# Patient Record
Sex: MALE | Race: WHITE | NOT HISPANIC OR LATINO | URBAN - METROPOLITAN AREA
[De-identification: names, ages, dates, MRNs, and addresses within clinical notes are randomized per-mention and may not be internally consistent; named-entity substitution may affect disease eponyms.]

---

## 2017-08-01 ENCOUNTER — OUTPATIENT (OUTPATIENT)
Dept: OUTPATIENT SERVICES | Facility: HOSPITAL | Age: 57
LOS: 1 days | End: 2017-08-01
Payer: MEDICAID

## 2017-08-15 ENCOUNTER — INPATIENT (INPATIENT)
Facility: HOSPITAL | Age: 57
LOS: 2 days | Discharge: HOME HEALTH | End: 2017-08-18
Attending: INTERNAL MEDICINE | Admitting: INTERNAL MEDICINE
Payer: MEDICAID

## 2017-08-15 VITALS
TEMPERATURE: 98 F | DIASTOLIC BLOOD PRESSURE: 64 MMHG | HEIGHT: 68 IN | RESPIRATION RATE: 16 BRPM | HEART RATE: 82 BPM | WEIGHT: 190.04 LBS | OXYGEN SATURATION: 100 % | SYSTOLIC BLOOD PRESSURE: 103 MMHG

## 2017-08-15 DIAGNOSIS — Z29.9 ENCOUNTER FOR PROPHYLACTIC MEASURES, UNSPECIFIED: ICD-10-CM

## 2017-08-15 DIAGNOSIS — N17.9 ACUTE KIDNEY FAILURE, UNSPECIFIED: ICD-10-CM

## 2017-08-15 DIAGNOSIS — F17.200 NICOTINE DEPENDENCE, UNSPECIFIED, UNCOMPLICATED: ICD-10-CM

## 2017-08-15 DIAGNOSIS — M79.89 OTHER SPECIFIED SOFT TISSUE DISORDERS: ICD-10-CM

## 2017-08-15 DIAGNOSIS — Z98.890 OTHER SPECIFIED POSTPROCEDURAL STATES: Chronic | ICD-10-CM

## 2017-08-15 DIAGNOSIS — G60.0 HEREDITARY MOTOR AND SENSORY NEUROPATHY: ICD-10-CM

## 2017-08-15 LAB
ALBUMIN SERPL ELPH-MCNC: 3.3 G/DL — SIGNIFICANT CHANGE UP (ref 3.3–5)
ALP SERPL-CCNC: 76 U/L — SIGNIFICANT CHANGE UP (ref 40–120)
ALT FLD-CCNC: 11 U/L — LOW (ref 12–78)
ANION GAP SERPL CALC-SCNC: 15 MMOL/L — SIGNIFICANT CHANGE UP (ref 5–17)
APTT BLD: 29.7 SEC — SIGNIFICANT CHANGE UP (ref 27.5–37.4)
AST SERPL-CCNC: 17 U/L — SIGNIFICANT CHANGE UP (ref 15–37)
BASOPHILS # BLD AUTO: 0.1 K/UL — SIGNIFICANT CHANGE UP (ref 0–0.2)
BASOPHILS NFR BLD AUTO: 0.4 % — SIGNIFICANT CHANGE UP (ref 0–2)
BILIRUB SERPL-MCNC: 0.5 MG/DL — SIGNIFICANT CHANGE UP (ref 0.2–1.2)
BUN SERPL-MCNC: 37 MG/DL — HIGH (ref 7–23)
CALCIUM SERPL-MCNC: 8.5 MG/DL — SIGNIFICANT CHANGE UP (ref 8.5–10.1)
CHLORIDE SERPL-SCNC: 106 MMOL/L — SIGNIFICANT CHANGE UP (ref 96–108)
CO2 SERPL-SCNC: 22 MMOL/L — SIGNIFICANT CHANGE UP (ref 22–31)
CREAT SERPL-MCNC: 2.68 MG/DL — HIGH (ref 0.5–1.3)
EOSINOPHIL # BLD AUTO: 0.3 K/UL — SIGNIFICANT CHANGE UP (ref 0–0.5)
EOSINOPHIL NFR BLD AUTO: 2.3 % — SIGNIFICANT CHANGE UP (ref 0–6)
ERYTHROCYTE [SEDIMENTATION RATE] IN BLOOD: 33 MM/HR — HIGH (ref 0–20)
GLUCOSE SERPL-MCNC: 96 MG/DL — SIGNIFICANT CHANGE UP (ref 70–99)
HCT VFR BLD CALC: 34.8 % — LOW (ref 39–50)
HGB BLD-MCNC: 11.4 G/DL — LOW (ref 13–17)
INR BLD: 1.36 RATIO — HIGH (ref 0.88–1.16)
LYMPHOCYTES # BLD AUTO: 0.9 K/UL — LOW (ref 1–3.3)
LYMPHOCYTES # BLD AUTO: 6.5 % — LOW (ref 13–44)
MCHC RBC-ENTMCNC: 28.6 PG — SIGNIFICANT CHANGE UP (ref 27–34)
MCHC RBC-ENTMCNC: 32.8 GM/DL — SIGNIFICANT CHANGE UP (ref 32–36)
MCV RBC AUTO: 87.1 FL — SIGNIFICANT CHANGE UP (ref 80–100)
MONOCYTES # BLD AUTO: 0.7 K/UL — SIGNIFICANT CHANGE UP (ref 0–0.9)
MONOCYTES NFR BLD AUTO: 4.6 % — SIGNIFICANT CHANGE UP (ref 2–14)
NEUTROPHILS # BLD AUTO: 12.4 K/UL — HIGH (ref 1.8–7.4)
NEUTROPHILS NFR BLD AUTO: 86.2 % — HIGH (ref 43–77)
PLATELET # BLD AUTO: 254 K/UL — SIGNIFICANT CHANGE UP (ref 150–400)
POTASSIUM SERPL-MCNC: 4.2 MMOL/L — SIGNIFICANT CHANGE UP (ref 3.5–5.3)
POTASSIUM SERPL-SCNC: 4.2 MMOL/L — SIGNIFICANT CHANGE UP (ref 3.5–5.3)
PROT SERPL-MCNC: 7.5 GM/DL — SIGNIFICANT CHANGE UP (ref 6–8.3)
PROTHROM AB SERPL-ACNC: 14.9 SEC — HIGH (ref 9.8–12.7)
RBC # BLD: 3.99 M/UL — LOW (ref 4.2–5.8)
RBC # FLD: 13.6 % — SIGNIFICANT CHANGE UP (ref 11–15)
SODIUM SERPL-SCNC: 143 MMOL/L — SIGNIFICANT CHANGE UP (ref 135–145)
WBC # BLD: 14.3 K/UL — HIGH (ref 3.8–10.5)
WBC # FLD AUTO: 14.3 K/UL — HIGH (ref 3.8–10.5)

## 2017-08-15 PROCEDURE — 73630 X-RAY EXAM OF FOOT: CPT | Mod: 26,50

## 2017-08-15 PROCEDURE — 99284 EMERGENCY DEPT VISIT MOD MDM: CPT

## 2017-08-15 PROCEDURE — 71010: CPT | Mod: 26

## 2017-08-15 PROCEDURE — 76775 US EXAM ABDO BACK WALL LIM: CPT | Mod: 26

## 2017-08-15 PROCEDURE — 99223 1ST HOSP IP/OBS HIGH 75: CPT

## 2017-08-15 PROCEDURE — 73120 X-RAY EXAM OF HAND: CPT | Mod: 26,50

## 2017-08-15 RX ORDER — ACETAMINOPHEN 500 MG
1000 TABLET ORAL ONCE
Qty: 0 | Refills: 0 | Status: COMPLETED | OUTPATIENT
Start: 2017-08-15 | End: 2017-08-15

## 2017-08-15 RX ORDER — NICOTINE POLACRILEX 2 MG
1 GUM BUCCAL DAILY
Qty: 0 | Refills: 0 | Status: DISCONTINUED | OUTPATIENT
Start: 2017-08-15 | End: 2017-08-18

## 2017-08-15 RX ORDER — HEPARIN SODIUM 5000 [USP'U]/ML
5000 INJECTION INTRAVENOUS; SUBCUTANEOUS EVERY 12 HOURS
Qty: 0 | Refills: 0 | Status: DISCONTINUED | OUTPATIENT
Start: 2017-08-15 | End: 2017-08-18

## 2017-08-15 RX ORDER — IBUPROFEN 200 MG
600 TABLET ORAL ONCE
Qty: 0 | Refills: 0 | Status: COMPLETED | OUTPATIENT
Start: 2017-08-15 | End: 2017-08-15

## 2017-08-15 RX ORDER — SODIUM CHLORIDE 9 MG/ML
1000 INJECTION INTRAMUSCULAR; INTRAVENOUS; SUBCUTANEOUS ONCE
Qty: 0 | Refills: 0 | Status: COMPLETED | OUTPATIENT
Start: 2017-08-15 | End: 2017-08-15

## 2017-08-15 RX ORDER — SODIUM CHLORIDE 9 MG/ML
2000 INJECTION INTRAMUSCULAR; INTRAVENOUS; SUBCUTANEOUS ONCE
Qty: 0 | Refills: 0 | Status: COMPLETED | OUTPATIENT
Start: 2017-08-15 | End: 2017-08-15

## 2017-08-15 RX ADMIN — SODIUM CHLORIDE 1000 MILLILITER(S): 9 INJECTION INTRAMUSCULAR; INTRAVENOUS; SUBCUTANEOUS at 19:37

## 2017-08-15 RX ADMIN — Medication 600 MILLIGRAM(S): at 20:04

## 2017-08-15 RX ADMIN — Medication 125 MILLIGRAM(S): at 19:38

## 2017-08-15 RX ADMIN — Medication 600 MILLIGRAM(S): at 19:39

## 2017-08-15 RX ADMIN — Medication 1000 MILLIGRAM(S): at 23:30

## 2017-08-15 RX ADMIN — Medication 400 MILLIGRAM(S): at 23:17

## 2017-08-15 RX ADMIN — Medication 1 PATCH: at 23:17

## 2017-08-15 RX ADMIN — SODIUM CHLORIDE 2000 MILLILITER(S): 9 INJECTION INTRAMUSCULAR; INTRAVENOUS; SUBCUTANEOUS at 19:37

## 2017-08-15 NOTE — H&P ADULT - NSHPPHYSICALEXAM_GEN_ALL_CORE
GENERAL: NAD, well-groomed, well-developed  HEAD:  Atraumatic, Normocephalic  EYES: EOMI, PERRLA, conjunctiva and sclera clear  ENMT: + mouth upper dentures, pt has hx of partial palate removal d/t cocaine abuse.   Musculoskeletal:	upper extremity +3 pitting edema and fingers contracted and lower extremity swelling +2 pitting edema noted  NECK: Supple, No JVD, Normal thyroid  NERVOUS SYSTEM:  Alert & Oriented X3, Good concentration DTRs 2+ intact and symmetric  CHEST/LUNG: Clear to percussion bilaterally; No rales, rhonchi, wheezing, or rubs  HEART: Regular rate and rhythm; No murmurs, rubs, or gallops  ABDOMEN: Soft, Nontender, Nondistended; Bowel sounds present  EXTREMITIES: Decreased distal pulses b/l, right 3rd toe discoloration  LYMPH: No lymphadenopathy noted

## 2017-08-15 NOTE — H&P ADULT - NSHPREVIEWOFSYSTEMS_GEN_ALL_CORE
ENMT:	mouth upper dentures, pt has hx of partial palate removal d/t cocaine abuse.     Musculoskeletal:	upper extremity +3 pitting edema and fingers contracted and lower extremity swelling +2 pitting edema noted No fever/chills, No photophobia/eye pain/changes in vision,  No chest pain/palpitations, no SOB/cough/wheeze/stridor, No abdominal pain, No N/V/D, no dysuria/frequency/discharge, No neck/back pain, no rash, no changes in neurological status/function.    ext: c/o upper extremity swelling and pain and lower extremity swelling

## 2017-08-15 NOTE — ED ADULT TRIAGE NOTE - CHIEF COMPLAINT QUOTE
swelling to hands with difficulty to straightened out fingers and swelling to ankles for 2 days. Pt c/o generalize joint pain with difficulty ambulating

## 2017-08-15 NOTE — H&P ADULT - PROBLEM SELECTOR PLAN 1
admit to medical floor.   IV fluid 2 liters received in ED  Renal consult- Dr. Whiteside  Labs in am to f/u admit to medical floor.   Likely secondary to NSAID overuse, avoid nephrotoxic medications  IV fluid 2 liters received in ED  Renal consult- Dr. Whiteside  Labs in am to f/u

## 2017-08-15 NOTE — ED PROVIDER NOTE - PROGRESS NOTE DETAILS
pt. states he does not have the sensation that he needs to void, he thinks his bladder is empty, will give po fluids  I explained to patient that he needs to be admitted to the hospital for further w/u and he has lost significant function in his kidneys.  I explained to him that he needs to be admitted, but at this time he is unsure of whether he wants to stay.  Pt. is of sound mind and decision-making capacity.  He understands that he could potentially loose a kidney or die if he doesn't stay.

## 2017-08-15 NOTE — H&P ADULT - NSHPLABSRESULTS_GEN_ALL_CORE
11.4   14.3  )-----------( 254      ( 15 Aug 2017 17:41 )             34.8       08-15    143  |  106  |  37<H>  ----------------------------<  96  4.2   |  22  |  2.68<H>    < from: US Renal (08.15.17 @ 19:31) >    Ultrasound of the kidneys.    COMPARISON: None.    FINDINGS:    The kidneys are echogenic bilaterally suggesting medical renal disease.    The right kidney measures 11.7 x 6.0 x 7.1 cm.    The left kidney measures 12.6 x 7.2 x 7.1 cm. there is a small cyst in   the midpole of the left kidney.    There is no hydronephrosis.  There is no gross renal mass or renal   calculus.    The partially fluid filled bladder is unremarkable. There is no   significant post void residual. Bilateral ureteral jets are seen.    IMPRESSION:    Echogenic kidneys suggestive of medical renal disease.  Small left renal cyst

## 2017-08-15 NOTE — ED PROVIDER NOTE - MEDICAL DECISION MAKING DETAILS
55 yo M with acute onset swelling of extremities, concerning for immunologic disease  -basic labs, ibuprofen, solu-medrol, anti nuclear antibody, cbc with diff, cmp, crp, esr, coags, mitochondrial antibody, surya, ua, cx  -f/u results, reeval, possible outpt. f/u with rheum

## 2017-08-15 NOTE — H&P ADULT - PROBLEM SELECTOR PLAN 2
labs in am.   PT and OT consults Possibly secondary to renal process however has had this in the past associated with CMT (resolved with exercises)  Follow nephrology consult, labs in am.   PT and OT consults

## 2017-08-15 NOTE — H&P ADULT - PMH
CMT (Charcot-Lakia-Tooth disease)    Palate deformity  partial palate removal  Substance abuse in remission

## 2017-08-15 NOTE — H&P ADULT - HISTORY OF PRESENT ILLNESS
57 yo M with b/l upper and LE swelling for 2 days, acute onset.  Pt. noticed it in the morning.  Pt. admits he's had ankle and foot swelling in the past with contracted joints, due to  Charcot–Lakia–Tooth disease. He works as a , however for last 2 days d/t swelling in b/l hands he is unable to do anything. Stated has not seen medical doctors for ages but sees a podiatrist for callus removal on regular bases.  Pt is a recovered drug addict, last use was an year ago. He has trouble extending his fingers completely.  He complains of mild pain in the hands and feet.  It's not the pain that's stopping him from opening his hands.  Has been taking Motrin 800mg 2 to 3 times a day for years. He has no other complaints at this time.  He denies trauma, URI symptoms, recent infection, cuts on the hands and feet.

## 2017-08-15 NOTE — ED ADULT NURSE NOTE - CHPI ED SYMPTOMS NEG
no pain/no vomiting/no headache/no chills/no loss of consciousness/no back pain/no fever/no dizziness/no decreased eating/drinking/no nausea

## 2017-08-15 NOTE — H&P ADULT - ASSESSMENT
55 yo M with PMHx of CMT here with b/l upper and LE swelling and weakness admitted for JOYCELYN 55 yo M with PMHx of CMT here with b/l upper and LE swelling and weakness with alanna on labs; patient will require admission for at least 2 midnights as detailed below:

## 2017-08-15 NOTE — ED PROVIDER NOTE - OBJECTIVE STATEMENT
57 yo M with b/l upper and LE swelling for 2 days, acute onset.  Pt. noticed it in the morning.  Pt. admits he's had ankle and foot swelling in the past with contracted joints, but he's never been formally diagnosed with a medical problem.  He has trouble extending his fingers completely.  He complains of mild pain in the hands and feet.  It's not the pain that's stopping him from opening his hands.  He has no other complaints at this time.  He denies trauma, URI symptoms, recent infection, cuts on the hands and feet.   ROS: negative for fever, cough, headache, chest pain, shortness of breath, abd pain, nausea, vomiting, diarrhea, rash, paresthesia, and weakness.   PMH: negative; Meds: Denies; SH: chronic smoker, no drinking or drugs

## 2017-08-15 NOTE — H&P ADULT - FAMILY HISTORY
<<-----Click on this checkbox to enter Family History Family history of rheumatoid arthritis     Father  Still living? Unknown  Hypertension, Age at diagnosis: Age Unknown

## 2017-08-15 NOTE — ED ADULT NURSE NOTE - OBJECTIVE STATEMENT
[age] yo male c/o bilateral hands and feet edema +4,x 3 days,  denies pmh. denies cp, ha, sob, palpitations, fever/chills,n/v/d. denies recent travel, injury or trauma , pt is a smoker, rarely consumes ETOH.  pt is a  by trade.

## 2017-08-15 NOTE — ED PROVIDER NOTE - PHYSICAL EXAMINATION
Vitals: WNL  Gen: AAOx3, NAD, sitting comfortably in stretcher  Head: ncat, perrla, eomi b/l  Neck: supple, no lymphadenopathy, no midline deviation  Heart: rrr, no m/r/g  Lungs: CTA b/l, no rales/ronchi/wheezes  Abd: soft, nontender, non-distended, no rebound or guarding  Ext: no clubbing/cyanosis/edema, tense induration of tissue of ankles and hands.  Pt. has difficulty extending fingers and toes completely, fingers are stuck in about 90 degrees of total flexion, sensation intact throughout, good equal muscle strength b/l.  Edema is non-pitting, minimal tenderness to palpation of the joint spaces of hands and feet.  No broken skin, mild erythema around hands and feet, no trailing erythema  Neuro: sensation and muscle strength intact b/l, antalgic gait due to foot pain with ambulation

## 2017-08-16 DIAGNOSIS — F11.20 OPIOID DEPENDENCE, UNCOMPLICATED: ICD-10-CM

## 2017-08-16 DIAGNOSIS — F10.20 ALCOHOL DEPENDENCE, UNCOMPLICATED: ICD-10-CM

## 2017-08-16 DIAGNOSIS — F17.200 NICOTINE DEPENDENCE, UNSPECIFIED, UNCOMPLICATED: ICD-10-CM

## 2017-08-16 LAB
AMPHET UR-MCNC: NEGATIVE — SIGNIFICANT CHANGE UP
ANA TITR SER: NEGATIVE — SIGNIFICANT CHANGE UP
ANION GAP SERPL CALC-SCNC: 15 MMOL/L — SIGNIFICANT CHANGE UP (ref 5–17)
APPEARANCE UR: CLEAR — SIGNIFICANT CHANGE UP
BARBITURATES UR SCN-MCNC: NEGATIVE — SIGNIFICANT CHANGE UP
BENZODIAZ UR-MCNC: NEGATIVE — SIGNIFICANT CHANGE UP
BILIRUB UR-MCNC: NEGATIVE — SIGNIFICANT CHANGE UP
BUN SERPL-MCNC: 45 MG/DL — HIGH (ref 7–23)
CALCIUM SERPL-MCNC: 8 MG/DL — LOW (ref 8.5–10.1)
CHLORIDE SERPL-SCNC: 110 MMOL/L — HIGH (ref 96–108)
CHOLEST SERPL-MCNC: 103 MG/DL — SIGNIFICANT CHANGE UP (ref 10–199)
CK SERPL-CCNC: 30 U/L — SIGNIFICANT CHANGE UP (ref 26–308)
CO2 SERPL-SCNC: 19 MMOL/L — LOW (ref 22–31)
COCAINE METAB.OTHER UR-MCNC: NEGATIVE — SIGNIFICANT CHANGE UP
COLOR SPEC: YELLOW — SIGNIFICANT CHANGE UP
CREAT SERPL-MCNC: 2.26 MG/DL — HIGH (ref 0.5–1.3)
CRP SERPL-MCNC: 33 MG/DL — HIGH (ref 0–0.4)
DIFF PNL FLD: ABNORMAL
EPI CELLS # UR: ABNORMAL
ETHANOL SERPL-MCNC: <10 MG/DL — SIGNIFICANT CHANGE UP (ref 0–10)
GLUCOSE SERPL-MCNC: 122 MG/DL — HIGH (ref 70–99)
GLUCOSE UR QL: NEGATIVE MG/DL — SIGNIFICANT CHANGE UP
HAV IGM SER-ACNC: SIGNIFICANT CHANGE UP
HAV IGM SER-ACNC: SIGNIFICANT CHANGE UP
HBV CORE IGM SER-ACNC: SIGNIFICANT CHANGE UP
HBV CORE IGM SER-ACNC: SIGNIFICANT CHANGE UP
HBV SURFACE AB SER-ACNC: REACTIVE
HBV SURFACE AG SER-ACNC: SIGNIFICANT CHANGE UP
HBV SURFACE AG SER-ACNC: SIGNIFICANT CHANGE UP
HCV AB S/CO SERPL IA: 0.1 S/CO — SIGNIFICANT CHANGE UP
HCV AB S/CO SERPL IA: 0.1 S/CO — SIGNIFICANT CHANGE UP
HCV AB SERPL-IMP: SIGNIFICANT CHANGE UP
HCV AB SERPL-IMP: SIGNIFICANT CHANGE UP
HDLC SERPL-MCNC: 12 MG/DL — LOW (ref 40–125)
INR BLD: 1.24 RATIO — HIGH (ref 0.88–1.16)
KETONES UR-MCNC: NEGATIVE — SIGNIFICANT CHANGE UP
LEUKOCYTE ESTERASE UR-ACNC: NEGATIVE — SIGNIFICANT CHANGE UP
LIPID PNL WITH DIRECT LDL SERPL: 76 MG/DL — SIGNIFICANT CHANGE UP
MAGNESIUM SERPL-MCNC: 2.3 MG/DL — SIGNIFICANT CHANGE UP (ref 1.6–2.6)
METHADONE UR-MCNC: NEGATIVE — SIGNIFICANT CHANGE UP
MITOCHONDRIA AB SER-ACNC: SIGNIFICANT CHANGE UP
NITRITE UR-MCNC: NEGATIVE — SIGNIFICANT CHANGE UP
OPIATES UR-MCNC: POSITIVE — SIGNIFICANT CHANGE UP
PCP SPEC-MCNC: SIGNIFICANT CHANGE UP
PCP UR-MCNC: NEGATIVE — SIGNIFICANT CHANGE UP
PH UR: 5 — SIGNIFICANT CHANGE UP (ref 5–8)
PHOSPHATE SERPL-MCNC: 4.5 MG/DL — SIGNIFICANT CHANGE UP (ref 2.5–4.5)
POTASSIUM SERPL-MCNC: 3.8 MMOL/L — SIGNIFICANT CHANGE UP (ref 3.5–5.3)
POTASSIUM SERPL-SCNC: 3.8 MMOL/L — SIGNIFICANT CHANGE UP (ref 3.5–5.3)
PROT UR-MCNC: 30 MG/DL
PROTHROM AB SERPL-ACNC: 13.6 SEC — HIGH (ref 9.8–12.7)
RBC CASTS # UR COMP ASSIST: SIGNIFICANT CHANGE UP /HPF (ref 0–4)
SODIUM SERPL-SCNC: 144 MMOL/L — SIGNIFICANT CHANGE UP (ref 135–145)
SP GR SPEC: 1.01 — SIGNIFICANT CHANGE UP (ref 1.01–1.02)
T3 SERPL-MCNC: 47 NG/DL — LOW (ref 80–200)
T4 AB SER-ACNC: 5 UG/DL — SIGNIFICANT CHANGE UP (ref 4.6–12)
THC UR QL: NEGATIVE — SIGNIFICANT CHANGE UP
TOTAL CHOLESTEROL/HDL RATIO MEASUREMENT: 8.6 RATIO — SIGNIFICANT CHANGE UP (ref 3.4–9.6)
TRIGL SERPL-MCNC: 76 MG/DL — SIGNIFICANT CHANGE UP (ref 10–149)
TSH SERPL-MCNC: 0.5 UIU/ML — SIGNIFICANT CHANGE UP (ref 0.36–3.74)
UROBILINOGEN FLD QL: NEGATIVE MG/DL — SIGNIFICANT CHANGE UP

## 2017-08-16 PROCEDURE — 93970 EXTREMITY STUDY: CPT | Mod: 26

## 2017-08-16 PROCEDURE — 99233 SBSQ HOSP IP/OBS HIGH 50: CPT

## 2017-08-16 PROCEDURE — 99223 1ST HOSP IP/OBS HIGH 75: CPT

## 2017-08-16 PROCEDURE — 93931 UPPER EXTREMITY STUDY: CPT | Mod: 26

## 2017-08-16 PROCEDURE — 93010 ELECTROCARDIOGRAM REPORT: CPT

## 2017-08-16 RX ORDER — ACETAMINOPHEN 500 MG
325 TABLET ORAL EVERY 4 HOURS
Qty: 0 | Refills: 0 | Status: DISCONTINUED | OUTPATIENT
Start: 2017-08-16 | End: 2017-08-18

## 2017-08-16 RX ORDER — ACETAMINOPHEN 500 MG
1000 TABLET ORAL ONCE
Qty: 0 | Refills: 0 | Status: COMPLETED | OUTPATIENT
Start: 2017-08-16 | End: 2017-08-16

## 2017-08-16 RX ADMIN — HEPARIN SODIUM 5000 UNIT(S): 5000 INJECTION INTRAVENOUS; SUBCUTANEOUS at 17:18

## 2017-08-16 RX ADMIN — Medication 15 MILLIGRAM(S): at 19:49

## 2017-08-16 RX ADMIN — Medication 325 MILLIGRAM(S): at 20:35

## 2017-08-16 RX ADMIN — Medication 1 MILLIGRAM(S): at 16:40

## 2017-08-16 RX ADMIN — Medication 1 PATCH: at 11:28

## 2017-08-16 RX ADMIN — Medication 1 PATCH: at 23:00

## 2017-08-16 RX ADMIN — Medication 1 MILLIGRAM(S): at 21:39

## 2017-08-16 RX ADMIN — Medication 325 MILLIGRAM(S): at 19:48

## 2017-08-16 RX ADMIN — Medication 400 MILLIGRAM(S): at 11:28

## 2017-08-16 RX ADMIN — Medication 1000 MILLIGRAM(S): at 11:40

## 2017-08-16 NOTE — PHYSICAL THERAPY INITIAL EVALUATION ADULT - GAIT DEVIATIONS NOTED, PT EVAL
decreased step length/decreased weight-shifting ability/decreased pk/decreased stride length/decreased velocity of limb motion

## 2017-08-16 NOTE — OCCUPATIONAL THERAPY INITIAL EVALUATION ADULT - ADDITIONAL COMMENTS
Prior to admission, pt was functioning in his  roles, self sufficient & ambulating independently without any assistive devices. Pt  job demands excessive bending and  good dexterity  manipulation to maneuver tools necessitated for his job performance ; presently , pt needs assistance with  functional  mobility and to complete self care tasks. The scale below depicts a picture of the pt's current level of functioning. Barthel Index: Feeding Score____10__, Bathing Score___0___, Grooming Score___0__, Dressing Score__5___, Bowel Score__5___, Bladder Score____5__, Toilet Score__5___, Transfer Score__10____, Mobility Score_0____, Stairs Score___0__, Total Score__40/100___.

## 2017-08-16 NOTE — OCCUPATIONAL THERAPY INITIAL EVALUATION ADULT - ANTICIPATED DISCHARGE DISPOSITION, OT EVAL
Home with  OT referral to assist with return to prior level of function pending improvement.  Recommend  rolling walker and  3: 1 commode to prevent falls, optimize pt's ability for ADL management & safely navigate in all terrains

## 2017-08-16 NOTE — CONSULT NOTE ADULT - SUBJECTIVE AND OBJECTIVE BOX
HISTORY OF PRESENT ILLNESS:    Patient is a 56y Male    HPI:  57 yo M with PMHx as listed below was in his USOH until about 1 month ago when he began to experience swelling in his feet.  It remained stable until 3 or 4 days ago, when his hands became swollen as well.  The hands and feet also became painful.  The pain is constant, though worse with activity.  No similar previous episodes.  He denies any other complaints.    PAST MEDICAL & SURGICAL HISTORY:  Palate deformity: partial palate removal  Substance abuse in remission  CMT (Charcot-Lakia-Tooth disease)  H/O hammer toe correction: left      ROS: No fever/chills, wt loss, night sweats, chest pain/dyspnea/cough, oral ulcers, rashes, alopecia, photosensitivity, dry eye/dry mouth, Raynaud's, dysphagia, focal weakness, or eye symptoms.    MEDICATIONS  (STANDING):  heparin  Injectable 5000 Unit(s) SubCutaneous every 12 hours  nicotine -  14 mG/24Hr(s) Patch 1 patch Transdermal daily    MEDICATIONS  (PRN):  acetaminophen   Tablet. 325 milliGRAM(s) Oral every 4 hours PRN Mild Pain (1 - 3)  LORazepam   Injectable 1 milliGRAM(s) IntraMuscular every 4 hours PRN Agitation      Allergies    No Known Allergies        FAMILY HISTORY:  Family history of rheumatoid arthritis  Hypertension (Father)      SOCIAL HISTORY:  Tobacco--   none            Vital Signs Last 24 Hrs  T(C): 37.1 (16 Aug 2017 17:35), Max: 37.1 (16 Aug 2017 17:35)  T(F): 98.8 (16 Aug 2017 17:35), Max: 98.8 (16 Aug 2017 17:35)  HR: 74 (16 Aug 2017 17:35) (74 - 85)  BP: 137/82 (16 Aug 2017 17:35) (122/73 - 162/81)  BP(mean): --  RR: 169 (16 Aug 2017 17:35) (15 - 169)  SpO2: 97% (16 Aug 2017 11:08) (96% - 100%)    PHYSICAL EXAM:  General :  NAD  HEENT--  no oral ulcers  Nodes--   LAD  Lungs--  CTA B/L  Heart--  RRR, nlS1 &S2 normal;   Abdomen--  soft, NT, ND +BS  Skin:  no rashes  Musculoskeletal exam:  (+)pitting edema throughout B/L hands, and in B/L LE's from the feet up to the knees;  (+)tenderness in B/L wrists;  no tenderness in rest of joints;  B/L wrists w/ decreased flexion/extension;  (+)Heberden's nodes in B/L hands    LABS:                        11.4   14.3  )-----------( 254      ( 15 Aug 2017 17:41 )             34.8     08-    144  |  110<H>  |  45<H>  ----------------------------<  122<H>  3.8   |  19<L>  |  2.26<H>    Ca    8.0<L>      16 Aug 2017 07:50  Phos  4.5     -  Mg     2.3     -    TPro  7.5  /  Alb  3.3  /  TBili  0.5  /  DBili  x   /  AST  17  /  ALT  11<L>  /  AlkPhos  76  08-15    PT/INR - ( 16 Aug 2017 07:50 )   PT: 13.6 sec;   INR: 1.24 ratio         PTT - ( 15 Aug 2017 17:41 )  PTT:29.7 sec  Urinalysis Basic - ( 16 Aug 2017 13:19 )    Color: Yellow / Appearance: Clear / S.015 / pH: x  Gluc: x / Ketone: Negative  / Bili: Negative / Urobili: Negative mg/dL   Blood: x / Protein: 30 mg/dL / Nitrite: Negative   Leuk Esterase: Negative / RBC: 0-2 /HPF / WBC x   Sq Epi: x / Non Sq Epi: Moderate / Bacteria: x    Sedimentation Rate, Erythrocyte (08.15.17 @ 17:41)    Sedimentation Rate, Erythrocyte: 33 mm/hr    C-Reactive Protein, Serum (17 @ 00:09)    C-Reactive Protein, Serum: 33.00: Test Repeated mg/dL                RADIOLOGY & ADDITIONAL STUDIES:  < from: Xray Hand 2 Views, Bilateral (08.15.17 @ 18:13) >  EXAM:  HAND 2VIEWS BI                            PROCEDURE DATE:  08/15/2017          INTERPRETATION:  Frontal and lateral views of the hands    Clinical indication: Hand pain and swelling    Comparison: None    FINDINGS AND   IMPRESSION: Evaluation is limited by suboptimal positioning. No acute   fracture or dislocation identified. There are degenerative changes, most   pronounced at the distal interphalangeal joints. There is chronic   deformity at the third distal interphalangeal joint. There is flexion at   the proximal interphalangeal joints. There is diffuse soft tissue swelling    < end of copied text >    < from: Xray Foot AP + Lateral + Oblique, Bilat (08.15.17 @ 18:13) >  EXAM:  FOOT COMPLETE  3 VWS   BI                            PROCEDURE DATE:  08/15/2017          INTERPRETATION:  Radiographs of the bilateral feet    CLINICAL INFORMATION: Pain    TECHNIQUE:  Frontal, oblique and lateral views of the feet were obtained.    FINDINGS:   No prior similar studies are available for review.      The right foot demonstrates no fracture or dislocation. There is no lytic   or blastic lesion. There is a minimal plantar calcaneal spur. There are   hammertoes.    The leftfoot demonstrates degenerative change of the first   metatarsophalangeal joint. There is fusion of the interphalangeal joint   of the great toe. There is no significant plantar calcaneal spur.    Impression: No evidence of fracture or dislocation in either foot. Left   foot demonstrates DJD at the first MTP joint and fusion of the   interphalangeal joint of the great toe.     < end of copied text >

## 2017-08-16 NOTE — OCCUPATIONAL THERAPY INITIAL EVALUATION ADULT - TRANSFER SAFETY CONCERNS NOTED: BED/CHAIR, REHAB EVAL
decreased safety awareness/decreased step length/decreased proprioception/squat pivot/decreased balance during turns

## 2017-08-16 NOTE — PHYSICAL THERAPY INITIAL EVALUATION ADULT - PASSIVE RANGE OF MOTION EXAMINATION, REHAB EVAL
deficits as listed below/bilateral upper extremity Passive ROM was WFL (within functional limits)/Pt has difficulty to have full extension of fingers/bilateral lower extremity Passive ROM was WFL (within functional limits)

## 2017-08-16 NOTE — PROGRESS NOTE ADULT - PROBLEM SELECTOR PLAN 2
-Follow nephrology consult  -PT and OT consults  -Pain mgmt -Follow nephrology consult  -PT and OT consults  -Pain mgmt  -check BL UE/LE Doppler US  -check Hepatitis panel

## 2017-08-16 NOTE — OCCUPATIONAL THERAPY INITIAL EVALUATION ADULT - PLANNED THERAPY INTERVENTIONS, OT EVAL
joint mobilization/ROM/ADL retraining/energy conservation techniques/parent/caregiver training.../transfer training/balance training/motor coordination training/strengthening/stretching/fine motor coordination training/neuromuscular re-education/bed mobility training/IADL retraining

## 2017-08-16 NOTE — PROGRESS NOTE ADULT - SUBJECTIVE AND OBJECTIVE BOX
Patient is a 56y old  Male who presents with a chief complaint of b/l hand swelling and b/l leg swelling (15 Aug 2017 21:51)      OVERNIGHT EVENTS: C/O BL hand and LE swelling is unchanged for last 3 days now. Denies any bug bites, or any injury. Also c/o BL knees and ankles joints pain    REVIEW OF SYSTEMS: denies chest pain/SOB, diaphoresis, no F/C, cough, dizziness, headache, blurry vision, nausea, vomiting, abdominal pain. All others review of systems negative     MEDICATIONS  (STANDING):  heparin  Injectable 5000 Unit(s) SubCutaneous every 12 hours  nicotine -  14 mG/24Hr(s) Patch 1 patch Transdermal daily  acetaminophen  IVPB. 1000 milliGRAM(s) IV Intermittent once    MEDICATIONS  (PRN):      Allergies    No Known Allergies    Intolerances        T(F): 97.8 (08-16-17 @ 11:08), Max: 98.6 (08-15-17 @ 20:19)  HR: 78 (08-16-17 @ 11:08) (75 - 85)  BP: 122/73 (08-16-17 @ 11:08) (103/64 - 162/81)  RR: 17 (08-16-17 @ 11:08) (15 - 17)  SpO2: 97% (08-16-17 @ 11:08) (96% - 100%)  Wt(kg): --    PHYSICAL EXAM:  GENERAL: NAD, well-groomed, well-developed  HEAD:  Atraumatic, Normocephalic  EYES: EOMI, PERRLA, conjunctiva and sclera clear  ENMT: No tonsillar erythema, exudates, or enlargement; Moist mucous membranes, Good dentition, No lesions  NECK: Supple, No JVD, Normal thyroid  NERVOUS SYSTEM:  Alert & Oriented X3, Good concentration; Motor Strength 5/5 B/L upper and lower extremities; DTRs 2+ intact and symmetric  CHEST/LUNG: Clear to percussion bilaterally; No rales, rhonchi, wheezing, or rubs BL  HEART: Regular rate and rhythm; No murmurs, rubs, or gallops  ABDOMEN: Soft, Nontender, Nondistended; Bowel sounds present  EXTREMITIES:  2+ Peripheral edema BL LE and hands, decreased ROM of BL hands fingers, ankles and knees  LYMPH: No lymphadenopathy noted  SKIN: No rashes or lesions    LABS:                        11.4   14.3  )-----------( 254      ( 15 Aug 2017 17:41 )             34.8     08-16    144  |  110<H>  |  45<H>  ----------------------------<  122<H>  3.8   |  19<L>  |  2.26<H>    Ca    8.0<L>      16 Aug 2017 07:50  Phos  4.5     08-16  Mg     2.3     08-16    TPro  7.5  /  Alb  3.3  /  TBili  0.5  /  DBili  x   /  AST  17  /  ALT  11<L>  /  AlkPhos  76  08-15    PT/INR - ( 16 Aug 2017 07:50 )   PT: 13.6 sec;   INR: 1.24 ratio         PTT - ( 15 Aug 2017 17:41 )  PTT:29.7 sec    Cultures;     blood cx pending    Lipid panel:   LDL 76  TG 76      RADIOLOGY & ADDITIONAL TESTS:    Imaging Personally Reviewed:  [x ] YES      Consultant(s) Notes Reviewed:  [x ] YES     Care Discussed with [x ] Consultants [X ] Patient [ ] Family  [x ]    [x ]  Other; RN

## 2017-08-16 NOTE — CONSULT NOTE ADULT - SUBJECTIVE AND OBJECTIVE BOX
Vascular Attendin17 The pt was seen and examined, , pt has Bilat upper and LE edema, few weeks and worse in few days, has difficulty moving the hands, Pt has had hand deformity for years but did not have swelling like this.        HPI:  55 yo M with b/l upper and LE swelling for 2 days, acute onset.  Pt. noticed it in the morning.  Pt. admits he's had ankle and foot swelling in the past with contracted joints, due to  Charcot–Lakia–Tooth disease. He works as a , however for last 2 days d/t swelling in b/l hands he is unable to do anything. Stated has not seen medical doctors for ages but sees a podiatrist for callus removal on regular bases.  Pt is a recovered drug addict, last use was an year ago. He has trouble extending his fingers completely.  He complains of mild pain in the hands and feet.  It's not the pain that's stopping him from opening his hands.  Has been taking Motrin 800mg 2 to 3 times a day for years. He has no other complaints at this time.  He denies trauma, URI symptoms, recent infection, cuts on the hands and feet. (15 Aug 2017 21:51)      PAST MEDICAL & SURGICAL HISTORY:  Palate deformity: partial palate removal  Substance abuse in remission  CMT (Charcot-Lakia-Tooth disease)  H/O hammer toe correction: left        MEDICATIONS  (STANDING):  heparin  Injectable 5000 Unit(s) SubCutaneous every 12 hours  nicotine -  14 mG/24Hr(s) Patch 1 patch Transdermal daily    MEDICATIONS  (PRN):  acetaminophen   Tablet. 325 milliGRAM(s) Oral every 4 hours PRN Mild Pain (1 - 3)  LORazepam   Injectable 1 milliGRAM(s) IntraMuscular every 4 hours PRN Agitation      Allergies    No Known Allergies    Intolerances        SOCIAL HISTORY:      Vital Signs Last 24 Hrs  T(C): 36.6 (16 Aug 2017 11:08), Max: 37 (15 Aug 2017 20:19)  T(F): 97.8 (16 Aug 2017 11:08), Max: 98.6 (15 Aug 2017 20:19)  HR: 78 (16 Aug 2017 11:08) (75 - 85)  BP: 122/73 (16 Aug 2017 11:08) (122/73 - 162/81)  BP(mean): --  RR: 17 (16 Aug 2017 11:08) (15 - 17)  SpO2: 97% (16 Aug 2017 11:08) (96% - 100%)    P/E:-  Bilat upper extremity swelling in distal forearm, on volar surface, edema hands and hand and fingers deformity, both feet with edema but no deformity seen.   CAROTIDS:- Bilateral carotids with no Bruits. No scars of previous catheterisation.  UPPER EXTREMITIES:- Bilateral radial artery pulses are normal and no ischemia of the Hands. No edema of the arms.  ABDOMEN:- No pulsatile mass in the abdomen and no ascites.  LOWER EXTREMITIES:- Bilateral LE with No Edema and no CVI, No varicose veins, no ulcers.The arterial pulse are examined with palpation and Dopplers and the findings are as follows,        Pulses:   Right:                                                                          Left:  FEM [ y]2+ [ ]1+ [ ]doppler                                             FEM [ y]2+ [ ]1+ [ ]doppler    POP [ ]2+ [y ]1+ [ ]doppler                                             POP [ ]2+ [y ]1+ [ ]doppler    DP [ ]2+ [ y]1+ [ ]doppler                                                DP [ ]2+ [ ]1+ [y ]doppler  PT[ ]2+ [y ]1+ [ ]doppler                                                  PT [ ]2+ [ ]1+ [y ]doppler      LABS:                        11.4   14.3  )-----------( 254      ( 15 Aug 2017 17:41 )             34.8     08-16    144  |  110<H>  |  45<H>  ----------------------------<  122<H>  3.8   |  19<L>  |  2.26<H>    Ca    8.0<L>      16 Aug 2017 07:50  Phos  4.5     08-16  Mg     2.3     08-16    TPro  7.5  /  Alb  3.3  /  TBili  0.5  /  DBili  x   /  AST  17  /  ALT  11<L>  /  AlkPhos  76  08-15    PT/INR - ( 16 Aug 2017 07:50 )   PT: 13.6 sec;   INR: 1.24 ratio         PTT - ( 15 Aug 2017 17:41 )  PTT:29.7 sec  Urinalysis Basic - ( 16 Aug 2017 13:19 )    Color: Yellow / Appearance: Clear / S.015 / pH: x  Gluc: x / Ketone: Negative  / Bili: Negative / Urobili: Negative mg/dL   Blood: x / Protein: 30 mg/dL / Nitrite: Negative   Leuk Esterase: Negative / RBC: 0-2 /HPF / WBC x   Sq Epi: x / Non Sq Epi: Moderate / Bacteria: x        RADIOLOGY & ADDITIONAL STUDIES  PROCEDURE DATE:  2017          INTERPRETATION:  Clinical Information: Bilateral upper survey swelling    Comparison: None available.    Technique: Spectral and color Doppler ultrasound of the bilateral upper   extremities.    FINDINGS:    RIGHT - There is normal compression and flow dynamics within the internal   jugular, axillary, brachial, radial and ulnar veins. The subclavian vein   demonstrates normal flow dynamics. Thecephalic, basilic and median   cubital vein are also patent and compressible.      LEFT - There is normal compression and flow dynamics within the internal   jugular, axillary, brachial, radial and ulnar veins. The subclavian vein   demonstrates normal flow dynamics. The cephalic, basilic and median   cubital vein are also patent and compressible.    Bilateral subcutaneous edema is noted.    IMPRESSION:     No upper extremity DVT. Bilateral subcutaneous edema.      Impression and Plan:       No vascular etio for swelling, appears to be inflammatory proceesss systemic??.  Consider, Rhematologic work up, RA , ZAIDA and may consider Hand surgery consult to eval the hand deformity.

## 2017-08-16 NOTE — PHYSICAL THERAPY INITIAL EVALUATION ADULT - PLANNED THERAPY INTERVENTIONS, PT EVAL
postural re-education/gait training/transfer training/ROM/strengthening/balance training/bed mobility training

## 2017-08-16 NOTE — OCCUPATIONAL THERAPY INITIAL EVALUATION ADULT - GENERAL OBSERVATIONS, REHAB EVAL
Pt was seen for initial OT  evaluation , encountered supine in bed, AA&Ox4, cooperative & followed commands. Pt  presents with  grade 1+ edema in  BUE, pain ,decreased ROM, muscle strength,  endurance, balance , ADL management and functional mobility skills. Dexterity and fine motor skills are diminished in both hands; pt is right hand dominant

## 2017-08-16 NOTE — OCCUPATIONAL THERAPY INITIAL EVALUATION ADULT - PERTINENT HX OF CURRENT PROBLEM, REHAB EVAL
Pt presented to ER  with c/o  pain in  BUE/LE . Pt is diagnosed with  swelling od bilateral LE ; Ultra sound 8/15/17 results confirm  echogenic  kidney  suggestive  of renal disease  and  small left renal cyst; X- ray 8/15/17 results showed  cardiomegaly and may represent pulmonary congestion Pt presented to ER  with c/o  pain in  BUE/LE . Pt is diagnosed with swelling of bilateral LE ; Ultra sound 8/15/17 results confirm  echogenic  kidney  suggestive  of renal disease  and  small left renal cyst; X- ray 8/15/17 results showed  cardiomegaly and may represent pulmonary congestion

## 2017-08-16 NOTE — PHYSICAL THERAPY INITIAL EVALUATION ADULT - MODIFIED CLINICAL TEST OF SENSORY INTEGRATION IN BALANCE TEST
Barthel Index: Feeding Score 10_/10__, Bathing Score 0_/5__, Grooming Score 5_/5__, Dressing Score 10_/10__, Bowels Score _10_/10_, Bladder Score 10_/10__, Toilet Score 10_/10__, Transfers Score _10/15__, Mobility Score _0_/15_, Stairs Score 0_/10__,     Total Score _65__/100

## 2017-08-16 NOTE — PHYSICAL THERAPY INITIAL EVALUATION ADULT - ACTIVE RANGE OF MOTION EXAMINATION, REHAB EVAL
bilateral  lower extremity Active ROM was WFL (within functional limits)/Pt has difficulty to have full extension of fingers/deficits as listed below/bilateral upper extremity Active ROM was WFL (within functional limits)

## 2017-08-16 NOTE — PHYSICAL THERAPY INITIAL EVALUATION ADULT - TINETTI BALANCE TEST, REHAB EVAL
Sitting Balance - 1/1 , Rises From Chair - 1/2, Attempts to rise - 1/2 , Immediate Standing Balance -1 /2,  Standing Balance - 1/2, Nudged - 2 /2, Eyes Closed - 1/1,  Turning 360 Deg - 1 /2, Sitting down - 1/2, Balance Score -10 /16

## 2017-08-16 NOTE — OCCUPATIONAL THERAPY INITIAL EVALUATION ADULT - LIVES WITH, PROFILE
his sister in the basement of a private house ;  pt has 3 steps to enter with  bilateral hand  rail s and 1 flight  of stairs to  ge  to his apartment with 1 hand rail; pt's  bathroom  has a walk in shower and is not equipped with shower  chair , grab  bars or  raised toilet seat. his sister in the basement of a private house ;  pt has 3 steps to enter with  bilateral hand rails and 1 flight  of stairs to get  to his apartment with 1 hand rail; pt's bathroom has a walk in shower and is not equipped with shower chair , grab bars or raised toilet seat.

## 2017-08-16 NOTE — PROGRESS NOTE ADULT - ASSESSMENT
57 yo M with PMHx of Charcot-Lakia-Tooth disease here with BL hands and LE swelling and weakness with JOYCELYN on labs; Renal US- Echogenic kidneys suggestive of medical renal disease. Small left renal cyst. BL hands xray- Evaluation is limited by suboptimal positioning. No acute fracture or dislocation identified. There are degenerative changes, most pronounced at the distal interphalangeal joints. There is chronic deformity at the third distal interphalangeal joint. There is flexion at the proximal interphalangeal joints. There is diffuse soft tissue swelling. Xray BL feet- No evidence of fracture or dislocation in either foot. Left foot demonstrates DJD at the first MTP joint and fusion of the interphalangeal joint of the great toe. CXR- Cardiomegaly. Diffuse prominence of the interstitial markings may represent pulmonary vascular congestion and the appropriate clinical setting. Rheum labs are pending. 57 yo M with PMHx of Charcot-Lakia-Tooth disease here with BL hands and LE swelling and weakness with JOYCELYN on labs; Renal US- Echogenic kidneys suggestive of medical renal disease. Small left renal cyst. BL hands xray- Evaluation is limited by suboptimal positioning. No acute fracture or dislocation identified. There are degenerative changes, most pronounced at the distal interphalangeal joints. There is chronic deformity at the third distal interphalangeal joint. There is flexion at the proximal interphalangeal joints. There is diffuse soft tissue swelling. Xray BL feet- No evidence of fracture or dislocation in either foot. Left foot demonstrates DJD at the first MTP joint and fusion of the interphalangeal joint of the great toe. CXR- Cardiomegaly. Diffuse prominence of the interstitial markings may represent pulmonary vascular congestion and the appropriate clinical setting. Rheum labs are pending. Pt takes Motrin 800 mg/day, Oxycontin 80mg/day, ETOH daily. Used Cocaine 5 yrs ago

## 2017-08-16 NOTE — OCCUPATIONAL THERAPY INITIAL EVALUATION ADULT - RANGE OF MOTION EXAMINATION, UPPER EXTREMITY
decreased  concentric and eccentric control are noted in BUE due to  pain with  movements/bilateral UE Active ROM was WFL  (within functional limits)/bilateral UE Passive ROM was WFL  (within functional limits)

## 2017-08-16 NOTE — CONSULT NOTE ADULT - SUBJECTIVE AND OBJECTIVE BOX
From chart     Patient is a 56y old  Male who presents with a chief complaint of b/l hand swelling and b/l leg swelling (15 Aug 2017 21:51)    HPI:  57 yo M with b/l upper and LE swelling for 2 days, acute onset.  Pt. noticed it in the morning.  Pt. admits he's had ankle and foot swelling in the past with contracted joints, due to  Charcot–Lakia–Tooth disease. He works as a , however for last 2 days d/t swelling in b/l hands he is unable to do anything. Stated has not seen medical doctors for ages but sees a podiatrist for callus removal on regular bases.  Pt is a recovered drug addict, last use was an year ago. He has trouble extending his fingers completely.  He complains of mild pain in the hands and feet.  It's not the pain that's stopping him from opening his hands.  Has been taking Motrin 800mg 2 to 3 times a day for years. He has no other complaints at this time.  He denies trauma, URI symptoms, recent infection, cuts on the hands and feet. (15 Aug 2017 21:51)        Patient with increasing hand swelling for period of months, worsening over the last few days; take Motrin 6 tablets a day for 1-2 weeks with on e daily for years for hand pain. He works as a .  Doesnt see a pmd; No hx renal disease. Noted ankle swelling as well. Noted shoulder stiffness and pain when lifting.    No fever, chills, cough, diarrhea, rash, night sweats, visual changes.      PAST MEDICAL & SURGICAL HISTORY:  Palate deformity: partial palate removal  Substance abuse in remission  CMT (Charcot-Lakia-Tooth disease)   H/O hammer toe correction: left    FAMILY HISTORY:  Family history of rheumatoid arthritis  Hypertension (Father)    No Known Allergies    MEDICATIONS  (STANDING):  heparin  Injectable 5000 Unit(s) SubCutaneous every 12 hours  nicotine -  14 mG/24Hr(s) Patch 1 patch Transdermal daily    MEDICATIONS  (PRN):  acetaminophen   Tablet. 325 milliGRAM(s) Oral every 4 hours PRN Mild Pain (1 - 3)    Vital Signs Last 24 Hrs  T(C): 36.6 (16 Aug 2017 11:08), Max: 37 (15 Aug 2017 20:19)  T(F): 97.8 (16 Aug 2017 11:08), Max: 98.6 (15 Aug 2017 20:19)  HR: 78 (16 Aug 2017 11:08) (75 - 85)  BP: 122/73 (16 Aug 2017 11:08) (103/64 - 162/81)  BP(mean): --  RR: 17 (16 Aug 2017 11:08) (15 - 17)  SpO2: 97% (16 Aug 2017 11:08) (96% - 100%)    CAPILLARY BLOOD GLUCOSE        PHYSICAL EXAM:      T(C): 36.6 (16 Aug 2017 11:08), Max: 37 (15 Aug 2017 20:19)  HR: 78 (16 Aug 2017 11:08) (75 - 85)  BP: 122/73 (16 Aug 2017 11:08) (103/64 - 162/81)  RR: 17 (16 Aug 2017 11:08) (15 - 17)  SpO2: 97% (16 Aug 2017 11:08) (96% - 100%)  Wt(kg): --  Respiratory: clear anteriorly, decreased BS at bases  Cardiovascular: S1 S2  Gastrointestinal: soft NT ND +BS  Extremities:   bilateral hand edema with limited ROM of PIP and DIP; contracted at rest.  2+ radial pulses.b/l              08-16    144  |  110<H>  |  45<H>  ----------------------------<  122<H>  3.8   |  19<L>  |  2.26<H>    Ca    8.0<L>      16 Aug 2017 07:50  Phos  4.5     08-16  Mg     2.3     08-16    TPro  7.5  /  Alb  3.3  /  TBili  0.5  /  DBili  x   /  AST  17  /  ALT  11<L>  /  AlkPhos  76  08-15                          11.4   14.3  )-----------( 254      ( 15 Aug 2017 17:41 )             34.8             Assessment and Plan      JOYCELYN suspected NSAIDs related AIN/ hemodynamic effect; to exclude secondary GN/AIN via CTD process /NSAIDs; hx CMT in the past; degree CKD unclear with years of NSAIDs use and smoking.  Hand swelling; r/o systemic rheumatoid process vs mechanical wear and tear injury vs vascular element.  Serologies sent; UA Urine eos; urine prot/ creat; CPK.  Vascular evaluation; will follow.  Doppler b/l extremities    .

## 2017-08-16 NOTE — PHYSICAL THERAPY INITIAL EVALUATION ADULT - CRITERIA FOR SKILLED THERAPEUTIC INTERVENTIONS
anticipated equipment needs at discharge/risk reduction/prevention/therapy frequency/impairments found/functional limitations in following categories

## 2017-08-16 NOTE — PROGRESS NOTE ADULT - PROBLEM SELECTOR PLAN 1
-Likely secondary to NSAID overuse, avoid nephrotoxic medications  -IV fluid 2 liters received in ED  -Renal consult- Dr. Whiteside  -Monitor electrolytes

## 2017-08-16 NOTE — CONSULT NOTE ADULT - ASSESSMENT
57 y/o M p/w acute pain/swelling in his hands and LE's (from feet up to knees).  Presentation suggestive of possible RS3PE (Remitting Seronegative Symmetrical Synovitis with Pitting Edema).  RA less likely given acute onset and pitting edema.  DDx also includes fluid overload secondary to JOYCELYN.    - Recommend trial of low-dose prednisone - 15mg PO daily.    - f/u ZAIDA, U Pr/Cr, RF, C3/C4.

## 2017-08-16 NOTE — PHYSICAL THERAPY INITIAL EVALUATION ADULT - GENERAL OBSERVATIONS, REHAB EVAL
Pt was seen sitting at the edge of bed, alert and Ox4. Pt was medicated by Y[facility] EPPS prior to PT session. Pt c/o pain in B feet during standing and ambulation. [facility] EPPS was informed. Edema +1 was noted in all four extremities.

## 2017-08-16 NOTE — OCCUPATIONAL THERAPY INITIAL EVALUATION ADULT - PRECAUTIONS/LIMITATIONS, REHAB EVAL
fall precautions/Both  hands are in  claw positiona and Pt has difficulty extending digits fall precautions/Both  hands are in  claw positions and Pt has difficulty extending digits

## 2017-08-16 NOTE — OCCUPATIONAL THERAPY INITIAL EVALUATION ADULT - RANGE OF MOTION EXAMINATION, LOWER EXTREMITY
bilateral LE Active Assistive ROM was WFL  (within functional limits)/bilateral LE Passive ROM was WFL  (within functional limits)

## 2017-08-16 NOTE — PHYSICAL THERAPY INITIAL EVALUATION ADULT - TINETTI GAIT TEST, REHAB EVAL
Indication of gait -1/1,   Step Length and height -2/2,   Foot Clearance -2/2,   Step Symmetry - 1/1,  Step Continuity -1/1,   Path -1/ 2,   Trunk -1/2,   Walking Time -0/1,   Total Score - 9/12

## 2017-08-16 NOTE — OCCUPATIONAL THERAPY INITIAL EVALUATION ADULT - SOCIAL CONCERNS
Complex psychosocial needs/coping issues/Pt voiced concerns  about  the pain in  BUE and ankle and the difficulty moving his finger. Complex psychosocial needs/coping issues/Pt voiced concerns  about  the pain in  BUE and ankle and the difficulty moving his fingers.

## 2017-08-17 LAB
ANION GAP SERPL CALC-SCNC: 12 MMOL/L — SIGNIFICANT CHANGE UP (ref 5–17)
APPEARANCE UR: CLEAR — SIGNIFICANT CHANGE UP
AUTO DIFF PNL BLD: NEGATIVE — SIGNIFICANT CHANGE UP
BILIRUB UR-MCNC: NEGATIVE — SIGNIFICANT CHANGE UP
BUN SERPL-MCNC: 61 MG/DL — HIGH (ref 7–23)
C-ANCA SER-ACNC: NEGATIVE — SIGNIFICANT CHANGE UP
C3 SERPL-MCNC: 109 MG/DL — SIGNIFICANT CHANGE UP (ref 80–180)
C4 SERPL-MCNC: 27 MG/DL — SIGNIFICANT CHANGE UP (ref 10–45)
CALCIUM SERPL-MCNC: 8.2 MG/DL — LOW (ref 8.5–10.1)
CHLORIDE SERPL-SCNC: 110 MMOL/L — HIGH (ref 96–108)
CO2 SERPL-SCNC: 20 MMOL/L — LOW (ref 22–31)
COLOR SPEC: YELLOW — SIGNIFICANT CHANGE UP
CREAT ?TM UR-MCNC: 80 MG/DL — SIGNIFICANT CHANGE UP
CREAT SERPL-MCNC: 2.07 MG/DL — HIGH (ref 0.5–1.3)
CULTURE RESULTS: NO GROWTH — SIGNIFICANT CHANGE UP
DIFF PNL FLD: NEGATIVE — SIGNIFICANT CHANGE UP
EOSINOPHIL NFR URNS MANUAL: NEGATIVE — SIGNIFICANT CHANGE UP
GLUCOSE SERPL-MCNC: 88 MG/DL — SIGNIFICANT CHANGE UP (ref 70–99)
GLUCOSE UR QL: NEGATIVE MG/DL — SIGNIFICANT CHANGE UP
HCT VFR BLD CALC: 37.4 % — LOW (ref 39–50)
HGB BLD-MCNC: 12.1 G/DL — LOW (ref 13–17)
KETONES UR-MCNC: NEGATIVE — SIGNIFICANT CHANGE UP
LEUKOCYTE ESTERASE UR-ACNC: NEGATIVE — SIGNIFICANT CHANGE UP
MCHC RBC-ENTMCNC: 28.8 PG — SIGNIFICANT CHANGE UP (ref 27–34)
MCHC RBC-ENTMCNC: 32.3 GM/DL — SIGNIFICANT CHANGE UP (ref 32–36)
MCV RBC AUTO: 89.3 FL — SIGNIFICANT CHANGE UP (ref 80–100)
NITRITE UR-MCNC: NEGATIVE — SIGNIFICANT CHANGE UP
P-ANCA SER-ACNC: NEGATIVE — SIGNIFICANT CHANGE UP
PH UR: 5 — SIGNIFICANT CHANGE UP (ref 5–8)
PLATELET # BLD AUTO: 244 K/UL — SIGNIFICANT CHANGE UP (ref 150–400)
POTASSIUM SERPL-MCNC: 4.2 MMOL/L — SIGNIFICANT CHANGE UP (ref 3.5–5.3)
POTASSIUM SERPL-SCNC: 4.2 MMOL/L — SIGNIFICANT CHANGE UP (ref 3.5–5.3)
PROT ?TM UR-MCNC: 36 MG/DL — HIGH (ref 0–12)
PROT ?TM UR-MCNC: 36 MG/DL — HIGH (ref 0–12)
PROT SERPL-MCNC: 6.3 G/DL — SIGNIFICANT CHANGE UP (ref 6–8.3)
PROT SERPL-MCNC: 6.3 G/DL — SIGNIFICANT CHANGE UP (ref 6–8.3)
PROT UR-MCNC: 30 MG/DL
PROT/CREAT UR-RTO: 0.5 RATIO — HIGH (ref 0–0.2)
RBC # BLD: 4.19 M/UL — LOW (ref 4.2–5.8)
RBC # FLD: 13.4 % — SIGNIFICANT CHANGE UP (ref 11–15)
RHEUMATOID FACT SERPL-ACNC: <7 IU/ML — SIGNIFICANT CHANGE UP (ref 0–13.9)
SODIUM SERPL-SCNC: 142 MMOL/L — SIGNIFICANT CHANGE UP (ref 135–145)
SP GR SPEC: 1.01 — SIGNIFICANT CHANGE UP (ref 1.01–1.02)
SPECIMEN SOURCE: SIGNIFICANT CHANGE UP
UROBILINOGEN FLD QL: NEGATIVE MG/DL — SIGNIFICANT CHANGE UP
WBC # BLD: 13.4 K/UL — HIGH (ref 3.8–10.5)
WBC # FLD AUTO: 13.4 K/UL — HIGH (ref 3.8–10.5)

## 2017-08-17 PROCEDURE — 99233 SBSQ HOSP IP/OBS HIGH 50: CPT

## 2017-08-17 RX ORDER — PANTOPRAZOLE SODIUM 20 MG/1
40 TABLET, DELAYED RELEASE ORAL
Qty: 0 | Refills: 0 | Status: DISCONTINUED | OUTPATIENT
Start: 2017-08-17 | End: 2017-08-18

## 2017-08-17 RX ADMIN — Medication 1 PATCH: at 11:17

## 2017-08-17 RX ADMIN — HEPARIN SODIUM 5000 UNIT(S): 5000 INJECTION INTRAVENOUS; SUBCUTANEOUS at 05:55

## 2017-08-17 RX ADMIN — Medication 15 MILLIGRAM(S): at 05:55

## 2017-08-17 RX ADMIN — PANTOPRAZOLE SODIUM 40 MILLIGRAM(S): 20 TABLET, DELAYED RELEASE ORAL at 11:17

## 2017-08-17 RX ADMIN — HEPARIN SODIUM 5000 UNIT(S): 5000 INJECTION INTRAVENOUS; SUBCUTANEOUS at 17:27

## 2017-08-17 NOTE — PROGRESS NOTE ADULT - PROBLEM SELECTOR PLAN 5
-monitor for Alcohol withdrawal  -will use Ativan PRN for anxiety
-will check Alcohol level  -monitor for Alcohol withdrawal

## 2017-08-17 NOTE — PROGRESS NOTE ADULT - SUBJECTIVE AND OBJECTIVE BOX
Subjective: dec R hand swelling      MEDICATIONS  (STANDING):  heparin  Injectable 5000 Unit(s) SubCutaneous every 12 hours  nicotine -  14 mG/24Hr(s) Patch 1 patch Transdermal daily  predniSONE   Tablet 15 milliGRAM(s) Oral daily  pantoprazole    Tablet 40 milliGRAM(s) Oral before breakfast    MEDICATIONS  (PRN):  acetaminophen   Tablet. 325 milliGRAM(s) Oral every 4 hours PRN Mild Pain (1 - 3)  LORazepam   Injectable 1 milliGRAM(s) IntraMuscular every 4 hours PRN Agitation          T(C): 36.6 (17 @ 12:04), Max: 37.1 (17 @ 17:35)  HR: 74 (17 @ 12:04) (64 - 74)  BP: 185/109 (17 @ 12:04) (137/82 - 185/109)  RR: 18 (17 @ 12:04) (15 - 169)  SpO2: 99% (17 @ 12:04) (97% - 99%)  Wt(kg): --        I&O's Detail    16 Aug 2017 07:01  -  17 Aug 2017 07:00  --------------------------------------------------------  IN:    Oral Fluid: 350 mL    Solution: 100 mL  Total IN: 450 mL    OUT:    Voided: 700 mL  Total OUT: 700 mL    Total NET: -250 mL               PHYSICAL EXAM:    GENERAL: anxious  EYES: EOMI, PERRLA, conjunctiva and sclera clear  NECK: Supple, no inc in JVP  CHEST/LUNG: Clear  HEART: S1S2  ABDOMEN: Soft, Nontender, Nondistended; Bowel sounds present  EXTREMITIES:  trace edema. R hand contracture   NEURO: no asterixis      LABS:  CBC Full  -  ( 17 Aug 2017 07:03 )  WBC Count : 13.4 K/uL  Hemoglobin : 12.1 g/dL  Hematocrit : 37.4 %  Platelet Count - Automated : 244 K/uL  Mean Cell Volume : 89.3 fl  Mean Cell Hemoglobin : 28.8 pg  Mean Cell Hemoglobin Concentration : 32.3 gm/dL  Auto Neutrophil # : x  Auto Lymphocyte # : x  Auto Monocyte # : x  Auto Eosinophil # : x  Auto Basophil # : x  Auto Neutrophil % : x  Auto Lymphocyte % : x  Auto Monocyte % : x  Auto Eosinophil % : x  Auto Basophil % : x        142  |  110<H>  |  61<H>  ----------------------------<  88  4.2   |  20<L>  |  2.07<H>    Ca    8.2<L>      17 Aug 2017 07:03  Phos  4.5     -  Mg     2.3     -    TPro  7.5  /  Alb  3.3  /  TBili  0.5  /  DBili  x   /  AST  17  /  ALT  11<L>  /  AlkPhos  76  08-15    PT/INR - ( 16 Aug 2017 07:50 )   PT: 13.6 sec;   INR: 1.24 ratio         PTT - ( 15 Aug 2017 17:41 )  PTT:29.7 sec  Urinalysis Basic - ( 17 Aug 2017 05:42 )    Color: Yellow / Appearance: Clear / S.015 / pH: x  Gluc: x / Ketone: Negative  / Bili: Negative / Urobili: Negative mg/dL   Blood: x / Protein: 30 mg/dL / Nitrite: Negative   Leuk Esterase: Negative / RBC: 0-2 /HPF / WBC 0-2   Sq Epi: x / Non Sq Epi: Moderate / Bacteria: x          ASSESSMENT and PLAN:    * JOYCELYN -- DDx per initial consult. Follow requested w/u. Cr declining. Essentially bland urine. Cont to monitor trend of renal indices. Obtain outpt  baseline Cr

## 2017-08-17 NOTE — PROGRESS NOTE ADULT - ASSESSMENT
57 yo M with PMHx of Charcot-Lakia-Tooth disease here with BL hands and LE swelling and weakness with JOYCELYN on labs; Renal US- Echogenic kidneys suggestive of medical renal disease. Small left renal cyst. BL hands xray- Evaluation is limited by suboptimal positioning. No acute fracture or dislocation identified. There are degenerative changes, most pronounced at the distal interphalangeal joints. There is chronic deformity at the third distal interphalangeal joint. There is flexion at the proximal interphalangeal joints. There is diffuse soft tissue swelling. Xray BL feet- No evidence of fracture or dislocation in either foot. Left foot demonstrates DJD at the first MTP joint and fusion of the interphalangeal joint of the great toe. CXR- Cardiomegaly. Diffuse prominence of the interstitial markings may represent pulmonary vascular congestion and the appropriate clinical setting. Rheum labs are pending. Pt takes Motrin 800 mg/day, Oxycontin 80mg/day, ETOH daily. Used Cocaine 5 yrs ago. Seen by Rheum, started on prednisone. BL UE/LE Doppler US- neg for DVT. UTOX positive for opiates.

## 2017-08-17 NOTE — PROGRESS NOTE ADULT - SUBJECTIVE AND OBJECTIVE BOX
Patient is a 56y old  Male who presents with a chief complaint of b/l hand swelling and b/l leg swelling (15 Aug 2017 21:51)      OVERNIGHT EVENTS: The hands and feet swelling unchanged    REVIEW OF SYSTEMS: denies chest pain/SOB, diaphoresis, no F/C, cough, dizziness, headache, blurry vision, nausea, vomiting, abdominal pain. All others review of systems negative     MEDICATIONS  (STANDING):  heparin  Injectable 5000 Unit(s) SubCutaneous every 12 hours  nicotine -  14 mG/24Hr(s) Patch 1 patch Transdermal daily  predniSONE   Tablet 15 milliGRAM(s) Oral daily  pantoprazole    Tablet 40 milliGRAM(s) Oral before breakfast    MEDICATIONS  (PRN):  acetaminophen   Tablet. 325 milliGRAM(s) Oral every 4 hours PRN Mild Pain (1 - 3)  LORazepam   Injectable 1 milliGRAM(s) IntraMuscular every 4 hours PRN Agitation      Allergies    No Known Allergies    Intolerances        T(F): 98 (17 @ 05:59), Max: 98.8 (17 @ 17:35)  HR: 64 (17 @ 05:59) (64 - 78)  BP: 185/97 (17 @ 05:59) (122/73 - 185/97)  RR: 16 (17 @ 05:59) (15 - 169)  SpO2: 97% (17 @ 05:59) (97% - 98%)  Wt(kg): --    PHYSICAL EXAM:  GENERAL: NAD, well-groomed, well-developed  HEAD:  Atraumatic, Normocephalic  EYES: EOMI, PERRLA, conjunctiva and sclera clear  ENMT: No tonsillar erythema, exudates, or enlargement; Moist mucous membranes, Good dentition, No lesions  NECK: Supple, No JVD, Normal thyroid  NERVOUS SYSTEM:  Alert & Oriented X3, Good concentration; Motor Strength 5/5 B/L upper and lower extremities; DTRs 2+ intact and symmetric  CHEST/LUNG: Clear to percussion bilaterally; No rales, rhonchi, wheezing, or rubs BL  HEART: Regular rate and rhythm; No murmurs, rubs, or gallops  ABDOMEN: Soft, Nontender, Nondistended; Bowel sounds present  EXTREMITIES:  (+) pitting edema throughout B/L hands, and in B/L LE's from the feet up to the knees;  (+)tenderness in B/L wrists;  B/L wrists w/ decreased flexion/extension  LYMPH: No lymphadenopathy noted  SKIN: No rashes or lesions    LABS:                        12.1   13.4  )-----------( 244      ( 17 Aug 2017 07:03 )             37.4     08-17    142  |  110<H>  |  61<H>  ----------------------------<  88  4.2   |  20<L>  |  2.07<H>    Ca    8.2<L>      17 Aug 2017 07:03  Phos  4.5     08-16  Mg     2.3     08-16    TPro  7.5  /  Alb  3.3  /  TBili  0.5  /  DBili  x   /  AST  17  /  ALT  11<L>  /  AlkPhos  76  08-15    PT/INR - ( 16 Aug 2017 07:50 )   PT: 13.6 sec;   INR: 1.24 ratio         PTT - ( 15 Aug 2017 17:41 )  PTT:29.7 sec  Urinalysis Basic - ( 17 Aug 2017 05:42 )    Color: Yellow / Appearance: Clear / S.015 / pH: x  Gluc: x / Ketone: Negative  / Bili: Negative / Urobili: Negative mg/dL   Blood: x / Protein: 30 mg/dL / Nitrite: Negative   Leuk Esterase: Negative / RBC: 0-2 /HPF / WBC 0-2   Sq Epi: x / Non Sq Epi: Moderate / Bacteria: x      Cultures;   CAPILLARY BLOOD GLUCOSE        Lipid panel:   LDL 76  TG 76    CARDIAC MARKERS ( 16 Aug 2017 12:37 )  x     / x     / 30 U/L / x     / x            RADIOLOGY & ADDITIONAL TESTS:    Imaging Personally Reviewed:  [x ] YES      Consultant(s) Notes Reviewed:  [x ] YES     Care Discussed with [ x] Consultants [X ] Patient [ ] Family  [x ]    [x ]  Other; RN

## 2017-08-17 NOTE — PROGRESS NOTE ADULT - PROBLEM SELECTOR PLAN 1
-Likely secondary to NSAID overuse, avoid nephrotoxic medications  -IV fluid 2 liters received in ED  -follow up Renal recs  -Monitor electrolytes, renal functions

## 2017-08-17 NOTE — PROGRESS NOTE ADULT - PROBLEM SELECTOR PLAN 2
-Follow up Rheum recs  -Per Rheum-possible Diagnosis is RS3PE (Remitting Seronegative Symmetrical Synovitis with Pitting Edema)  -Started on prednisone and PPI  -check TTE  -cont on PT and OT  -Pain mgmt

## 2017-08-18 ENCOUNTER — TRANSCRIPTION ENCOUNTER (OUTPATIENT)
Age: 57
End: 2017-08-18

## 2017-08-18 VITALS
SYSTOLIC BLOOD PRESSURE: 160 MMHG | OXYGEN SATURATION: 98 % | TEMPERATURE: 98 F | DIASTOLIC BLOOD PRESSURE: 70 MMHG | HEART RATE: 64 BPM | RESPIRATION RATE: 16 BRPM

## 2017-08-18 PROBLEM — Z00.00 ENCOUNTER FOR PREVENTIVE HEALTH EXAMINATION: Status: ACTIVE | Noted: 2017-08-18

## 2017-08-18 LAB
ANION GAP SERPL CALC-SCNC: 10 MMOL/L — SIGNIFICANT CHANGE UP (ref 5–17)
BASOPHILS # BLD AUTO: 0.1 K/UL — SIGNIFICANT CHANGE UP (ref 0–0.2)
BASOPHILS NFR BLD AUTO: 0.5 % — SIGNIFICANT CHANGE UP (ref 0–2)
BUN SERPL-MCNC: 49 MG/DL — HIGH (ref 7–23)
CALCIUM SERPL-MCNC: 7.9 MG/DL — LOW (ref 8.5–10.1)
CCP IGG SERPL-ACNC: <8 UNITS — SIGNIFICANT CHANGE UP (ref 0–19)
CHLORIDE SERPL-SCNC: 114 MMOL/L — HIGH (ref 96–108)
CO2 SERPL-SCNC: 22 MMOL/L — SIGNIFICANT CHANGE UP (ref 22–31)
CREAT SERPL-MCNC: 1.43 MG/DL — HIGH (ref 0.5–1.3)
EOSINOPHIL # BLD AUTO: 0.1 K/UL — SIGNIFICANT CHANGE UP (ref 0–0.5)
EOSINOPHIL NFR BLD AUTO: 1.4 % — SIGNIFICANT CHANGE UP (ref 0–6)
GBM IGG SER-ACNC: <0.2 U — SIGNIFICANT CHANGE UP
GLUCOSE SERPL-MCNC: 88 MG/DL — SIGNIFICANT CHANGE UP (ref 70–99)
HCT VFR BLD CALC: 32.1 % — LOW (ref 39–50)
HGB BLD-MCNC: 10.6 G/DL — LOW (ref 13–17)
IGA FLD-MCNC: 361 MG/DL — SIGNIFICANT CHANGE UP (ref 68–378)
IGG FLD-MCNC: 1150 MG/DL — SIGNIFICANT CHANGE UP (ref 694–1618)
IGM SERPL-MCNC: 66 MG/DL — SIGNIFICANT CHANGE UP (ref 40–230)
KAPPA LC SER QL IFE: 5.05 MG/DL — HIGH (ref 0.33–1.94)
KAPPA/LAMBDA FREE LIGHT CHAIN RATIO, SERUM: 1.61 RATIO — SIGNIFICANT CHANGE UP (ref 0.26–1.65)
LAMBDA LC SER QL IFE: 3.14 MG/DL — HIGH (ref 0.57–2.63)
LYMPHOCYTES # BLD AUTO: 1.8 K/UL — SIGNIFICANT CHANGE UP (ref 1–3.3)
LYMPHOCYTES # BLD AUTO: 19.2 % — SIGNIFICANT CHANGE UP (ref 13–44)
M PROTEIN 24H UR ELPH-MRATE: SIGNIFICANT CHANGE UP
MCHC RBC-ENTMCNC: 29.4 PG — SIGNIFICANT CHANGE UP (ref 27–34)
MCHC RBC-ENTMCNC: 33 GM/DL — SIGNIFICANT CHANGE UP (ref 32–36)
MCV RBC AUTO: 89.1 FL — SIGNIFICANT CHANGE UP (ref 80–100)
MONOCYTES # BLD AUTO: 0.7 K/UL — SIGNIFICANT CHANGE UP (ref 0–0.9)
MONOCYTES NFR BLD AUTO: 7 % — SIGNIFICANT CHANGE UP (ref 2–14)
NEUTROPHILS # BLD AUTO: 6.9 K/UL — SIGNIFICANT CHANGE UP (ref 1.8–7.4)
NEUTROPHILS NFR BLD AUTO: 71.9 % — SIGNIFICANT CHANGE UP (ref 43–77)
PLATELET # BLD AUTO: 217 K/UL — SIGNIFICANT CHANGE UP (ref 150–400)
POTASSIUM SERPL-MCNC: 3.1 MMOL/L — LOW (ref 3.5–5.3)
POTASSIUM SERPL-SCNC: 3.1 MMOL/L — LOW (ref 3.5–5.3)
RBC # BLD: 3.6 M/UL — LOW (ref 4.2–5.8)
RBC # FLD: 13.2 % — SIGNIFICANT CHANGE UP (ref 11–15)
RF+CCP IGG SER-IMP: NEGATIVE — SIGNIFICANT CHANGE UP
SODIUM SERPL-SCNC: 146 MMOL/L — HIGH (ref 135–145)
WBC # BLD: 9.6 K/UL — SIGNIFICANT CHANGE UP (ref 3.8–10.5)
WBC # FLD AUTO: 9.6 K/UL — SIGNIFICANT CHANGE UP (ref 3.8–10.5)

## 2017-08-18 PROCEDURE — 99239 HOSP IP/OBS DSCHRG MGMT >30: CPT

## 2017-08-18 RX ORDER — POTASSIUM CHLORIDE 20 MEQ
40 PACKET (EA) ORAL ONCE
Qty: 0 | Refills: 0 | Status: DISCONTINUED | OUTPATIENT
Start: 2017-08-18 | End: 2017-08-18

## 2017-08-18 RX ORDER — NICOTINE POLACRILEX 2 MG
1 GUM BUCCAL
Qty: 30 | Refills: 0
Start: 2017-08-18 | End: 2017-09-17

## 2017-08-18 RX ORDER — PANTOPRAZOLE SODIUM 20 MG/1
1 TABLET, DELAYED RELEASE ORAL
Qty: 30 | Refills: 0
Start: 2017-08-18 | End: 2017-09-17

## 2017-08-18 RX ORDER — IBUPROFEN 200 MG
1 TABLET ORAL
Qty: 0 | Refills: 0 | COMMUNITY

## 2017-08-18 RX ORDER — POTASSIUM CHLORIDE 20 MEQ
40 PACKET (EA) ORAL EVERY 4 HOURS
Qty: 0 | Refills: 0 | Status: DISCONTINUED | OUTPATIENT
Start: 2017-08-18 | End: 2017-08-18

## 2017-08-18 RX ORDER — POTASSIUM CHLORIDE 20 MEQ
40 PACKET (EA) ORAL ONCE
Qty: 0 | Refills: 0 | Status: COMPLETED | OUTPATIENT
Start: 2017-08-18 | End: 2017-08-18

## 2017-08-18 RX ADMIN — Medication 1 PATCH: at 11:13

## 2017-08-18 RX ADMIN — PANTOPRAZOLE SODIUM 40 MILLIGRAM(S): 20 TABLET, DELAYED RELEASE ORAL at 07:51

## 2017-08-18 RX ADMIN — Medication 40 MILLIEQUIVALENT(S): at 08:57

## 2017-08-18 RX ADMIN — Medication 325 MILLIGRAM(S): at 06:12

## 2017-08-18 RX ADMIN — Medication 15 MILLIGRAM(S): at 05:25

## 2017-08-18 RX ADMIN — Medication 1 PATCH: at 11:12

## 2017-08-18 RX ADMIN — HEPARIN SODIUM 5000 UNIT(S): 5000 INJECTION INTRAVENOUS; SUBCUTANEOUS at 05:25

## 2017-08-18 RX ADMIN — Medication 325 MILLIGRAM(S): at 05:27

## 2017-08-18 NOTE — DISCHARGE NOTE ADULT - MEDICATION SUMMARY - MEDICATIONS TO STOP TAKING
I will STOP taking the medications listed below when I get home from the hospital:    Motrin 800 mg oral tablet  -- 1 tab(s) by mouth 3 times a day

## 2017-08-18 NOTE — DISCHARGE NOTE ADULT - CARE PLAN
Principal Discharge DX:	JOYCELYN (acute kidney injury)  Goal:	Resolving  Instructions for follow-up, activity and diet:	Encourage PO fluids. 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 Follow up with Rheum, PMD.  Secondary Diagnosis:	Swelling of extremity of unknown etiology  Instructions for follow-up, activity and diet:	As above  Secondary Diagnosis:	Smoking addiction  Instructions for follow-up, activity and diet:	nicotine patch-Discussed smoking cessation.  Secondary Diagnosis:	Uncomplicated alcohol dependence  Instructions for follow-up, activity and diet:	Discussed alcohol abstinence  Secondary Diagnosis:	Oxycontin use disorder, moderate  Instructions for follow-up, activity and diet:	Education provided for drug abstinence.

## 2017-08-18 NOTE — DISCHARGE NOTE ADULT - HOSPITAL COURSE
55 yo M with PMHx of Charcot-Lakia-Tooth disease here with BL hands and LE swelling and weakness with JOYCELYN on labs; Renal US- Echogenic kidneys suggestive of medical renal disease. Small left renal cyst. BL hands xray- Evaluation is limited by suboptimal positioning. No acute fracture or dislocation identified. There are degenerative changes, most pronounced at the distal interphalangeal joints. There is chronic deformity at the third distal interphalangeal joint. There is flexion at the proximal interphalangeal joints. There is diffuse soft tissue swelling. Xray BL feet- No evidence of fracture or dislocation in either foot. Left foot demonstrates DJD at the first MTP joint and fusion of the interphalangeal joint of the great toe. CXR- Cardiomegaly. Diffuse prominence of the interstitial markings may represent pulmonary vascular congestion and the appropriate clinical setting. Rheum labs are pending. Pt takes Motrin 800 mg/day, Oxycontin 80mg/day, ETOH daily. Used Cocaine 5 yrs ago. Seen by Rheum, started on prednisone. BL UE/LE Doppler US- neg for DVT. UTOX positive for opiates.   TTE-  1. Left ventricular ejection fraction, by visual estimation, is 55 to   60%.   2. Thickening of the anterior and posterior mitral valve leaflets.   3. Mild-moderate tricuspid regurgitation.   4. Estimated pulmonary artery systolic pressure is 62.8 mmHg assuming a   right atrial pressure of 5 mmHg, which is consistent with severe   pulmonary hypertension.  Renal function is improving. 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 Cont on prednisone and follow up with Dr. Mariscal and Rheum

## 2017-08-18 NOTE — DISCHARGE NOTE ADULT - PLAN OF CARE
Resolving Encourage PO fluids. Follow up with renal for monitoring BMP Supportive care Cont on prednisone and PPI. Follow up with Rheum, PMD. As above nicotine patch-Discussed smoking cessation. Discussed alcohol abstinence Education provided for drug abstinence.

## 2017-08-18 NOTE — DISCHARGE NOTE ADULT - PATIENT PORTAL LINK FT
“You can access the [facility] Patient Portal, offered by [facility], by registering with the following website: [email or web address]”

## 2017-08-18 NOTE — PROGRESS NOTE ADULT - SUBJECTIVE AND OBJECTIVE BOX
Patient seen in follow up for JOYCELYN; feels well; swelling improved.    MEDICATIONS  (STANDING):  heparin  Injectable 5000 Unit(s) SubCutaneous every 12 hours  nicotine -  14 mG/24Hr(s) Patch 1 patch Transdermal daily  predniSONE   Tablet 15 milliGRAM(s) Oral daily  pantoprazole    Tablet 40 milliGRAM(s) Oral before breakfast  potassium chloride    Tablet ER 40 milliEquivalent(s) Oral once    MEDICATIONS  (PRN):  acetaminophen   Tablet. 325 milliGRAM(s) Oral every 4 hours PRN Mild Pain (1 - 3)  LORazepam   Injectable 1 milliGRAM(s) IntraMuscular every 4 hours PRN Agitation    PHYSICAL EXAM:      T(C): 36.7 (08-18-17 @ 11:43), Max: 37.2 (08-17-17 @ 23:10)  HR: 64 (08-18-17 @ 11:43) (54 - 65)  BP: 160/70 (08-18-17 @ 11:43) (160/70 - 185/92)  RR: 16 (08-18-17 @ 11:43) (14 - 16)  SpO2: 98% (08-18-17 @ 11:43) (96% - 98%)  Wt(kg): --  Respiratory: clear anteriorly, decreased BS at bases  Cardiovascular: S1 S2  Gastrointestinal: soft NT ND +BS  Extremities:   1 +edema improved                                    10.6   9.6   )-----------( 217      ( 18 Aug 2017 06:41 )             32.1     08-18    146<H>  |  114<H>  |  49<H>  ----------------------------<  88  3.1<L>   |  22  |  1.43<H>    Ca    7.9<L>      18 Aug 2017 06:41    TPro  6.3  /  Alb  x   /  TBili  x   /  DBili  x   /  AST  x   /  ALT  x   /  AlkPhos  x   08-17      LIVER FUNCTIONS - ( 17 Aug 2017 09:55 )  Alb: x     / Pro: 6.3 g/dL / ALK PHOS: x     / ALT: x     / AST: x     / GGT: x             Assessment and Plan:  JOYCELYN NSAIDs effect, improved.  For discharge and follow up with rheumatology.

## 2017-08-18 NOTE — DISCHARGE NOTE ADULT - MEDICATION SUMMARY - MEDICATIONS TO TAKE
I will START or STAY ON the medications listed below when I get home from the hospital:    predniSONE 5 mg oral tablet  -- 3 tab(s) by mouth once a day  -- Indication: For SWELLING OF BILATERAL EXTREMETIES    pantoprazole 40 mg oral delayed release tablet  -- 1 tab(s) by mouth once a day (before a meal)  -- Indication: For GERD    nicotine 14 mg/24 hr transdermal film, extended release  -- 1 patch by transdermal patch once a day  -- Indication: For Current every day smoker

## 2017-08-18 NOTE — DISCHARGE NOTE ADULT - SECONDARY DIAGNOSIS.
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

## 2017-08-18 NOTE — DISCHARGE NOTE ADULT - CARE PROVIDERS DIRECT ADDRESSES
[email or web address]

## 2017-08-18 NOTE — DISCHARGE NOTE ADULT - CARE PROVIDER_API CALL
Joshua Whiteside), Internal Medicine; Nephrology  [address] [facility]  [address] [place] [id number]  Phone: [phone]  Fax: [phone]    Tony Lafleur), Internal Medicine; Rheumatology  [address]  [place] [address]  Phone: [phone]  Fax: [phone]    Adryan Mariscal), Internal Medicine  [address] [facility]  [address] [place] [address]  Phone: [phone]  Fax: [phone]

## 2017-08-21 DIAGNOSIS — R69 ILLNESS, UNSPECIFIED: ICD-10-CM

## 2017-08-21 LAB
% ALBUMIN: 47.2 % — SIGNIFICANT CHANGE UP
% ALPHA 1: 8.6 % — SIGNIFICANT CHANGE UP
% ALPHA 2: 13.9 % — SIGNIFICANT CHANGE UP
% BETA: 13.5 % — SIGNIFICANT CHANGE UP
% GAMMA: 16.8 % — SIGNIFICANT CHANGE UP
ALBUMIN SERPL ELPH-MCNC: 3 G/DL — LOW (ref 3.6–5.5)
ALBUMIN/GLOB SERPL ELPH: 0.9 RATIO — SIGNIFICANT CHANGE UP
ALPHA1 GLOB SERPL ELPH-MCNC: 0.5 G/DL — HIGH (ref 0.1–0.4)
ALPHA2 GLOB SERPL ELPH-MCNC: 0.9 G/DL — SIGNIFICANT CHANGE UP (ref 0.5–1)
B-GLOBULIN SERPL ELPH-MCNC: 0.9 G/DL — SIGNIFICANT CHANGE UP (ref 0.5–1)
CULTURE RESULTS: SIGNIFICANT CHANGE UP
CULTURE RESULTS: SIGNIFICANT CHANGE UP
GAMMA GLOBULIN: 1.1 G/DL — SIGNIFICANT CHANGE UP (ref 0.6–1.6)
INTERPRETATION SERPL IFE-IMP: SIGNIFICANT CHANGE UP
PROT PATTERN SERPL ELPH-IMP: SIGNIFICANT CHANGE UP
SPECIMEN SOURCE: SIGNIFICANT CHANGE UP
SPECIMEN SOURCE: SIGNIFICANT CHANGE UP

## 2017-08-22 ENCOUNTER — APPOINTMENT (OUTPATIENT)
Dept: INTERNAL MEDICINE | Facility: CLINIC | Age: 57
End: 2017-08-22
Payer: MEDICAID

## 2017-08-22 VITALS
DIASTOLIC BLOOD PRESSURE: 70 MMHG | OXYGEN SATURATION: 96 % | RESPIRATION RATE: 10 BRPM | SYSTOLIC BLOOD PRESSURE: 120 MMHG | HEART RATE: 76 BPM

## 2017-08-22 VITALS — WEIGHT: 181 LBS | HEIGHT: 68.5 IN | BODY MASS INDEX: 27.12 KG/M2

## 2017-08-22 DIAGNOSIS — I27.2 OTHER SECONDARY PULMONARY HYPERTENSION: ICD-10-CM

## 2017-08-22 DIAGNOSIS — M65.841 OTHER SYNOVITIS AND TENOSYNOVITIS, RIGHT HAND: ICD-10-CM

## 2017-08-22 DIAGNOSIS — N17.9 ACUTE KIDNEY FAILURE, UNSPECIFIED: ICD-10-CM

## 2017-08-22 DIAGNOSIS — F11.29 OPIOID DEPENDENCE WITH UNSPECIFIED OPIOID-INDUCED DISORDER: ICD-10-CM

## 2017-08-22 DIAGNOSIS — M19.079 PRIMARY OSTEOARTHRITIS, UNSPECIFIED ANKLE AND FOOT: ICD-10-CM

## 2017-08-22 DIAGNOSIS — M79.89 OTHER SPECIFIED SOFT TISSUE DISORDERS: ICD-10-CM

## 2017-08-22 DIAGNOSIS — Z87.898 PERSONAL HISTORY OF OTHER SPECIFIED CONDITIONS: ICD-10-CM

## 2017-08-22 DIAGNOSIS — G60.0 HEREDITARY MOTOR AND SENSORY NEUROPATHY: ICD-10-CM

## 2017-08-22 DIAGNOSIS — Z82.49 FAMILY HISTORY OF ISCHEMIC HEART DISEASE AND OTHER DISEASES OF THE CIRCULATORY SYSTEM: ICD-10-CM

## 2017-08-22 DIAGNOSIS — Z82.61 FAMILY HISTORY OF ARTHRITIS: ICD-10-CM

## 2017-08-22 DIAGNOSIS — F10.20 ALCOHOL DEPENDENCE, UNCOMPLICATED: ICD-10-CM

## 2017-08-22 DIAGNOSIS — I51.7 CARDIOMEGALY: ICD-10-CM

## 2017-08-22 DIAGNOSIS — Q38.5 CONGENITAL MALFORMATIONS OF PALATE, NOT ELSEWHERE CLASSIFIED: ICD-10-CM

## 2017-08-22 DIAGNOSIS — M65.862 OTHER SYNOVITIS AND TENOSYNOVITIS, LEFT LOWER LEG: ICD-10-CM

## 2017-08-22 DIAGNOSIS — T39.395A ADVERSE EFFECT OF OTHER NONSTEROIDAL ANTI-INFLAMMATORY DRUGS [NSAID], INITIAL ENCOUNTER: ICD-10-CM

## 2017-08-22 DIAGNOSIS — M65.842 OTHER SYNOVITIS AND TENOSYNOVITIS, LEFT HAND: ICD-10-CM

## 2017-08-22 DIAGNOSIS — E87.70 FLUID OVERLOAD, UNSPECIFIED: ICD-10-CM

## 2017-08-22 DIAGNOSIS — N28.1 CYST OF KIDNEY, ACQUIRED: ICD-10-CM

## 2017-08-22 DIAGNOSIS — M65.861 OTHER SYNOVITIS AND TENOSYNOVITIS, RIGHT LOWER LEG: ICD-10-CM

## 2017-08-22 DIAGNOSIS — M21.949 UNSPECIFIED ACQUIRED DEFORMITY OF HAND, UNSPECIFIED HAND: ICD-10-CM

## 2017-08-22 DIAGNOSIS — F17.200 NICOTINE DEPENDENCE, UNSPECIFIED, UNCOMPLICATED: ICD-10-CM

## 2017-08-22 PROBLEM — Z82.5 FAMILY HISTORY OF ASTHMA: Status: ACTIVE | Noted: 2017-08-22

## 2017-08-22 PROBLEM — Z83.3 FAMILY HISTORY OF DIABETES MELLITUS: Status: ACTIVE | Noted: 2017-08-22

## 2017-08-22 PROBLEM — Z72.3 DOES NOT EXERCISE: Status: ACTIVE | Noted: 2017-08-22

## 2017-08-22 PROCEDURE — 99214 OFFICE O/P EST MOD 30 MIN: CPT | Mod: 25

## 2017-08-22 PROCEDURE — 99204 OFFICE O/P NEW MOD 45 MIN: CPT | Mod: 25

## 2017-08-22 PROCEDURE — 99406 BEHAV CHNG SMOKING 3-10 MIN: CPT

## 2017-08-24 ENCOUNTER — APPOINTMENT (OUTPATIENT)
Dept: INTERNAL MEDICINE | Facility: CLINIC | Age: 57
End: 2017-08-24

## 2017-08-24 DIAGNOSIS — Z82.5 FAMILY HISTORY OF ASTHMA AND OTHER CHRONIC LOWER RESPIRATORY DISEASES: ICD-10-CM

## 2017-08-24 DIAGNOSIS — Z83.3 FAMILY HISTORY OF DIABETES MELLITUS: ICD-10-CM

## 2017-08-24 DIAGNOSIS — Z78.9 OTHER SPECIFIED HEALTH STATUS: ICD-10-CM

## 2017-08-24 DIAGNOSIS — Z72.3 LACK OF PHYSICAL EXERCISE: ICD-10-CM

## 2017-08-24 DIAGNOSIS — Z82.49 FAMILY HISTORY OF ISCHEMIC HEART DISEASE AND OTHER DISEASES OF THE CIRCULATORY SYSTEM: ICD-10-CM

## 2017-08-25 ENCOUNTER — APPOINTMENT (OUTPATIENT)
Dept: RHEUMATOLOGY | Facility: CLINIC | Age: 57
End: 2017-08-25

## 2017-09-01 PROCEDURE — G9001: CPT

## 2017-09-05 ENCOUNTER — RX RENEWAL (OUTPATIENT)
Age: 57
End: 2017-09-05

## 2017-09-13 ENCOUNTER — APPOINTMENT (OUTPATIENT)
Dept: RHEUMATOLOGY | Facility: CLINIC | Age: 57
End: 2017-09-13
Payer: MEDICAID

## 2017-09-13 VITALS
WEIGHT: 178 LBS | DIASTOLIC BLOOD PRESSURE: 84 MMHG | BODY MASS INDEX: 26.67 KG/M2 | HEIGHT: 68.5 IN | SYSTOLIC BLOOD PRESSURE: 122 MMHG

## 2017-09-13 DIAGNOSIS — Z82.61 FAMILY HISTORY OF ARTHRITIS: ICD-10-CM

## 2017-09-13 DIAGNOSIS — Z80.41 FAMILY HISTORY OF MALIGNANT NEOPLASM OF OVARY: ICD-10-CM

## 2017-09-13 DIAGNOSIS — Z84.0 FAMILY HISTORY OF DISEASES OF THE SKIN AND SUBCUTANEOUS TISSUE: ICD-10-CM

## 2017-09-13 PROCEDURE — 36415 COLL VENOUS BLD VENIPUNCTURE: CPT

## 2017-09-13 PROCEDURE — 99215 OFFICE O/P EST HI 40 MIN: CPT | Mod: 25

## 2017-09-13 PROCEDURE — 99205 OFFICE O/P NEW HI 60 MIN: CPT | Mod: 25

## 2017-09-14 LAB
ALBUMIN SERPL ELPH-MCNC: 4.6 G/DL
ALP BLD-CCNC: 71 U/L
ALT SERPL-CCNC: 7 U/L
ANION GAP SERPL CALC-SCNC: 19 MMOL/L
AST SERPL-CCNC: 18 U/L
BASOPHILS # BLD AUTO: 0.04 K/UL
BASOPHILS NFR BLD AUTO: 0.4 %
BILIRUB SERPL-MCNC: 0.3 MG/DL
BUN SERPL-MCNC: 16 MG/DL
CALCIUM SERPL-MCNC: 10.3 MG/DL
CCP AB SER IA-ACNC: <8 UNITS
CHLORIDE SERPL-SCNC: 102 MMOL/L
CO2 SERPL-SCNC: 24 MMOL/L
CREAT SERPL-MCNC: 0.87 MG/DL
CRP SERPL-MCNC: 6.2 MG/DL
EOSINOPHIL # BLD AUTO: 0.24 K/UL
EOSINOPHIL NFR BLD AUTO: 2.6 %
ERYTHROCYTE [SEDIMENTATION RATE] IN BLOOD BY WESTERGREN METHOD: 30 MM/HR
GLUCOSE SERPL-MCNC: 105 MG/DL
HCT VFR BLD CALC: 38.9 %
HGB BLD-MCNC: 11.9 G/DL
IMM GRANULOCYTES NFR BLD AUTO: 0.1 %
LYMPHOCYTES # BLD AUTO: 2.17 K/UL
LYMPHOCYTES NFR BLD AUTO: 23.2 %
MAN DIFF?: NORMAL
MCHC RBC-ENTMCNC: 27.5 PG
MCHC RBC-ENTMCNC: 30.6 GM/DL
MCV RBC AUTO: 89.8 FL
MONOCYTES # BLD AUTO: 0.46 K/UL
MONOCYTES NFR BLD AUTO: 4.9 %
NEUTROPHILS # BLD AUTO: 6.43 K/UL
NEUTROPHILS NFR BLD AUTO: 68.8 %
PLATELET # BLD AUTO: 383 K/UL
POTASSIUM SERPL-SCNC: 4.3 MMOL/L
PROT SERPL-MCNC: 8.8 G/DL
RBC # BLD: 4.33 M/UL
RBC # FLD: 15.3 %
RF+CCP IGG SER-IMP: NEGATIVE
RHEUMATOID FACT SER QL: <7 IU/ML
SODIUM SERPL-SCNC: 145 MMOL/L
WBC # FLD AUTO: 9.35 K/UL

## 2017-09-15 LAB
ANA SER IF-ACNC: NEGATIVE
HLA-B27 RELATED AG QL: NORMAL

## 2017-09-17 ENCOUNTER — TRANSCRIPTION ENCOUNTER (OUTPATIENT)
Age: 57
End: 2017-09-17

## 2017-09-20 ENCOUNTER — APPOINTMENT (OUTPATIENT)
Dept: INTERNAL MEDICINE | Facility: CLINIC | Age: 57
End: 2017-09-20
Payer: MEDICAID

## 2017-09-20 VITALS — HEIGHT: 68.5 IN | BODY MASS INDEX: 26.52 KG/M2 | WEIGHT: 177 LBS

## 2017-09-20 VITALS
OXYGEN SATURATION: 96 % | DIASTOLIC BLOOD PRESSURE: 66 MMHG | RESPIRATION RATE: 12 BRPM | SYSTOLIC BLOOD PRESSURE: 126 MMHG | HEART RATE: 76 BPM

## 2017-09-20 DIAGNOSIS — N28.9 DISORDER OF KIDNEY AND URETER, UNSPECIFIED: ICD-10-CM

## 2017-09-20 DIAGNOSIS — M79.89 OTHER SPECIFIED SOFT TISSUE DISORDERS: ICD-10-CM

## 2017-09-20 PROCEDURE — 99406 BEHAV CHNG SMOKING 3-10 MIN: CPT

## 2017-09-20 PROCEDURE — 99214 OFFICE O/P EST MOD 30 MIN: CPT | Mod: 25

## 2017-10-18 ENCOUNTER — APPOINTMENT (OUTPATIENT)
Dept: RHEUMATOLOGY | Facility: CLINIC | Age: 57
End: 2017-10-18
Payer: MEDICAID

## 2017-10-18 VITALS — HEIGHT: 68.5 IN | BODY MASS INDEX: 26.52 KG/M2 | WEIGHT: 177 LBS

## 2017-10-18 DIAGNOSIS — Z79.899 OTHER LONG TERM (CURRENT) DRUG THERAPY: ICD-10-CM

## 2017-10-18 PROCEDURE — 99214 OFFICE O/P EST MOD 30 MIN: CPT | Mod: 25

## 2017-10-18 PROCEDURE — 36415 COLL VENOUS BLD VENIPUNCTURE: CPT

## 2017-10-30 ENCOUNTER — APPOINTMENT (OUTPATIENT)
Dept: NEUROLOGY | Facility: CLINIC | Age: 57
End: 2017-10-30
Payer: MEDICAID

## 2017-10-30 VITALS
DIASTOLIC BLOOD PRESSURE: 78 MMHG | SYSTOLIC BLOOD PRESSURE: 145 MMHG | HEART RATE: 69 BPM | HEIGHT: 68.5 IN | WEIGHT: 174 LBS | BODY MASS INDEX: 26.07 KG/M2

## 2017-10-30 PROCEDURE — 99205 OFFICE O/P NEW HI 60 MIN: CPT

## 2017-10-30 RX ORDER — PANTOPRAZOLE 40 MG/1
40 TABLET, DELAYED RELEASE ORAL
Refills: 0 | Status: DISCONTINUED | COMMUNITY
End: 2017-10-30

## 2017-10-30 RX ORDER — PREDNISONE 5 MG/1
5 TABLET ORAL DAILY
Qty: 30 | Refills: 0 | Status: DISCONTINUED | COMMUNITY
End: 2017-10-30

## 2017-11-08 LAB
ALBUMIN SERPL ELPH-MCNC: 4.4 G/DL
ALP BLD-CCNC: 74 U/L
ALT SERPL-CCNC: 14 U/L
ANION GAP SERPL CALC-SCNC: 16 MMOL/L
AST SERPL-CCNC: 21 U/L
BASOPHILS # BLD AUTO: 0.06 K/UL
BASOPHILS NFR BLD AUTO: 0.6 %
BILIRUB SERPL-MCNC: <0.2 MG/DL
BUN SERPL-MCNC: 16 MG/DL
CALCIUM SERPL-MCNC: 9.4 MG/DL
CHLORIDE SERPL-SCNC: 104 MMOL/L
CO2 SERPL-SCNC: 26 MMOL/L
CREAT SERPL-MCNC: 0.82 MG/DL
CRP SERPL-MCNC: 1.6 MG/DL
EOSINOPHIL # BLD AUTO: 0.43 K/UL
EOSINOPHIL NFR BLD AUTO: 4.6 %
ERYTHROCYTE [SEDIMENTATION RATE] IN BLOOD BY WESTERGREN METHOD: 8 MM/HR
GLUCOSE SERPL-MCNC: 92 MG/DL
HCT VFR BLD CALC: 37.7 %
HGB BLD-MCNC: 12.4 G/DL
IMM GRANULOCYTES NFR BLD AUTO: 0.2 %
LYMPHOCYTES # BLD AUTO: 2.91 K/UL
LYMPHOCYTES NFR BLD AUTO: 31.3 %
MAN DIFF?: NORMAL
MCHC RBC-ENTMCNC: 29.3 PG
MCHC RBC-ENTMCNC: 32.9 GM/DL
MCV RBC AUTO: 89.1 FL
MONOCYTES # BLD AUTO: 0.47 K/UL
MONOCYTES NFR BLD AUTO: 5.1 %
NEUTROPHILS # BLD AUTO: 5.41 K/UL
NEUTROPHILS NFR BLD AUTO: 58.2 %
PLATELET # BLD AUTO: 323 K/UL
POTASSIUM SERPL-SCNC: 4.8 MMOL/L
PROT SERPL-MCNC: 7.5 G/DL
RBC # BLD: 4.23 M/UL
RBC # FLD: 15.2 %
SODIUM SERPL-SCNC: 146 MMOL/L
WBC # FLD AUTO: 9.3 K/UL

## 2017-11-22 ENCOUNTER — APPOINTMENT (OUTPATIENT)
Dept: INTERNAL MEDICINE | Facility: CLINIC | Age: 57
End: 2017-11-22
Payer: MEDICAID

## 2017-11-22 VITALS — DIASTOLIC BLOOD PRESSURE: 72 MMHG | SYSTOLIC BLOOD PRESSURE: 120 MMHG | HEART RATE: 76 BPM | RESPIRATION RATE: 12 BRPM

## 2017-11-22 VITALS — BODY MASS INDEX: 26.67 KG/M2 | HEIGHT: 68.5 IN | WEIGHT: 178 LBS

## 2017-11-22 DIAGNOSIS — R74.8 ABNORMAL LEVELS OF OTHER SERUM ENZYMES: ICD-10-CM

## 2017-11-22 DIAGNOSIS — I07.1 RHEUMATIC TRICUSPID INSUFFICIENCY: ICD-10-CM

## 2017-11-22 DIAGNOSIS — I27.20 PULMONARY HYPERTENSION, UNSPECIFIED: ICD-10-CM

## 2017-11-22 DIAGNOSIS — F17.200 NICOTINE DEPENDENCE, UNSPECIFIED, UNCOMPLICATED: ICD-10-CM

## 2017-11-22 DIAGNOSIS — M65.88 OTHER SYNOVITIS AND TENOSYNOVITIS, OTHER SITE: ICD-10-CM

## 2017-11-22 DIAGNOSIS — M79.643 PAIN IN UNSPECIFIED HAND: ICD-10-CM

## 2017-11-22 DIAGNOSIS — R60.9 OTHER SYNOVITIS AND TENOSYNOVITIS, OTHER SITE: ICD-10-CM

## 2017-11-22 DIAGNOSIS — M79.673 PAIN IN UNSPECIFIED HAND: ICD-10-CM

## 2017-11-22 PROCEDURE — 99214 OFFICE O/P EST MOD 30 MIN: CPT | Mod: 25

## 2017-11-22 PROCEDURE — 36415 COLL VENOUS BLD VENIPUNCTURE: CPT

## 2017-11-24 LAB
CHOLEST SERPL-MCNC: 172 MG/DL
CHOLEST/HDLC SERPL: 3.8 RATIO
HDLC SERPL-MCNC: 45 MG/DL
LDLC SERPL CALC-MCNC: 107 MG/DL
TRIGL SERPL-MCNC: 101 MG/DL

## 2017-12-04 ENCOUNTER — APPOINTMENT (OUTPATIENT)
Dept: NEUROLOGY | Facility: CLINIC | Age: 57
End: 2017-12-04

## 2017-12-12 ENCOUNTER — APPOINTMENT (OUTPATIENT)
Dept: NEUROLOGY | Facility: CLINIC | Age: 57
End: 2017-12-12
Payer: MEDICAID

## 2017-12-12 PROCEDURE — 95910 NRV CNDJ TEST 7-8 STUDIES: CPT

## 2017-12-12 PROCEDURE — 95885 MUSC TST DONE W/NERV TST LIM: CPT | Mod: 59

## 2017-12-12 PROCEDURE — 99213 OFFICE O/P EST LOW 20 MIN: CPT | Mod: 25

## 2018-01-28 ENCOUNTER — RX RENEWAL (OUTPATIENT)
Age: 58
End: 2018-01-28

## 2018-07-23 PROBLEM — Z78.9 ALCOHOL USE: Status: ACTIVE | Noted: 2017-08-22

## 2018-08-28 NOTE — ED ADULT TRIAGE NOTE - NS ED NURSE DIRECT TO ROOM YN
"    Interventional Cardiology Clinic Note  Reason for Visit: Systolic Murmur  Referred by:  Dr Monica Martel  We appreciate your kind referral    HPI:   Ms. Claudette Gilhespie is a 17 y.o. woman with PMH sig for migraine headaches presents as a referral from Dr Monica Martel for evaluation of a cardiac murmur.  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  No history of rheumatic fever/heart disease, childhood illnesses or congenital anomalies, family hx unremarkable.  She does not have any other complaints at this time.    ROS:    Review of Systems   Constitution: Negative.   HENT: Negative.    Eyes: Negative.    Cardiovascular: Negative.    Respiratory: Negative.    Endocrine: Negative.    Skin: Negative.    Musculoskeletal: Negative.    Gastrointestinal: Negative.    Genitourinary: Negative.    Neurological: Negative.      PMH:   History reviewed. No pertinent past medical history.  History reviewed. No pertinent surgical history.  Allergies:   Review of patient's allergies indicates:  No Known Allergies  Medications:     Current Outpatient Medications on File Prior to Visit   Medication Sig Dispense Refill    [DISCONTINUED] methylPREDNISolone (MEDROL DOSEPACK) 4 mg tablet use as directed 1 Package 0    [DISCONTINUED] ondansetron (ZOFRAN-ODT) 4 MG TbDL Take 1 tablet (4 mg total) by mouth every 6 (six) hours as needed. 16 tablet 0     No current facility-administered medications on file prior to visit.      Social History:     Social History     Tobacco Use    Smoking status: Never Smoker    Smokeless tobacco: Never Used   Substance Use Topics    Alcohol use: No     Family History:   History reviewed. No pertinent family history.  Physical Exam:   /70 (BP Location: Right arm, Patient Position: Sitting, BP Method: Large (Automatic))   Pulse 65   Ht 5' 8" (1.727 m)   Wt 60.6 kg (133 lb 9.6 oz)   SpO2 97%   BMI 20.31 " kg/m²    Physical Exam   Constitutional: She is oriented to person, place, and time. She appears well-developed and well-nourished.   HENT:   Head: Normocephalic and atraumatic.   Eyes: Conjunctivae and EOM are normal. Pupils are equal, round, and reactive to light.   Neck: Normal range of motion. Neck supple. No JVD present.   Cardiovascular: Normal rate and regular rhythm. Exam reveals no gallop and no friction rub.   Murmur (diffuse, precordial 3/6 pansystolic murmur loudest at LLSB and apex) heard.  Pulmonary/Chest: Effort normal and breath sounds normal. No respiratory distress. She has no wheezes. She has no rales. She exhibits no tenderness.   Abdominal: Soft. Bowel sounds are normal. She exhibits no distension. There is no tenderness.   Musculoskeletal: Normal range of motion. She exhibits no edema or tenderness.   Neurological: She is alert and oriented to person, place, and time.   Skin: Skin is warm and dry. No erythema. No pallor.       Labs:     No results found for: NA, K, CL, CO2, BUN, CREATININE, GLUCOSE, ANIONGAP  No results found for: HGBA1C  No results found for: BNP, BNPTRIAGEBLO No results found for: WBC, HGB, HCT, PLT, GRAN  No results found for: CHOL, HDL, LDLCALC, TRIG       Imaging:   none    EKG: nsr with sinus arrhythmia, LVH (sokolov-shah), rightward axis  Assessment:     1. Systolic murmur          Plan:   Systolic murmur  ekg  2decho with cfd for evaluation of systolic murmur  No other symptoms to suggest decompensation  No childhood cardiac or infectious history  No history of anemia  We will call patient with results or she can check them online  She will schedule the echo at her convenience    Discussed with Dr. Wilson. RTC as needed.     Signed:  Sly Godinez MD  8/28/2018 8:52 AM    I have personally taken the history and examined this patient. I have discussed and agree with the resident's findings and plan as documented in the resident's note. Systolic murmur with no  symptoms referred for evaluation after routine college physical by Dr. Martel. 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 ECG with RAD, axis= 100 most likely due to vertical heart. ECHO with small pfo, no RV enlargement and no redundant septum. There is no significant valvular disease and the EF is normal. Recommend no further workup, return to competitive sports.    Chip Wilson     No

## 2019-07-15 NOTE — ED ADULT TRIAGE NOTE - NS AS WEIGHT METHOD - PEDI/INFANT
[facility] GI Discharge Instructions Endoscopy      7/15/2019    During your exam, the physician:    Removed polyp and Lab results will be called/mailed to you within 7-14 days.  If you have not heard from the doctor within 10 days, call the office for results:      DIET INSTRUCTIONS:  Resume your regular diet    PRESCRIPTIONS/MEDICATIONS  No new prescriptions given today    A RESPONSIBLE ADULT MUST ACCOMPANY YOU AND DRIVE YOU HOME    You had the following procedure(s) today:   Colonoscopy    1. No specimens were obtained today, so there is no restrictions on any aspirin or anti-inflammatory products.  2. Following sedation, your judgment, perception and coordination are impaired for a minimum of 24 hours.   Therefore:  · Do not drive. Do not return to work today.  · It is strongly recommended to have someone stay with you at home the day of discharge and provide overnight care.   · Do not operate appliances or machinery that require quick reaction time  · Do not sign legal documents or be involved in work decisions  · Do not smoke or drink alcoholic beverages for 24 hours  · Plan to spend a few hours resting before resuming your normal routine    Please call your physician in the event that you experience any of the following or proceed to the nearest hospital in the event of an emergency:     COLONOSCOPY  Fever  Severe abdominal distention or pain. Some mild distention and/or cramping are normal after these procedures but should pass within an hour or two with the passage of air.  Rectal bleeding more than blood streaking on the toilet  tissue  Nausea or Vomiting    If you have any questions or concerns, contact Dr. Lilia Childers [phone]    [facility] will be calling you within 3-5 business days to follow up after your procedure.     RECOMMENDATIONS:   We will follow up with the biopsy results and make further recommendations as indicated.      stated

## 2020-10-28 NOTE — PROGRESS NOTE ADULT - PROVIDER SPECIALTY LIST ADULT
Endoscopy Procedure Note


General


Indication for Procedure:  gib


Procedures Performed:  EGD


Operative Findings/Diagnosis:  esophagitis


Specimen:  yes


Pt Tolerated Procedure Well:  Yes


Estimated Blood Loss:  none





Anesthesia


Anesthesiologist:  rey


Anesthesia:  MAC





Inserted Devices


Implant(s) used?:  No





GI Core Measures


50 yrs or older w/o bx or poly:  Not Applicable


10yrs. F/U recommended:  Not Applicable











Álvaro Marmolejo MD             Oct 28, 2020 11:38 Nephrology

## 2021-01-29 NOTE — H&P ADULT - PROBLEM SELECTOR PROBLEM 1
Group Topic: BH Process Group     Date: 1/29/2021  Start Time:  1:00 PM  End Time:  1:45 PM  Facilitators: SHAD Contreras    Focus: Patients to give and receive feedback regarding topics related to their mental health.     Number in attendance: 5      Method: Group  Attendance: Present  Participation: Minimal  Patient Response: Appropriate feedback and Asked questions  Mood: Normal  Affect: Type: Euthymic (normal mood)   Range: Full (normal)   Congruency: Congruent   Stability: Stable  Behavior/Socialization: Cooperative and Engaged  Thought Process: Focused  Task Performance: Follows directions  Patient Evaluation: Independent - full participation   Pt answered dc questions appropriately     JOYCELYN (acute kidney injury)

## 2022-01-07 ENCOUNTER — INPATIENT (INPATIENT)
Facility: HOSPITAL | Age: 62
LOS: 17 days | Discharge: HOME HEALTH | DRG: 580 | End: 2022-01-25
Attending: INTERNAL MEDICINE | Admitting: INTERNAL MEDICINE
Payer: MEDICAID

## 2022-01-07 VITALS
WEIGHT: 210.1 LBS | DIASTOLIC BLOOD PRESSURE: 58 MMHG | SYSTOLIC BLOOD PRESSURE: 114 MMHG | OXYGEN SATURATION: 99 % | HEART RATE: 66 BPM | HEIGHT: 68 IN | RESPIRATION RATE: 20 BRPM | TEMPERATURE: 99 F

## 2022-01-07 DIAGNOSIS — Z98.890 OTHER SPECIFIED POSTPROCEDURAL STATES: Chronic | ICD-10-CM

## 2022-01-07 DIAGNOSIS — I77.1 STRICTURE OF ARTERY: ICD-10-CM

## 2022-01-07 DIAGNOSIS — M86.179 OTHER ACUTE OSTEOMYELITIS, UNSPECIFIED ANKLE AND FOOT: ICD-10-CM

## 2022-01-07 PROBLEM — F19.10 OTHER PSYCHOACTIVE SUBSTANCE ABUSE, UNCOMPLICATED: Chronic | Status: ACTIVE | Noted: 2017-08-15

## 2022-01-07 PROBLEM — Q38.5 CONGENITAL MALFORMATIONS OF PALATE, NOT ELSEWHERE CLASSIFIED: Chronic | Status: ACTIVE | Noted: 2017-08-15

## 2022-01-07 PROBLEM — G60.0 HEREDITARY MOTOR AND SENSORY NEUROPATHY: Chronic | Status: ACTIVE | Noted: 2017-08-15

## 2022-01-07 LAB
A1C WITH ESTIMATED AVERAGE GLUCOSE RESULT: 5.1 % — SIGNIFICANT CHANGE UP (ref 4–5.6)
ALBUMIN SERPL ELPH-MCNC: 3.7 G/DL — SIGNIFICANT CHANGE UP (ref 3.3–5)
ALP SERPL-CCNC: 96 U/L — SIGNIFICANT CHANGE UP (ref 40–120)
ALT FLD-CCNC: 12 U/L — SIGNIFICANT CHANGE UP (ref 10–45)
ANION GAP SERPL CALC-SCNC: 15 MMOL/L — SIGNIFICANT CHANGE UP (ref 5–17)
APTT BLD: 29.7 SEC — SIGNIFICANT CHANGE UP (ref 27.5–35.5)
AST SERPL-CCNC: 26 U/L — SIGNIFICANT CHANGE UP (ref 10–40)
BASE EXCESS BLDV CALC-SCNC: 1.2 MMOL/L — SIGNIFICANT CHANGE UP (ref -2–2)
BASOPHILS # BLD AUTO: 0.03 K/UL — SIGNIFICANT CHANGE UP (ref 0–0.2)
BASOPHILS NFR BLD AUTO: 0.3 % — SIGNIFICANT CHANGE UP (ref 0–2)
BILIRUB SERPL-MCNC: 0.4 MG/DL — SIGNIFICANT CHANGE UP (ref 0.2–1.2)
BUN SERPL-MCNC: 17 MG/DL — SIGNIFICANT CHANGE UP (ref 7–23)
CA-I SERPL-SCNC: 1.21 MMOL/L — SIGNIFICANT CHANGE UP (ref 1.15–1.33)
CALCIUM SERPL-MCNC: 9.4 MG/DL — SIGNIFICANT CHANGE UP (ref 8.4–10.5)
CHLORIDE BLDV-SCNC: 104 MMOL/L — SIGNIFICANT CHANGE UP (ref 96–108)
CHLORIDE SERPL-SCNC: 103 MMOL/L — SIGNIFICANT CHANGE UP (ref 96–108)
CO2 BLDV-SCNC: 30 MMOL/L — HIGH (ref 22–26)
CO2 SERPL-SCNC: 24 MMOL/L — SIGNIFICANT CHANGE UP (ref 22–31)
CREAT SERPL-MCNC: 0.84 MG/DL — SIGNIFICANT CHANGE UP (ref 0.5–1.3)
EOSINOPHIL # BLD AUTO: 0.01 K/UL — SIGNIFICANT CHANGE UP (ref 0–0.5)
EOSINOPHIL NFR BLD AUTO: 0.1 % — SIGNIFICANT CHANGE UP (ref 0–6)
ESTIMATED AVERAGE GLUCOSE: 100 MG/DL — SIGNIFICANT CHANGE UP (ref 68–114)
FLUAV AG NPH QL: SIGNIFICANT CHANGE UP
FLUBV AG NPH QL: SIGNIFICANT CHANGE UP
GAS PNL BLDV: 140 MMOL/L — SIGNIFICANT CHANGE UP (ref 136–145)
GAS PNL BLDV: SIGNIFICANT CHANGE UP
GAS PNL BLDV: SIGNIFICANT CHANGE UP
GLUCOSE BLDV-MCNC: 121 MG/DL — HIGH (ref 70–99)
GLUCOSE SERPL-MCNC: 124 MG/DL — HIGH (ref 70–99)
HCO3 BLDV-SCNC: 28 MMOL/L — SIGNIFICANT CHANGE UP (ref 22–29)
HCT VFR BLD CALC: 32.8 % — LOW (ref 39–50)
HCT VFR BLDA CALC: 33 % — LOW (ref 39–51)
HGB BLD CALC-MCNC: 10.9 G/DL — LOW (ref 12.6–17.4)
HGB BLD-MCNC: 10.5 G/DL — LOW (ref 13–17)
IMM GRANULOCYTES NFR BLD AUTO: 0.8 % — SIGNIFICANT CHANGE UP (ref 0–1.5)
INR BLD: 1.31 RATIO — HIGH (ref 0.88–1.16)
LACTATE BLDV-MCNC: 2 MMOL/L — SIGNIFICANT CHANGE UP (ref 0.7–2)
LYMPHOCYTES # BLD AUTO: 0.84 K/UL — LOW (ref 1–3.3)
LYMPHOCYTES # BLD AUTO: 7 % — LOW (ref 13–44)
MCHC RBC-ENTMCNC: 28 PG — SIGNIFICANT CHANGE UP (ref 27–34)
MCHC RBC-ENTMCNC: 32 GM/DL — SIGNIFICANT CHANGE UP (ref 32–36)
MCV RBC AUTO: 87.5 FL — SIGNIFICANT CHANGE UP (ref 80–100)
MONOCYTES # BLD AUTO: 0.35 K/UL — SIGNIFICANT CHANGE UP (ref 0–0.9)
MONOCYTES NFR BLD AUTO: 2.9 % — SIGNIFICANT CHANGE UP (ref 2–14)
NEUTROPHILS # BLD AUTO: 10.68 K/UL — HIGH (ref 1.8–7.4)
NEUTROPHILS NFR BLD AUTO: 88.9 % — HIGH (ref 43–77)
NRBC # BLD: 0 /100 WBCS — SIGNIFICANT CHANGE UP (ref 0–0)
PCO2 BLDV: 53 MMHG — SIGNIFICANT CHANGE UP (ref 42–55)
PH BLDV: 7.33 — SIGNIFICANT CHANGE UP (ref 7.32–7.43)
PLATELET # BLD AUTO: 284 K/UL — SIGNIFICANT CHANGE UP (ref 150–400)
PO2 BLDV: 26 MMHG — SIGNIFICANT CHANGE UP (ref 25–45)
POTASSIUM BLDV-SCNC: 3.7 MMOL/L — SIGNIFICANT CHANGE UP (ref 3.5–5.1)
POTASSIUM SERPL-MCNC: 3.9 MMOL/L — SIGNIFICANT CHANGE UP (ref 3.5–5.3)
POTASSIUM SERPL-SCNC: 3.9 MMOL/L — SIGNIFICANT CHANGE UP (ref 3.5–5.3)
PROT SERPL-MCNC: 7.5 G/DL — SIGNIFICANT CHANGE UP (ref 6–8.3)
PROTHROM AB SERPL-ACNC: 15.5 SEC — HIGH (ref 10.6–13.6)
RBC # BLD: 3.75 M/UL — LOW (ref 4.2–5.8)
RBC # FLD: 13.8 % — SIGNIFICANT CHANGE UP (ref 10.3–14.5)
RSV RNA NPH QL NAA+NON-PROBE: SIGNIFICANT CHANGE UP
SAO2 % BLDV: 39.9 % — LOW (ref 67–88)
SARS-COV-2 RNA SPEC QL NAA+PROBE: SIGNIFICANT CHANGE UP
SODIUM SERPL-SCNC: 142 MMOL/L — SIGNIFICANT CHANGE UP (ref 135–145)
WBC # BLD: 12 K/UL — HIGH (ref 3.8–10.5)
WBC # FLD AUTO: 12 K/UL — HIGH (ref 3.8–10.5)

## 2022-01-07 PROCEDURE — 99222 1ST HOSP IP/OBS MODERATE 55: CPT

## 2022-01-07 PROCEDURE — 93306 TTE W/DOPPLER COMPLETE: CPT | Mod: 26

## 2022-01-07 PROCEDURE — 99253 IP/OBS CNSLTJ NEW/EST LOW 45: CPT

## 2022-01-07 PROCEDURE — 99285 EMERGENCY DEPT VISIT HI MDM: CPT

## 2022-01-07 PROCEDURE — 93010 ELECTROCARDIOGRAM REPORT: CPT

## 2022-01-07 PROCEDURE — 71045 X-RAY EXAM CHEST 1 VIEW: CPT | Mod: 26

## 2022-01-07 PROCEDURE — 73630 X-RAY EXAM OF FOOT: CPT | Mod: 26,LT

## 2022-01-07 PROCEDURE — 73630 X-RAY EXAM OF FOOT: CPT | Mod: 26,RT

## 2022-01-07 PROCEDURE — 73720 MRI LWR EXTREMITY W/O&W/DYE: CPT | Mod: 26,LT

## 2022-01-07 RX ORDER — VANCOMYCIN HCL 1 G
1000 VIAL (EA) INTRAVENOUS EVERY 12 HOURS
Refills: 0 | Status: DISCONTINUED | OUTPATIENT
Start: 2022-01-07 | End: 2022-01-09

## 2022-01-07 RX ORDER — PIPERACILLIN AND TAZOBACTAM 4; .5 G/20ML; G/20ML
3.38 INJECTION, POWDER, LYOPHILIZED, FOR SOLUTION INTRAVENOUS EVERY 8 HOURS
Refills: 0 | Status: DISCONTINUED | OUTPATIENT
Start: 2022-01-07 | End: 2022-01-14

## 2022-01-07 RX ORDER — PIPERACILLIN AND TAZOBACTAM 4; .5 G/20ML; G/20ML
3.38 INJECTION, POWDER, LYOPHILIZED, FOR SOLUTION INTRAVENOUS ONCE
Refills: 0 | Status: COMPLETED | OUTPATIENT
Start: 2022-01-07 | End: 2022-01-07

## 2022-01-07 RX ORDER — SODIUM HYPOCHLORITE 0.125 %
1 SOLUTION, NON-ORAL MISCELLANEOUS DAILY
Refills: 0 | Status: DISCONTINUED | OUTPATIENT
Start: 2022-01-07 | End: 2022-01-14

## 2022-01-07 RX ORDER — SODIUM CHLORIDE 9 MG/ML
1000 INJECTION INTRAMUSCULAR; INTRAVENOUS; SUBCUTANEOUS
Refills: 0 | Status: DISCONTINUED | OUTPATIENT
Start: 2022-01-07 | End: 2022-01-08

## 2022-01-07 RX ORDER — HEPARIN SODIUM 5000 [USP'U]/ML
5000 INJECTION INTRAVENOUS; SUBCUTANEOUS EVERY 12 HOURS
Refills: 0 | Status: DISCONTINUED | OUTPATIENT
Start: 2022-01-07 | End: 2022-01-08

## 2022-01-07 RX ORDER — VANCOMYCIN HCL 1 G
1000 VIAL (EA) INTRAVENOUS ONCE
Refills: 0 | Status: COMPLETED | OUTPATIENT
Start: 2022-01-07 | End: 2022-01-07

## 2022-01-07 RX ADMIN — PIPERACILLIN AND TAZOBACTAM 25 GRAM(S): 4; .5 INJECTION, POWDER, LYOPHILIZED, FOR SOLUTION INTRAVENOUS at 18:02

## 2022-01-07 RX ADMIN — HEPARIN SODIUM 5000 UNIT(S): 5000 INJECTION INTRAVENOUS; SUBCUTANEOUS at 18:01

## 2022-01-07 RX ADMIN — Medication 250 MILLIGRAM(S): at 11:00

## 2022-01-07 RX ADMIN — PIPERACILLIN AND TAZOBACTAM 200 GRAM(S): 4; .5 INJECTION, POWDER, LYOPHILIZED, FOR SOLUTION INTRAVENOUS at 11:00

## 2022-01-07 NOTE — ED PROVIDER NOTE - LOWER EXTREMITY EXAM, LEFT
ulcer noted at base dorsum of 1st tow and lateral aspect of 5th digit, purulent discharge noted, 2+ DP pulses b/l

## 2022-01-07 NOTE — CONSULT NOTE ADULT - SUBJECTIVE AND OBJECTIVE BOX
Podiatry pager #: [phone]/ [phone]    Patient is a 61y old  Male who presents with a chief complaint of     HPI:      PAST MEDICAL & SURGICAL HISTORY:  CMT (Charcot-Lakia-Tooth disease)    Substance abuse in remission    Palate deformity  partial palate removal    H/O hammer toe correction  left        MEDICATIONS  (STANDING):    MEDICATIONS  (PRN):      Allergies    No Known Allergies    Intolerances        VITALS:    Vital Signs Last 24 Hrs  T(C): 37.4 (07 Jan 2022 09:34), Max: 37.4 (07 Jan 2022 09:34)  T(F): 99.4 (07 Jan 2022 09:34), Max: 99.4 (07 Jan 2022 09:34)  HR: 66 (07 Jan 2022 09:34) (66 - 66)  BP: 114/58 (07 Jan 2022 09:34) (114/58 - 114/58)  BP(mean): --  RR: 20 (07 Jan 2022 09:34) (20 - 20)  SpO2: 99% (07 Jan 2022 09:34) (99% - 99%)    LABS:                          10.5   12.00 )-----------( 284      ( 07 Jan 2022 10:56 )             32.8               CAPILLARY BLOOD GLUCOSE          PT/INR - ( 07 Jan 2022 10:56 )   PT: 15.5 sec;   INR: 1.31 ratio         PTT - ( 07 Jan 2022 10:56 )  PTT:29.7 sec    LOWER EXTREMITY PHYSICAL EXAM:    Vasular: DP/PT 0/4, B/L, CFT <3 seconds B/L, Temperature gradient warm to cool, B/L.   Neuro: Epicritic sensation intact to the level of _, B/L.  Musculoskeletal/Ortho: unremarkable  Skin:    LEFT foot: lateral aspect of metatarsal 5 fibronecrotic wound to bone with strong malodor, wound tracks about 4cm proximally along lateral aspect of 5th mt shaft, no purulence, periwound erythema to the 5th mt base and midfoot.    Right foot: Distal aspect of 2nd digit wound to bone with scant purulence, no tracking, no malodor, periwound erythema to digit, no tracking, no crepitus.       RADIOLOGY & ADDITIONAL STUDIES:    Resident read Prelim L foot: cortical erosion of lateral aspect of 5th mt shaft, no subcutaneous air, no fractures.  Podiatry pager #: [phone]/ [phone]    Patient is a 61y old  Male who presents with a chief complaint of L foot infection     HPI: 60 yo M with a pmhx of RA in hands presents to the ED with a L foot ulcer. He was seen in the office by Dane and was told to come to the ED yesterday. He states his ulcer stared about 3 weeks ago from a poorly fitting shoe. Since then, his ulcer became worse, prompting him to see Dr. Gordon. He admits to mild pain over his foot. He denies any trauma, fevers, chills, N/V/D. He has not taken any antibiotics. He denies any CP, SOB. abdominal pain. He denies any other symptoms at bedside.      PAST MEDICAL & SURGICAL HISTORY:  CMT (Charcot-Lakia-Tooth disease)    Substance abuse in remission    Palate deformity  partial palate removal    H/O hammer toe correction  left        MEDICATIONS  (STANDING):    MEDICATIONS  (PRN):      Allergies    No Known Allergies    Intolerances        VITALS:    Vital Signs Last 24 Hrs  T(C): 37.4 (07 Jan 2022 09:34), Max: 37.4 (07 Jan 2022 09:34)  T(F): 99.4 (07 Jan 2022 09:34), Max: 99.4 (07 Jan 2022 09:34)  HR: 66 (07 Jan 2022 09:34) (66 - 66)  BP: 114/58 (07 Jan 2022 09:34) (114/58 - 114/58)  BP(mean): --  RR: 20 (07 Jan 2022 09:34) (20 - 20)  SpO2: 99% (07 Jan 2022 09:34) (99% - 99%)    LABS:                          10.5   12.00 )-----------( 284      ( 07 Jan 2022 10:56 )             32.8               CAPILLARY BLOOD GLUCOSE          PT/INR - ( 07 Jan 2022 10:56 )   PT: 15.5 sec;   INR: 1.31 ratio         PTT - ( 07 Jan 2022 10:56 )  PTT:29.7 sec    LOWER EXTREMITY PHYSICAL EXAM:    Vasular: DP/PT 0/4, B/L, CFT <3 seconds B/L, Temperature gradient warm to cool, B/L.   Neuro: Epicritic sensation intact to the level of _, B/L.  Musculoskeletal/Ortho: unremarkable  Skin:    LEFT foot: lateral aspect of metatarsal 5 fibronecrotic wound to bone with strong malodor, wound tracks about 4cm proximally along lateral aspect of 5th mt shaft, no purulence, periwound erythema to the 5th mt base and midfoot.    Right foot: Distal aspect of 2nd digit wound to bone with scant purulence, no tracking, no malodor, periwound erythema to digit, no tracking, no crepitus.  sub mt 2 plantar hyperkeratosis with no acute signs of infection        RADIOLOGY & ADDITIONAL STUDIES:    Resident read Prelim L foot: cortical erosion of lateral aspect of 5th mt shaft, no subcutaneous air, no fractures.  Podiatry pager #: [phone]/ [phone]    Patient is a 61y old  Male who presents with a chief complaint of L foot infection     HPI: 62 yo M with a pmhx of RA in hands presents to the ED with a L foot ulcer. He was seen in the office by Dane and was told to come to the ED yesterday. He states his ulcer stared about 3 weeks ago from a poorly fitting shoe. Since then, his ulcer became worse, prompting him to see Dr. Gordon. He admits to mild pain over his foot. He denies any trauma, fevers, chills, N/V/D. He has not taken any antibiotics. He denies any CP, SOB. abdominal pain. He denies any other symptoms at bedside.      PAST MEDICAL & SURGICAL HISTORY:  CMT (Charcot-Lakia-Tooth disease)    Substance abuse in remission    Palate deformity  partial palate removal    H/O hammer toe correction  left        MEDICATIONS  (STANDING):    MEDICATIONS  (PRN):      Allergies    No Known Allergies    Intolerances        VITALS:    Vital Signs Last 24 Hrs  T(C): 37.4 (07 Jan 2022 09:34), Max: 37.4 (07 Jan 2022 09:34)  T(F): 99.4 (07 Jan 2022 09:34), Max: 99.4 (07 Jan 2022 09:34)  HR: 66 (07 Jan 2022 09:34) (66 - 66)  BP: 114/58 (07 Jan 2022 09:34) (114/58 - 114/58)  BP(mean): --  RR: 20 (07 Jan 2022 09:34) (20 - 20)  SpO2: 99% (07 Jan 2022 09:34) (99% - 99%)    LABS:                          10.5   12.00 )-----------( 284      ( 07 Jan 2022 10:56 )             32.8               CAPILLARY BLOOD GLUCOSE          PT/INR - ( 07 Jan 2022 10:56 )   PT: 15.5 sec;   INR: 1.31 ratio         PTT - ( 07 Jan 2022 10:56 )  PTT:29.7 sec    LOWER EXTREMITY PHYSICAL EXAM:    Vasular: DP/PT 0/4, B/L, CFT <3 seconds B/L, Temperature gradient warm to cool, B/L.   Neuro: Epicritic sensation intact to the level of _, B/L.  Musculoskeletal/Ortho: unremarkable  Skin:    LEFT foot: lateral aspect of metatarsal 5 fibronecrotic wound to bone with strong malodor, wound tracks about 4cm proximally along lateral aspect of 5th mt shaft, no purulence, periwound erythema to the 5th mt base and midfoot. Dorsal hallux fibrogranular wound ot subQ with no acute signs of infection.     Right foot: Distal aspect of 2nd digit wound to bone with scant purulence, no tracking, no malodor, periwound erythema to digit, no tracking, no crepitus.  sub mt 2 plantar hyperkeratosis with no acute signs of infection        RADIOLOGY & ADDITIONAL STUDIES:    Resident read Prelim L foot: cortical erosion of lateral aspect of 5th mt shaft, no subcutaneous air, no fractures.

## 2022-01-07 NOTE — ED PROVIDER NOTE - OBJECTIVE STATEMENT
60 yo M with a pmhx of RA in hands presents to the ED with a L foot ulcer. He was seen in the office by Dane and was told to come to the ED yesterday. He states his ulcer stared about 3 weeks ago from a poorly fitting shoe. Since then, his ulcer became worse, prompting him to see Dr. Gordon. He admits to mild pain over his foot. He denies any trauma, fevers, chills, N/V/D. He has not taken any antibiotics. He denies any CP, SOB. abdominal pain. He denies any other symptoms at bedside.

## 2022-01-07 NOTE — CONSULT NOTE ADULT - PROBLEM SELECTOR RECOMMENDATION 9
ROHINI Dillon MD performed a history and physical exam of the patient and discussed  the findings and plan with the house officer. I reviewed the resident note and agree with the findings and plan   I Kian Dillon MD have personally seen and examined the patient at bedside today at 6  pm

## 2022-01-07 NOTE — H&P ADULT - HISTORY OF PRESENT ILLNESS
CHIEF COMPLAINT:Patient is a 61y old  Male who presents with a chief complaint of left 5th toe osteomyelitis and rt plantar foot wound/ulcer (07 Jan 2022 13:32)      HPI:  62 yo M with a pmhx of RA in hands presents to the ED with a L foot ulcer. He was seen in the office by Dane and was told to come to the ED yesterday. He states his ulcer stared about 3 weeks ago from a poorly fitting shoe. Since then, his ulcer became worse, prompting him to see Dr. Gordon. He admits to mild pain over his foot. He denies any trauma, fevers, chills, N/V/D. He has not taken any antibiotics. He denies any CP, SOB. abdominal pain. He denies any other symptoms at bedside.    PAST MEDICAL & SURGICAL HISTORY:  CMT (Charcot-Lakia-Tooth disease)    Substance abuse in remission    Palate deformity  partial palate removal    H/O hammer toe correction  left        MEDICATIONS  (STANDING):  Dakins Solution - 1/2 Strength 1 Application(s) Topical daily  heparin   Injectable 5000 Unit(s) SubCutaneous every 12 hours  piperacillin/tazobactam IVPB.. 3.375 Gram(s) IV Intermittent every 8 hours  vancomycin  IVPB 1000 milliGRAM(s) IV Intermittent every 12 hours    MEDICATIONS  (PRN):      FAMILY HISTORY:  Hypertension (Father)    Family history of rheumatoid arthritis        SOCIAL HISTORY:    [x ] Non-smoker  [ ] Smoker  [ ] Alcohol    Allergies    No Known Allergies    Intolerances    	    REVIEW OF SYSTEMS:  CONSTITUTIONAL: No fever, weight loss, or fatigue  EYES: No eye pain, visual disturbances, or discharge  ENT:  No difficulty hearing, tinnitus, vertigo; No sinus or throat pain  NECK: No pain or stiffness  RESPIRATORY: No cough, wheezing, chills or hemoptysis; No Shortness of Breath  CARDIOVASCULAR: No chest pain, palpitations, passing out, dizziness, or leg swelling  GASTROINTESTINAL: No abdominal or epigastric pain. No nausea, vomiting, or hematemesis; No diarrhea or constipation. No melena or hematochezia.  GENITOURINARY: No dysuria, frequency, hematuria, or incontinence  NEUROLOGICAL: No headaches, memory loss, loss of strength, numbness, or tremors  SKIN: No itching, burning, rashes, or lesions   LYMPH Nodes: No enlarged glands  ENDOCRINE: No heat or cold intolerance; No hair loss  MUSCULOSKELETAL: No joint pain or swelling; No muscle, back, or extremity pain, Charcot-Lakia   PSYCHIATRIC: No depression, anxiety, mood swings, or difficulty sleeping  HEME/LYMPH: No easy bruising, or bleeding gums  ALLERGY AND IMMUNOLOGIC: No hives or eczema	    [ ] All others negative	  [ ] Unable to obtain    PHYSICAL EXAM:  T(C): 36.7 (01-07-22 @ 18:50), Max: 37.4 (01-07-22 @ 09:34)  HR: 88 (01-07-22 @ 18:50) (66 - 88)  BP: 127/69 (01-07-22 @ 18:50) (105/49 - 127/69)  RR: 20 (01-07-22 @ 18:50) (20 - 20)  SpO2: 96% (01-07-22 @ 18:50) (96% - 99%)  Wt(kg): --  I&O's Summary    07 Jan 2022 07:01  -  07 Jan 2022 19:10  --------------------------------------------------------  IN: 410 mL / OUT: 0 mL / NET: 410 mL        Appearance: Normal	  HEENT:   Normal oral mucosa, PERRL, EOMI	  Lymphatic: No lymphadenopathy  Cardiovascular: Normal S1 S2, No JVD, + murmurs, No edema  Respiratory: Lungs clear to auscultation	  Psychiatry: A & O x 3, Mood & affect appropriate  Gastrointestinal:  Soft, Non-tender, + BS	  Skin: No rashes, No ecchymoses, No cyanosis	  Neurologic: Non-focal  Extremities: Normal range of motion, No clubbing, cyanosis or edema  Vascular: Peripheral pulses palpable 2+ bilaterally    TELEMETRY: 	    ECG:  	  RADIOLOGY:  OTHER: 	  	  LABS:	 	    CARDIAC MARKERS:                              10.5   12.00 )-----------( 284      ( 07 Jan 2022 10:56 )             32.8     01-07    142  |  103  |  17  ----------------------------<  124<H>  3.9   |  24  |  0.84    Ca    9.4      07 Jan 2022 10:56    TPro  7.5  /  Alb  3.7  /  TBili  0.4  /  DBili  x   /  AST  26  /  ALT  12  /  AlkPhos  96  01-07    proBNP:   Lipid Profile:   HgA1c:   TSH:   PT/INR - ( 07 Jan 2022 10:56 )   PT: 15.5 sec;   INR: 1.31 ratio         PTT - ( 07 Jan 2022 10:56 )  PTT:29.7 sec    PREVIOUS DIAGNOSTIC TESTING:    < from: Xray Chest 1 View- PORTABLE-Urgent (01.07.22 @ 10:25) >  The heart is normal in size.  Linear atelectasis of the lung bases.  There is no pneumothorax or pleural effusion.    IMPRESSION:  Linear atelectasis of the lung bases.

## 2022-01-07 NOTE — CONSULT NOTE ADULT - SUBJECTIVE AND OBJECTIVE BOX
VASCULAR SURGERY CONSULT NOTE  :::::::::::::::::::::::::::::::::::::::::::::::::::::  JOY BALTAZAR 61y M  MRN: [id number]  Admit Date: 01-07-22  [facility] 07  :::::::::::::::::::::::::::::::::::::::::::::::::::::  Date of Service: 01-07-22 @ 13:32  Requested by: James Burton    :::::::::::::::::::::::::::::::::::::::::::::::::::::    HPI: Patient is a 61y male with PMH of Charcot-Lakia-Tooth disease substance abuse in remission, peripheral vascular disease and surgical hx of hammer toe correction left presenting with one month of non-healing 5th left toe wound associated with malodor and pain. Denies headache, chills, fever, dizziness, nausea, vomiting, chest pain, SOB, diarrhea, pain during urination or recent falls.    In the ED patient afebrile and hemodynamically stable. Laboratory results showing WBC: 12.00, Hgb/Hct: 10.5/32.8. Initial imaging: significant for xray showing left 5th toe osteomyelitis. Podiatry service consulted and performed bedside I&D showing significant tracking but no purulence of left 5th toe and right 2nd toe wound with purulent discharge.     Allergies: No Known Allergies    Past Medical History:   CMT (Charcot-Lakia-Tooth disease)  Substance abuse in remission  Palate deformity    Past Surgical History:  H/O hammer toe correction left    Family History:  Hypertension (Father)  Family history of rheumatoid arthritis    Social History:   history of substance abuse, active smoker    Home Medications:  **    ::::::::::::::::::::::::::::::::::::::::::::::::::::::::::::::::::::::::::::::::::::::::::::::::::::::::::::::::::::::::::::::::::::::::::    Vital signs:  T(C): 37.4, Max: 37.4 (01-07 @ 09:34)  HR: 66 (66 - 66)  BP: 114/58 (114/58 - 114/58)  RR: 20 (20 - 20)  SpO2: 99% (99% - 99%)  Height (cm): 172.7  Weight (kg): 95.3  BMI (kg/m2): 32      Physical Exam:  General: NAD, male appearing stated age  Neuro: Awake, alert and oriented  HEENT: NC, AT, MOM  Resp: Unlabored breathing, symmetric chest expansion  GI/Abd: Soft, non-distended, non-tender  Extremities All 4 extremities moving spontaneously  Vascular:   - Right: warm, DP  PT  Pop  Fem     - Left: warm, DP  PT  Pop  Fem         ::::::::::::::::::::::::::::::::::::::::::::::::::::::::::::::::::::::::::::::::::::::::::::::::::::::::::::::::::::::::::::::::::::::::::    Laboratory:                        10.5   12.00 )-----------( 284      ( 01-07 @ 10:56 )             32.8     01-07                             Phos: x  Mg: x   142  |  103  |  17  ----------------------------<  124  3.9   |  24  |  0.84    01-07   TPro 7.5 / Alb 3.7 / TBili 0.4 / DBili x  / AST/AST 26/12 / AlkPhos 96    PT/INR/PTT - (01-07 @ 10:56) PT: 15.5 sec; INR: 1.31 ratio ; PTT: 29.7 sec     ::::::::::::::::::::::::::::::::::::::::::::::::::::::::::::::::::::::::::::::::::::::::::::::::::::::::::::::::::::::::::::::::::::::::::    Imaging:  < from: Xray Foot AP + Lateral + Oblique, Left (01.07.22 @ 11:35) >  FINDINGS:    Large soft tissue ulceration laterally at the level the fifth metatarsal   phalangeal joint. Complete erosion of the head of the fifth metatarsal   and partial erosion of the base of the fifth proximal phalanx consistent   with osteomyelitis. Packing material immediately lateral to head of the   fifth metatarsal.    Fusion at the first interphalangeal joint, unchanged. Erosive changes to   the first left MTP which have progressed since 8/15/2017. These findings   are likely related to an erosive arthropathy although correlate for any   signs of infection in this location is septic arthritis is in the   differential diagnosis.    Spurring on the superior side of the calcaneus.    IMPRESSION:    Large soft tissue ulceration with osteomyelitis at the distal fifth   metatarsal and proximal fifth proximal phalanx.    Erosive changes at the first metatarsophalangeal joint which have   progressed since 8/15/2017 and are most consistent with erosive   arthropathy although correlate for signs of infection in this location as   septic arthritis is also in the differential diagnosis.    < end of copied text >    ::::::::::::::::::::::::::::::::::::::::::::::::::::::::::::::::::::::::::::::::::::::::::::::::::::::::::::::::::::::::::::::::::::::::::       VASCULAR SURGERY CONSULT NOTE  :::::::::::::::::::::::::::::::::::::::::::::::::::::  JOY BALTAZAR 61y M  MRN: [id number]  Admit Date: 01-07-22  [facility] 07  :::::::::::::::::::::::::::::::::::::::::::::::::::::  Date of Service: 01-07-22 @ 13:32  Requested by: James Burton    :::::::::::::::::::::::::::::::::::::::::::::::::::::    HPI: Patient is a 61y male with PMH of Charcot-Lakia-Tooth disease substance abuse in remission, peripheral vascular disease and surgical hx of hammer toe correction left presenting with one month of non-healing 5th left toe wound associated with malodor and pain. Denies headache, chills, fever, dizziness, nausea, vomiting, chest pain, SOB, diarrhea, pain during urination or recent falls.    In the ED patient afebrile and hemodynamically stable. Laboratory results showing WBC: 12.00, Hgb/Hct: 10.5/32.8. Initial imaging: significant for xray showing left 5th toe osteomyelitis. Podiatry service consulted and performed bedside I&D showing significant tracking but no purulence of left 5th toe and right 2nd toe wound with purulent discharge.     Allergies: No Known Allergies    Past Medical History:   CMT (Charcot-Lakia-Tooth disease)  Substance abuse in remission  Palate deformity    Past Surgical History:  H/O hammer toe correction left    Family History:  Hypertension (Father)  Family history of rheumatoid arthritis    Social History:   history of substance abuse, active smoker    Home Medications:  **    ::::::::::::::::::::::::::::::::::::::::::::::::::::::::::::::::::::::::::::::::::::::::::::::::::::::::::::::::::::::::::::::::::::::::::    Vital signs:  T(C): 37.4, Max: 37.4 (01-07 @ 09:34)  HR: 66 (66 - 66)  BP: 114/58 (114/58 - 114/58)  RR: 20 (20 - 20)  SpO2: 99% (99% - 99%)  Height (cm): 172.7  Weight (kg): 95.3  BMI (kg/m2): 32      Physical Exam:  General: NAD, male appearing stated age  Neuro: Awake, alert and oriented  HEENT: NC, AT, MOM  Resp: Unlabored breathing, symmetric chest expansion  GI/Abd: Soft, non-distended, non-tender  Extremities All 4 extremities moving spontaneously, sensory symmetric bilaterally  Vascular:   - Right: warm, DP signal  PT Signal  Pop +1  Fem +1; cellulitis right lower distal extremity     - Left: warm, DP Signal PT No signal Pop +1  Fem +1; superficial non bleeding wound on dorsal foot 3cm, 5th toe s/p I&D with packed and not bleeding malodorous      ::::::::::::::::::::::::::::::::::::::::::::::::::::::::::::::::::::::::::::::::::::::::::::::::::::::::::::::::::::::::::::::::::::::::::    Laboratory:                        10.5   12.00 )-----------( 284      ( 01-07 @ 10:56 )             32.8     01-07                             Phos: x  Mg: x   142  |  103  |  17  ----------------------------<  124  3.9   |  24  |  0.84    01-07   TPro 7.5 / Alb 3.7 / TBili 0.4 / DBili x  / AST/AST 26/12 / AlkPhos 96    PT/INR/PTT - (01-07 @ 10:56) PT: 15.5 sec; INR: 1.31 ratio ; PTT: 29.7 sec     ::::::::::::::::::::::::::::::::::::::::::::::::::::::::::::::::::::::::::::::::::::::::::::::::::::::::::::::::::::::::::::::::::::::::::    Imaging:  < from: Xray Foot AP + Lateral + Oblique, Left (01.07.22 @ 11:35) >  FINDINGS:    Large soft tissue ulceration laterally at the level the fifth metatarsal   phalangeal joint. Complete erosion of the head of the fifth metatarsal   and partial erosion of the base of the fifth proximal phalanx consistent   with osteomyelitis. Packing material immediately lateral to head of the   fifth metatarsal.    Fusion at the first interphalangeal joint, unchanged. Erosive changes to   the first left MTP which have progressed since 8/15/2017. These findings   are likely related to an erosive arthropathy although correlate for any   signs of infection in this location is septic arthritis is in the   differential diagnosis.    Spurring on the superior side of the calcaneus.    IMPRESSION:    Large soft tissue ulceration with osteomyelitis at the distal fifth   metatarsal and proximal fifth proximal phalanx.    Erosive changes at the first metatarsophalangeal joint which have   progressed since 8/15/2017 and are most consistent with erosive   arthropathy although correlate for signs of infection in this location as   septic arthritis is also in the differential diagnosis.    < end of copied text >      < from: Xray Foot AP + Lateral + Oblique, Right (01.07.22 @ 12:24) >  EXAM:  Frontal, oblique, and lateral right foot from 1/7/2022 at 1224. Compared   prior study from 8/15/2017.    IMPRESSION:  Ulcerated right 2nd toe tip soft tissues with stigmata of osteomyelitis   and pathologic bone fragmentation involving the underlying 2nd distal   phalangeal tuft. No proximally tracking gas collections beyond this   region and no additional foci of osteomyelitis.    Intact and normally aligned remaining osteoarticular structures.    Tarsometatarsal alignment maintained without evidence for a Lisfranc   injury.    Slight hammertoe deformities. Preserved remaining joint spaces and no   joint margin erosions.    Thick posterior and tiny plantar calcaneal enthesophytes.    No discrete lytic or blastic lesions.    < end of copied text >  ::::::::::::::::::::::::::::::::::::::::::::::::::::::::::::::::::::::::::::::::::::::::::::::::::::::::::       VASCULAR SURGERY CONSULT NOTE  :::::::::::::::::::::::::::::::::::::::::::::::::::::  JOY BALTAZAR 61y M  MRN: [id number]  Admit Date: 01-07-22  [facility] 07  :::::::::::::::::::::::::::::::::::::::::::::::::::::  Date of Service: 01-07-22 @ 13:32  Requested by: James Burton    :::::::::::::::::::::::::::::::::::::::::::::::::::::    HPI: Patient is a 61y male with PMH of Charcot-Lakia-Tooth disease substance abuse in remission, peripheral vascular disease and surgical hx of hammer toe correction left presenting with one month of non-healing 5th left toe wound associated with malodor and pain. Denies headache, chills, fever, dizziness, nausea, vomiting, chest pain, SOB, diarrhea, pain during urination or recent falls.    In the ED patient afebrile and hemodynamically stable. Laboratory results showing WBC: 12.00, Hgb/Hct: 10.5/32.8. Initial imaging: significant for xray showing left 5th toe osteomyelitis. Podiatry service consulted and performed bedside I&D showing significant tracking but no purulence of left 5th toe and right 2nd toe wound with purulent discharge.   VASCULAR SURG ATT ADDENDUM  Pt denies nocturnal leg or foot cramps or intermittent claudication     Allergies: No Known Allergies    Past Medical History:   CMT (Charcot-Lakia-Tooth disease)  Substance abuse in remission  Palate deformity    Past Surgical History:  H/O hammer toe correction left    Family History:  Hypertension (Father)  Family history of rheumatoid arthritis    Social History:   history of substance abuse, active smoker    Home Medications:  **    ::::::::::::::::::::::::::::::::::::::::::::::::::::::::::::::::::::::::::::::::::::::::::::::::::::::::::::::::::::::::::::::::::::::::::    Vital signs:  T(C): 37.4, Max: 37.4 (01-07 @ 09:34)  HR: 66 (66 - 66)  BP: 114/58 (114/58 - 114/58)  RR: 20 (20 - 20)  SpO2: 99% (99% - 99%)  Height (cm): 172.7  Weight (kg): 95.3  BMI (kg/m2): 32      Physical Exam:  General: NAD, male appearing stated age  Neuro: Awake, alert and oriented  HEENT: NC, AT, MOM  Resp: Unlabored breathing, symmetric chest expansion  GI/Abd: Soft, non-distended, non-tender  Extremities All 4 extremities moving spontaneously, sensory symmetric bilaterally  Vascular:   - Right: warm, DP signal  PT Signal  Pop +1  Fem +1; cellulitis right lower distal extremity     - Left: warm, DP Signal PT No signal Pop +1  Fem +1; superficial non bleeding wound on dorsal foot 3cm, 5th toe s/p I&D with packed and not bleeding malodorous      ::::::::::::::::::::::::::::::::::::::::::::::::::::::::::::::::::::::::::::::::::::::::::::::::::::::::::::::::::::::::::::::::::::::::::    Laboratory:                        10.5   12.00 )-----------( 284      ( 01-07 @ 10:56 )             32.8     01-07                             Phos: x  Mg: x   142  |  103  |  17  ----------------------------<  124  3.9   |  24  |  0.84    01-07   TPro 7.5 / Alb 3.7 / TBili 0.4 / DBili x  / AST/AST 26/12 / AlkPhos 96    PT/INR/PTT - (01-07 @ 10:56) PT: 15.5 sec; INR: 1.31 ratio ; PTT: 29.7 sec     ::::::::::::::::::::::::::::::::::::::::::::::::::::::::::::::::::::::::::::::::::::::::::::::::::::::::::::::::::::::::::::::::::::::::::    Imaging:  < from: Xray Foot AP + Lateral + Oblique, Left (01.07.22 @ 11:35) >  FINDINGS:    Large soft tissue ulceration laterally at the level the fifth metatarsal   phalangeal joint. Complete erosion of the head of the fifth metatarsal   and partial erosion of the base of the fifth proximal phalanx consistent   with osteomyelitis. Packing material immediately lateral to head of the   fifth metatarsal.    Fusion at the first interphalangeal joint, unchanged. Erosive changes to   the first left MTP which have progressed since 8/15/2017. These findings   are likely related to an erosive arthropathy although correlate for any   signs of infection in this location is septic arthritis is in the   differential diagnosis.    Spurring on the superior side of the calcaneus.    IMPRESSION:    Large soft tissue ulceration with osteomyelitis at the distal fifth   metatarsal and proximal fifth proximal phalanx.    Erosive changes at the first metatarsophalangeal joint which have   progressed since 8/15/2017 and are most consistent with erosive   arthropathy although correlate for signs of infection in this location as   septic arthritis is also in the differential diagnosis.    < end of copied text >      < from: Xray Foot AP + Lateral + Oblique, Right (01.07.22 @ 12:24) >  EXAM:  Frontal, oblique, and lateral right foot from 1/7/2022 at 1224. Compared   prior study from 8/15/2017.    IMPRESSION:  Ulcerated right 2nd toe tip soft tissues with stigmata of osteomyelitis   and pathologic bone fragmentation involving the underlying 2nd distal   phalangeal tuft. No proximally tracking gas collections beyond this   region and no additional foci of osteomyelitis.    Intact and normally aligned remaining osteoarticular structures.    Tarsometatarsal alignment maintained without evidence for a Lisfranc   injury.    Slight hammertoe deformities. Preserved remaining joint spaces and no   joint margin erosions.    Thick posterior and tiny plantar calcaneal enthesophytes.    No discrete lytic or blastic lesions.    < end of copied text >  ::::::::::::::::::::::::::::::::::::::::::::::::::::::::::::::::::::::::::::::::::::::::::::::::::::::::::

## 2022-01-07 NOTE — CONSULT NOTE ADULT - ASSESSMENT
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 Vascular surgery consulted for evaluation of peripheral vascular disease.       Plan  -    Plan to be discussed with vascular surgery fellow Dr. Santana Tejada PGY2   Vascular Surgery [id number] 61y male with PMH of Charcot-Lakia-Tooth disease substance abuse in remission, peripheral vascular disease and surgical hx of hammer toe correction left presenting with one month of non-healing 5th left toe s/p bedside I&D by podiatry with osteomyelitis. Vascular surgery consulted for evaluation of peripheral vascular disease.       Plan  - No acute vascular surgery intervention  - S/P I&D by podiatry of left 5th toe wound  - Left 5th toe and Right 2nd toe wound with osteomyelitis  - Continue with antibiotics per primary team  - F.U GERALDO/PVR  - Will follow with you      Plan to be discussed with vascular surgery fellow Dr. Santana Tejada PGY2   Vascular Surgery [id number] 61y male with PMH of Charcot-Lakia-Tooth disease substance abuse in remission, peripheral vascular disease and surgical hx of hammer toe correction left presenting with one month of non-healing 5th left toe s/p bedside I&D by podiatry with osteomyelitis. Vascular surgery consulted for evaluation of peripheral vascular disease.       Plan  - No acute vascular surgery intervention  - S/P I&D by podiatry of left 5th toe wound  - recommend  yas foot MRI  s/o osteomyelitis   - Continue with antibiotics per primary team  - F.U GERALDO/PVR  - d/w pt indications risks and benefits of yas le angio pssible endo intervention  pt consents  will follow     Plan to be discussed with vascular surgery fellow Dr. Santana Tejada PGY2   Vascular Surgery [id number]

## 2022-01-07 NOTE — H&P ADULT - NSHPREVIEWOFSYSTEMS_GEN_ALL_CORE
< from: TTE with Doppler (w/Cont) (01.07.22 @ 13:23) >    Mitral Valve: Mitral annular calcification, otherwise  normal mitral valve.  Aortic Valve/Aorta: Aortic valve not well visualized;  appears calcified.  Aortic Root: 2.9 cm.  Left Atrium: Normal left atrium.  Left Ventricle: Endocardial visualization enhanced with  intravenous injection of Ultrasonic Enhancing Agent  (Definity). Overall preserved left ventricular ejection  fraction. Normal left ventricular internal dimensions and  wall thicknesses. Normal diastolic function  Right Heart: Normal right atrium. The right ventricle is  not well visualized; grossly normal right ventricular  systolic function. Normal tricuspid valve. Minimal  tricuspid regurgitation. Normal pulmonic valve.  Pericardium/Pleura: Normal pericardium with no pericardial  effusion.  Hemodynamic: Estimated right atrial pressure is 8 mm Hg.  Unable to estimate RVSP.  ------------------------------------------------------------------------  Conclusions:  1. Endocardial visualization enhanced with intravenous  injection of Ultrasonic Enhancing Agent (Definity). Overall  preserved left ventricular ejection fraction.  2. The right ventricle is not well visualized; grossly  normal right ventricular systolic function.

## 2022-01-07 NOTE — CONSULT NOTE ADULT - EXTREMITIES COMMENTS
mild to mod art insuff w mod trophic  skin changes  right plantar  wound over 2/3rd med head 2mm diam  w minimal drainage   left foot lateral aspect  wound w limited granulation tissue 1.5 cm diam w toe edema and mild odor drainage

## 2022-01-07 NOTE — CONSULT NOTE ADULT - ASSESSMENT
61 year with severe RA, pVD, , actively smoking, no pulses presents with two ulcers, The one on the left is deep and probably goes  down to the bone.   Suspect that he has vascular disease and needs to be evaluated.  agree with empiric antibiotics zosyn and vancomycin: based: on creat pending.  discussed with podiatry .

## 2022-01-07 NOTE — ED PROVIDER NOTE - CLINICAL SUMMARY MEDICAL DECISION MAKING FREE TEXT BOX
62 yo M with a pmhx of RA in hands presents to the ED with a L foot ulcer. ulcer has been a problem for the last 3 weeks. was told come to ED yesterday. vitals wnl. PE as noted above. concerns for osteo vs infected ulcer. possible underlying sepsis. will order labs, imaging, ekg, meds, reassess. will consult podiatry. 60 yo M with a pmhx of RA in hands presents to the ED with a L foot ulcer. ulcer has been a problem for the last 3 weeks. was told come to ED yesterday. vitals wnl. PE as noted above. concerns for osteo vs infected ulcer. possible underlying sepsis. will order labs, imaging, ekg, meds, reassess. will consult podiatry.    Attending MD Menezes: 60 yo male with PMH for RA no meds, has not seen an doctor in over 1 year.  Denies other medical hx presents with complaint of ulcer to left foot.  Seen by podiatry Dr. Portillo and referred to the ED. Patient complains of pain to left foot and no other complaints. Patient denies fever, chills, nausea, vomiting, or diarrhea. Podiatry at bedside.  Admit to medicine for further management.  IV abx given.

## 2022-01-07 NOTE — ED PROVIDER NOTE - NSICDXFAMILYHX_GEN_ALL_CORE_FT
FAMILY HISTORY:  Family history of rheumatoid arthritis    Father  Still living? Unknown  Hypertension, Age at diagnosis: Age Unknown

## 2022-01-07 NOTE — ED ADULT NURSE NOTE - OBJECTIVE STATEMENT
61 yr old male to ED with infection foot Scheduled to have surgery Palp DP Denies pain at this time Denies med hx. Denies fever or chills.

## 2022-01-07 NOTE — CONSULT NOTE ADULT - ASSESSMENT
61M with b/l foot infection   - Pt assessed bedside in ED  - WBC 12, ESR and CRP pending, afebrile, VSS   - Resident read Prelim L foot: cortical erosion of lateral aspect of 5th mt shaft, no subcutaneous air, no fractures.   - R foot xrays pending   - LEFT foot: lateral aspect of metatarsal 5 fibronecrotic wound to bone with strong malodor, wound tracks about 4cm proximally along lateral aspect of 5th mt shaft, no purulence, periwound erythema to the 5th mt base and midfoot.  - RIGHT foot: Distal aspect of 2nd digit wound to bone with scant purulence, no tracking, no malodor, periwound erythema to digit, no tracking, no crepitus.   - Verbal consent obtained for I&D of L foot  - After prepping the L foot in a sterile manner, a reverse pabon block was administered using 10cc of 1% lidocaine plain   - Then using sterile suture removal kit wound was debrided and decompressed to the level of subq and not beyond, all areas of tracking explored, wound flushed with copious amounts of NS   - Wound packed and dressed with DSD   - PT tolerated procedure well   - Recommend admit to medicine   - Recommend IV vancomycin and zosyn   - Dakins + packing for L foot wound   - Betadine and DSD for R foot   - Podiatry plan is L foot partial 5th ray resection and R foot partial 2nd ray resection pending OR clearance  - Please document medical clearance for procedure under light sedation with local block   - Discussed with attending  61M with b/l foot infection   - Pt assessed bedside in ED  - WBC 12, ESR and CRP pending, afebrile, VSS   - Resident read Prelim L foot: cortical erosion of lateral aspect of 5th mt shaft, no subcutaneous air, no fractures.   - R foot xrays pending   - LEFT foot: lateral aspect of metatarsal 5 fibronecrotic wound to bone with strong malodor, wound tracks about 4cm proximally along lateral aspect of 5th mt shaft, no purulence, periwound erythema to the 5th mt base and midfoot.  - RIGHT foot: Distal aspect of 2nd digit wound to bone with scant purulence, no tracking, no malodor, periwound erythema to digit, no tracking, no crepitus. sub mt 2 plantar hyperkeratosis with no acute signs of infection  - Verbal consent obtained for I&D of L foot  - After prepping the L foot in a sterile manner, a reverse pabon block was administered using 10cc of 1% lidocaine plain   - Then using sterile suture removal kit wound was debrided and decompressed to the level of subq and not beyond, all areas of tracking explored, wound flushed with copious amounts of NS   - Wound packed and dressed with DSD   - PT tolerated procedure well   - Recommend admit to medicine   - Recommend IV vancomycin and zosyn   - Dakins + packing for L foot wound   - Betadine and DSD for R foot   - Podiatry plan is L foot partial 5th ray resection and R foot partial 2nd ray resection pending OR clearance  - Please document medical clearance for procedure under light sedation with local block   - Discussed with attending  61M with b/l foot infection   - Pt assessed bedside in ED  - WBC 12, ESR and CRP pending, afebrile, VSS   - Resident read Prelim L foot: cortical erosion of lateral aspect of 5th mt shaft, no subcutaneous air, no fractures.   - Resident read prelim R foot: cortical erosion of distal phalanx of 2nd digit, no gas, no fractures   - LEFT foot: lateral aspect of metatarsal 5 fibronecrotic wound to bone with strong malodor, wound tracks about 4cm proximally along lateral aspect of 5th mt shaft, no purulence, periwound erythema to the 5th mt base and midfoot. Dorsal hallux fibrogranular wound ot subQ with no acute signs of infection  - RIGHT foot: Distal aspect of 2nd digit wound to bone with scant purulence, no tracking, no malodor, periwound erythema to digit, no tracking, no crepitus. sub mt 2 plantar hyperkeratosis with no acute signs of infection  - Verbal consent obtained for I&D of L foot  - After prepping the L foot in a sterile manner, a reverse pabon block was administered using 10cc of 1% lidocaine plain   - Then using sterile suture removal kit wound was debrided and decompressed to the level of subq and not beyond, all areas of tracking explored, wound flushed with copious amounts of NS   - Wound packed and dressed with DSD   - PT tolerated procedure well   - Recommend admit to medicine   - Recommend IV vancomycin and zosyn   - Dakins + packing for L foot wound   - Betadine and DSD for R foot   - L foot MR ordered  - Podiatry plan is L foot partial 5th ray resection and R foot partial 2nd ray resection pending OR clearance  - Please document medical clearance for procedure under light sedation with local block   - Discussed with attending  61M with b/l foot infection   - Pt assessed bedside in ED  - WBC 12, ESR and CRP pending, afebrile, VSS   - Resident read Prelim L foot: cortical erosion of lateral aspect of 5th mt shaft, no subcutaneous air, no fractures.   - Resident read prelim R foot: cortical erosion of distal phalanx of 2nd digit, no gas, no fractures   - LEFT foot: lateral aspect of metatarsal 5 fibronecrotic wound to bone with strong malodor, wound tracks about 4cm proximally along lateral aspect of 5th mt shaft, no purulence, periwound erythema to the 5th mt base and midfoot. Dorsal hallux fibrogranular wound ot subQ with no acute signs of infection  - RIGHT foot: Distal aspect of 2nd digit wound to bone with scant purulence, no tracking, no malodor, periwound erythema to digit, no tracking, no crepitus. sub mt 2 plantar hyperkeratosis with no acute signs of infection  - Verbal consent obtained for I&D of L foot  - After prepping the L foot in a sterile manner, a reverse pabon block was administered using 10cc of 1% lidocaine plain   - Then using sterile suture removal kit wound was debrided and decompressed to the level of subq and not beyond, all areas of tracking explored, wound flushed with copious amounts of NS   - Wound packed and dressed with DSD   - PT tolerated procedure well   - Recommend admit to medicine   - Recommend IV vancomycin and zosyn   - Dakins + packing for L foot wound   - Betadine and DSD for R foot   - L foot MR ordered  - GERALDO/PVR ordered   - Podiatry plan is L foot partial 5th ray resection and R foot partial 2nd ray resection pending OR clearance  - Please document medical clearance for procedure under light sedation with local block   - Discussed with attending  61M with b/l foot infection   - Pt assessed bedside in ED  - WBC 12, ESR and CRP pending, afebrile, VSS   - Resident read Prelim L foot: cortical erosion of lateral aspect of 5th mt shaft, no subcutaneous air, no fractures.   - Resident read prelim R foot: cortical erosion of distal phalanx of 2nd digit, no gas, no fractures   - LEFT foot: lateral aspect of metatarsal 5 fibronecrotic wound to bone with strong malodor, wound tracks about 4cm proximally along lateral aspect of 5th mt shaft, no purulence, periwound erythema to the 5th mt base and midfoot. Dorsal hallux fibrogranular wound ot subQ with no acute signs of infection  - RIGHT foot: Distal aspect of 2nd digit wound to bone with scant purulence, no tracking, no malodor, periwound erythema to digit, no tracking, no crepitus. sub mt 2 plantar hyperkeratosis with no acute signs of infection  - Verbal consent obtained for I&D of L foot  - After prepping the L foot in a sterile manner, a reverse pabon block was administered using 10cc of 1% lidocaine plain   - Then using sterile suture removal kit wound was debrided and decompressed to the level of subq and not beyond, all areas of tracking explored, wound flushed with copious amounts of NS   - Wound packed and dressed with DSD   - PT tolerated procedure well   - Wound culture taken of L 5th wound and R 2nd digit wound   - Recommend admit to medicine   - Recommend IV vancomycin and zosyn   - Dakins + packing for L foot wound   - Betadine and DSD for R foot   - L foot MR ordered  - GERALDO/PVR ordered   - Podiatry plan is L foot partial 5th ray resection and R foot partial 2nd ray resection pending OR clearance  - Please document medical clearance for procedure under light sedation with local block   - Discussed with attending

## 2022-01-07 NOTE — ED PROVIDER NOTE - ATTENDING CONTRIBUTION TO CARE
Attending MD Menezes:  I personally have seen and examined this patient.  Resident note reviewed and agree on plan of care and except where noted.

## 2022-01-07 NOTE — ED PROVIDER NOTE - NSICDXPASTMEDICALHX_GEN_ALL_CORE_FT
PAST MEDICAL HISTORY:  CMT (Charcot-Lakia-Tooth disease)     Palate deformity partial palate removal    Substance abuse in remission

## 2022-01-07 NOTE — H&P ADULT - ASSESSMENT
1M    h/o anemia, Charcot Lakia Tooth disease . smoker . RA     with b/l foot infection   - LEFT foot: lateral aspect of metatarsal 5 , wound to bone   - RIGHT foot: Distal aspect of 2nd digit wound to bone with scant purulence, no trackimg     * osteo/ ulcer , left foot, 5th metatarsal    s/p I/D by podiatry  in  er  on iv ab, seen by house ID   * Anemia   *  Active smoker   not keen on quitting now/ suspect pad, given non healing  nature of wound  * prior echo,  mod  TR, severe pulm htn    echo ordered   on dvt ppx   1M    h/o anemia, Charcot Lakia Tooth disease . smoker . RA     with b/l foot infection   - LEFT foot: lateral aspect of metatarsal 5 , wound to bone   - RIGHT foot: Distal aspect of 2nd digit wound to bone with scant purulence, no trackimg     * osteo/ ulcer , left foot, 5th metatarsal    s/p I/D by podiatry  in  er  on iv ab, seen by house ID   * Anemia   *  Active smoker   not keen on quitting now/ suspect pad, given non healing  nature of wound  echo noted

## 2022-01-07 NOTE — CONSULT NOTE ADULT - SUBJECTIVE AND OBJECTIVE BOX
Patient is a 61y old  Male who presents with a chief complaint of   HPI: Pt with severe RA and actively smoking  presents with one month of an ulcer on the lateral aspect of 5 th metatarsal from a show        PAST MEDICAL & SURGICAL HISTORY:  CMT (Charcot-Lakia-Tooth disease)    Substance abuse in remission    Palate deformity  partial palate removal    H/O hammer toe correction  left        Social history:    FAMILY HISTORY:  Hypertension (Father)    Family history of rheumatoid arthritis                Hematology/Lymphatics:	No LN swelling.No gum bleeding     Endocrine:	No recent weight gain or loss.No abnormal heat/cold intolerance    Allergic/Immunologic:	No hives or rash   Allergies    No Known Allergies    Intolerances        Antimicrobials:          Vital Signs Last 24 Hrs  T(C): 37.4 (07 Jan 2022 09:34), Max: 37.4 (07 Jan 2022 09:34)  T(F): 99.4 (07 Jan 2022 09:34), Max: 99.4 (07 Jan 2022 09:34)  HR: 66 (07 Jan 2022 09:34) (66 - 66)  BP: 114/58 (07 Jan 2022 09:34) (114/58 - 114/58)  BP(mean): --  RR: 20 (07 Jan 2022 09:34) (20 - 20)  SpO2: 99% (07 Jan 2022 09:34) (99% - 99%)            No cachexia     Eyes:PERRL EOMI.NO discharge or conjunctival injection    ENMT:No sinus tenderness.No thrush.No pharyngeal exudate or erythema.Fair dental hygiene    Neck:Supple,No LN,no JVD      Respiratory:Good air entry bilaterally,CTA    Cardiovascular:S1 S2 wnl, No murmurs,rub or gallops    Gastrointestinal:Soft BS(+) no tenderness no masses ,No rebound or guarding    Genitourinary:No CVA tendereness     Rectal:    Extremities:No cyanosis,clubbing or edema.    Vascular  no :peripheral pulses              Musculoskeletal:No joint  deep ulcer on lateral aspect of left foot   small excar ulcer on right middle toe.                                   10.5   12.00 )-----------( 284      ( 07 Jan 2022 10:56 )             32.8                 RECENT CULTURES:      MICROBIOLOGY:          Radiology:      Assessment:        Recommendations and Plan:    Pager [phone]  After 5 pm/weekends or if no response :[phone]

## 2022-01-08 DIAGNOSIS — M86.172 OTHER ACUTE OSTEOMYELITIS, LEFT ANKLE AND FOOT: ICD-10-CM

## 2022-01-08 DIAGNOSIS — M86.9 OSTEOMYELITIS, UNSPECIFIED: ICD-10-CM

## 2022-01-08 LAB
ANION GAP SERPL CALC-SCNC: 13 MMOL/L — SIGNIFICANT CHANGE UP (ref 5–17)
APPEARANCE UR: CLEAR — SIGNIFICANT CHANGE UP
APTT BLD: 32.5 SEC — SIGNIFICANT CHANGE UP (ref 27.5–35.5)
BACTERIA # UR AUTO: NEGATIVE — SIGNIFICANT CHANGE UP
BILIRUB UR-MCNC: NEGATIVE — SIGNIFICANT CHANGE UP
BUN SERPL-MCNC: 16 MG/DL — SIGNIFICANT CHANGE UP (ref 7–23)
CALCIUM SERPL-MCNC: 8.8 MG/DL — SIGNIFICANT CHANGE UP (ref 8.4–10.5)
CHLORIDE SERPL-SCNC: 103 MMOL/L — SIGNIFICANT CHANGE UP (ref 96–108)
CK MB BLD-MCNC: 2.4 % — SIGNIFICANT CHANGE UP (ref 0–3.5)
CK MB CFR SERPL CALC: 1.4 NG/ML — SIGNIFICANT CHANGE UP (ref 0–6.7)
CK MB CFR SERPL CALC: 1.5 NG/ML — SIGNIFICANT CHANGE UP (ref 0–6.7)
CK SERPL-CCNC: 50 U/L — SIGNIFICANT CHANGE UP (ref 30–200)
CK SERPL-CCNC: 63 U/L — SIGNIFICANT CHANGE UP (ref 30–200)
CO2 SERPL-SCNC: 22 MMOL/L — SIGNIFICANT CHANGE UP (ref 22–31)
COLOR SPEC: YELLOW — SIGNIFICANT CHANGE UP
CREAT SERPL-MCNC: 0.62 MG/DL — SIGNIFICANT CHANGE UP (ref 0.5–1.3)
CRP SERPL-MCNC: 434 MG/L — HIGH (ref 0–4)
DIFF PNL FLD: ABNORMAL
EPI CELLS # UR: 2 /HPF — SIGNIFICANT CHANGE UP
GLUCOSE SERPL-MCNC: 99 MG/DL — SIGNIFICANT CHANGE UP (ref 70–99)
GLUCOSE UR QL: NEGATIVE — SIGNIFICANT CHANGE UP
HCT VFR BLD CALC: 29.9 % — LOW (ref 39–50)
HCT VFR BLD CALC: 35.3 % — LOW (ref 39–50)
HCV AB S/CO SERPL IA: 0.1 S/CO — SIGNIFICANT CHANGE UP (ref 0–0.99)
HCV AB SERPL-IMP: SIGNIFICANT CHANGE UP
HGB BLD-MCNC: 10.9 G/DL — LOW (ref 13–17)
HGB BLD-MCNC: 9.4 G/DL — LOW (ref 13–17)
HYALINE CASTS # UR AUTO: 1 /LPF — SIGNIFICANT CHANGE UP (ref 0–2)
KETONES UR-MCNC: ABNORMAL
LEUKOCYTE ESTERASE UR-ACNC: NEGATIVE — SIGNIFICANT CHANGE UP
MCHC RBC-ENTMCNC: 27.2 PG — SIGNIFICANT CHANGE UP (ref 27–34)
MCHC RBC-ENTMCNC: 27.9 PG — SIGNIFICANT CHANGE UP (ref 27–34)
MCHC RBC-ENTMCNC: 30.9 GM/DL — LOW (ref 32–36)
MCHC RBC-ENTMCNC: 31.4 GM/DL — LOW (ref 32–36)
MCV RBC AUTO: 88 FL — SIGNIFICANT CHANGE UP (ref 80–100)
MCV RBC AUTO: 88.7 FL — SIGNIFICANT CHANGE UP (ref 80–100)
MRSA PCR RESULT.: SIGNIFICANT CHANGE UP
NITRITE UR-MCNC: NEGATIVE — SIGNIFICANT CHANGE UP
NRBC # BLD: 0 /100 WBCS — SIGNIFICANT CHANGE UP (ref 0–0)
NRBC # BLD: 0 /100 WBCS — SIGNIFICANT CHANGE UP (ref 0–0)
PH UR: 6.5 — SIGNIFICANT CHANGE UP (ref 5–8)
PLATELET # BLD AUTO: 254 K/UL — SIGNIFICANT CHANGE UP (ref 150–400)
PLATELET # BLD AUTO: 273 K/UL — SIGNIFICANT CHANGE UP (ref 150–400)
POTASSIUM SERPL-MCNC: 3.3 MMOL/L — LOW (ref 3.5–5.3)
POTASSIUM SERPL-SCNC: 3.3 MMOL/L — LOW (ref 3.5–5.3)
PROT UR-MCNC: 100 — SIGNIFICANT CHANGE UP
RBC # BLD: 3.37 M/UL — LOW (ref 4.2–5.8)
RBC # BLD: 4.01 M/UL — LOW (ref 4.2–5.8)
RBC # FLD: 13.8 % — SIGNIFICANT CHANGE UP (ref 10.3–14.5)
RBC # FLD: 14 % — SIGNIFICANT CHANGE UP (ref 10.3–14.5)
RBC CASTS # UR COMP ASSIST: 27 /HPF — HIGH (ref 0–4)
S AUREUS DNA NOSE QL NAA+PROBE: SIGNIFICANT CHANGE UP
SODIUM SERPL-SCNC: 138 MMOL/L — SIGNIFICANT CHANGE UP (ref 135–145)
SP GR SPEC: 1.03 — HIGH (ref 1.01–1.02)
TROPONIN T, HIGH SENSITIVITY RESULT: 21 NG/L — SIGNIFICANT CHANGE UP (ref 0–51)
TROPONIN T, HIGH SENSITIVITY RESULT: 26 NG/L — SIGNIFICANT CHANGE UP (ref 0–51)
UROBILINOGEN FLD QL: ABNORMAL
WBC # BLD: 11.23 K/UL — HIGH (ref 3.8–10.5)
WBC # BLD: 14.46 K/UL — HIGH (ref 3.8–10.5)
WBC # FLD AUTO: 11.23 K/UL — HIGH (ref 3.8–10.5)
WBC # FLD AUTO: 14.46 K/UL — HIGH (ref 3.8–10.5)
WBC UR QL: 3 /HPF — SIGNIFICANT CHANGE UP (ref 0–5)

## 2022-01-08 PROCEDURE — 93010 ELECTROCARDIOGRAM REPORT: CPT | Mod: 76

## 2022-01-08 PROCEDURE — 99232 SBSQ HOSP IP/OBS MODERATE 35: CPT

## 2022-01-08 PROCEDURE — 93923 UPR/LXTR ART STDY 3+ LVLS: CPT | Mod: 26

## 2022-01-08 PROCEDURE — 93010 ELECTROCARDIOGRAM REPORT: CPT | Mod: 77

## 2022-01-08 RX ORDER — HEPARIN SODIUM 5000 [USP'U]/ML
7500 INJECTION INTRAVENOUS; SUBCUTANEOUS EVERY 6 HOURS
Refills: 0 | Status: DISCONTINUED | OUTPATIENT
Start: 2022-01-08 | End: 2022-01-10

## 2022-01-08 RX ORDER — METOPROLOL TARTRATE 50 MG
5 TABLET ORAL ONCE
Refills: 0 | Status: COMPLETED | OUTPATIENT
Start: 2022-01-08 | End: 2022-01-08

## 2022-01-08 RX ORDER — POTASSIUM CHLORIDE 20 MEQ
10 PACKET (EA) ORAL
Refills: 0 | Status: DISCONTINUED | OUTPATIENT
Start: 2022-01-08 | End: 2022-01-08

## 2022-01-08 RX ORDER — METOPROLOL TARTRATE 50 MG
25 TABLET ORAL THREE TIMES A DAY
Refills: 0 | Status: DISCONTINUED | OUTPATIENT
Start: 2022-01-08 | End: 2022-01-09

## 2022-01-08 RX ORDER — SODIUM CHLORIDE 9 MG/ML
75 INJECTION INTRAMUSCULAR; INTRAVENOUS; SUBCUTANEOUS ONCE
Refills: 0 | Status: DISCONTINUED | OUTPATIENT
Start: 2022-01-08 | End: 2022-01-08

## 2022-01-08 RX ORDER — POTASSIUM CHLORIDE 20 MEQ
40 PACKET (EA) ORAL EVERY 4 HOURS
Refills: 0 | Status: COMPLETED | OUTPATIENT
Start: 2022-01-08 | End: 2022-01-08

## 2022-01-08 RX ORDER — HEPARIN SODIUM 5000 [USP'U]/ML
INJECTION INTRAVENOUS; SUBCUTANEOUS
Qty: 25000 | Refills: 0 | Status: DISCONTINUED | OUTPATIENT
Start: 2022-01-08 | End: 2022-01-10

## 2022-01-08 RX ORDER — ASPIRIN/CALCIUM CARB/MAGNESIUM 324 MG
325 TABLET ORAL DAILY
Refills: 0 | Status: DISCONTINUED | OUTPATIENT
Start: 2022-01-08 | End: 2022-01-09

## 2022-01-08 RX ORDER — METOPROLOL TARTRATE 50 MG
5 TABLET ORAL ONCE
Refills: 0 | Status: DISCONTINUED | OUTPATIENT
Start: 2022-01-08 | End: 2022-01-08

## 2022-01-08 RX ORDER — HEPARIN SODIUM 5000 [USP'U]/ML
3500 INJECTION INTRAVENOUS; SUBCUTANEOUS EVERY 6 HOURS
Refills: 0 | Status: DISCONTINUED | OUTPATIENT
Start: 2022-01-08 | End: 2022-01-10

## 2022-01-08 RX ORDER — ACETAMINOPHEN 500 MG
1000 TABLET ORAL ONCE
Refills: 0 | Status: COMPLETED | OUTPATIENT
Start: 2022-01-08 | End: 2022-01-08

## 2022-01-08 RX ORDER — METOPROLOL TARTRATE 50 MG
25 TABLET ORAL
Refills: 0 | Status: DISCONTINUED | OUTPATIENT
Start: 2022-01-08 | End: 2022-01-08

## 2022-01-08 RX ORDER — SODIUM CHLORIDE 9 MG/ML
1000 INJECTION INTRAMUSCULAR; INTRAVENOUS; SUBCUTANEOUS
Refills: 0 | Status: DISCONTINUED | OUTPATIENT
Start: 2022-01-08 | End: 2022-01-11

## 2022-01-08 RX ORDER — ATORVASTATIN CALCIUM 80 MG/1
80 TABLET, FILM COATED ORAL AT BEDTIME
Refills: 0 | Status: DISCONTINUED | OUTPATIENT
Start: 2022-01-08 | End: 2022-01-14

## 2022-01-08 RX ORDER — HEPARIN SODIUM 5000 [USP'U]/ML
7500 INJECTION INTRAVENOUS; SUBCUTANEOUS ONCE
Refills: 0 | Status: COMPLETED | OUTPATIENT
Start: 2022-01-08 | End: 2022-01-08

## 2022-01-08 RX ADMIN — SODIUM CHLORIDE 75 MILLILITER(S): 9 INJECTION INTRAMUSCULAR; INTRAVENOUS; SUBCUTANEOUS at 09:53

## 2022-01-08 RX ADMIN — HEPARIN SODIUM 7500 UNIT(S): 5000 INJECTION INTRAVENOUS; SUBCUTANEOUS at 11:47

## 2022-01-08 RX ADMIN — Medication 1 APPLICATION(S): at 13:04

## 2022-01-08 RX ADMIN — HEPARIN SODIUM 2100 UNIT(S)/HR: 5000 INJECTION INTRAVENOUS; SUBCUTANEOUS at 19:22

## 2022-01-08 RX ADMIN — Medication 25 MILLIGRAM(S): at 13:23

## 2022-01-08 RX ADMIN — Medication 5 MILLIGRAM(S): at 22:18

## 2022-01-08 RX ADMIN — Medication 325 MILLIGRAM(S): at 13:23

## 2022-01-08 RX ADMIN — SODIUM CHLORIDE 50 MILLILITER(S): 9 INJECTION INTRAMUSCULAR; INTRAVENOUS; SUBCUTANEOUS at 00:10

## 2022-01-08 RX ADMIN — PIPERACILLIN AND TAZOBACTAM 25 GRAM(S): 4; .5 INJECTION, POWDER, LYOPHILIZED, FOR SOLUTION INTRAVENOUS at 02:35

## 2022-01-08 RX ADMIN — Medication 400 MILLIGRAM(S): at 09:53

## 2022-01-08 RX ADMIN — Medication 5 MILLIGRAM(S): at 11:44

## 2022-01-08 RX ADMIN — Medication 25 MILLIGRAM(S): at 21:18

## 2022-01-08 RX ADMIN — ATORVASTATIN CALCIUM 80 MILLIGRAM(S): 80 TABLET, FILM COATED ORAL at 21:18

## 2022-01-08 RX ADMIN — Medication 250 MILLIGRAM(S): at 11:44

## 2022-01-08 RX ADMIN — Medication 40 MILLIEQUIVALENT(S): at 13:03

## 2022-01-08 RX ADMIN — PIPERACILLIN AND TAZOBACTAM 25 GRAM(S): 4; .5 INJECTION, POWDER, LYOPHILIZED, FOR SOLUTION INTRAVENOUS at 17:24

## 2022-01-08 RX ADMIN — PIPERACILLIN AND TAZOBACTAM 25 GRAM(S): 4; .5 INJECTION, POWDER, LYOPHILIZED, FOR SOLUTION INTRAVENOUS at 10:28

## 2022-01-08 RX ADMIN — HEPARIN SODIUM 2100 UNIT(S)/HR: 5000 INJECTION INTRAVENOUS; SUBCUTANEOUS at 18:33

## 2022-01-08 RX ADMIN — Medication 40 MILLIEQUIVALENT(S): at 09:55

## 2022-01-08 RX ADMIN — Medication 250 MILLIGRAM(S): at 00:10

## 2022-01-08 RX ADMIN — HEPARIN SODIUM 7500 UNIT(S): 5000 INJECTION INTRAVENOUS; SUBCUTANEOUS at 18:34

## 2022-01-08 RX ADMIN — HEPARIN SODIUM 1700 UNIT(S)/HR: 5000 INJECTION INTRAVENOUS; SUBCUTANEOUS at 12:23

## 2022-01-08 NOTE — PROVIDER CONTACT NOTE (OTHER) - ACTION/TREATMENT ORDERED:
As per SARAH Giang give scheduled rx'ed nighttime dose of PO metoprolol now. Will continue to monitor.

## 2022-01-08 NOTE — PROGRESS NOTE ADULT - ASSESSMENT
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 Vascular surgery consulted for evaluation of peripheral vascular disease.       Plan  - pt is tent on the [facility] for  yas le angio poss ba/stent  for  mon  pt consents  advised  pt to d/w pods att  operative plan and laterality of procedure   will follow

## 2022-01-08 NOTE — PROGRESS NOTE ADULT - SUBJECTIVE AND OBJECTIVE BOX
Patient is a 61y old  Male who presents with a chief complaint of OM toe (08 Jan 2022 13:04)      Vascular Surgery Attending Progress Note    Interval HPI: pt w/o new c/o     Medications:  aspirin enteric coated 325 milliGRAM(s) Oral daily  atorvastatin 80 milliGRAM(s) Oral at bedtime  Dakins Solution - 1/2 Strength 1 Application(s) Topical daily  heparin   Injectable 7500 Unit(s) IV Push every 6 hours PRN  heparin   Injectable 3500 Unit(s) IV Push every 6 hours PRN  heparin  Infusion.  Unit(s)/Hr IV Continuous <Continuous>  metoprolol tartrate 25 milliGRAM(s) Oral three times a day  piperacillin/tazobactam IVPB.. 3.375 Gram(s) IV Intermittent every 8 hours  sodium chloride 0.9%. 1000 milliLiter(s) IV Continuous <Continuous>  vancomycin  IVPB 1000 milliGRAM(s) IV Intermittent every 12 hours      Vital Signs Last 24 Hrs  T(C): 37.9 (08 Jan 2022 11:12), Max: 37.9 (08 Jan 2022 11:12)  T(F): 100.3 (08 Jan 2022 11:12), Max: 100.3 (08 Jan 2022 11:12)  HR: 123 (08 Jan 2022 13:21) (80 - 150)  BP: 113/74 (08 Jan 2022 13:21) (102/64 - 140/89)  BP(mean): --  RR: 18 (08 Jan 2022 11:12) (18 - 20)  SpO2: 96% (08 Jan 2022 11:12) (95% - 96%)  I&O's Summary    07 Jan 2022 07:01  -  08 Jan 2022 07:00  --------------------------------------------------------  IN: 710 mL / OUT: 600 mL / NET: 110 mL    08 Jan 2022 07:01  -  08 Jan 2022 16:13  --------------------------------------------------------  IN: 280 mL / OUT: 300 mL / NET: -20 mL        Physical Exam:  Neuro  A&Ox3 VSS  Vascular:   yas foot wounds/lesions stable  no acute changes     LABS:                        9.4    11.23 )-----------( 254      ( 08 Jan 2022 06:07 )             29.9     01-08    138  |  103  |  16  ----------------------------<  99  3.3<L>   |  22  |  0.62    Ca    8.8      08 Jan 2022 06:07    TPro  7.5  /  Alb  3.7  /  TBili  0.4  /  DBili  x   /  AST  26  /  ALT  12  /  AlkPhos  96  01-07    PT/INR - ( 07 Jan 2022 10:56 )   PT: 15.5 sec;   INR: 1.31 ratio         PTT - ( 07 Jan 2022 10:56 )  PTT:29.7 sec    MRI left foot reviewed sig for left toe 1 and 5 om     DM ELISE MD  [phone] Cell [phone]

## 2022-01-08 NOTE — CHART NOTE - NSCHARTNOTEFT_GEN_A_CORE
Notified by RN that pt on tele monitor with Atrial Fibrillation with -160's.    Pt seen and evaluated at bedside; Pt with no complaints of chest pain, shortness of breath, headache, dizziness, acute visual changes, nausea, vomiting, fevers, or chills.    Chart reviewed: pt is 61y male with PMH of Charcot-Laika-Tooth disease substance abuse in remission, peripheral vascular disease and surgical hx of hammer toe correction left presenting with one month of non-healing 5th left toe s/p bedside I&D by podiatry with osteomyelitis, on Vancomycin/Zosyn; with New Onset Atrial Fibrillation; started on Heparin gtt and Lopressor 25mg TID.    Pt due for Lopressor 25mg PO presently, given by RN. 9:17pm    9:48pm Pt with sustained 's; 5mg IV Metoprolol x1 given.    Will re-assess response and monitor closely, endorse to AM team.

## 2022-01-08 NOTE — PROGRESS NOTE ADULT - SUBJECTIVE AND OBJECTIVE BOX
CARDIOLOGY     PROGRESS  NOTE   ________________________________________________    CHIEF COMPLAINT:Patient is a 61y old  Male who presents with a chief complaint of OM toe (08 Jan 2022 08:58)  events noted.  	  REVIEW OF SYSTEMS:  CONSTITUTIONAL: No fever, weight loss, or fatigue  EYES: No eye pain, visual disturbances, or discharge  ENT:  No difficulty hearing, tinnitus, vertigo; No sinus or throat pain  NECK: No pain or stiffness  RESPIRATORY: No cough, wheezing, chills or hemoptysis; No Shortness of Breath  CARDIOVASCULAR: No chest pain, palpitations, passing out, dizziness, or leg swelling  GASTROINTESTINAL: No abdominal or epigastric pain. No nausea, vomiting, or hematemesis; No diarrhea or constipation. No melena or hematochezia.  GENITOURINARY: No dysuria, frequency, hematuria, or incontinence  NEUROLOGICAL: No headaches, memory loss, loss of strength, numbness, or tremors  SKIN: No itching, burning, rashes, or lesions   LYMPH Nodes: No enlarged glands  ENDOCRINE: No heat or cold intolerance; No hair loss  MUSCULOSKELETAL: No joint pain or swelling; No muscle, back, or extremity pain  PSYCHIATRIC: No depression, anxiety, mood swings, or difficulty sleeping  HEME/LYMPH: No easy bruising, or bleeding gums  ALLERGY AND IMMUNOLOGIC: No hives or eczema	    [ ] All others negative	  [ ] Unable to obtain    PHYSICAL EXAM:  T(C): 37.9 (01-08-22 @ 11:12), Max: 37.9 (01-08-22 @ 11:12)  HR: 140 (01-08-22 @ 11:41) (80 - 150)  BP: 117/76 (01-08-22 @ 11:41) (102/64 - 140/89)  RR: 18 (01-08-22 @ 11:12) (18 - 20)  SpO2: 96% (01-08-22 @ 11:12) (95% - 99%)  Wt(kg): --  I&O's Summary    07 Jan 2022 07:01  -  08 Jan 2022 07:00  --------------------------------------------------------  IN: 710 mL / OUT: 600 mL / NET: 110 mL    08 Jan 2022 07:01  -  08 Jan 2022 13:04  --------------------------------------------------------  IN: 280 mL / OUT: 300 mL / NET: -20 mL        Appearance: Normal	  HEENT:   Normal oral mucosa, PERRL, EOMI	  Lymphatic: No lymphadenopathy  Cardiovascular: Normal S1 S2, No JVD, +murmurs, No edema  Respiratory: Lungs clear to auscultation	  Psychiatry: A & O x 3, Mood & affect appropriate  Gastrointestinal:  Soft, Non-tender, + BS	  Skin: No rashes, No ecchymoses, No cyanosis	  Neurologic: Non-focal  Extremities: Normal range of motion, No clubbing, cyanosis or edema  Vascular: Peripheral pulses palpable 2+ bilaterally    MEDICATIONS  (STANDING):  Dakins Solution - 1/2 Strength 1 Application(s) Topical daily  heparin  Infusion.  Unit(s)/Hr (17 mL/Hr) IV Continuous <Continuous>  piperacillin/tazobactam IVPB.. 3.375 Gram(s) IV Intermittent every 8 hours  sodium chloride 0.9%. 1000 milliLiter(s) (75 mL/Hr) IV Continuous <Continuous>  vancomycin  IVPB 1000 milliGRAM(s) IV Intermittent every 12 hours      TELEMETRY: 	    ECG:  	  RADIOLOGY:  OTHER: 	  	  LABS:	 	    CARDIAC MARKERS:  CARDIAC MARKERS ( 08 Jan 2022 10:25 )  x     / x     / 63 U/L / x     / 1.5 ng/mL                                9.4    11.23 )-----------( 254      ( 08 Jan 2022 06:07 )             29.9     01-08    138  |  103  |  16  ----------------------------<  99  3.3<L>   |  22  |  0.62    Ca    8.8      08 Jan 2022 06:07    TPro  7.5  /  Alb  3.7  /  TBili  0.4  /  DBili  x   /  AST  26  /  ALT  12  /  AlkPhos  96  01-07    proBNP:   Lipid Profile:   HgA1c:   TSH:   PT/INR - ( 07 Jan 2022 10:56 )   PT: 15.5 sec;   INR: 1.31 ratio         PTT - ( 07 Jan 2022 10:56 )  PTT:29.7 sec  < from: TTE with Doppler (w/Cont) (01.07.22 @ 13:23) >  Mitral Valve: Mitral annular calcification, otherwise  normal mitral valve.  Aortic Valve/Aorta: Aortic valve not well visualized;  appears calcified.  Aortic Root: 2.9 cm.  Left Atrium: Normal left atrium.  Left Ventricle: Endocardial visualization enhanced with  intravenous injection of Ultrasonic Enhancing Agent  (Definity). Overall preserved left ventricular ejection  fraction. Normal left ventricular internal dimensions and  wall thicknesses. Normal diastolic function  Right Heart: Normal right atrium. The right ventricle is  not well visualized; grossly normal right ventricular  systolic function. Normal tricuspid valve. Minimal  tricuspid regurgitation. Normal pulmonic valve.  Pericardium/Pleura: Normal pericardium with no pericardial  effusion.  Hemodynamic: Estimated right atrial pressure is 8 mm Hg.  Unable to estimate RVSP.  ------------------------------------------------------------------------  Conclusions:  1. Endocardial visualization enhanced with intravenous  injection of Ultrasonic Enhancing Agent (Definity). Overall  preserved left ventricular ejection fraction.  2. The right ventricle is not well visualized; grossly  normal right ventricular systolic function.    < from: 12 Lead ECG (01.07.22 @ 10:32) >  Diagnosis Line SINUS RHYTHM WITH PREMATURE ATRIAL COMPLEXES  POSSIBLE LEFT ATRIAL ENLARGEMENT  PROLONGED QT  ABNORMAL ECG  NO PREVIOUS ECGS AVAILABLE      < from: 12 Lead ECG (01.08.22 @ 09:06) >  Diagnosis Line Atrial flutter  INFERIOR INJURY PATTERN  ** ** ACUTE MI / STEMI ** **  ABNORMAL ECG  WHEN COMPARED WITH ECG OF 08-JAN-2022 09:06, (UNCONFIRMED)  SIGNIFICANT CHANGES HAVE OCCURRED    Troponin T, High Sensitivity (01.08.22 @ 10:25)    Troponin T, High Sensitivity Result: 26: Specimen not hemolyzed  *  *  Rapid upward or downward changes in high-sensitivity troponin levels  suggest acute myocardial injury. Renal impairment may cause sustained  troponin elevations.  Normal: <6 - 14 ng/L  Indeterminate: 15-51 ng/L  Elevated: > 51 ng/L  See http://labs/test/[email or web address] on the [facility] intranet for more  information ng/L            Assessment and plan  ---------------------------  OM of the toe with +mri  podiatry/ id appreciated continue IV abx  events noted today and discussed with NP, pt with tachycardia , no chest pain ecg ?st elevation in inferior leads, first trop negartive  new onset a fib/ flutter  asa  beta blocker  fu trop  has transferred to tele  will observe closely  may need cath

## 2022-01-08 NOTE — PROGRESS NOTE ADULT - ASSESSMENT
61M with b/l foot wounds  - Pt seen and evalulated   - Afebrile, WBC 11.23, ESR 61, CRP pending   - LEFT foot: lateral aspect of metatarsal 5 fibronecrotic wound to bone with mild malodor, wound tracks about 4cm proximally along lateral aspect of 5th mt shaft, no purulence, improved periwound erythema to the 5th mt base. Dorsal hallux fibrogranular wound to subQ with no acute signs of infection  - RIGHT foot: Distal aspect of 2nd digit wound to bone with no purulence, no tracking, no malodor, periwound erythema to digit, no tracking, no crepitus. sub mt 2 plantar hyperkeratosis with no acute signs of infection  - Wound culture of L 5th wound and R 2nd digit wound, results pending   - Left foot MR preliminary resident read showing increased signal intensity on T2 of 1st and 5th metatarsal   - GERALDO/PVR pending   - [facility] w/ Dr[facility] Dillon[facility] (appreciate recs)  - Podiatry plan is tentatively L foot partial 5th ray resection and R foot partial 2nd ray resection for Tuesday afternoon   - Please document medical clearance for procedure under light sedation with local block   - Seen with attending

## 2022-01-08 NOTE — PROGRESS NOTE ADULT - SUBJECTIVE AND OBJECTIVE BOX
Podiatry pager #: [phone] ([place])/ [phone] ([facility])    Patient is a 61y old  Male who presents with a chief complaint of left 5th toe osteomyelitis and rt plantar foot wound/ulcer (07 Jan 2022 13:32)       INTERVAL HPI/OVERNIGHT EVENTS:  Patient seen and evaluated at bedside.  Pt is resting comfortable in NAD. Denies N/V/F/C.     Allergies    No Known Allergies    Intolerances        Vital Signs Last 24 Hrs  T(C): 37.5 (08 Jan 2022 08:49), Max: 37.5 (08 Jan 2022 08:49)  T(F): 99.5 (08 Jan 2022 08:49), Max: 99.5 (08 Jan 2022 08:49)  HR: 125 (08 Jan 2022 08:49) (66 - 125)  BP: 140/89 (08 Jan 2022 08:49) (105/49 - 140/89)  BP(mean): 66 (07 Jan 2022 15:57) (66 - 66)  RR: 18 (08 Jan 2022 08:49) (18 - 20)  SpO2: 96% (08 Jan 2022 08:49) (96% - 99%)    LABS:                        9.4    11.23 )-----------( 254      ( 08 Jan 2022 06:07 )             29.9     01-08    138  |  103  |  16  ----------------------------<  99  3.3<L>   |  22  |  0.62    Ca    8.8      08 Jan 2022 06:07    TPro  7.5  /  Alb  3.7  /  TBili  0.4  /  DBili  x   /  AST  26  /  ALT  12  /  AlkPhos  96  01-07    PT/INR - ( 07 Jan 2022 10:56 )   PT: 15.5 sec;   INR: 1.31 ratio         PTT - ( 07 Jan 2022 10:56 )  PTT:29.7 sec    CAPILLARY BLOOD GLUCOSE          Lower Extremity Physical Exam:  Vasular: DP/PT 0/4, B/L, CFT <3 seconds B/L, Temperature gradient warm to cool, B/L.   Neuro: Epicritic sensation intact to the level of _, B/L.  Musculoskeletal/Ortho: unremarkable  Skin:    LEFT foot: lateral aspect of metatarsal 5 fibronecrotic wound to bone with mild malodor, wound tracks about 4cm proximally along lateral aspect of 5th mt shaft, no purulence, improved periwound erythema to the 5th mt base. Dorsal hallux fibrogranular wound to subQ with no acute signs of infection.     Right foot: Distal aspect of 2nd digit wound to bone with no purulence, no tracking, no malodor, periwound erythema to digit, no tracking, no crepitus.  sub mt 2 plantar hyperkeratosis with no acute signs of infection    RADIOLOGY & ADDITIONAL TESTS:  < from: Xray Foot AP + Lateral + Oblique, Right (01.07.22 @ 12:24) >    ACC: [id number] EXAM:  XR FOOT COMP MIN 3 VIEWS RT                          PROCEDURE DATE:  01/07/2022          INTERPRETATION:  CLINICAL INDICATION: right 2nd toe purulence    EXAM:  Frontal, oblique, and lateral right foot from 1/7/2022 at 1224. Compared   prior study from 8/15/2017.    IMPRESSION:  Ulcerated right 2nd toe tip soft tissues with stigmata of osteomyelitis   and pathologic bone fragmentation involving the underlying 2nd distal   phalangeal tuft. No proximally tracking gas collections beyond this   region and no additional foci of osteomyelitis.    Intact and normally aligned remaining osteoarticular structures.    Tarsometatarsal alignment maintained without evidence for a Lisfranc   injury.    Slight hammertoe deformities. Preserved remaining joint spaces and no   joint margin erosions.    Thick posterior and tiny plantar calcaneal enthesophytes.    No discrete lytic or blastic lesions.    --- End of Report ---            GALILEA FREDEMAN MD; Attending Radiologist  This document has been electronically signed. Jan 7 2022  2:58PM    < end of copied text >  < from: Xray Foot AP + Lateral + Oblique, Left (01.07.22 @ 11:35) >    ACC: [id number] EXAM:  XR FOOT COMP MIN 3 VIEWS LT                          PROCEDURE DATE:  01/07/2022          INTERPRETATION:  EXAMINATION: XR FOOT COMPLETE 3 VIEWS LEFT    CLINICAL INFORMATION: Rheumatoid arthritis of the hands presenting with   pain associated with left foot ulcer. Evaluate for osteomyelitis    TECHNIQUE: 3-views left foot  dated 1/7/2022 11:35 AM    COMPARISON: Left foot radiographs from 8/15/2017.    FINDINGS:    Large soft tissue ulceration laterally at the level the fifth metatarsal   phalangeal joint. Complete erosion of the head of the fifth metatarsal   and partial erosion of the base of the fifth proximal phalanx consistent   with osteomyelitis. Packing material immediately lateral to head of the   fifth metatarsal.    Fusion at the first interphalangeal joint, unchanged. Erosive changes to   the first left MTP which have progressed since 8/15/2017. These findings   are likely related to an erosive arthropathy although correlate for any   signs of infection in this location is septic arthritis is in the   differential diagnosis.    Spurring on the superior side of the calcaneus.    IMPRESSION:    Large soft tissue ulceration with osteomyelitis at the distal fifth   metatarsal and proximal fifth proximal phalanx.    Erosive changes at the first metatarsophalangeal joint which have   progressed since 8/15/2017 and are most consistent with erosive   arthropathy although correlate for signs of infection in this location as   septic arthritis is also in the differential diagnosis.    --- End of Report ---          TYRA VALDEZ MD; Resident Radiology  This document has been electronically signed.  KARINE PÉREZ MD; Attending Radiologist  This document has been electronically signed. Jan 7 2022 12:31PM    < end of copied text >

## 2022-01-08 NOTE — PATIENT PROFILE ADULT - FALL HARM RISK - HARM RISK INTERVENTIONS

## 2022-01-08 NOTE — PATIENT PROFILE ADULT - LIVING ENVIRONMENT
Patient Name: Willem Jackson  Caller Name: Toño   Callback Number:[phone]  Best Availability: until 430p  Can A Detailed Message Be left? yes  Additional Info: caller is requesting to speak with a nurse or the provider in regards to discontinuing enoxaparin (LOVENOX) 30 MG/0.3ML injectable solution   Did you confirm the message with the caller?: yes    Thank you,  Anh Gerard  
Chart review appears lovenox was discontinued yesterday by MANISH Lowery  pt reported he was not taking.    Incoming call from [facility], from Dr. Chiu office.     Lovenox is to be continued for 4 weeks post op.  Pt has been out for 3 days.  Pt was discharged from [facility] without refills.  Pt is to be on medication through 9/16/21.     Message sent out to provider who discontinued the medication and writer awaiting to speak to Dr. Vo.     Call to pharmacy; medication is ready for  at [facility]. Pt notified.  Courtesy call to [facility], message left that medications including Lovenox are ready for , as clarified by pharmacy staff.     Discussed with Dr. Vo; verbal to add lovenox back on med list through 9/16/21  
no

## 2022-01-08 NOTE — PATIENT PROFILE ADULT - HOME ACCESSIBILITY CONCERNS - OTHER
Pt states he is unsure now regarding his current healthcare intervention and how that will affect his mobility at home.

## 2022-01-09 LAB
ANION GAP SERPL CALC-SCNC: 15 MMOL/L — SIGNIFICANT CHANGE UP (ref 5–17)
APTT BLD: 26.8 SEC — LOW (ref 27.5–35.5)
APTT BLD: 34.8 SEC — SIGNIFICANT CHANGE UP (ref 27.5–35.5)
APTT BLD: 48.9 SEC — HIGH (ref 27.5–35.5)
APTT BLD: 51.8 SEC — HIGH (ref 27.5–35.5)
BUN SERPL-MCNC: 11 MG/DL — SIGNIFICANT CHANGE UP (ref 7–23)
CALCIUM SERPL-MCNC: 9.4 MG/DL — SIGNIFICANT CHANGE UP (ref 8.4–10.5)
CHLORIDE SERPL-SCNC: 100 MMOL/L — SIGNIFICANT CHANGE UP (ref 96–108)
CHOLEST SERPL-MCNC: 118 MG/DL — SIGNIFICANT CHANGE UP
CO2 SERPL-SCNC: 22 MMOL/L — SIGNIFICANT CHANGE UP (ref 22–31)
CREAT SERPL-MCNC: 0.5 MG/DL — SIGNIFICANT CHANGE UP (ref 0.5–1.3)
CULTURE RESULTS: SIGNIFICANT CHANGE UP
CULTURE RESULTS: SIGNIFICANT CHANGE UP
GLUCOSE SERPL-MCNC: 103 MG/DL — HIGH (ref 70–99)
HCT VFR BLD CALC: 33.2 % — LOW (ref 39–50)
HDLC SERPL-MCNC: 25 MG/DL — LOW
HGB BLD-MCNC: 10.6 G/DL — LOW (ref 13–17)
LIPID PNL WITH DIRECT LDL SERPL: 69 MG/DL — SIGNIFICANT CHANGE UP
MAGNESIUM SERPL-MCNC: 1.9 MG/DL — SIGNIFICANT CHANGE UP (ref 1.6–2.6)
MCHC RBC-ENTMCNC: 27.6 PG — SIGNIFICANT CHANGE UP (ref 27–34)
MCHC RBC-ENTMCNC: 31.9 GM/DL — LOW (ref 32–36)
MCV RBC AUTO: 86.5 FL — SIGNIFICANT CHANGE UP (ref 80–100)
NON HDL CHOLESTEROL: 93 MG/DL — SIGNIFICANT CHANGE UP
NRBC # BLD: 0 /100 WBCS — SIGNIFICANT CHANGE UP (ref 0–0)
PLATELET # BLD AUTO: 319 K/UL — SIGNIFICANT CHANGE UP (ref 150–400)
POTASSIUM SERPL-MCNC: 3.5 MMOL/L — SIGNIFICANT CHANGE UP (ref 3.5–5.3)
POTASSIUM SERPL-SCNC: 3.5 MMOL/L — SIGNIFICANT CHANGE UP (ref 3.5–5.3)
RBC # BLD: 3.84 M/UL — LOW (ref 4.2–5.8)
RBC # FLD: 13.9 % — SIGNIFICANT CHANGE UP (ref 10.3–14.5)
SARS-COV-2 RNA SPEC QL NAA+PROBE: SIGNIFICANT CHANGE UP
SODIUM SERPL-SCNC: 137 MMOL/L — SIGNIFICANT CHANGE UP (ref 135–145)
SPECIMEN SOURCE: SIGNIFICANT CHANGE UP
SPECIMEN SOURCE: SIGNIFICANT CHANGE UP
TRIGL SERPL-MCNC: 118 MG/DL — SIGNIFICANT CHANGE UP
VANCOMYCIN TROUGH SERPL-MCNC: 5.9 UG/ML — LOW (ref 10–20)
WBC # BLD: 15.54 K/UL — HIGH (ref 3.8–10.5)
WBC # FLD AUTO: 15.54 K/UL — HIGH (ref 3.8–10.5)

## 2022-01-09 PROCEDURE — 93010 ELECTROCARDIOGRAM REPORT: CPT

## 2022-01-09 PROCEDURE — 99232 SBSQ HOSP IP/OBS MODERATE 35: CPT

## 2022-01-09 RX ORDER — AMIODARONE HYDROCHLORIDE 400 MG/1
150 TABLET ORAL ONCE
Refills: 0 | Status: COMPLETED | OUTPATIENT
Start: 2022-01-09 | End: 2022-01-09

## 2022-01-09 RX ORDER — METOPROLOL TARTRATE 50 MG
50 TABLET ORAL
Refills: 0 | Status: DISCONTINUED | OUTPATIENT
Start: 2022-01-09 | End: 2022-01-11

## 2022-01-09 RX ORDER — ASPIRIN/CALCIUM CARB/MAGNESIUM 324 MG
81 TABLET ORAL DAILY
Refills: 0 | Status: DISCONTINUED | OUTPATIENT
Start: 2022-01-09 | End: 2022-01-14

## 2022-01-09 RX ORDER — SODIUM CHLORIDE 9 MG/ML
1000 INJECTION, SOLUTION INTRAVENOUS
Refills: 0 | Status: DISCONTINUED | OUTPATIENT
Start: 2022-01-10 | End: 2022-01-14

## 2022-01-09 RX ORDER — POTASSIUM CHLORIDE 20 MEQ
20 PACKET (EA) ORAL ONCE
Refills: 0 | Status: COMPLETED | OUTPATIENT
Start: 2022-01-09 | End: 2022-01-09

## 2022-01-09 RX ORDER — DIGOXIN 250 MCG
250 TABLET ORAL EVERY 4 HOURS
Refills: 0 | Status: COMPLETED | OUTPATIENT
Start: 2022-01-09 | End: 2022-01-09

## 2022-01-09 RX ORDER — MAGNESIUM SULFATE 500 MG/ML
1 VIAL (ML) INJECTION ONCE
Refills: 0 | Status: COMPLETED | OUTPATIENT
Start: 2022-01-09 | End: 2022-01-09

## 2022-01-09 RX ADMIN — ATORVASTATIN CALCIUM 80 MILLIGRAM(S): 80 TABLET, FILM COATED ORAL at 21:40

## 2022-01-09 RX ADMIN — Medication 250 MICROGRAM(S): at 10:15

## 2022-01-09 RX ADMIN — HEPARIN SODIUM 7500 UNIT(S): 5000 INJECTION INTRAVENOUS; SUBCUTANEOUS at 01:34

## 2022-01-09 RX ADMIN — HEPARIN SODIUM 3300 UNIT(S)/HR: 5000 INJECTION INTRAVENOUS; SUBCUTANEOUS at 23:31

## 2022-01-09 RX ADMIN — HEPARIN SODIUM 3100 UNIT(S)/HR: 5000 INJECTION INTRAVENOUS; SUBCUTANEOUS at 18:37

## 2022-01-09 RX ADMIN — Medication 1 APPLICATION(S): at 11:52

## 2022-01-09 RX ADMIN — HEPARIN SODIUM 7500 UNIT(S): 5000 INJECTION INTRAVENOUS; SUBCUTANEOUS at 09:12

## 2022-01-09 RX ADMIN — Medication 100 GRAM(S): at 12:56

## 2022-01-09 RX ADMIN — Medication 25 MILLIGRAM(S): at 05:05

## 2022-01-09 RX ADMIN — HEPARIN SODIUM 2500 UNIT(S)/HR: 5000 INJECTION INTRAVENOUS; SUBCUTANEOUS at 07:12

## 2022-01-09 RX ADMIN — HEPARIN SODIUM 3100 UNIT(S)/HR: 5000 INJECTION INTRAVENOUS; SUBCUTANEOUS at 16:18

## 2022-01-09 RX ADMIN — Medication 250 MICROGRAM(S): at 13:37

## 2022-01-09 RX ADMIN — Medication 25 MILLIGRAM(S): at 13:36

## 2022-01-09 RX ADMIN — HEPARIN SODIUM 3500 UNIT(S): 5000 INJECTION INTRAVENOUS; SUBCUTANEOUS at 16:19

## 2022-01-09 RX ADMIN — HEPARIN SODIUM 2900 UNIT(S)/HR: 5000 INJECTION INTRAVENOUS; SUBCUTANEOUS at 09:11

## 2022-01-09 RX ADMIN — HEPARIN SODIUM 2900 UNIT(S)/HR: 5000 INJECTION INTRAVENOUS; SUBCUTANEOUS at 10:17

## 2022-01-09 RX ADMIN — HEPARIN SODIUM 2500 UNIT(S)/HR: 5000 INJECTION INTRAVENOUS; SUBCUTANEOUS at 01:28

## 2022-01-09 RX ADMIN — Medication 50 MILLIGRAM(S): at 20:58

## 2022-01-09 RX ADMIN — AMIODARONE HYDROCHLORIDE 600 MILLIGRAM(S): 400 TABLET ORAL at 18:18

## 2022-01-09 RX ADMIN — PIPERACILLIN AND TAZOBACTAM 25 GRAM(S): 4; .5 INJECTION, POWDER, LYOPHILIZED, FOR SOLUTION INTRAVENOUS at 03:10

## 2022-01-09 RX ADMIN — Medication 20 MILLIEQUIVALENT(S): at 12:53

## 2022-01-09 RX ADMIN — Medication 250 MILLIGRAM(S): at 01:41

## 2022-01-09 RX ADMIN — PIPERACILLIN AND TAZOBACTAM 25 GRAM(S): 4; .5 INJECTION, POWDER, LYOPHILIZED, FOR SOLUTION INTRAVENOUS at 17:04

## 2022-01-09 RX ADMIN — Medication 81 MILLIGRAM(S): at 11:52

## 2022-01-09 RX ADMIN — PIPERACILLIN AND TAZOBACTAM 25 GRAM(S): 4; .5 INJECTION, POWDER, LYOPHILIZED, FOR SOLUTION INTRAVENOUS at 10:14

## 2022-01-09 NOTE — PROGRESS NOTE ADULT - SUBJECTIVE AND OBJECTIVE BOX
Patient is a 61y old  Male who presents with a chief complaint of OM toe (2022 09:40)       INTERVAL HPI/OVERNIGHT EVENTS:  Patient seen and evaluated at bedside.  Pt is resting comfortable in NAD. Denies N/V/F/C.  Pain rated at X/10    Allergies    No Known Allergies    Intolerances        Vital Signs Last 24 Hrs  T(C): 37.6 (2022 05:03), Max: 37.9 (2022 11:12)  T(F): 99.6 (2022 05:03), Max: 100.3 (2022 11:12)  HR: 118 (2022 05:03) (110 - 150)  BP: 124/84 (2022 05:03) (102/64 - 133/88)  BP(mean): --  RR: 19 (2022 05:03) (18 - 19)  SpO2: 99% (2022 05:03) (95% - 99%)    LABS:                        10.6   15.54 )-----------( 319      ( 2022 08:05 )             33.2     01-09    137  |  100  |  11  ----------------------------<  103<H>  3.5   |  22  |  0.50    Ca    9.4      2022 08:05    TPro  7.5  /  Alb  3.7  /  TBili  0.4  /  DBili  x   /  AST  26  /  ALT  12  /  AlkPhos  96  01-07    PT/INR - ( 2022 10:56 )   PT: 15.5 sec;   INR: 1.31 ratio         PTT - ( 2022 08:05 )  PTT:34.8 sec  Urinalysis Basic - ( 2022 15:43 )    Color: Yellow / Appearance: Clear / S.030 / pH: x  Gluc: x / Ketone: Small  / Bili: Negative / Urobili: 2 mg/dL   Blood: x / Protein: 100 / Nitrite: Negative   Leuk Esterase: Negative / RBC: 27 /hpf / WBC 3 /HPF   Sq Epi: x / Non Sq Epi: 2 /hpf / Bacteria: Negative      CAPILLARY BLOOD GLUCOSE

## 2022-01-09 NOTE — PROGRESS NOTE ADULT - ASSESSMENT
Patient visited at bedside for evaluation of osteomyelitis both feet in the presence of PVD.  extensive fibrotic tissue with draining purulence without gas noted 5th ray left foot  Deep ulcer probing to bone 5th ray with extensive osteomyelitis seen on x-ray and MRI left foot  Small ulceration plantar 1 metatarsal head with no signs of any obvious abscess or drainage  Ulcer with probing down to bone distal right 2nd toe also noted with x-rays signs of osteomyelitis distal right 2nd toe.    ACC: [id number] EXAM:  MR FOOT WAW IC LT                          PROCEDURE DATE:  01/07/2022          INTERPRETATION:  LEFT FOOT MRI    CLINICAL INFORMATION: Left foot fifth digit wound to bone. Evaluate for   osteomyelitis.  TECHNIQUE: Multiplanar,multisequence MRI was obtained of the LEFT foot   before and after the intravenous administration of 7.5 ml Gadavist (0 ml   discarded) .  COMPARISON: Left foot radiographs 1/7/2022 and 8/15/2017.    FINDINGS:    Study is partially limited by patientmotion artifact.    PERIPHERAL SOFT TISSUES: Soft tissue wound is seen along the lateral   aspect of the foot at the level of the fifth MTP joint measuring   approximately 11 x 13 mm. The wound is associated with subcutaneous tract   which extends tothe fifth MTP joint and the cortex of the fifth   metatarsal. A small amount of fluid is seen within the wound with   peripheral enhancement suggesting possible abscess cavity (11:15/19,   14:15-19). Suspected small soft tissue ulceration in the plantar aspect   of the foot deep to the head of the first metatarsal measuring 8 x 5 mm   (11:20, 12:20). The wound tracks to the level of the cortical bone.  BONE MARROW: Increased STIR signal with loss of T1 hyperintense signal   and enhancement involving the proximal phalanx of the fifth toe and the   fifth metatarsal to the level of the proximal metaphysis.  Increased or signal or loss of T1 hyperintense signal and postcontrast   enhancement involving the entirety of the first metatarsal. Marrow signal   is otherwise within normal limits. No acute fracture.    MUSCLES AND TENDONS: Mild atrophy and edema of the intrinsic musculature   of the foot.  LIGAMENTS AND CAPSULAR STRUCTURES: Lisfranc ligament remains intact.  SYNOVIUM/JOINT FLUID: No large joint effusion.      IMPRESSION:  1.  Soft tissue wound in the lateral aspect of the foot extending to the   fifth MTP joint with acute osteomyelitis of the proximal phalanx of the   fifth toe and fifth metatarsal.  2.  Soft tissue wound in the plantar aspect of the foot at the level of   the first metatarsal with acute osteomyelitis of the first metatarsal.    --- End of Report --      CHOCO MELISSA MD; Attending Radiologist  This document has been electronically signed. Jan 8 2022 10:00AM    Patient going for Angiogram with vascular tomorrow (Monday)  Plan is for left 5th ray resection and right 2nd toe distal amputation  Patient refused TMA left foot even though MRI reveals suspected osteomyelitis of left 1 metatarsal. Patient would like to attempt at resection of osteomyelitis left 5th ray and 6 weeks of IV antibiosis for remaining OM at 1 metatarsal/ I will attempt at bone biopsy of 1 metatarsal left foot. Patient understands that he will have chronic Osteomyelitis 1 metatarsal left foot which could reactivate and cause progressive spread of infection.  patient told that he has a high risk for future TMA and BKA left foot.   Await cardiac clearance and vascular clearance prior to foot surgery on Tuesday    Patient is not covid vaccinated and wants to get covid vaccination during this admission.  Patient refuses Flu vaccination    Apply betadine to ulcerative areas both feet with gauze, cas ware.  Podiatry will follow

## 2022-01-09 NOTE — PROGRESS NOTE ADULT - SUBJECTIVE AND OBJECTIVE BOX
Patient is a 61y old  Male who presents with a chief complaint of foot pain    Interval History/ROS:  MRI confirms OM L foot.  For angiogram tomorrow.  No fever.  No n/v/d.  some pain of b/l feet.  No n/v/d.  no abdominal pain.  no dysuria.  Remainder of ROS otherwise negative.    PAST MEDICAL & SURGICAL HISTORY:  CMT (Charcot-Lakia-Tooth disease)  Substance abuse in remission  Palate deformity partial palate removal  H/O hammer toe correction left    Allergies  No Known Allergies    Antimicrobials:  piperacillin/tazobactam IVPB.. 3.375 every 8 hours  vancomycin  IVPB 1000 every 12 hours    MEDICATIONS  (STANDING):  aspirin enteric coated 81 daily  atorvastatin 80 at bedtime  digoxin  Injectable 250 every 4 hours  heparin  Infusion.  <Continuous>  metoprolol tartrate 25 three times a day    Vital Signs Last 24 Hrs  T(F): 99.6 (01-09-22 @ 05:03), Max: 100.3 (01-08-22 @ 11:12)  HR: 118 (01-09-22 @ 05:03)  BP: 124/84 (01-09-22 @ 05:03)  RR: 19 (01-09-22 @ 05:03)  SpO2: 99% (01-09-22 @ 05:03) (95% - 99%)  Wt(kg): --    PHYSICAL EXAM:  Constitutional: non-toxic, lying in bed  HEAD/EYES: anicteric  ENT:  suppe  Cardiovascular:   normal S1, S2  Respiratory:  clear BS bilaterally  GI:  soft, non-tender, normal bowel sounds  :  no nelson  Musculoskeletal:  b/l feet with dressings that are c/d/i; L foot with erythema  Neurologic: awake and alert  Skin:  no phlebitis  Psychiatric:  awake, alert, appropriate mood                        10.6   15.54 )-----------( 319      ( 09 Jan 2022 08:05 )             33.2 01-09    137  |  100  |  11  ----------------------------<  103  3.5   |  22  |  0.50  Ca    9.4      09 Jan 2022 08:05  TPro  7.5  /  Alb  3.7  /  TBili  0.4  /  DBili  x   /  AST  26  /  ALT  12  /  AlkPhos  96  01-07    C-Reactive Protein, Serum: 434 (01-08 @ 10:25)  Sedimentation Rate, Erythrocyte: 61 (01-07 @ 16:15)    MICROBIOLOGY:  Vancomycin Level, Trough: 5.9 (01-09 @ 00:54)    Culture - Blood (collected 01-07-22 @ 14:47)  Source: .Blood Blood-Peripheral  Preliminary Report (01-08-22 @ 15:05):    No growth to date.    Culture - Blood (collected 01-07-22 @ 14:47)  Source: .Blood Blood-Peripheral  Preliminary Report (01-08-22 @ 15:05):    No growth to date.    Culture - Abscess with Gram Stain (collected 01-07-22 @ 14:42)  Source: .Abscess L foot wound  Preliminary Report (01-08-22 @ 13:52):    Few Streptococcus dysgalactiae (Group C/G) "Susceptibilities not    performed"    Culture - Abscess with Gram Stain (collected 01-07-22 @ 14:42)  Source: .Abscess R foot wound  Preliminary Report (01-08-22 @ 13:54):    Few Streptococcus dysgalactiae (Group C/G) "Susceptibilities not    performed"    Few Corynebacterium striatum group "Susceptibilities not performed"    RADIOLOGY:  MR Foot w/wo IV Cont, Left (01.07.22 @ 22:59) >  IMPRESSION:  1.  Soft tissue wound in the lateral aspect of the foot extending to the fifth MTP joint with acute osteomyelitis of the proximal phalanx of the fifth toe and fifth metatarsal.  2.  Soft tissue wound in the plantar aspect of the foot at the level of the first metatarsal with acute osteomyelitis of the first metatarsal.

## 2022-01-09 NOTE — PROGRESS NOTE ADULT - SUBJECTIVE AND OBJECTIVE BOX
Patient is a 61y old  Male who presents with a chief complaint of OM toe (09 Jan 2022 10:06)      Vascular Surgery Attending Progress Note    Interval HPI: pt w/o c/o     Medications:  aspirin enteric coated 81 milliGRAM(s) Oral daily  atorvastatin 80 milliGRAM(s) Oral at bedtime  Dakins Solution - 1/2 Strength 1 Application(s) Topical daily  heparin   Injectable 7500 Unit(s) IV Push every 6 hours PRN  heparin   Injectable 3500 Unit(s) IV Push every 6 hours PRN  heparin  Infusion.  Unit(s)/Hr IV Continuous <Continuous>  metoprolol tartrate 25 milliGRAM(s) Oral three times a day  piperacillin/tazobactam IVPB.. 3.375 Gram(s) IV Intermittent every 8 hours  sodium chloride 0.9%. 1000 milliLiter(s) IV Continuous <Continuous>      Vital Signs Last 24 Hrs  T(C): 36.9 (09 Jan 2022 11:35), Max: 37.6 (09 Jan 2022 05:03)  T(F): 98.4 (09 Jan 2022 11:35), Max: 99.6 (09 Jan 2022 05:03)  HR: 118 (09 Jan 2022 13:35) (104 - 150)  BP: 142/72 (09 Jan 2022 13:35) (122/75 - 142/72)  BP(mean): --  RR: 18 (09 Jan 2022 11:35) (18 - 19)  SpO2: 97% (09 Jan 2022 11:35) (97% - 99%)  I&O's Summary    08 Jan 2022 07:01  -  09 Jan 2022 07:00  --------------------------------------------------------  IN: 1707 mL / OUT: 1000 mL / NET: 707 mL    09 Jan 2022 07:01  -  09 Jan 2022 17:01  --------------------------------------------------------  IN: 600 mL / OUT: 1750 mL / NET: -1150 mL        Physical Exam:  Neuro  A&Ox3 VSS  Vascular:  yas foot wounds no acute changes     LABS:                        10.6   15.54 )-----------( 319      ( 09 Jan 2022 08:05 )             33.2     01-09    137  |  100  |  11  ----------------------------<  103<H>  3.5   |  22  |  0.50    Ca    9.4      09 Jan 2022 08:05  Mg     1.9     01-09      PTT - ( 09 Jan 2022 15:24 )  PTT:48.9 sec    DM ELISE MD  [phone] Cell [phone]

## 2022-01-09 NOTE — PROGRESS NOTE ADULT - ASSESSMENT
61M with severe RA, PVD, active smoker with L foot OM.  For angiogram tomorrow.  No MRSA isolated.    PVD, OM L foot  - continue zosyn for now  - for angiogram and eventual podiatric surgery  - d/c vancomycin

## 2022-01-09 NOTE — PROGRESS NOTE ADULT - ASSESSMENT
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 Vascular surgery consulted for evaluation of peripheral vascular disease.       Plan  - pt is tent on the Count includes the [facility] for  yas le angio poss ba/stent  for  mon  pt consents  d/w Dr Gordon   tent  podiatric  intervention  after le angio and vasc surg clearance will be left toe  5 amp and rt toe 2 map   recent cardiac events noted  will d/w Dr calixto cardiology cl for tent  yas le angio Mon   will follow

## 2022-01-09 NOTE — PROGRESS NOTE ADULT - SUBJECTIVE AND OBJECTIVE BOX
CARDIOLOGY     PROGRESS  NOTE   ________________________________________________    CHIEF COMPLAINT:Patient is a 61y old  Male who presents with a chief complaint of OM toe (08 Jan 2022 16:13)  still in a.fib.  	  REVIEW OF SYSTEMS:  CONSTITUTIONAL: No fever, weight loss, or fatigue  EYES: No eye pain, visual disturbances, or discharge  ENT:  No difficulty hearing, tinnitus, vertigo; No sinus or throat pain  NECK: No pain or stiffness  RESPIRATORY: No cough, wheezing, chills or hemoptysis; No Shortness of Breath  CARDIOVASCULAR: No chest pain, palpitations, passing out, dizziness, or leg swelling  GASTROINTESTINAL: No abdominal or epigastric pain. No nausea, vomiting, or hematemesis; No diarrhea or constipation. No melena or hematochezia.  GENITOURINARY: No dysuria, frequency, hematuria, or incontinence  NEUROLOGICAL: No headaches, memory loss, loss of strength, numbness, or tremors  SKIN: No itching, burning, rashes, or lesions   LYMPH Nodes: No enlarged glands  ENDOCRINE: No heat or cold intolerance; No hair loss  MUSCULOSKELETAL: No joint pain or swelling; No muscle, back, or extremity pain  PSYCHIATRIC: No depression, anxiety, mood swings, or difficulty sleeping  HEME/LYMPH: No easy bruising, or bleeding gums  ALLERGY AND IMMUNOLOGIC: No hives or eczema	    [ ] All others negative	  [ ] Unable to obtain    PHYSICAL EXAM:  T(C): 37.6 (01-09-22 @ 05:03), Max: 37.9 (01-08-22 @ 11:12)  HR: 118 (01-09-22 @ 05:03) (110 - 150)  BP: 124/84 (01-09-22 @ 05:03) (102/64 - 133/88)  RR: 19 (01-09-22 @ 05:03) (18 - 19)  SpO2: 99% (01-09-22 @ 05:03) (95% - 99%)  Wt(kg): --  I&O's Summary    08 Jan 2022 07:01  -  09 Jan 2022 07:00  --------------------------------------------------------  IN: 1707 mL / OUT: 1000 mL / NET: 707 mL    09 Jan 2022 07:01  -  09 Jan 2022 09:40  --------------------------------------------------------  IN: 0 mL / OUT: 800 mL / NET: -800 mL        Appearance: Normal	  HEENT:   Normal oral mucosa, PERRL, EOMI	  Lymphatic: No lymphadenopathy  Cardiovascular: Normal S1 S2, No JVD, + murmurs, No edema  Respiratory: Lungs clear to auscultation	  Psychiatry: A & O x 3, Mood & affect appropriate  Gastrointestinal:  Soft, Non-tender, + BS	  Skin: No rashes, No ecchymoses, No cyanosis	  Neurologic: Non-focal  Extremities: Normal range of motion, + om toe  Vascular: Peripheral pulses palpable 2+ bilaterally    MEDICATIONS  (STANDING):  aspirin enteric coated 325 milliGRAM(s) Oral daily  atorvastatin 80 milliGRAM(s) Oral at bedtime  Dakins Solution - 1/2 Strength 1 Application(s) Topical daily  heparin  Infusion.  Unit(s)/Hr (17 mL/Hr) IV Continuous <Continuous>  metoprolol tartrate 25 milliGRAM(s) Oral three times a day  piperacillin/tazobactam IVPB.. 3.375 Gram(s) IV Intermittent every 8 hours  sodium chloride 0.9%. 1000 milliLiter(s) (75 mL/Hr) IV Continuous <Continuous>  vancomycin  IVPB 1000 milliGRAM(s) IV Intermittent every 12 hours      TELEMETRY: 	    ECG:  	  RADIOLOGY:  OTHER: 	  	  LABS:	 	    CARDIAC MARKERS:  CARDIAC MARKERS ( 08 Jan 2022 14:06 )  x     / x     / 50 U/L / x     / 1.4 ng/mL  CARDIAC MARKERS ( 08 Jan 2022 10:25 )  x     / x     / 63 U/L / x     / 1.5 ng/mL                                10.6   15.54 )-----------( 319      ( 09 Jan 2022 08:05 )             33.2     01-09    137  |  100  |  11  ----------------------------<  103<H>  3.5   |  22  |  0.50    Ca    9.4      09 Jan 2022 08:05    TPro  7.5  /  Alb  3.7  /  TBili  0.4  /  DBili  x   /  AST  26  /  ALT  12  /  AlkPhos  96  01-07    proBNP:   Lipid Profile:   HgA1c:   TSH:   PT/INR - ( 07 Jan 2022 10:56 )   PT: 15.5 sec;   INR: 1.31 ratio         PTT - ( 09 Jan 2022 08:05 )  PTT:34.8 sec  Troponin T, High Sensitivity (01.08.22 @ 14:06)    Troponin T, High Sensitivity Result: 21: Specimen not hemolyzed  *  *  Rapid upward or downward changes in high-sensitivity troponin levels  suggest acute myocardial injury. Renal impairment may cause sustained  troponin elevations.  Normal: <6 - 14 ng/L  Indeterminate: 15-51 ng/L  Elevated: > 51 ng/L  See http://labs/test/TROPTHS on the [facility] intranet for more  information ng/L    	  Creatine Kinase, Serum (01.08.22 @ 14:06)    Creatine Kinase, Serum: 50 U/L    Assessment and plan  ---------------------------  OM of the toe with +mri  podiatry/ id appreciated continue IV abx  events noted today and discussed with NP, pt with tachycardia , no chest pain ecg ?st elevation in inferior leads, first trop negartive  new onset a fib/ flutter  asa  beta blocker  fu trop  has transferred to tele  will observe closely  trop/ cpk negative  increase beta blocker, add dig to control hr  continue iv heparin

## 2022-01-10 LAB
ANION GAP SERPL CALC-SCNC: 14 MMOL/L — SIGNIFICANT CHANGE UP (ref 5–17)
APTT BLD: 55.4 SEC — HIGH (ref 27.5–35.5)
BLD GP AB SCN SERPL QL: NEGATIVE — SIGNIFICANT CHANGE UP
BUN SERPL-MCNC: 10 MG/DL — SIGNIFICANT CHANGE UP (ref 7–23)
CALCIUM SERPL-MCNC: 9.3 MG/DL — SIGNIFICANT CHANGE UP (ref 8.4–10.5)
CHLORIDE SERPL-SCNC: 103 MMOL/L — SIGNIFICANT CHANGE UP (ref 96–108)
CO2 SERPL-SCNC: 20 MMOL/L — LOW (ref 22–31)
CREAT SERPL-MCNC: 0.57 MG/DL — SIGNIFICANT CHANGE UP (ref 0.5–1.3)
GLUCOSE SERPL-MCNC: 109 MG/DL — HIGH (ref 70–99)
HCT VFR BLD CALC: 33.7 % — LOW (ref 39–50)
HGB BLD-MCNC: 10.7 G/DL — LOW (ref 13–17)
INR BLD: 1.21 RATIO — HIGH (ref 0.88–1.16)
MAGNESIUM SERPL-MCNC: 2.1 MG/DL — SIGNIFICANT CHANGE UP (ref 1.6–2.6)
MCHC RBC-ENTMCNC: 27.4 PG — SIGNIFICANT CHANGE UP (ref 27–34)
MCHC RBC-ENTMCNC: 31.8 GM/DL — LOW (ref 32–36)
MCV RBC AUTO: 86.4 FL — SIGNIFICANT CHANGE UP (ref 80–100)
NRBC # BLD: 0 /100 WBCS — SIGNIFICANT CHANGE UP (ref 0–0)
PLATELET # BLD AUTO: 342 K/UL — SIGNIFICANT CHANGE UP (ref 150–400)
POTASSIUM SERPL-MCNC: 4 MMOL/L — SIGNIFICANT CHANGE UP (ref 3.5–5.3)
POTASSIUM SERPL-SCNC: 4 MMOL/L — SIGNIFICANT CHANGE UP (ref 3.5–5.3)
PROTHROM AB SERPL-ACNC: 14.4 SEC — HIGH (ref 10.6–13.6)
RBC # BLD: 3.9 M/UL — LOW (ref 4.2–5.8)
RBC # FLD: 14 % — SIGNIFICANT CHANGE UP (ref 10.3–14.5)
RH IG SCN BLD-IMP: NEGATIVE — SIGNIFICANT CHANGE UP
SODIUM SERPL-SCNC: 137 MMOL/L — SIGNIFICANT CHANGE UP (ref 135–145)
WBC # BLD: 10.57 K/UL — HIGH (ref 3.8–10.5)
WBC # FLD AUTO: 10.57 K/UL — HIGH (ref 3.8–10.5)

## 2022-01-10 PROCEDURE — 93010 ELECTROCARDIOGRAM REPORT: CPT

## 2022-01-10 PROCEDURE — 92928 PRQ TCAT PLMT NTRAC ST 1 LES: CPT | Mod: LD

## 2022-01-10 PROCEDURE — 99232 SBSQ HOSP IP/OBS MODERATE 35: CPT

## 2022-01-10 PROCEDURE — 93458 L HRT ARTERY/VENTRICLE ANGIO: CPT | Mod: 26,59

## 2022-01-10 RX ORDER — CLOPIDOGREL BISULFATE 75 MG/1
75 TABLET, FILM COATED ORAL DAILY
Refills: 0 | Status: DISCONTINUED | OUTPATIENT
Start: 2022-01-11 | End: 2022-01-14

## 2022-01-10 RX ORDER — METOPROLOL TARTRATE 50 MG
5 TABLET ORAL ONCE
Refills: 0 | Status: COMPLETED | OUTPATIENT
Start: 2022-01-10 | End: 2022-01-10

## 2022-01-10 RX ADMIN — ATORVASTATIN CALCIUM 80 MILLIGRAM(S): 80 TABLET, FILM COATED ORAL at 21:31

## 2022-01-10 RX ADMIN — HEPARIN SODIUM 3300 UNIT(S)/HR: 5000 INJECTION INTRAVENOUS; SUBCUTANEOUS at 02:07

## 2022-01-10 RX ADMIN — PIPERACILLIN AND TAZOBACTAM 25 GRAM(S): 4; .5 INJECTION, POWDER, LYOPHILIZED, FOR SOLUTION INTRAVENOUS at 19:19

## 2022-01-10 RX ADMIN — PIPERACILLIN AND TAZOBACTAM 25 GRAM(S): 4; .5 INJECTION, POWDER, LYOPHILIZED, FOR SOLUTION INTRAVENOUS at 02:08

## 2022-01-10 RX ADMIN — Medication 50 MILLIGRAM(S): at 05:45

## 2022-01-10 RX ADMIN — Medication 81 MILLIGRAM(S): at 10:44

## 2022-01-10 RX ADMIN — Medication 5 MILLIGRAM(S): at 16:28

## 2022-01-10 RX ADMIN — HEPARIN SODIUM 3500 UNIT(S): 5000 INJECTION INTRAVENOUS; SUBCUTANEOUS at 08:59

## 2022-01-10 RX ADMIN — HEPARIN SODIUM 3500 UNIT(S)/HR: 5000 INJECTION INTRAVENOUS; SUBCUTANEOUS at 08:57

## 2022-01-10 RX ADMIN — HEPARIN SODIUM 3300 UNIT(S)/HR: 5000 INJECTION INTRAVENOUS; SUBCUTANEOUS at 07:42

## 2022-01-10 RX ADMIN — Medication 50 MILLIGRAM(S): at 19:19

## 2022-01-10 NOTE — PROGRESS NOTE ADULT - ASSESSMENT
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 Vascular surgery consulted for evaluation of peripheral vascular disease.       Plan  - Pt for [facility] t/d w/ cards  - BLE angio deferred until cardiac cath completed  - Per podiatry, after le angio and vasc surg clearance, Pt will undergo left toe 5 amp and rt toe 2 amp  - Please page [phone] w/ any questions    KINDRA Patterson PGY-3 61y male with PMH of Charcot-Lakia-Tooth disease substance abuse in remission, peripheral vascular disease and surgical hx of hammer toe correction left presenting with one month of non-healing 5th left toe s/p bedside I&D by podiatry with osteomyelitis. Vascular surgery consulted for evaluation of peripheral vascular disease.       Plan  - s/p card cath  s/p pci to lad  [facility] abilio for yas le angio  wed  will follow     KINDRA Patterson PGY-3

## 2022-01-10 NOTE — PROGRESS NOTE ADULT - SUBJECTIVE AND OBJECTIVE BOX
Podiatry pager #: [phone] ([place])/ [phone] ([facility])    Patient is a 61y old  Male who presents with a chief complaint of OM toe (10 Rodriguez 2022 08:17)       INTERVAL HPI/OVERNIGHT EVENTS:  Patient seen and evaluated at bedside.  Pt is resting comfortable in NAD. Denies N/V/F/C.     Allergies    No Known Allergies    Intolerances        Vital Signs Last 24 Hrs  T(C): 36.8 (10 Rodriguez 2022 04:55), Max: 36.9 (2022 11:35)  T(F): 98.3 (10 Rodriguez 2022 04:55), Max: 98.4 (2022 11:35)  HR: 101 (10 Rodriguez 2022 04:55) (101 - 120)  BP: 135/81 (10 Rodriguez 2022 04:55) (106/76 - 142/72)  BP(mean): --  RR: 18 (10 Rodriguez 2022 04:55) (18 - 18)  SpO2: 98% (10 Rodriguez 2022 04:55) (96% - 98%)    LABS:                        10.7   10.57 )-----------( 342      ( 10 Rodriguez 2022 07:27 )             33.7     01-10    137  |  103  |  10  ----------------------------<  109<H>  4.0   |  20<L>  |  0.57    Ca    9.3      10 Rodriguez 2022 07:24  Mg     2.1     01-10      PT/INR - ( 10 Rodriguez 2022 07:27 )   PT: 14.4 sec;   INR: 1.21 ratio         PTT - ( 10 Rodriguez 2022 07:27 )  PTT:55.4 sec  Urinalysis Basic - ( 2022 15:43 )    Color: Yellow / Appearance: Clear / S.030 / pH: x  Gluc: x / Ketone: Small  / Bili: Negative / Urobili: 2 mg/dL   Blood: x / Protein: 100 / Nitrite: Negative   Leuk Esterase: Negative / RBC: 27 /hpf / WBC 3 /HPF   Sq Epi: x / Non Sq Epi: 2 /hpf / Bacteria: Negative      CAPILLARY BLOOD GLUCOSE          Lower Extremity Physical Exam:  Vascular: DP/PT 0/4, B/L, CFT <3 seconds B/L, Temperature gradient warm to cool, B/L.   Neuro: Epicritic sensation intact to the level of digits, B/L.  Musculoskeletal/Ortho: unremarkable  Skin:    LEFT foot: lateral aspect of metatarsal 5 fibronecrotic wound to bone with mild malodor, wound tracks about 4cm proximally along lateral aspect of 5th mt shaft, no purulence, improved periwound erythema to the 5th mt base. Dorsal hallux fibrogranular wound to subQ with no acute signs of infection. Plantar 1st MPJ wound probing to bone, no acute signs of infection.     Right foot: Distal aspect of 2nd digit wound to bone with no purulence, no tracking, no malodor, periwound erythema to digit, no tracking, no crepitus.  sub mt 2 plantar hyperkeratosis with no acute signs of infection.   RLE cellulitis improving from tibial tuberosity to mid tibial shaft w/ warmth.     RADIOLOGY & ADDITIONAL TESTS:  < from: MR Foot w/wo IV Cont, Left (22 @ 22:59) >    ACC: [id number] EXAM:  MR FOOT WAW IC LT                          PROCEDURE DATE:  2022          INTERPRETATION:  LEFT FOOT MRI    CLINICAL INFORMATION: Left foot fifth digit wound to bone. Evaluate for   osteomyelitis.  TECHNIQUE: Multiplanar,multisequence MRI was obtained of the LEFT foot   before and after the intravenous administration of 7.5 ml Gadavist (0 ml   discarded) .  COMPARISON: Left foot radiographs 2022 and 8/15/2017.    FINDINGS:    Study is partially limited by patientmotion artifact.    PERIPHERAL SOFT TISSUES: Soft tissue wound is seen along the lateral   aspect of the foot at the level of the fifth MTP joint measuring   approximately 11 x 13 mm. The wound is associated with subcutaneous tract   which extends tothe fifth MTP joint and the cortex of the fifth   metatarsal. A small amount of fluid is seen within the wound with   peripheral enhancement suggesting possible abscess cavity (11:15/19,   14:15-19). Suspected small soft tissue ulceration in the plantar aspect   of the foot deep to the head of the first metatarsal measuring 8 x 5 mm   (11:20, 12:20). The wound tracks to the level of the cortical bone.  BONE MARROW: Increased STIR signal with loss of T1 hyperintense signal   and enhancement involving the proximal phalanx of the fifth toe and the   fifth metatarsal to the level of the proximal metaphysis.  Increased or signal or loss of T1 hyperintense signal and postcontrast   enhancement involving the entirety of the first metatarsal. Marrow signal   is otherwise within normal limits. No acute fracture.    MUSCLES AND TENDONS: Mild atrophy and edema of the intrinsic musculature   of the foot.  LIGAMENTS AND CAPSULAR STRUCTURES: Lisfranc ligament remains intact.  SYNOVIUM/JOINT FLUID: No large joint effusion.      IMPRESSION:  1.  Soft tissue wound in the lateral aspect of the foot extending to the   fifth MTP joint with acute osteomyelitis of the proximal phalanx of the   fifth toe and fifth metatarsal.  2.  Soft tissue wound in the plantar aspect of the foot at the level of   the first metatarsal with acute osteomyelitis of the first metatarsal.    --- End of Report ---            CHOCO MELISSA MD; Attending Radiologist  This document has been electronically signed. 2022 10:00AM    < end of copied text >

## 2022-01-10 NOTE — PROGRESS NOTE ADULT - ASSESSMENT
61M with b/l foot wounds  - Pt seen and evalulated   - Afebrile, WBC 10.57, ESR 61, CRP 43.4  - LEFT foot: lateral aspect of metatarsal 5 fibronecrotic wound to bone with mild malodor, wound tracks about 4cm proximally along lateral aspect of 5th mt shaft, no purulence, improved periwound erythema to the 5th mt base. Dorsal hallux fibrogranular wound to subQ with no acute signs of infection. Plantar 1st MPJ wound to bone, no acute signs of infection.   - RIGHT foot: Distal aspect of 2nd digit wound to bone with no purulence, no tracking, no malodor, periwound erythema to digit, no tracking, no crepitus. sub mt 2 plantar hyperkeratosis with no acute signs of infection.  - RLE cellulitis improving from tibial tuberosity to level of mid tibial shaft   - Wound culture of L 5th wound growing Strep dysgalactiae, Bacteriodes  - Wound culture R 2nd digit growing strep dysgalactiae, Corynebacterium and Bacteriodes  - Left foot MR showing osteomyelitis of 1st metatarsal and 5th metatarsal and digit   - Per discussion w/ pt and attending, patient refused TMA left foot even though MRI reveals suspected osteomyelitis of left 1 metatarsal. Patient would like to attempt at resection of osteomyelitis left 5th ray and 6 weeks of IV antibiotics for remaining OM at 1 metatarsal. Dr. Gordon will attempt at bone biopsy of 1 metatarsal left foot. Patient understands that he will have chronic Osteomyelitis 1 metatarsal left foot which could reactivate and cause progressive spread of infection.  - patient is aware that he has a high risk for future TMA and BKA left foot.   - Per discussion w/ Dr. Dillon, [facility] planning angio after cardiac clearance   - Podiatry plan is tentatively L foot partial 5th ray resection, L foot 1st metatarsal bone biopsy and R foot partial 2nd ray resection pending vascular recs/ cardiac clearance (likely next week)   - Please document medical clearance for procedure under light sedation with local block   - Discussed with attending

## 2022-01-10 NOTE — PROGRESS NOTE ADULT - SUBJECTIVE AND OBJECTIVE BOX
VASCULAR SURGERY PROGRESS NOTE:    ========================================  TEAM PAGER [phone]  ========================================    Subjective:  Pt w/o new c/o this AM. Denies worsening pain in BLE. Abdirahman CP, SoB, palpitations.      Objective:    PE:  Gen: NAD  Resp: airway patent, respirations unlabored, no increased WoB  Abd: soft, ND, NT, no rebound or guarding  Ext: BLE dressing c/d/i  Neuro: AAOx3, no focal deficits    Vital Signs Last 24 Hrs  T(C): 36.8 (10 Rodriguez 2022 04:55), Max: 36.9 (2022 11:35)  T(F): 98.3 (10 Rodriguez 2022 04:55), Max: 98.4 (2022 11:35)  HR: 101 (10 Rodriguez 2022 04:55) (101 - 120)  BP: 135/81 (10 Rodriguez 2022 04:55) (106/76 - 142/72)  BP(mean): --  RR: 18 (10 Rodriguez 2022 04:55) (18 - 18)  SpO2: 98% (10 Rodriguez 2022 04:55) (96% - 98%)    I&O's Detail    2022 07:01  -  10 Rodriguez 2022 07:00  --------------------------------------------------------  IN:    Oral Fluid: 900 mL  Total IN: 900 mL    OUT:    Voided (mL): 2350 mL  Total OUT: 2350 mL    Total NET: -1450 mL          Daily     Daily     MEDICATIONS  (STANDING):  aspirin enteric coated 81 milliGRAM(s) Oral daily  atorvastatin 80 milliGRAM(s) Oral at bedtime  Dakins Solution - 1/2 Strength 1 Application(s) Topical daily  dextrose 5% + sodium chloride 0.45%. 1000 milliLiter(s) (100 mL/Hr) IV Continuous <Continuous>  heparin  Infusion.  Unit(s)/Hr (17 mL/Hr) IV Continuous <Continuous>  metoprolol tartrate 50 milliGRAM(s) Oral two times a day  piperacillin/tazobactam IVPB.. 3.375 Gram(s) IV Intermittent every 8 hours  sodium chloride 0.9%. 1000 milliLiter(s) (75 mL/Hr) IV Continuous <Continuous>    MEDICATIONS  (PRN):  heparin   Injectable 7500 Unit(s) IV Push every 6 hours PRN For aPTT less than 40  heparin   Injectable 3500 Unit(s) IV Push every 6 hours PRN For aPTT between 40 - 57      LABS:                        10.7   10.57 )-----------( 342      ( 10 Rodriguez 2022 07:27 )             33.7     01-10    137  |  103  |  10  ----------------------------<  109<H>  4.0   |  20<L>  |  0.57    Ca    9.3      10 Rodriguez 2022 07:24  Mg     2.1     01-10      PT/INR - ( 10 Rodriguez 2022 07:27 )   PT: 14.4 sec;   INR: 1.21 ratio         PTT - ( 10 Rodriguez 2022 07:27 )  PTT:55.4 sec  Urinalysis Basic - ( 2022 15:43 )    Color: Yellow / Appearance: Clear / S.030 / pH: x  Gluc: x / Ketone: Small  / Bili: Negative / Urobili: 2 mg/dL   Blood: x / Protein: 100 / Nitrite: Negative   Leuk Esterase: Negative / RBC: 27 /hpf / WBC 3 /HPF   Sq Epi: x / Non Sq Epi: 2 /hpf / Bacteria: Negative        RADIOLOGY & ADDITIONAL STUDIES:             VASCULAR SURGERY PROGRESS NOTE:    ========================================  TEAM PAGER [phone]  ========================================    Subjective:  Pt w/o new c/o this AM. Denies worsening pain in BLE. Abdirahman CP, SoB, palpitations.      Objective:    PE:  Gen: NAD  Resp: airway patent, respirations unlabored, no increased WoB  Abd: soft, ND, NT, no rebound or guarding  Ext: BLE dressing c/d/i  Neuro: AAOx3, no focal deficits    Vital Signs Last 24 Hrs  T(C): 36.8 (10 Rodriguez 2022 04:55), Max: 36.9 (2022 11:35)  T(F): 98.3 (10 Rodriguez 2022 04:55), Max: 98.4 (2022 11:35)  HR: 101 (10 Rodriguez 2022 04:55) (101 - 120)  BP: 135/81 (10 Rodriguez 2022 04:55) (106/76 - 142/72)  BP(mean): --  RR: 18 (10 Rodriguez 2022 04:55) (18 - 18)  SpO2: 98% (10 Rodriguez 2022 04:55) (96% - 98%)    I&O's Detail    2022 07:01  -  10 Rodriguez 2022 07:00  --------------------------------------------------------  IN:    Oral Fluid: 900 mL  Total IN: 900 mL    OUT:    Voided (mL): 2350 mL  Total OUT: 2350 mL    Total NET: -1450 mL    Daily     MEDICATIONS  (STANDING):  aspirin enteric coated 81 milliGRAM(s) Oral daily  atorvastatin 80 milliGRAM(s) Oral at bedtime  Dakins Solution - 1/2 Strength 1 Application(s) Topical daily  dextrose 5% + sodium chloride 0.45%. 1000 milliLiter(s) (100 mL/Hr) IV Continuous <Continuous>  heparin  Infusion.  Unit(s)/Hr (17 mL/Hr) IV Continuous <Continuous>  metoprolol tartrate 50 milliGRAM(s) Oral two times a day  piperacillin/tazobactam IVPB.. 3.375 Gram(s) IV Intermittent every 8 hours  sodium chloride 0.9%. 1000 milliLiter(s) (75 mL/Hr) IV Continuous <Continuous>    MEDICATIONS  (PRN):  heparin   Injectable 7500 Unit(s) IV Push every 6 hours PRN For aPTT less than 40  heparin   Injectable 3500 Unit(s) IV Push every 6 hours PRN For aPTT between 40 - 57      LABS:                        10.7   10.57 )-----------( 342      ( 10 Rodriguez 2022 07:27 )             33.7     01-10    137  |  103  |  10  ----------------------------<  109<H>  4.0   |  20<L>  |  0.57    Ca    9.3      10 Rodriguez 2022 07:24  Mg     2.1     01-10      PT/INR - ( 10 Rodriguez 2022 07:27 )   PT: 14.4 sec;   INR: 1.21 ratio         PTT - ( 10 Rodriguez 2022 07:27 )  PTT:55.4 sec  Urinalysis Basic - ( 2022 15:43 )    Color: Yellow / Appearance: Clear / S.030 / pH: x  Gluc: x / Ketone: Small  / Bili: Negative / Urobili: 2 mg/dL   Blood: x / Protein: 100 / Nitrite: Negative   Leuk Esterase: Negative / RBC: 27 /hpf / WBC 3 /HPF   Sq Epi: x / Non Sq Epi: 2 /hpf / Bacteria: Negative

## 2022-01-10 NOTE — CHART NOTE - NSCHARTNOTEFT_GEN_A_CORE
Removal of Femoral Sheath    Pulses in the right lower extremity are audible. The patient was placed in the supine position. The insertion site was identified and the sutures were removed per protocol.  The 6 Arabic femoral sheath was then removed. Direct pressure was applied for  __20____ minutes.     Monitoring of the right groin and both lower extremities including neuro-vascular checks and vital signs every 15 minutes x 4, then every 30 minutes x 2, then every 1 hour was ordered.    Gauze and Tegaderm dressing placed. Patient instructed to keep extremity straight and that nursing staff will inform them when they can sit up.     Complications: none    Comments: assisted by MALICK Lerma, [facility]  Invasive Cardiology  [phone]

## 2022-01-10 NOTE — PROGRESS NOTE ADULT - SUBJECTIVE AND OBJECTIVE BOX
infectious diseases progress note:    Patient is a 61y old  Male who presents with a chief complaint of OM toe (2022 17:01)        Other acute osteomyelitis, ankle and foot          ROS:  CONSTITUTIONAL:  Negative fever or chills, feels well, good appetite  EYES:  Negative  blurry vision or double vision  CARDIOVASCULAR:  Negative for chest pain or palpitations  RESPIRATORY:  Negative for cough, wheezing, or SOB   GASTROINTESTINAL:  Negative for nausea, vomiting, diarrhea, constipation, or abdominal pain  GENITOURINARY:  Negative frequency, urgency or dysuria  NEUROLOGIC:  No headache, confusion, dizziness, lightheadedness    Allergies    No Known Allergies    Intolerances        ANTIBIOTICS/RELEVANT:  antimicrobials  piperacillin/tazobactam IVPB.. 3.375 Gram(s) IV Intermittent every 8 hours    immunologic:    OTHER:  aspirin enteric coated 81 milliGRAM(s) Oral daily  atorvastatin 80 milliGRAM(s) Oral at bedtime  Dakins Solution - 1/2 Strength 1 Application(s) Topical daily  dextrose 5% + sodium chloride 0.45%. 1000 milliLiter(s) IV Continuous <Continuous>  heparin   Injectable 7500 Unit(s) IV Push every 6 hours PRN  heparin   Injectable 3500 Unit(s) IV Push every 6 hours PRN  heparin  Infusion.  Unit(s)/Hr IV Continuous <Continuous>  metoprolol tartrate 50 milliGRAM(s) Oral two times a day  sodium chloride 0.9%. 1000 milliLiter(s) IV Continuous <Continuous>      Objective:  Vital Signs Last 24 Hrs  T(C): 36.8 (10 Rodriguez 2022 04:55), Max: 36.9 (2022 11:35)  T(F): 98.3 (10 Rodriguez 2022 04:55), Max: 98.4 (2022 11:35)  HR: 101 (10 Rodriguez 2022 04:55) (101 - 120)  BP: 135/81 (10 Rodriguez 2022 04:55) (106/76 - 142/72)  BP(mean): --  RR: 18 (10 Rodriguez 2022 04:55) (18 - 18)  SpO2: 98% (10 Rodriguez 2022 04:55) (96% - 98%)    PHYSICAL EXAM:  Constitutional:Well-developed, well nourished--no acute distress  Eyes:LELAND, EOMI  Ear/Nose/Throat: no oral lesion, no sinus tenderness on percussion	  Neck:no JVD, no lymphadenopathy, supple  Respiratory: CTA yas  Cardiovascular: S1S2 RRR, no murmurs  Gastrointestinal:soft, (+) BS, no HSM  Extremities:no e/e/c        LABS:                        10.7   10.57 )-----------( 342      ( 10 Rodriguez 2022 07:27 )             33.7     01-10    137  |  103  |  10  ----------------------------<  109<H>  4.0   |  20<L>  |  0.57    Ca    9.3      10 Rodriguez 2022 07:24  Mg     2.1     01-10      PT/INR - ( 10 Rodriguez 2022 07:27 )   PT: 14.4 sec;   INR: 1.21 ratio         PTT - ( 10 Rodriguez 2022 07:27 )  PTT:55.4 sec  Urinalysis Basic - ( 2022 15:43 )    Color: Yellow / Appearance: Clear / S.030 / pH: x  Gluc: x / Ketone: Small  / Bili: Negative / Urobili: 2 mg/dL   Blood: x / Protein: 100 / Nitrite: Negative   Leuk Esterase: Negative / RBC: 27 /hpf / WBC 3 /HPF   Sq Epi: x / Non Sq Epi: 2 /hpf / Bacteria: Negative          MICROBIOLOGY:    RECENT CULTURES:   @ 14:47 .Blood Blood-Peripheral                No growth to date.     @ 14:42 .Abscess R foot wound                Few Streptococcus dysgalactiae (Group C/G)  Few Corynebacterium striatum group  Rare Bacteroides fragilis  "Susceptibilities not performed"          RESPIRATORY CULTURES:              RADIOLOGY & ADDITIONAL STUDIES:        Pager [phone]  After 5 pm/weekends or if no response :[phone]

## 2022-01-10 NOTE — PROGRESS NOTE ADULT - ASSESSMENT
61M with severe RA, PVD, active smoker with L foot OM.  For angiogram tomorrow.  No MRSA isolated.    PVD, OM L foot  - continue zosyn for now  - for angiogram and eventual podiatric surgery   cultures noted strept and anaerobes

## 2022-01-10 NOTE — PROGRESS NOTE ADULT - SUBJECTIVE AND OBJECTIVE BOX
CARDIOLOGY     PROGRESS  NOTE   ________________________________________________    CHIEF COMPLAINT:Patient is a 61y old  Male who presents with a chief complaint of OM toe (10 Rodriguez 2022 08:48)  no complain.  	  REVIEW OF SYSTEMS:  CONSTITUTIONAL: No fever, weight loss, or fatigue  EYES: No eye pain, visual disturbances, or discharge  ENT:  No difficulty hearing, tinnitus, vertigo; No sinus or throat pain  NECK: No pain or stiffness  RESPIRATORY: No cough, wheezing, chills or hemoptysis; No Shortness of Breath  CARDIOVASCULAR: No chest pain, palpitations, passing out, dizziness, or leg swelling  GASTROINTESTINAL: No abdominal or epigastric pain. No nausea, vomiting, or hematemesis; No diarrhea or constipation. No melena or hematochezia.  GENITOURINARY: No dysuria, frequency, hematuria, or incontinence  NEUROLOGICAL: No headaches, memory loss, loss of strength, numbness, or tremors  SKIN: No itching, burning, rashes, or lesions   LYMPH Nodes: No enlarged glands  ENDOCRINE: No heat or cold intolerance; No hair loss  MUSCULOSKELETAL: No joint pain or swelling; No muscle, back, or extremity pain  PSYCHIATRIC: No depression, anxiety, mood swings, or difficulty sleeping  HEME/LYMPH: No easy bruising, or bleeding gums  ALLERGY AND IMMUNOLOGIC: No hives or eczema	    [ ] All others negative	  [ ] Unable to obtain    PHYSICAL EXAM:  T(C): 36.8 (01-10-22 @ 04:55), Max: 36.9 (01-09-22 @ 11:35)  HR: 101 (01-10-22 @ 04:55) (101 - 120)  BP: 135/81 (01-10-22 @ 04:55) (106/76 - 142/72)  RR: 18 (01-10-22 @ 04:55) (18 - 18)  SpO2: 98% (01-10-22 @ 04:55) (96% - 98%)  Wt(kg): --  I&O's Summary    09 Jan 2022 07:01  -  10 Rodriguez 2022 07:00  --------------------------------------------------------  IN: 900 mL / OUT: 2350 mL / NET: -1450 mL        Appearance: Normal	  HEENT:   Normal oral mucosa, PERRL, EOMI	  Lymphatic: No lymphadenopathy  Cardiovascular: Normal S1 S2, No JVD, + murmurs, No edema  Respiratory: Lungs clear to auscultation	  Psychiatry: A & O x 3, Mood & affect appropriate  Gastrointestinal:  Soft, Non-tender, + BS	  Skin: No rashes, No ecchymoses, No cyanosis	  Neurologic: Non-focal  Extremities: Normal range of motion, No clubbing, cyanosis or edema  Vascular: Peripheral pulses palpable 2+ bilaterally    MEDICATIONS  (STANDING):  aspirin enteric coated 81 milliGRAM(s) Oral daily  atorvastatin 80 milliGRAM(s) Oral at bedtime  Dakins Solution - 1/2 Strength 1 Application(s) Topical daily  dextrose 5% + sodium chloride 0.45%. 1000 milliLiter(s) (100 mL/Hr) IV Continuous <Continuous>  heparin  Infusion.  Unit(s)/Hr (17 mL/Hr) IV Continuous <Continuous>  metoprolol tartrate 50 milliGRAM(s) Oral two times a day  piperacillin/tazobactam IVPB.. 3.375 Gram(s) IV Intermittent every 8 hours  sodium chloride 0.9%. 1000 milliLiter(s) (75 mL/Hr) IV Continuous <Continuous>      TELEMETRY: 	    ECG:  	  RADIOLOGY:  OTHER: 	  	  LABS:	 	    CARDIAC MARKERS:  CARDIAC MARKERS ( 08 Jan 2022 14:06 )  x     / x     / 50 U/L / x     / 1.4 ng/mL  CARDIAC MARKERS ( 08 Jan 2022 10:25 )  x     / x     / 63 U/L / x     / 1.5 ng/mL                                10.7   10.57 )-----------( 342      ( 10 Rodriguez 2022 07:27 )             33.7     01-10    137  |  103  |  10  ----------------------------<  109<H>  4.0   |  20<L>  |  0.57    Ca    9.3      10 Rodriguez 2022 07:24  Mg     2.1     01-10      proBNP:   Lipid Profile: Cholesterol 118  LDL --  HDL 25      HgA1c:   TSH:   PT/INR - ( 10 Rodriguez 2022 07:27 )   PT: 14.4 sec;   INR: 1.21 ratio         PTT - ( 10 Rodriguez 2022 07:27 )  PTT:55.4 sec  < from: TTE with Doppler (w/Cont) (01.07.22 @ 13:23) >  1. Endocardial visualization enhanced with intravenous  injection of Ultrasonic Enhancing Agent (Definity). Overall  preserved left ventricular ejection fraction.  2. The right ventricle is not well visualized; grossly  normal right ventricular systolic function.      < from: 12 Lead ECG (01.08.22 @ 09:06) >  Diagnosis Line Atrial flutter  INFERIOR INJURY PATTERN  ** ** ACUTE MI / STEMI ** **  ABNORMAL ECG  WHEN COMPARED WITH ECG OF 08-JAN-2022 09:06, (UNCONFIRMED)  SIGNIFICANT CHANGES HAVE OCCURRED    Troponin T, High Sensitivity Result: 21: Specimen not hemolyzed  *  *  Rapid upward or downward changes in high-sensitivity troponin levels  suggest acute myocardial injury. Renal impairment may cause sustained  troponin elevations.  Normal: <6 - 14 ng/L  Indeterminate: 15-51 ng/L  Elevated: > 51 ng/L  See http://labs/test/TROPTHS on the [facility] intranet for more  information ng/L (01.08.22 @ 14:06)          Assessment and plan  ---------------------------  OM of the toe with +mri  podiatry/ id appreciated continue IV abx  events noted today and discussed with NP, pt with tachycardia , no chest pain ecg ?st elevation in inferior leads, first trop negartive  new onset a fib/ flutter  asa  beta blocker  fu trop  has transferred to tele  will observe closely  trop/ cpk negative  increase beta blocker, add dig to control hr  continue iv heparin  cath today    	         DISCHARGE

## 2022-01-11 LAB
ANION GAP SERPL CALC-SCNC: 13 MMOL/L — SIGNIFICANT CHANGE UP (ref 5–17)
APTT BLD: 30.8 SEC — SIGNIFICANT CHANGE UP (ref 27.5–35.5)
APTT BLD: 41.4 SEC — HIGH (ref 27.5–35.5)
APTT BLD: 43.5 SEC — HIGH (ref 27.5–35.5)
APTT BLD: 57 SEC — HIGH (ref 27.5–35.5)
BUN SERPL-MCNC: 10 MG/DL — SIGNIFICANT CHANGE UP (ref 7–23)
CALCIUM SERPL-MCNC: 9.2 MG/DL — SIGNIFICANT CHANGE UP (ref 8.4–10.5)
CHLORIDE SERPL-SCNC: 101 MMOL/L — SIGNIFICANT CHANGE UP (ref 96–108)
CO2 SERPL-SCNC: 23 MMOL/L — SIGNIFICANT CHANGE UP (ref 22–31)
CREAT SERPL-MCNC: 0.54 MG/DL — SIGNIFICANT CHANGE UP (ref 0.5–1.3)
GLUCOSE BLDC GLUCOMTR-MCNC: 93 MG/DL — SIGNIFICANT CHANGE UP (ref 70–99)
GLUCOSE SERPL-MCNC: 105 MG/DL — HIGH (ref 70–99)
HCT VFR BLD CALC: 34.6 % — LOW (ref 39–50)
HGB BLD-MCNC: 11.2 G/DL — LOW (ref 13–17)
MAGNESIUM SERPL-MCNC: 2.1 MG/DL — SIGNIFICANT CHANGE UP (ref 1.6–2.6)
MCHC RBC-ENTMCNC: 27.8 PG — SIGNIFICANT CHANGE UP (ref 27–34)
MCHC RBC-ENTMCNC: 32.4 GM/DL — SIGNIFICANT CHANGE UP (ref 32–36)
MCV RBC AUTO: 85.9 FL — SIGNIFICANT CHANGE UP (ref 80–100)
NRBC # BLD: 0 /100 WBCS — SIGNIFICANT CHANGE UP (ref 0–0)
PLATELET # BLD AUTO: 381 K/UL — SIGNIFICANT CHANGE UP (ref 150–400)
POTASSIUM SERPL-MCNC: 3.7 MMOL/L — SIGNIFICANT CHANGE UP (ref 3.5–5.3)
POTASSIUM SERPL-SCNC: 3.7 MMOL/L — SIGNIFICANT CHANGE UP (ref 3.5–5.3)
RBC # BLD: 4.03 M/UL — LOW (ref 4.2–5.8)
RBC # FLD: 13.9 % — SIGNIFICANT CHANGE UP (ref 10.3–14.5)
SARS-COV-2 RNA SPEC QL NAA+PROBE: SIGNIFICANT CHANGE UP
SODIUM SERPL-SCNC: 137 MMOL/L — SIGNIFICANT CHANGE UP (ref 135–145)
WBC # BLD: 8.94 K/UL — SIGNIFICANT CHANGE UP (ref 3.8–10.5)
WBC # FLD AUTO: 8.94 K/UL — SIGNIFICANT CHANGE UP (ref 3.8–10.5)

## 2022-01-11 PROCEDURE — 99232 SBSQ HOSP IP/OBS MODERATE 35: CPT

## 2022-01-11 PROCEDURE — 73720 MRI LWR EXTREMITY W/O&W/DYE: CPT | Mod: 26,RT

## 2022-01-11 PROCEDURE — 71045 X-RAY EXAM CHEST 1 VIEW: CPT | Mod: 26

## 2022-01-11 RX ORDER — HEPARIN SODIUM 5000 [USP'U]/ML
3500 INJECTION INTRAVENOUS; SUBCUTANEOUS EVERY 6 HOURS
Refills: 0 | Status: DISCONTINUED | OUTPATIENT
Start: 2022-01-11 | End: 2022-01-12

## 2022-01-11 RX ORDER — OXYCODONE AND ACETAMINOPHEN 5; 325 MG/1; MG/1
1 TABLET ORAL EVERY 4 HOURS
Refills: 0 | Status: DISCONTINUED | OUTPATIENT
Start: 2022-01-11 | End: 2022-01-14

## 2022-01-11 RX ORDER — ACETAMINOPHEN 500 MG
650 TABLET ORAL EVERY 6 HOURS
Refills: 0 | Status: DISCONTINUED | OUTPATIENT
Start: 2022-01-11 | End: 2022-01-14

## 2022-01-11 RX ORDER — METOPROLOL TARTRATE 50 MG
75 TABLET ORAL
Refills: 0 | Status: DISCONTINUED | OUTPATIENT
Start: 2022-01-11 | End: 2022-01-14

## 2022-01-11 RX ORDER — HEPARIN SODIUM 5000 [USP'U]/ML
3000 INJECTION INTRAVENOUS; SUBCUTANEOUS
Qty: 25000 | Refills: 0 | Status: DISCONTINUED | OUTPATIENT
Start: 2022-01-11 | End: 2022-01-12

## 2022-01-11 RX ORDER — HEPARIN SODIUM 5000 [USP'U]/ML
7500 INJECTION INTRAVENOUS; SUBCUTANEOUS EVERY 6 HOURS
Refills: 0 | Status: DISCONTINUED | OUTPATIENT
Start: 2022-01-11 | End: 2022-01-12

## 2022-01-11 RX ORDER — NICOTINE POLACRILEX 2 MG
1 GUM BUCCAL DAILY
Refills: 0 | Status: DISCONTINUED | OUTPATIENT
Start: 2022-01-11 | End: 2022-01-14

## 2022-01-11 RX ADMIN — Medication 75 MILLIGRAM(S): at 18:59

## 2022-01-11 RX ADMIN — HEPARIN SODIUM 3200 UNIT(S)/HR: 5000 INJECTION INTRAVENOUS; SUBCUTANEOUS at 09:42

## 2022-01-11 RX ADMIN — Medication 50 MILLIGRAM(S): at 05:48

## 2022-01-11 RX ADMIN — Medication 1 PATCH: at 10:51

## 2022-01-11 RX ADMIN — SODIUM CHLORIDE 100 MILLILITER(S): 9 INJECTION, SOLUTION INTRAVENOUS at 21:11

## 2022-01-11 RX ADMIN — HEPARIN SODIUM 3000 UNIT(S)/HR: 5000 INJECTION INTRAVENOUS; SUBCUTANEOUS at 07:16

## 2022-01-11 RX ADMIN — PIPERACILLIN AND TAZOBACTAM 25 GRAM(S): 4; .5 INJECTION, POWDER, LYOPHILIZED, FOR SOLUTION INTRAVENOUS at 03:20

## 2022-01-11 RX ADMIN — Medication 81 MILLIGRAM(S): at 11:35

## 2022-01-11 RX ADMIN — CLOPIDOGREL BISULFATE 75 MILLIGRAM(S): 75 TABLET, FILM COATED ORAL at 11:35

## 2022-01-11 RX ADMIN — OXYCODONE AND ACETAMINOPHEN 1 TABLET(S): 5; 325 TABLET ORAL at 16:03

## 2022-01-11 RX ADMIN — PIPERACILLIN AND TAZOBACTAM 25 GRAM(S): 4; .5 INJECTION, POWDER, LYOPHILIZED, FOR SOLUTION INTRAVENOUS at 18:58

## 2022-01-11 RX ADMIN — HEPARIN SODIUM 3500 UNIT(S): 5000 INJECTION INTRAVENOUS; SUBCUTANEOUS at 09:13

## 2022-01-11 RX ADMIN — PIPERACILLIN AND TAZOBACTAM 25 GRAM(S): 4; .5 INJECTION, POWDER, LYOPHILIZED, FOR SOLUTION INTRAVENOUS at 10:11

## 2022-01-11 RX ADMIN — Medication 1 APPLICATION(S): at 11:36

## 2022-01-11 RX ADMIN — OXYCODONE AND ACETAMINOPHEN 1 TABLET(S): 5; 325 TABLET ORAL at 16:40

## 2022-01-11 RX ADMIN — ATORVASTATIN CALCIUM 80 MILLIGRAM(S): 80 TABLET, FILM COATED ORAL at 21:04

## 2022-01-11 RX ADMIN — HEPARIN SODIUM 3400 UNIT(S)/HR: 5000 INJECTION INTRAVENOUS; SUBCUTANEOUS at 23:32

## 2022-01-11 RX ADMIN — HEPARIN SODIUM 3200 UNIT(S)/HR: 5000 INJECTION INTRAVENOUS; SUBCUTANEOUS at 19:17

## 2022-01-11 RX ADMIN — HEPARIN SODIUM 3000 UNIT(S)/HR: 5000 INJECTION INTRAVENOUS; SUBCUTANEOUS at 01:46

## 2022-01-11 NOTE — PROGRESS NOTE ADULT - SUBJECTIVE AND OBJECTIVE BOX
infectious diseases progress note:    Patient is a 61y old  Male who presents with a chief complaint of OM toe (10 Rodriguez 2022 10:01)        Other acute osteomyelitis, ankle and foot               Allergies    No Known Allergies    Intolerances        ANTIBIOTICS/RELEVANT:  antimicrobials  piperacillin/tazobactam IVPB.. 3.375 Gram(s) IV Intermittent every 8 hours    immunologic:    OTHER:  acetaminophen     Tablet .. 650 milliGRAM(s) Oral every 6 hours PRN  aspirin enteric coated 81 milliGRAM(s) Oral daily  atorvastatin 80 milliGRAM(s) Oral at bedtime  clopidogrel Tablet 75 milliGRAM(s) Oral daily  Dakins Solution - 1/2 Strength 1 Application(s) Topical daily  dextrose 5% + sodium chloride 0.45%. 1000 milliLiter(s) IV Continuous <Continuous>  heparin   Injectable 7500 Unit(s) IV Push every 6 hours PRN  heparin   Injectable 3500 Unit(s) IV Push every 6 hours PRN  heparin  Infusion. 3000 Unit(s)/Hr IV Continuous <Continuous>  metoprolol tartrate 50 milliGRAM(s) Oral two times a day  oxycodone    5 mG/acetaminophen 325 mG 1 Tablet(s) Oral every 4 hours PRN  sodium chloride 0.9%. 1000 milliLiter(s) IV Continuous <Continuous>      Objective:  Vital Signs Last 24 Hrs  T(C): 36.9 (11 Jan 2022 04:12), Max: 36.9 (10 Rodriguez 2022 10:55)  T(F): 98.4 (11 Jan 2022 04:12), Max: 98.5 (10 Rodriguez 2022 10:55)  HR: 104 (11 Jan 2022 04:12) (102 - 130)  BP: 130/85 (11 Jan 2022 04:12) (102/72 - 158/90)  BP(mean): --  RR: 17 (11 Jan 2022 04:12) (14 - 22)  SpO2: 95% (11 Jan 2022 04:12) (95% - 99%)       Eyes:LELAND, EOMI  Ear/Nose/Throat: no oral lesion, no sinus tenderness on percussion	  Neck:no JVD, no lymphadenopathy, supple  Respiratory: CTA yas  Cardiovascular: S1S2 RRR, no murmurs  Gastrointestinal:soft, (+) BS, no HSM  Extremities: oopen wd         LABS:                        11.2   8.94  )-----------( 381      ( 11 Jan 2022 06:44 )             34.6     01-11    137  |  101  |  10  ----------------------------<  105<H>  3.7   |  23  |  0.54    Ca    9.2      11 Jan 2022 06:44  Mg     2.1     01-11      PT/INR - ( 10 Rodriguez 2022 07:27 )   PT: 14.4 sec;   INR: 1.21 ratio         PTT - ( 11 Jan 2022 00:30 )  PTT:30.8 sec        MICROBIOLOGY:    RECENT CULTURES:  01-07 @ 14:47 .Blood Blood-Peripheral                No growth to date.    01-07 @ 14:42 .Abscess R foot wound                Few Streptococcus dysgalactiae (Group C/G)  Few Corynebacterium striatum group  Rare Bacteroides fragilis  "Susceptibilities not performed"          RESPIRATORY CULTURES:              RADIOLOGY & ADDITIONAL STUDIES:        Pager [phone]  After 5 pm/weekends or if no response :[phone]

## 2022-01-11 NOTE — PROGRESS NOTE ADULT - SUBJECTIVE AND OBJECTIVE BOX
INTERVAL EVENTS: No o/n events. Denies CP, dyspnea, palpitations, presyncope, syncope, f/c/n/v.     REVIEW OF SYSTEMS:  Constitutional:     [X] negative [ ] fevers [ ] chills [ ] weight loss [ ] weight gain  HEENT:                  [X] negative [ ] dry eyes [ ] eye irritation [ ] postnasal drip [ ] nasal congestion  CV:                         [X] negative  [ ] chest pain [ ] orthopnea [ ] palpitations [ ] murmur  Resp:                     [X] negative [ ] cough [ ] shortness of breath [ ] wheezing [ ] sputum [ ] hemoptysis  GI:                          [X] negative [ ] nausea [ ] vomiting [ ] diarrhea [ ] constipation [ ] abd pain [ ] dysphagia   :                        [X] negative [ ] dysuria [ ] nocturia [ ] hematuria [ ] increased urinary frequency  MSK:                      [X] negative [ ] back pain [ ] myalgias [ ] arthralgias [ ] fracture  Skin:                       [X] negative [ ] rash [ ] itch  Neuro:                   [X] negative [ ] headache [ ] dizziness [ ] syncope [ ] weakness [ ] numbness  Psych:                    [X] negative [ ] anxiety [ ] depression  Endo:                     [X] negative [ ] diabetes [ ] thyroid problem  Heme/Lymph:      [X] negative [ ] anemia [ ] bleeding problem  Allergic/Immune: [X] negative [ ] itchy eyes [ ] nasal discharge [ ] hives [ ] angioedema    [X] All other systems negative or otherwise described above.  [ ] Unable to assess ROS because ________.    PAST MEDICAL & SURGICAL HISTORY:  CMT (Charcot-Lakia-Tooth disease)    Substance abuse in remission    Palate deformity  partial palate removal    H/O hammer toe correction  left      MEDICATIONS  (STANDING):  aspirin enteric coated 81 milliGRAM(s) Oral daily  atorvastatin 80 milliGRAM(s) Oral at bedtime  clopidogrel Tablet 75 milliGRAM(s) Oral daily  Dakins Solution - 1/2 Strength 1 Application(s) Topical daily  dextrose 5% + sodium chloride 0.45%. 1000 milliLiter(s) (100 mL/Hr) IV Continuous <Continuous>  heparin  Infusion. 3000 Unit(s)/Hr (30 mL/Hr) IV Continuous <Continuous>  metoprolol tartrate 75 milliGRAM(s) Oral two times a day  nicotine - 21 mG/24Hr(s) Patch 1 patch Transdermal daily  piperacillin/tazobactam IVPB.. 3.375 Gram(s) IV Intermittent every 8 hours    MEDICATIONS  (PRN):  acetaminophen     Tablet .. 650 milliGRAM(s) Oral every 6 hours PRN Mild Pain (1 - 3)  heparin   Injectable 7500 Unit(s) IV Push every 6 hours PRN For aPTT less than 40  heparin   Injectable 3500 Unit(s) IV Push every 6 hours PRN For aPTT between 40 - 57  oxycodone    5 mG/acetaminophen 325 mG 1 Tablet(s) Oral every 4 hours PRN Moderate Pain (4 - 6)    ICU Vital Signs Last 24 Hrs  T(C): 37.1 (11 Jan 2022 11:27), Max: 37.1 (11 Jan 2022 11:27)  T(F): 98.7 (11 Jan 2022 11:27), Max: 98.7 (11 Jan 2022 11:27)  HR: 94 (11 Jan 2022 11:27) (94 - 130)  BP: 110/71 (11 Jan 2022 11:27) (110/71 - 158/90)  BP(mean): --  ABP: --  ABP(mean): --  RR: 18 (11 Jan 2022 11:27) (14 - 22)  SpO2: 96% (11 Jan 2022 11:27) (95% - 99%)    Orthostatic VS    Daily     Daily   I&O's Summary    10 Rodriguez 2022 07:01  -  11 Jan 2022 07:00  --------------------------------------------------------  IN: 0 mL / OUT: 3100 mL / NET: -3100 mL    11 Jan 2022 07:01  -  11 Jan 2022 11:47  --------------------------------------------------------  IN: 124 mL / OUT: 0 mL / NET: 124 mL        PHYSICAL EXAM:  GEN: Awake, alert. NAD.   HEENT: NCAT,EOMI. Mucosa moist. No JVD.  RESP: CTA b/l  CV: RRR. Normal S1/S2. No m/r/g.  EXT: Warm. No edema, clubbing, or cyanosis. No R groin tenderness, induration, edema, bruit, or thrill. Femoral palpable b/l. DP dopplerable.   NEURO: AAOx3. No focal deficits.       LABS:                        11.2   8.94  )-----------( 381      ( 11 Jan 2022 06:44 )             34.6     PT/INR - ( 10 Rodriguez 2022 07:27 )   PT: 14.4 sec;   INR: 1.21 ratio         PTT - ( 11 Jan 2022 08:41 )  PTT:41.4 sec  01-11    137  |  101  |  10  ----------------------------<  105<H>  3.7   |  23  |  0.54    Ca    9.2      11 Jan 2022 06:44  Mg     2.1     01-11        Lipid Profile: Cholesterol 118 mg/dL, Triglyceride 118 mg/dL, LDL 69 mg/dL, HDL 25 mg/dL, [01-09-22]      RADIOLOGY & ADDITIONAL STUDIES:    Cardiovascular Diagnostic Testing    Telemetry: reviewed; AF, PVCs    Echo: Personally reviewed  < from: TTE with Doppler (w/Cont) (01.07.22 @ 13:23) >  ------------------------------------------------------------------------  Dimensions:    Normal Values:  LA:     3.7    2.0 - 4.0 cm  Ao:     2.9    2.0 - 3.8 cm  SEPTUM: 0.8    0.6 - 1.2 cm  PWT:    0.9    0.6 - 1.1 cm  LVIDd:  4.8    3.0 - 5.6 cm  LVIDs:  3.2    1.8 - 4.0 cm  Derived variables:  LVMI: 66 g/m2  RWT: 0.37  Fractional short: 33 %  EF (Marte Rule): 65 %  ------------------------------------------------------------------------  Observations:  Mitral Valve: Mitral annular calcification, otherwise  normal mitral valve.  Aortic Valve/Aorta: Aortic valve not well visualized;  appears calcified.  Aortic Root: 2.9 cm.  Left Atrium: Normal left atrium.  Left Ventricle: Endocardial visualization enhanced with  intravenous injection of Ultrasonic Enhancing Agent  (Definity). Overall preserved left ventricular ejection  fraction. Normal left ventricular internal dimensions and  wall thicknesses. Normal diastolic function  Right Heart: Normal right atrium. The right ventricle is  not well visualized; grossly normal right ventricular  systolic function. Normal tricuspid valve. Minimal  tricuspid regurgitation. Normal pulmonic valve.  Pericardium/Pleura: Normal pericardium with no pericardial  effusion.  Hemodynamic: Estimated right atrial pressure is 8 mm Hg.  Unable to estimate RVSP.  ------------------------------------------------------------------------  Conclusions:  1. Endocardial visualization enhanced with intravenous  injection of Ultrasonic Enhancing Agent (Definity). Overall  preserved left ventricular ejection fraction.  2. The right ventricle is not well visualized; grossly  normal right ventricular systolic function.  *** No previous Echo exam.  ------------------------------------------------------------------------  Confirmed on  1/7/2022 - 16:09:45 by ADRIAN Li  ------------------------------------------------------------------------    < end of copied text >    Stress Testing: none    Cath:   < from: Cardiac Catheterization (01.10.22 @ 15:05) >  Coronary Angiography   LM   Left main artery: Angiography shows mild atherosclerosis.      CX   Circumflex: Angiography shows mild atherosclerosis.      RCA   Mid right coronary artery: There is a 40 % stenosis.      Diagnostic Physician Signature:     Electronically signed by Guy Olivo MD on 01/10/2022 at 04:29 PM     Interventional Findings:     Interventional Details   Proximal left anterior descending: This was an 80 % [place] stenosis.  The lesion length was 20 mm. This was an ACC/AHA  High/C lesion for intervention. Guidewire crossing was successful.      A successful Bare Metal Stent was deployed using a 3.00 X 15MM COBRA,  a 6FR JL4.0 LAUNCHER, and aBMW  UNIVERSAL 190.      The inflation pressure was 0 delmis for the duration of 12.0 seconds.     Following intervention there is a 1 % residual stenosis. There was  SURI Flow 3 before the procedure and SURI Flow 3 following the  procedure.      Conclusions:   ASA and Plavix for 1 month     < end of copied text >      CXR: Personally reviewed  < from: Xray Chest 1 View- PORTABLE-Urgent (01.07.22 @ 10:25) >  IMPRESSION:  Linear atelectasis of the lung bases.    --- End of Report ---    < end of copied text >    Other cardiac imaging: none

## 2022-01-11 NOTE — PROGRESS NOTE ADULT - ASSESSMENT
61M with severe RA, PVD, active smoker with L foot OM.  For angiogram tomorrow.  No MRSA isolated.    PVD, OM L foot  - continue zosyn for now    angiogram done  for amputation and debridement    cultures noted strept and anaerobes covered by zosyn

## 2022-01-11 NOTE — PROGRESS NOTE ADULT - SUBJECTIVE AND OBJECTIVE BOX
Subjective:  Patient seen at bedside this AM. Reports feeling well, without complaints. Denies chest pain, SOB.      24h Events:   - Overnight, no acute events  - RCI to LAD yesterday in cath lab    Objective:  Vital Signs  T(C): 37.1 (01-11 @ 11:27), Max: 37.1 (01-11 @ 11:27)  HR: 94 (01-11 @ 11:27) (94 - 130)  BP: 110/71 (01-11 @ 11:27) (110/71 - 158/90)  RR: 18 (01-11 @ 11:27) (14 - 22)  SpO2: 96% (01-11 @ 11:27) (95% - 99%)  01-10-22 @ 07:01  -  01-11-22 @ 07:00  --------------------------------------------------------  IN:  Total IN: 0 mL    OUT:    Voided (mL): 3100 mL  Total OUT: 3100 mL    Total NET: -3100 mL      Physical Exam:  GEN: resting in bed comfortably in NAD  NEURO: awake, alert  RESP: no increased WOB  EXTR: BLE dressing c/d/i      Labs:             11.2   8.94  )-----------( 381      ( 11 Jan 2022 06:44 )             34.6     137  |  101  |  10  ----------------------------<  105<H>  3.7   |  23  |  0.54    Ca    9.2      11 Jan 2022 06:44  Mg     2.1     01-11      Medications:   MEDICATIONS  (STANDING):  aspirin enteric coated 81 milliGRAM(s) Oral daily  atorvastatin 80 milliGRAM(s) Oral at bedtime  clopidogrel Tablet 75 milliGRAM(s) Oral daily  Dakins Solution - 1/2 Strength 1 Application(s) Topical daily  dextrose 5% + sodium chloride 0.45%. 1000 milliLiter(s) (100 mL/Hr) IV Continuous <Continuous>  heparin  Infusion. 3000 Unit(s)/Hr (30 mL/Hr) IV Continuous <Continuous>  metoprolol tartrate 75 milliGRAM(s) Oral two times a day  nicotine - 21 mG/24Hr(s) Patch 1 patch Transdermal daily  piperacillin/tazobactam IVPB.. 3.375 Gram(s) IV Intermittent every 8 hours    MEDICATIONS  (PRN):  acetaminophen     Tablet .. 650 milliGRAM(s) Oral every 6 hours PRN Mild Pain (1 - 3)  heparin   Injectable 7500 Unit(s) IV Push every 6 hours PRN For aPTT less than 40  heparin   Injectable 3500 Unit(s) IV Push every 6 hours PRN For aPTT between 40 - 57  oxycodone    5 mG/acetaminophen 325 mG 1 Tablet(s) Oral every 4 hours PRN Moderate Pain (4 - 6)

## 2022-01-11 NOTE — PROGRESS NOTE ADULT - SUBJECTIVE AND OBJECTIVE BOX
CARDIOLOGY     PROGRESS  NOTE   ________________________________________________    CHIEF COMPLAINT:Patient is a 61y old  Male who presents with a chief complaint of OM toe (11 Jan 2022 09:15)  no complain.  	  REVIEW OF SYSTEMS:  CONSTITUTIONAL: No fever, weight loss, or fatigue  EYES: No eye pain, visual disturbances, or discharge  ENT:  No difficulty hearing, tinnitus, vertigo; No sinus or throat pain  NECK: No pain or stiffness  RESPIRATORY: No cough, wheezing, chills or hemoptysis; No Shortness of Breath  CARDIOVASCULAR: No chest pain, palpitations, passing out, dizziness, or leg swelling  GASTROINTESTINAL: No abdominal or epigastric pain. No nausea, vomiting, or hematemesis; No diarrhea or constipation. No melena or hematochezia.  GENITOURINARY: No dysuria, frequency, hematuria, or incontinence  NEUROLOGICAL: No headaches, memory loss, loss of strength, numbness, or tremors  SKIN: No itching, burning, rashes, or lesions   LYMPH Nodes: No enlarged glands  ENDOCRINE: No heat or cold intolerance; No hair loss  MUSCULOSKELETAL: No joint pain or swelling; No muscle, back, or extremity pain  PSYCHIATRIC: No depression, anxiety, mood swings, or difficulty sleeping  HEME/LYMPH: No easy bruising, or bleeding gums  ALLERGY AND IMMUNOLOGIC: No hives or eczema	    [ ] All others negative	  [ ] Unable to obtain    PHYSICAL EXAM:  T(C): 36.9 (01-11-22 @ 04:12), Max: 36.9 (01-10-22 @ 10:55)  HR: 104 (01-11-22 @ 04:12) (102 - 130)  BP: 130/85 (01-11-22 @ 04:12) (102/72 - 158/90)  RR: 17 (01-11-22 @ 04:12) (14 - 22)  SpO2: 95% (01-11-22 @ 04:12) (95% - 99%)  Wt(kg): --  I&O's Summary    10 Rodriguez 2022 07:01  -  11 Jan 2022 07:00  --------------------------------------------------------  IN: 0 mL / OUT: 3100 mL / NET: -3100 mL        Appearance: Normal	  HEENT:   Normal oral mucosa, PERRL, EOMI	  Lymphatic: No lymphadenopathy  Cardiovascular: Normal S1 S2, No JVD, + murmurs, No edema  Respiratory: Lungs clear to auscultation	  Psychiatry: A & O x 3, Mood & affect appropriate  Gastrointestinal:  Soft, Non-tender, + BS	  Skin: No rashes, No ecchymoses, No cyanosis	  Neurologic: Non-focal  Extremities: Normal range of motion, No clubbing, cyanosis or edema  Vascular: Peripheral pulses palpable 2+ bilaterally    MEDICATIONS  (STANDING):  aspirin enteric coated 81 milliGRAM(s) Oral daily  atorvastatin 80 milliGRAM(s) Oral at bedtime  clopidogrel Tablet 75 milliGRAM(s) Oral daily  Dakins Solution - 1/2 Strength 1 Application(s) Topical daily  dextrose 5% + sodium chloride 0.45%. 1000 milliLiter(s) (100 mL/Hr) IV Continuous <Continuous>  heparin  Infusion. 3000 Unit(s)/Hr (30 mL/Hr) IV Continuous <Continuous>  metoprolol tartrate 50 milliGRAM(s) Oral two times a day  piperacillin/tazobactam IVPB.. 3.375 Gram(s) IV Intermittent every 8 hours      TELEMETRY: 	    ECG:  	  RADIOLOGY:  OTHER: 	  	  LABS:	 	    CARDIAC MARKERS:                                11.2   8.94  )-----------( 381      ( 11 Jan 2022 06:44 )             34.6     01-11    137  |  101  |  10  ----------------------------<  105<H>  3.7   |  23  |  0.54    Ca    9.2      11 Jan 2022 06:44  Mg     2.1     01-11      proBNP:   Lipid Profile: Cholesterol 118  LDL --  HDL 25      HgA1c:   TSH:   PT/INR - ( 10 Rodriguez 2022 07:27 )   PT: 14.4 sec;   INR: 1.21 ratio         PTT - ( 11 Jan 2022 08:41 )  PTT:41.4 sec      Assessment and plan  ---------------------------  OM of the toe with +mri  podiatry/ id appreciated continue IV abx  events noted today and discussed with NP, pt with tachycardia , no chest pain ecg ?st elevation in inferior leads, first trop negartive  new onset a fib/ flutter  asa  beta blocker  fu trop  has transferred to tele  will observe closely  trop/ cpk negative  increase beta blocker, add dig to control hr  continue iv heparin  s/p LAD stent  pt will be clear for peripheral angio tomorrow  increase beta blocker  social work consult

## 2022-01-11 NOTE — PROVIDER CONTACT NOTE (MEDICATION) - SITUATION
Pt has hep gtt running at 34ml/hr;. subsequent bolus ordered. clarified with provider if bolus needed.

## 2022-01-11 NOTE — PROGRESS NOTE ADULT - ASSESSMENT
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 Vascular surgery consulted for evaluation of peripheral vascular disease.     PLAN:  - Bilateral LE angiogram tomorrow  - Please pre-op patient as follows:      - NPO past midnight for OR, IVF/DM management per discretion of primary team     - AM labs tomorrow: CBC, BMP, PT/PTT/INR     - Maintain an active T+S     - COVID result within 72h   - Please optimize patient from MEDICAL perspective for angiogram and document clearance/optimization when appropriate   - Please optimize patient from CARDIAC perspective for angiogram and document clearance/optimization when appropriate       Fartun Solis, PGY-2  Vascular Surgery  #[phone]

## 2022-01-11 NOTE — CHART NOTE - NSCHARTNOTEFT_GEN_A_CORE
Preop Dx: PVD  Surgeon: Santana   Procedure: yas le angio tomorrow     Vital Signs Last 24 Hrs  T(C): 36.9 (11 Jan 2022 04:12), Max: 36.9 (10 Rodriguez 2022 10:55)  T(F): 98.4 (11 Jan 2022 04:12), Max: 98.5 (10 Rodriguez 2022 10:55)  HR: 104 (11 Jan 2022 04:12) (102 - 130)  BP: 130/85 (11 Jan 2022 04:12) (102/72 - 158/90)  BP(mean): --  RR: 17 (11 Jan 2022 04:12) (14 - 22)  SpO2: 95% (11 Jan 2022 04:12) (95% - 99%)                        11.2   8.94  )-----------( 381      ( 11 Jan 2022 06:44 )             34.6     01-11    137  |  101  |  10  ----------------------------<  105<H>  3.7   |  23  |  0.54    Ca    9.2      11 Jan 2022 06:44  Mg     2.1     01-11      PT/INR - ( 10 Rodriguez 2022 07:27 )   PT: 14.4 sec;   INR: 1.21 ratio         PTT - ( 11 Jan 2022 08:41 )  PTT:41.4 sec  Daily     Daily     EKG: done 1/8  CXR: ordered  Type and Screen: negative 1/10        A/P: 61y Male with PVD    - OR 1/12 for LLE angio with Dr. Dillon   - NPO past midnight, except medications  - IVF while NPO  - Consent to be signed  - Medical clearance for OR pending  - covid ordered    0259 Preop Dx: PVD  Surgeon: Santana   Procedure: yas le angio tomorrow     Vital Signs Last 24 Hrs  T(C): 36.9 (11 Jan 2022 04:12), Max: 36.9 (10 Rodriguez 2022 10:55)  T(F): 98.4 (11 Jan 2022 04:12), Max: 98.5 (10 Rodriguez 2022 10:55)  HR: 104 (11 Jan 2022 04:12) (102 - 130)  BP: 130/85 (11 Jan 2022 04:12) (102/72 - 158/90)  BP(mean): --  RR: 17 (11 Jan 2022 04:12) (14 - 22)  SpO2: 95% (11 Jan 2022 04:12) (95% - 99%)                        11.2   8.94  )-----------( 381      ( 11 Jan 2022 06:44 )             34.6     01-11    137  |  101  |  10  ----------------------------<  105<H>  3.7   |  23  |  0.54    Ca    9.2      11 Jan 2022 06:44  Mg     2.1     01-11      PT/INR - ( 10 Rodriguez 2022 07:27 )   PT: 14.4 sec;   INR: 1.21 ratio         PTT - ( 11 Jan 2022 08:41 )  PTT:41.4 sec  Daily     Daily     EKG: done 1/8  CXR: ordered  Type and Screen: negative 1/10        A/P: 61y Male with PVD    - OR 1/12 for LLE angio with Dr. Dillon   - NPO past midnight, except medications  - IVF while NPO  - Consent to be signed  - Medical clearance for OR pending  - covid ordered  - hold heparin gtt at 1 pm tomorrow (1/12) prior to OR ( spoke to team)    [id number]

## 2022-01-11 NOTE — PROGRESS NOTE ADULT - SUBJECTIVE AND OBJECTIVE BOX
Podiatry pager #: [phone] ([place])/ [phone] ([facility])    Patient is a 61y old  Male who presents with a chief complaint of OM toe (11 Jan 2022 08:23)       INTERVAL HPI/OVERNIGHT EVENTS:  Patient seen and evaluated at bedside.  Pt is resting comfortable in NAD. Denies N/V/F/C.     Allergies    No Known Allergies    Intolerances        Vital Signs Last 24 Hrs  T(C): 36.9 (11 Jan 2022 04:12), Max: 36.9 (10 Rodriguez 2022 10:55)  T(F): 98.4 (11 Jan 2022 04:12), Max: 98.5 (10 Rodriguez 2022 10:55)  HR: 104 (11 Jan 2022 04:12) (102 - 130)  BP: 130/85 (11 Jan 2022 04:12) (102/72 - 158/90)  BP(mean): --  RR: 17 (11 Jan 2022 04:12) (14 - 22)  SpO2: 95% (11 Jan 2022 04:12) (95% - 99%)    LABS:                        11.2   8.94  )-----------( 381      ( 11 Jan 2022 06:44 )             34.6     01-11    137  |  101  |  10  ----------------------------<  105<H>  3.7   |  23  |  0.54    Ca    9.2      11 Jan 2022 06:44  Mg     2.1     01-11      PT/INR - ( 10 Rodriguez 2022 07:27 )   PT: 14.4 sec;   INR: 1.21 ratio         PTT - ( 11 Jan 2022 08:41 )  PTT:41.4 sec    CAPILLARY BLOOD GLUCOSE          Lower Extremity Physical Exam:  Vascular: DP/PT 0/4, B/L, CFT <3 seconds B/L, Temperature gradient warm to cool, B/L.   Neuro: Epicritic sensation intact to the level of digits, B/L.  Musculoskeletal/Ortho: unremarkable  Skin:    LEFT foot: lateral aspect of metatarsal 5 fibronecrotic wound to bone with no malodor, wound tracks about 4cm proximally along lateral aspect of 5th mt shaft, no purulence, improved periwound erythema to the 5th mt base. Dorsal hallux fibrogranular wound to subQ with no acute signs of infection. Plantar 1st MPJ wound probing to bone, no acute signs of infection.     Right foot: Distal aspect of 2nd digit wound to bone with no purulence, no tracking, no malodor, periwound erythema to digit, no tracking, no crepitus.  sub mt 2 plantar hyperkeratosis with no acute signs of infection.   RLE cellulitis improving from tibial tuberosity to mid tibial shaft w/ warmth.     RADIOLOGY & ADDITIONAL TESTS:

## 2022-01-11 NOTE — PROGRESS NOTE ADULT - ASSESSMENT
61M with b/l foot wounds  - Pt seen and evalulated   - Afebrile, no leukocytosis, ESR 61, CRP 43.4  - LEFT foot: lateral aspect of metatarsal 5 fibronecrotic wound to bone with no malodor, wound tracks about 4cm proximally along lateral aspect of 5th mt shaft, no purulence, improved periwound erythema to the 5th mt base. Dorsal hallux fibrogranular wound to subQ with no acute signs of infection. Plantar 1st MPJ wound to bone, no acute signs of infection.   - RIGHT foot: Distal aspect of 2nd digit wound to bone with no purulence, no tracking, no malodor, periwound erythema to digit, no tracking, no crepitus. sub mt 2 plantar hyperkeratosis with no acute signs of infection.  - RLE cellulitis improving from tibial tuberosity to level of mid tibial shaft   - Wound culture of L 5th wound growing Strep dysgalactiae, Bacteriodes  - Wound culture R 2nd digit growing strep dysgalactiae, Corynebacterium and Bacteriodes  - Left foot MR showing osteomyelitis of 1st metatarsal and 5th metatarsal and digit   - Per discussion w/ pt and attending, patient refused TMA left foot even though MRI reveals suspected osteomyelitis of left 1 metatarsal. Patient would like to attempt at resection of osteomyelitis left 5th ray and 6 weeks of IV antibiotics for remaining OM at 1 metatarsal. Dr. Gordon will attempt at bone biopsy of 1 metatarsal left foot. Patient understands that he will have chronic Osteomyelitis 1 metatarsal left foot which could reactivate and cause progressive spread of infection.  - patient is aware that he has a high risk for future TMA and BKA left foot.   - Per discussion w/ Dr. Dillon, [facility] planning angio after cardiac clearance   - Podiatry plan is tentatively L foot partial 5th ray resection, L foot 1st metatarsal bone biopsy and R foot partial 2nd ray resection pending vascular recs/ cardiac clearance (likely next week)   - Please document medical clearance for procedure under light sedation with local block when pt is optimized   - Seen with attending

## 2022-01-11 NOTE — PROGRESS NOTE ADULT - ASSESSMENT
61M with severe RA, PVD with L foot OM for B/L LE angio now s/p LHC and LAD PCI    Plan  -c/w ASA 81mg qd, plavix 75mg qd, lipitor 80mg qd  -patient counselled re: appropriate groin care measures   -currently on triple therapy, will defer to primary team re: mgmt of DAPT/AC mgmt in the setting of new onset AF

## 2022-01-12 LAB
ANION GAP SERPL CALC-SCNC: 13 MMOL/L — SIGNIFICANT CHANGE UP (ref 5–17)
APTT BLD: 76.5 SEC — HIGH (ref 27.5–35.5)
APTT BLD: 78.3 SEC — HIGH (ref 27.5–35.5)
BLD GP AB SCN SERPL QL: NEGATIVE — SIGNIFICANT CHANGE UP
BUN SERPL-MCNC: 10 MG/DL — SIGNIFICANT CHANGE UP (ref 7–23)
CALCIUM SERPL-MCNC: 9.6 MG/DL — SIGNIFICANT CHANGE UP (ref 8.4–10.5)
CHLORIDE SERPL-SCNC: 102 MMOL/L — SIGNIFICANT CHANGE UP (ref 96–108)
CO2 SERPL-SCNC: 24 MMOL/L — SIGNIFICANT CHANGE UP (ref 22–31)
CREAT SERPL-MCNC: 0.59 MG/DL — SIGNIFICANT CHANGE UP (ref 0.5–1.3)
CULTURE RESULTS: SIGNIFICANT CHANGE UP
CULTURE RESULTS: SIGNIFICANT CHANGE UP
GLUCOSE SERPL-MCNC: 121 MG/DL — HIGH (ref 70–99)
HCT VFR BLD CALC: 36.1 % — LOW (ref 39–50)
HGB BLD-MCNC: 11.4 G/DL — LOW (ref 13–17)
INR BLD: 1.37 RATIO — HIGH (ref 0.88–1.16)
MAGNESIUM SERPL-MCNC: 2.1 MG/DL — SIGNIFICANT CHANGE UP (ref 1.6–2.6)
MCHC RBC-ENTMCNC: 27.1 PG — SIGNIFICANT CHANGE UP (ref 27–34)
MCHC RBC-ENTMCNC: 31.6 GM/DL — LOW (ref 32–36)
MCV RBC AUTO: 85.7 FL — SIGNIFICANT CHANGE UP (ref 80–100)
NRBC # BLD: 0 /100 WBCS — SIGNIFICANT CHANGE UP (ref 0–0)
PHOSPHATE SERPL-MCNC: 3.6 MG/DL — SIGNIFICANT CHANGE UP (ref 2.5–4.5)
PLATELET # BLD AUTO: 471 K/UL — HIGH (ref 150–400)
POTASSIUM SERPL-MCNC: 3.7 MMOL/L — SIGNIFICANT CHANGE UP (ref 3.5–5.3)
POTASSIUM SERPL-SCNC: 3.7 MMOL/L — SIGNIFICANT CHANGE UP (ref 3.5–5.3)
PROTHROM AB SERPL-ACNC: 16.2 SEC — HIGH (ref 10.6–13.6)
RBC # BLD: 4.21 M/UL — SIGNIFICANT CHANGE UP (ref 4.2–5.8)
RBC # FLD: 13.9 % — SIGNIFICANT CHANGE UP (ref 10.3–14.5)
RH IG SCN BLD-IMP: NEGATIVE — SIGNIFICANT CHANGE UP
SODIUM SERPL-SCNC: 139 MMOL/L — SIGNIFICANT CHANGE UP (ref 135–145)
SPECIMEN SOURCE: SIGNIFICANT CHANGE UP
SPECIMEN SOURCE: SIGNIFICANT CHANGE UP
WBC # BLD: 9.21 K/UL — SIGNIFICANT CHANGE UP (ref 3.8–10.5)
WBC # FLD AUTO: 9.21 K/UL — SIGNIFICANT CHANGE UP (ref 3.8–10.5)

## 2022-01-12 PROCEDURE — 37221: CPT | Mod: LT

## 2022-01-12 PROCEDURE — 75716 ARTERY X-RAYS ARMS/LEGS: CPT | Mod: 26

## 2022-01-12 PROCEDURE — 75625 CONTRAST EXAM ABDOMINL AORTA: CPT | Mod: 26

## 2022-01-12 PROCEDURE — 36245 INS CATH ABD/L-EXT ART 1ST: CPT | Mod: LT,59

## 2022-01-12 PROCEDURE — 99232 SBSQ HOSP IP/OBS MODERATE 35: CPT

## 2022-01-12 RX ORDER — HEPARIN SODIUM 5000 [USP'U]/ML
1000 INJECTION INTRAVENOUS; SUBCUTANEOUS
Qty: 25000 | Refills: 0 | Status: DISCONTINUED | OUTPATIENT
Start: 2022-01-12 | End: 2022-01-13

## 2022-01-12 RX ADMIN — Medication 81 MILLIGRAM(S): at 15:10

## 2022-01-12 RX ADMIN — HEPARIN SODIUM 3400 UNIT(S)/HR: 5000 INJECTION INTRAVENOUS; SUBCUTANEOUS at 08:17

## 2022-01-12 RX ADMIN — HEPARIN SODIUM 3400 UNIT(S)/HR: 5000 INJECTION INTRAVENOUS; SUBCUTANEOUS at 10:31

## 2022-01-12 RX ADMIN — ATORVASTATIN CALCIUM 80 MILLIGRAM(S): 80 TABLET, FILM COATED ORAL at 21:09

## 2022-01-12 RX ADMIN — Medication 75 MILLIGRAM(S): at 05:20

## 2022-01-12 RX ADMIN — Medication 650 MILLIGRAM(S): at 08:21

## 2022-01-12 RX ADMIN — PIPERACILLIN AND TAZOBACTAM 25 GRAM(S): 4; .5 INJECTION, POWDER, LYOPHILIZED, FOR SOLUTION INTRAVENOUS at 19:01

## 2022-01-12 RX ADMIN — SODIUM CHLORIDE 100 MILLILITER(S): 9 INJECTION, SOLUTION INTRAVENOUS at 08:21

## 2022-01-12 RX ADMIN — PIPERACILLIN AND TAZOBACTAM 25 GRAM(S): 4; .5 INJECTION, POWDER, LYOPHILIZED, FOR SOLUTION INTRAVENOUS at 10:32

## 2022-01-12 RX ADMIN — Medication 1 PATCH: at 19:02

## 2022-01-12 RX ADMIN — SODIUM CHLORIDE 100 MILLILITER(S): 9 INJECTION, SOLUTION INTRAVENOUS at 05:45

## 2022-01-12 RX ADMIN — Medication 650 MILLIGRAM(S): at 09:50

## 2022-01-12 RX ADMIN — Medication 1 PATCH: at 19:00

## 2022-01-12 RX ADMIN — HEPARIN SODIUM 3400 UNIT(S)/HR: 5000 INJECTION INTRAVENOUS; SUBCUTANEOUS at 07:30

## 2022-01-12 RX ADMIN — PIPERACILLIN AND TAZOBACTAM 25 GRAM(S): 4; .5 INJECTION, POWDER, LYOPHILIZED, FOR SOLUTION INTRAVENOUS at 02:10

## 2022-01-12 RX ADMIN — SODIUM CHLORIDE 100 MILLILITER(S): 9 INJECTION, SOLUTION INTRAVENOUS at 18:59

## 2022-01-12 RX ADMIN — CLOPIDOGREL BISULFATE 75 MILLIGRAM(S): 75 TABLET, FILM COATED ORAL at 15:11

## 2022-01-12 RX ADMIN — Medication 75 MILLIGRAM(S): at 19:01

## 2022-01-12 RX ADMIN — HEPARIN SODIUM 10 UNIT(S)/HR: 5000 INJECTION INTRAVENOUS; SUBCUTANEOUS at 19:04

## 2022-01-12 RX ADMIN — Medication 1 APPLICATION(S): at 12:22

## 2022-01-12 NOTE — PROGRESS NOTE ADULT - SUBJECTIVE AND OBJECTIVE BOX
CARDIOLOGY     PROGRESS  NOTE   ________________________________________________    CHIEF COMPLAINT:Patient is a 61y old  Male who presents with a chief complaint of OM toe (12 Jan 2022 08:48)  no complain.  	  REVIEW OF SYSTEMS:  CONSTITUTIONAL: No fever, weight loss, or fatigue  EYES: No eye pain, visual disturbances, or discharge  ENT:  No difficulty hearing, tinnitus, vertigo; No sinus or throat pain  NECK: No pain or stiffness  RESPIRATORY: No cough, wheezing, chills or hemoptysis; No Shortness of Breath  CARDIOVASCULAR: No chest pain, palpitations, passing out, dizziness, or leg swelling  GASTROINTESTINAL: No abdominal or epigastric pain. No nausea, vomiting, or hematemesis; No diarrhea or constipation. No melena or hematochezia.  GENITOURINARY: No dysuria, frequency, hematuria, or incontinence  NEUROLOGICAL: No headaches, memory loss, loss of strength, numbness, or tremors  SKIN: No itching, burning, rashes, or lesions   LYMPH Nodes: No enlarged glands  ENDOCRINE: No heat or cold intolerance; No hair loss  MUSCULOSKELETAL: No joint pain or swelling; No muscle, back, or extremity pain  PSYCHIATRIC: No depression, anxiety, mood swings, or difficulty sleeping  HEME/LYMPH: No easy bruising, or bleeding gums  ALLERGY AND IMMUNOLOGIC: No hives or eczema	    [ ] All others negative	  [ ] Unable to obtain    PHYSICAL EXAM:  T(C): 36.5 (01-12-22 @ 04:14), Max: 37.1 (01-11-22 @ 11:27)  HR: 98 (01-12-22 @ 04:14) (94 - 101)  BP: 119/74 (01-12-22 @ 04:14) (108/68 - 119/74)  RR: 18 (01-12-22 @ 04:14) (18 - 18)  SpO2: 98% (01-12-22 @ 04:14) (96% - 99%)  Wt(kg): --  I&O's Summary    11 Jan 2022 07:01  -  12 Jan 2022 07:00  --------------------------------------------------------  IN: 2802 mL / OUT: 2150 mL / NET: 652 mL        Appearance: Normal	  HEENT:   Normal oral mucosa, PERRL, EOMI	  Lymphatic: No lymphadenopathy  Cardiovascular: Normal S1 S2, No JVD, + murmurs, No edema  Respiratory: rhonchi  Psychiatry: A & O x 3, Mood & affect appropriate  Gastrointestinal:  Soft, Non-tender, + BS	  Skin: No rashes, No ecchymoses, No cyanosis	  Neurologic: Non-focal  Extremities: Normal range of motion, toe wound  Vascular: Peripheral pulses palpable 2+ bilaterally    MEDICATIONS  (STANDING):  aspirin enteric coated 81 milliGRAM(s) Oral daily  atorvastatin 80 milliGRAM(s) Oral at bedtime  clopidogrel Tablet 75 milliGRAM(s) Oral daily  Dakins Solution - 1/2 Strength 1 Application(s) Topical daily  dextrose 5% + sodium chloride 0.45%. 1000 milliLiter(s) (100 mL/Hr) IV Continuous <Continuous>  heparin  Infusion. 3000 Unit(s)/Hr (30 mL/Hr) IV Continuous <Continuous>  metoprolol tartrate 75 milliGRAM(s) Oral two times a day  nicotine - 21 mG/24Hr(s) Patch 1 patch Transdermal daily  piperacillin/tazobactam IVPB.. 3.375 Gram(s) IV Intermittent every 8 hours      TELEMETRY: 	    ECG:  	  RADIOLOGY:  OTHER: 	  	  LABS:	 	    CARDIAC MARKERS:                                11.4   9.21  )-----------( 471      ( 12 Jan 2022 06:54 )             36.1     01-12    139  |  102  |  10  ----------------------------<  121<H>  3.7   |  24  |  0.59    Ca    9.6      12 Jan 2022 06:54  Phos  3.6     01-12  Mg     2.1     01-12      proBNP:   Lipid Profile: Cholesterol 118  LDL --  HDL 25      HgA1c:   TSH:   PT/INR - ( 12 Jan 2022 06:54 )   PT: 16.2 sec;   INR: 1.37 ratio         PTT - ( 12 Jan 2022 06:54 )  PTT:76.5 sec  PVD, OM L foot  - continue zosyn for now    angiogram for vascular   intervention today   cultures noted strept and anaerobes covered by zosyn  surgery after revascularization planned     Assessment and plan  ---------------------------  OM of the toe with +mri  podiatry/ id appreciated continue IV abx  events noted today and discussed with NP, pt with tachycardia , no chest pain ecg ?st elevation in inferior leads, first trop negartive  new onset a fib/ flutter  asa  beta blocker  fu trop  has transferred to tele  will observe closely  trop/ cpk negative  increase beta blocker, add dig to control hr  continue iv heparin  s/p LAD stent  pt is clear for peripheral angio today  continue asa/plavix  increase beta blocker  social work consult

## 2022-01-12 NOTE — BRIEF OPERATIVE NOTE - COMMENTS
2 hours lay flat.   ASA81, Plavix today.   Start hep gtt 1000u/hr, no bolus  Dressing off tomorrow AM

## 2022-01-12 NOTE — BRIEF OPERATIVE NOTE - NSICDXBRIEFPROCEDURE_GEN_ALL_CORE_FT
PROCEDURES:  Angiogram, with bilateral lower extremity runoff 12-Jan-2022 18:10:39  Fartun Solis  Insertion, stent, artery, external iliac 12-Jan-2022 18:18:22 L external iliac stent Fartun Solis  Balloon angioplasty of iliac artery 12-Jan-2022 18:18:57 L external iliac balloon angioplasty Fartun Solis

## 2022-01-12 NOTE — PROGRESS NOTE ADULT - SUBJECTIVE AND OBJECTIVE BOX
Podiatry pager #: [phone] ([place])/ [phone] ([facility])    Patient is a 61y old  Male who presents with a chief complaint of OM toe (12 Jan 2022 09:16)       INTERVAL HPI/OVERNIGHT EVENTS:  Patient seen and evaluated at bedside.  Pt is resting comfortable in NAD. Denies N/V/F/C.     Allergies    No Known Allergies    Intolerances        Vital Signs Last 24 Hrs  T(C): 36.6 (12 Jan 2022 11:46), Max: 37 (11 Jan 2022 20:09)  T(F): 97.8 (12 Jan 2022 11:46), Max: 98.6 (11 Jan 2022 20:09)  HR: 80 (12 Jan 2022 11:46) (80 - 101)  BP: 106/65 (12 Jan 2022 11:46) (106/65 - 119/74)  BP(mean): --  RR: 18 (12 Jan 2022 11:46) (18 - 18)  SpO2: 97% (12 Jan 2022 11:46) (97% - 99%)    LABS:                        11.4   9.21  )-----------( 471      ( 12 Jan 2022 06:54 )             36.1     01-12    139  |  102  |  10  ----------------------------<  121<H>  3.7   |  24  |  0.59    Ca    9.6      12 Jan 2022 06:54  Phos  3.6     01-12  Mg     2.1     01-12      PT/INR - ( 12 Jan 2022 06:54 )   PT: 16.2 sec;   INR: 1.37 ratio         PTT - ( 12 Jan 2022 06:54 )  PTT:76.5 sec    CAPILLARY BLOOD GLUCOSE      POCT Blood Glucose.: 93 mg/dL (11 Jan 2022 16:16)      Lower Extremity Physical Exam:  Vascular: DP/PT 0/4, B/L, CFT <3 seconds B/L, Temperature gradient warm to cool, B/L.   Neuro: Epicritic sensation intact to the level of digits, B/L.  Musculoskeletal/Ortho: unremarkable  Skin:    LEFT foot: lateral aspect of metatarsal 5 fibronecrotic wound to bone with no malodor, wound tracks about 4cm proximally along lateral aspect of 5th mt shaft, no purulence, improved periwound erythema to the 5th mt base. Dorsal hallux fibrogranular wound to subQ with no acute signs of infection. Plantar 1st MPJ wound probing to bone, no acute signs of infection.     Right foot: Distal aspect of 2nd digit wound to bone with no purulence, no tracking, no malodor, periwound erythema to digit, no tracking, no crepitus.  sub mt 2 plantar hyperkeratosis with no acute signs of infection.   RLE cellulitis improving from tibial tuberosity to mid tibial shaft w/ warmth.     RADIOLOGY & ADDITIONAL TESTS:

## 2022-01-12 NOTE — PROGRESS NOTE ADULT - SUBJECTIVE AND OBJECTIVE BOX
Subjective:  Patient seen at bedside this AM. Reports feeling well, without complaints. Denies chest pain, SOB.  Plan for OR today for B/L LE Angiogram.     24h Events:   - Overnight, no acute events      Objective:  Vital Signs Last 24 Hrs  T(C): 36.5 (12 Jan 2022 04:14), Max: 37.1 (11 Jan 2022 11:27)  T(F): 97.7 (12 Jan 2022 04:14), Max: 98.7 (11 Jan 2022 11:27)  HR: 98 (12 Jan 2022 04:14) (94 - 101)  BP: 119/74 (12 Jan 2022 04:14) (108/68 - 119/74)  BP(mean): --  RR: 18 (12 Jan 2022 04:14) (18 - 18)  SpO2: 98% (12 Jan 2022 04:14) (96% - 99%)    --------------------------------------------------------    I&O's Detail    11 Jan 2022 07:01  -  12 Jan 2022 07:00  --------------------------------------------------------  IN:    dextrose 5% + sodium chloride 0.45%: 1300 mL    Heparin Infusion: 782 mL    Oral Fluid: 720 mL  Total IN: 2802 mL    OUT:    Voided (mL): 2150 mL  Total OUT: 2150 mL    Total NET: 652 mL        Physical Exam:  GEN: resting in bed comfortably in NAD  NEURO: awake, alert  RESP: no increased WOB  EXTR: BLE dressing c/d/i                            11.4   9.21  )-----------( 471      ( 12 Jan 2022 06:54 )             36.1       01-12    139  |  102  |  10  ----------------------------<  121<H>  3.7   |  24  |  0.59    Ca    9.6      12 Jan 2022 06:54  Phos  3.6     01-12  Mg     2.1     01-12         PT/INR - ( 12 Jan 2022 06:54 )   PT: 16.2 sec;   INR: 1.37 ratio         PTT - ( 12 Jan 2022 06:54 )  PTT:76.5 sec          CAPILLARY BLOOD GLUCOSE      POCT Blood Glucose.: 93 mg/dL (11 Jan 2022 16:16)      MEDICATIONS  (STANDING):  aspirin enteric coated 81 milliGRAM(s) Oral daily  atorvastatin 80 milliGRAM(s) Oral at bedtime  clopidogrel Tablet 75 milliGRAM(s) Oral daily  Dakins Solution - 1/2 Strength 1 Application(s) Topical daily  dextrose 5% + sodium chloride 0.45%. 1000 milliLiter(s) (100 mL/Hr) IV Continuous <Continuous>  heparin  Infusion. 3000 Unit(s)/Hr (30 mL/Hr) IV Continuous <Continuous>  metoprolol tartrate 75 milliGRAM(s) Oral two times a day  nicotine - 21 mG/24Hr(s) Patch 1 patch Transdermal daily  piperacillin/tazobactam IVPB.. 3.375 Gram(s) IV Intermittent every 8 hours    MEDICATIONS  (PRN):  acetaminophen     Tablet .. 650 milliGRAM(s) Oral every 6 hours PRN Mild Pain (1 - 3)  heparin   Injectable 7500 Unit(s) IV Push every 6 hours PRN For aPTT less than 40  heparin   Injectable 3500 Unit(s) IV Push every 6 hours PRN For aPTT between 40 - 57  oxycodone    5 mG/acetaminophen 325 mG 1 Tablet(s) Oral every 4 hours PRN Moderate Pain (4 - 6)

## 2022-01-12 NOTE — PROGRESS NOTE ADULT - ASSESSMENT
61M with b/l foot wounds  - Pt seen and evalulated   - Afebrile, no leukocytosis, ESR 61, CRP 43.4  - LEFT foot: lateral aspect of metatarsal 5 fibronecrotic wound to bone with no malodor, wound tracks about 4cm proximally along lateral aspect of 5th mt shaft, no purulence, improved periwound erythema to the 5th mt base. Dorsal hallux fibrogranular wound to subQ with no acute signs of infection. Plantar 1st MPJ wound to bone, no acute signs of infection.   - RIGHT foot: Distal aspect of 2nd digit wound to bone with no purulence, no tracking, no malodor, periwound erythema to digit, no tracking, no crepitus. sub mt 2 plantar hyperkeratosis with no acute signs of infection.  - RLE cellulitis improving from tibial tuberosity to level of mid tibial shaft   - Wound culture of L 5th wound growing Strep dysgalactiae, Bacteriodes  - Wound culture R 2nd digit growing strep dysgalactiae, Corynebacterium and Bacteriodes  - Left foot MR showing osteomyelitis of 1st metatarsal and 5th metatarsal and digit   - Per discussion w/ pt and attending, patient refused TMA left foot even though MRI reveals suspected osteomyelitis of left 1 metatarsal. Patient would like to attempt at resection of osteomyelitis left 5th ray and 6 weeks of IV antibiotics for remaining OM at 1 metatarsal. Dr. Gordon will attempt at bone biopsy of 1 metatarsal left foot. Patient understands that he will have chronic Osteomyelitis 1 metatarsal left foot which could reactivate and cause progressive spread of infection.  - patient is aware that he has a high risk for future TMA and BKA left foot.   - Per discussion w/ [facility]. Dillon, [facility] planning [facility] after cardiac clearance   - Podiatry plan is tentatively L foot partial 5th ray resection, L foot 1st metatarsal bone biopsy and R foot partial 2nd ray resection on Friday w/ Dr. Gordon pending vascular recs/ cardiac clearance   - Vasc plan for B/L LE angio today  - Please document medical clearance for procedure under light sedation with local block when pt is optimized   - Seen with attending

## 2022-01-12 NOTE — PROGRESS NOTE ADULT - ASSESSMENT
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 Vascular surgery consulted for evaluation of peripheral vascular disease.     PLAN:  - Bilateral LE angiogram today.   - Hold Heparin gtt at 1:30pm in anticipation of 3pm surgery.   - Care per primary team appreciated.       Vascular Surgery  #[id number]

## 2022-01-12 NOTE — PROGRESS NOTE ADULT - ASSESSMENT
61M with severe RA, PVD, active smoker with L foot OM.  For angiogram tomorrow.  No MRSA isolated.    PVD, OM L foot  - continue zosyn for now    angiogram for vascular   intervention today   cultures noted strept and anaerobes covered by zosyn  surgery after revascularization planned

## 2022-01-12 NOTE — CHART NOTE - NSCHARTNOTEFT_GEN_A_CORE
POST-OPERATIVE NOTE    Subjective:  Patient is s/p b/l LE angiogram with L external iliac artery stent placement. Recovering appropriately. Denies chest pain, SOB, palpitations. Pain well controlled.     Vital Signs Last 24 Hrs  T(C): 36.5 (12 Jan 2022 20:28), Max: 37 (12 Jan 2022 18:39)  T(F): 97.7 (12 Jan 2022 20:28), Max: 98.6 (12 Jan 2022 18:39)  HR: 98 (12 Jan 2022 20:28) (80 - 112)  BP: 117/71 (12 Jan 2022 20:28) (101/64 - 149/70)  BP(mean): --  RR: 18 (12 Jan 2022 20:28) (10 - 20)  SpO2: 97% (12 Jan 2022 20:28) (95% - 99%)  I&O's Detail    11 Jan 2022 07:01  -  12 Jan 2022 07:00  --------------------------------------------------------  IN:    dextrose 5% + sodium chloride 0.45%: 1300 mL    Heparin Infusion: 782 mL    Oral Fluid: 720 mL  Total IN: 2802 mL    OUT:    Voided (mL): 2150 mL  Total OUT: 2150 mL    Total NET: 652 mL      12 Jan 2022 07:01  -  12 Jan 2022 22:55  --------------------------------------------------------  IN:    dextrose 5% + sodium chloride 0.45%: 800 mL    Heparin: 20 mL    Heparin Infusion: 187 mL    IV PiggyBack: 200 mL  Total IN: 1207 mL    OUT:    Oral Fluid: 0 mL    Voided (mL): 2100 mL  Total OUT: 2100 mL    Total NET: -893 mL        piperacillin/tazobactam IVPB.. 3.375  aspirin enteric coated 81  clopidogrel Tablet 75  heparin  Infusion 1000  metoprolol tartrate 75  piperacillin/tazobactam IVPB.. 3.375    PAST MEDICAL & SURGICAL HISTORY:  CMT (Charcot-Lakia-Tooth disease)    Substance abuse in remission    Palate deformity  partial palate removal    H/O hammer toe correction  left          Physical Exam:  General: NAD, resting comfortably in bed  Pulmonary: Nonlabored breathing, no respiratory distress  Abdominal: soft, NT/ND; R groin soft, no collections   Extremities: WWP, dopplerable L AT, R PT        LABS:                        11.4   9.21  )-----------( 471      ( 12 Jan 2022 06:54 )             36.1     01-12    139  |  102  |  10  ----------------------------<  121<H>  3.7   |  24  |  0.59    Ca    9.6      12 Jan 2022 06:54  Phos  3.6     01-12  Mg     2.1     01-12      PT/INR - ( 12 Jan 2022 06:54 )   PT: 16.2 sec;   INR: 1.37 ratio         PTT - ( 12 Jan 2022 06:54 )  PTT:76.5 sec  CAPILLARY BLOOD GLUCOSE          Radiology and Additional Studies:    Assessment:  The patient is a 61y Male who is now several hours post-op from a b/l LE angiogram with L external iliac artery stent placement    Plan:  - Pain control as needed  - Start ASA81, plavix today  - Start hep gtt, f/u ptt   - Will remove dressing tomorrow AM   - F/u AM labs    Vascular Surgery  [id number] POST-OPERATIVE NOTE    Subjective:  Patient is s/p b/l LE angiogram with L external iliac artery stent placement. Recovering appropriately. Denies chest pain, SOB, palpitations. Pain well controlled.     Vital Signs Last 24 Hrs  T(C): 36.5 (12 Jan 2022 20:28), Max: 37 (12 Jan 2022 18:39)  T(F): 97.7 (12 Jan 2022 20:28), Max: 98.6 (12 Jan 2022 18:39)  HR: 98 (12 Jan 2022 20:28) (80 - 112)  BP: 117/71 (12 Jan 2022 20:28) (101/64 - 149/70)  BP(mean): --  RR: 18 (12 Jan 2022 20:28) (10 - 20)  SpO2: 97% (12 Jan 2022 20:28) (95% - 99%)  I&O's Detail    11 Jan 2022 07:01  -  12 Jan 2022 07:00  --------------------------------------------------------  IN:    dextrose 5% + sodium chloride 0.45%: 1300 mL    Heparin Infusion: 782 mL    Oral Fluid: 720 mL  Total IN: 2802 mL    OUT:    Voided (mL): 2150 mL  Total OUT: 2150 mL    Total NET: 652 mL      12 Jan 2022 07:01  -  12 Jan 2022 22:55  --------------------------------------------------------  IN:    dextrose 5% + sodium chloride 0.45%: 800 mL    Heparin: 20 mL    Heparin Infusion: 187 mL    IV PiggyBack: 200 mL  Total IN: 1207 mL    OUT:    Oral Fluid: 0 mL    Voided (mL): 2100 mL  Total OUT: 2100 mL    Total NET: -893 mL        piperacillin/tazobactam IVPB.. 3.375  aspirin enteric coated 81  clopidogrel Tablet 75  heparin  Infusion 1000  metoprolol tartrate 75  piperacillin/tazobactam IVPB.. 3.375    PAST MEDICAL & SURGICAL HISTORY:  CMT (Charcot-Lakia-Tooth disease)    Substance abuse in remission    Palate deformity  partial palate removal    H/O hammer toe correction  left          Physical Exam:  General: NAD, resting comfortably in bed  Pulmonary: Nonlabored breathing, no respiratory distress  Abdominal: soft, NT/ND; R groin soft, no collections, dressing in place   Extremities: WWP, b/l feet wrapped with kerlix, offloaded with boots        LABS:                        11.4   9.21  )-----------( 471      ( 12 Jan 2022 06:54 )             36.1     01-12    139  |  102  |  10  ----------------------------<  121<H>  3.7   |  24  |  0.59    Ca    9.6      12 Jan 2022 06:54  Phos  3.6     01-12  Mg     2.1     01-12      PT/INR - ( 12 Jan 2022 06:54 )   PT: 16.2 sec;   INR: 1.37 ratio         PTT - ( 12 Jan 2022 06:54 )  PTT:76.5 sec  CAPILLARY BLOOD GLUCOSE          Radiology and Additional Studies:    Assessment:  The patient is a 61y Male who is now several hours post-op from a b/l LE angiogram with L external iliac artery stent placement    Plan:  - Pain control as needed  - Start ASA81, plavix today  - Start hep gtt, f/u ptt   - Will remove dressing tomorrow AM   - F/u AM labs    Vascular Surgery  [id number]

## 2022-01-12 NOTE — PROGRESS NOTE ADULT - SUBJECTIVE AND OBJECTIVE BOX
infectious diseases progress note:    Patient is a 61y old  Male who presents with a chief complaint of OM toe (12 Jan 2022 08:34)        Other acute osteomyelitis, ankle and foot               Allergies    No Known Allergies    Intolerances        ANTIBIOTICS/RELEVANT:  antimicrobials  piperacillin/tazobactam IVPB.. 3.375 Gram(s) IV Intermittent every 8 hours    immunologic:    OTHER:  acetaminophen     Tablet .. 650 milliGRAM(s) Oral every 6 hours PRN  aspirin enteric coated 81 milliGRAM(s) Oral daily  atorvastatin 80 milliGRAM(s) Oral at bedtime  clopidogrel Tablet 75 milliGRAM(s) Oral daily  Dakins Solution - 1/2 Strength 1 Application(s) Topical daily  dextrose 5% + sodium chloride 0.45%. 1000 milliLiter(s) IV Continuous <Continuous>  heparin   Injectable 7500 Unit(s) IV Push every 6 hours PRN  heparin   Injectable 3500 Unit(s) IV Push every 6 hours PRN  heparin  Infusion. 3000 Unit(s)/Hr IV Continuous <Continuous>  metoprolol tartrate 75 milliGRAM(s) Oral two times a day  nicotine - 21 mG/24Hr(s) Patch 1 patch Transdermal daily  oxycodone    5 mG/acetaminophen 325 mG 1 Tablet(s) Oral every 4 hours PRN      Objective:  Vital Signs Last 24 Hrs  T(C): 36.5 (12 Jan 2022 04:14), Max: 37.1 (11 Jan 2022 11:27)  T(F): 97.7 (12 Jan 2022 04:14), Max: 98.7 (11 Jan 2022 11:27)  HR: 98 (12 Jan 2022 04:14) (94 - 101)  BP: 119/74 (12 Jan 2022 04:14) (108/68 - 119/74)  BP(mean): --  RR: 18 (12 Jan 2022 04:14) (18 - 18)  SpO2: 98% (12 Jan 2022 04:14) (96% - 99%)     Eyes:[place], EOMI  Ear/Nose/Throat: no oral lesion, no sinus tenderness on percussion	  Neck:no JVD, no lymphadenopathy, supple  Respiratory: CTA yas  Cardiovascular: S1S2 RRR, no murmurs  Gastrointestinal:soft, (+) BS, no HSM  Extremities:no e/e/c        LABS:                        11.4   9.21  )-----------( 471      ( 12 Jan 2022 06:54 )             36.1     01-12    139  |  102  |  10  ----------------------------<  121<H>  3.7   |  24  |  0.59    Ca    9.6      12 Jan 2022 06:54  Phos  3.6     01-12  Mg     2.1     01-12      PT/INR - ( 12 Jan 2022 06:54 )   PT: 16.2 sec;   INR: 1.37 ratio         PTT - ( 12 Jan 2022 06:54 )  PTT:76.5 sec        MICROBIOLOGY:    RECENT CULTURES:  01-07 @ 14:47 .Blood Blood-Peripheral                No growth to date.    01-07 @ 14:42 .Abscess R foot wound                Few Streptococcus dysgalactiae (Group C/G)  Few Corynebacterium striatum group  Rare Bacteroides fragilis  "Susceptibilities not performed"          RESPIRATORY CULTURES:              RADIOLOGY & ADDITIONAL STUDIES:        Pager [phone]  After 5 pm/weekends or if no response :[phone]

## 2022-01-13 ENCOUNTER — TRANSCRIPTION ENCOUNTER (OUTPATIENT)
Age: 62
End: 2022-01-13

## 2022-01-13 LAB
APTT BLD: 30.8 SEC — SIGNIFICANT CHANGE UP (ref 27.5–35.5)
APTT BLD: 33 SEC — SIGNIFICANT CHANGE UP (ref 27.5–35.5)
APTT BLD: 36 SEC — HIGH (ref 27.5–35.5)
APTT BLD: 40.5 SEC — HIGH (ref 27.5–35.5)
BLD GP AB SCN SERPL QL: NEGATIVE — SIGNIFICANT CHANGE UP
HCT VFR BLD CALC: 35.2 % — LOW (ref 39–50)
HGB BLD-MCNC: 11.1 G/DL — LOW (ref 13–17)
MCHC RBC-ENTMCNC: 26.9 PG — LOW (ref 27–34)
MCHC RBC-ENTMCNC: 31.5 GM/DL — LOW (ref 32–36)
MCV RBC AUTO: 85.2 FL — SIGNIFICANT CHANGE UP (ref 80–100)
NRBC # BLD: 0 /100 WBCS — SIGNIFICANT CHANGE UP (ref 0–0)
PLATELET # BLD AUTO: 481 K/UL — HIGH (ref 150–400)
RBC # BLD: 4.13 M/UL — LOW (ref 4.2–5.8)
RBC # FLD: 13.9 % — SIGNIFICANT CHANGE UP (ref 10.3–14.5)
RH IG SCN BLD-IMP: NEGATIVE — SIGNIFICANT CHANGE UP
SARS-COV-2 RNA SPEC QL NAA+PROBE: SIGNIFICANT CHANGE UP
WBC # BLD: 8.97 K/UL — SIGNIFICANT CHANGE UP (ref 3.8–10.5)
WBC # FLD AUTO: 8.97 K/UL — SIGNIFICANT CHANGE UP (ref 3.8–10.5)

## 2022-01-13 PROCEDURE — 99232 SBSQ HOSP IP/OBS MODERATE 35: CPT

## 2022-01-13 RX ORDER — HEPARIN SODIUM 5000 [USP'U]/ML
1500 INJECTION INTRAVENOUS; SUBCUTANEOUS
Qty: 25000 | Refills: 0 | Status: DISCONTINUED | OUTPATIENT
Start: 2022-01-13 | End: 2022-01-13

## 2022-01-13 RX ORDER — HEPARIN SODIUM 5000 [USP'U]/ML
1700 INJECTION INTRAVENOUS; SUBCUTANEOUS
Qty: 25000 | Refills: 0 | Status: DISCONTINUED | OUTPATIENT
Start: 2022-01-13 | End: 2022-01-14

## 2022-01-13 RX ADMIN — PIPERACILLIN AND TAZOBACTAM 25 GRAM(S): 4; .5 INJECTION, POWDER, LYOPHILIZED, FOR SOLUTION INTRAVENOUS at 17:21

## 2022-01-13 RX ADMIN — HEPARIN SODIUM 17 UNIT(S)/HR: 5000 INJECTION INTRAVENOUS; SUBCUTANEOUS at 19:10

## 2022-01-13 RX ADMIN — PIPERACILLIN AND TAZOBACTAM 25 GRAM(S): 4; .5 INJECTION, POWDER, LYOPHILIZED, FOR SOLUTION INTRAVENOUS at 01:19

## 2022-01-13 RX ADMIN — CLOPIDOGREL BISULFATE 75 MILLIGRAM(S): 75 TABLET, FILM COATED ORAL at 11:38

## 2022-01-13 RX ADMIN — SODIUM CHLORIDE 100 MILLILITER(S): 9 INJECTION, SOLUTION INTRAVENOUS at 02:07

## 2022-01-13 RX ADMIN — Medication 1 PATCH: at 18:29

## 2022-01-13 RX ADMIN — Medication 1 APPLICATION(S): at 11:36

## 2022-01-13 RX ADMIN — SODIUM CHLORIDE 100 MILLILITER(S): 9 INJECTION, SOLUTION INTRAVENOUS at 22:11

## 2022-01-13 RX ADMIN — Medication 75 MILLIGRAM(S): at 05:17

## 2022-01-13 RX ADMIN — Medication 75 MILLIGRAM(S): at 17:21

## 2022-01-13 RX ADMIN — Medication 1 PATCH: at 07:38

## 2022-01-13 RX ADMIN — Medication 81 MILLIGRAM(S): at 11:38

## 2022-01-13 RX ADMIN — SODIUM CHLORIDE 100 MILLILITER(S): 9 INJECTION, SOLUTION INTRAVENOUS at 10:09

## 2022-01-13 RX ADMIN — Medication 1 PATCH: at 11:38

## 2022-01-13 RX ADMIN — HEPARIN SODIUM 11 UNIT(S)/HR: 5000 INJECTION INTRAVENOUS; SUBCUTANEOUS at 08:18

## 2022-01-13 RX ADMIN — ATORVASTATIN CALCIUM 80 MILLIGRAM(S): 80 TABLET, FILM COATED ORAL at 21:25

## 2022-01-13 RX ADMIN — PIPERACILLIN AND TAZOBACTAM 25 GRAM(S): 4; .5 INJECTION, POWDER, LYOPHILIZED, FOR SOLUTION INTRAVENOUS at 10:09

## 2022-01-13 RX ADMIN — SODIUM CHLORIDE 100 MILLILITER(S): 9 INJECTION, SOLUTION INTRAVENOUS at 05:12

## 2022-01-13 RX ADMIN — Medication 650 MILLIGRAM(S): at 22:54

## 2022-01-13 RX ADMIN — HEPARIN SODIUM 11 UNIT(S)/HR: 5000 INJECTION INTRAVENOUS; SUBCUTANEOUS at 02:56

## 2022-01-13 RX ADMIN — HEPARIN SODIUM 15 UNIT(S)/HR: 5000 INJECTION INTRAVENOUS; SUBCUTANEOUS at 11:09

## 2022-01-13 RX ADMIN — Medication 650 MILLIGRAM(S): at 22:24

## 2022-01-13 NOTE — PROGRESS NOTE ADULT - SUBJECTIVE AND OBJECTIVE BOX
CARDIOLOGY     PROGRESS  NOTE   ________________________________________________    CHIEF COMPLAINT:Patient is a 61y old  Male who presents with a chief complaint of OM toe (13 Jan 2022 10:02)  no complain  	  REVIEW OF SYSTEMS:  CONSTITUTIONAL: No fever, weight loss, or fatigue  EYES: No eye pain, visual disturbances, or discharge  ENT:  No difficulty hearing, tinnitus, vertigo; No sinus or throat pain  NECK: No pain or stiffness  RESPIRATORY: No cough, wheezing, chills or hemoptysis; No Shortness of Breath  CARDIOVASCULAR: No chest pain, palpitations, passing out, dizziness, or leg swelling  GASTROINTESTINAL: No abdominal or epigastric pain. No nausea, vomiting, or hematemesis; No diarrhea or constipation. No melena or hematochezia.  GENITOURINARY: No dysuria, frequency, hematuria, or incontinence  NEUROLOGICAL: No headaches, memory loss, loss of strength, numbness, or tremors  SKIN: No itching, burning, rashes, or lesions   LYMPH Nodes: No enlarged glands  ENDOCRINE: No heat or cold intolerance; No hair loss  MUSCULOSKELETAL: No joint pain or swelling; No muscle, back, or extremity pain  PSYCHIATRIC: No depression, anxiety, mood swings, or difficulty sleeping  HEME/LYMPH: No easy bruising, or bleeding gums  ALLERGY AND IMMUNOLOGIC: No hives or eczema	    [ ] All others negative	  [ ] Unable to obtain    PHYSICAL EXAM:  T(C): 36.7 (01-13-22 @ 05:09), Max: 37 (01-12-22 @ 18:39)  HR: 88 (01-13-22 @ 05:09) (80 - 112)  BP: 108/75 (01-13-22 @ 05:09) (101/64 - 149/70)  RR: 17 (01-13-22 @ 05:09) (10 - 20)  SpO2: 97% (01-13-22 @ 05:09) (95% - 99%)  Wt(kg): --  I&O's Summary    12 Jan 2022 07:01  -  13 Jan 2022 07:00  --------------------------------------------------------  IN: 2523 mL / OUT: 2100 mL / NET: 423 mL    13 Jan 2022 07:01  -  13 Jan 2022 11:28  --------------------------------------------------------  IN: 863 mL / OUT: 0 mL / NET: 863 mL        Appearance: Normal	  HEENT:   Normal oral mucosa, PERRL, EOMI	  Lymphatic: No lymphadenopathy  Cardiovascular: Normal S1 S2, No JVD, + murmurs, No edema  Respiratory: Lungs clear to auscultation	  Psychiatry: A & O x 3, Mood & affect appropriate  Gastrointestinal:  Soft, Non-tender, + BS	  Skin: No rashes, No ecchymoses, No cyanosis	  Neurologic: Non-focal  Extremities: Normal range of motion, No clubbing, cyanosis or edema  Vascular: Peripheral pulses palpable 2+ bilaterally    MEDICATIONS  (STANDING):  aspirin enteric coated 81 milliGRAM(s) Oral daily  atorvastatin 80 milliGRAM(s) Oral at bedtime  clopidogrel Tablet 75 milliGRAM(s) Oral daily  Dakins Solution - 1/2 Strength 1 Application(s) Topical daily  dextrose 5% + sodium chloride 0.45%. 1000 milliLiter(s) (100 mL/Hr) IV Continuous <Continuous>  heparin  Infusion 1500 Unit(s)/Hr (15 mL/Hr) IV Continuous <Continuous>  metoprolol tartrate 75 milliGRAM(s) Oral two times a day  nicotine - 21 mG/24Hr(s) Patch 1 patch Transdermal daily  piperacillin/tazobactam IVPB.. 3.375 Gram(s) IV Intermittent every 8 hours      TELEMETRY: 	    ECG:  	  RADIOLOGY:  OTHER: 	  	  LABS:	 	    CARDIAC MARKERS:                                11.1   8.97  )-----------( 481      ( 13 Jan 2022 06:06 )             35.2     01-12    139  |  102  |  10  ----------------------------<  121<H>  3.7   |  24  |  0.59    Ca    9.6      12 Jan 2022 06:54  Phos  3.6     01-12  Mg     2.1     01-12      proBNP:   Lipid Profile: Cholesterol 118  LDL --  HDL 25      HgA1c:   TSH:   PT/INR - ( 12 Jan 2022 06:54 )   PT: 16.2 sec;   INR: 1.37 ratio         PTT - ( 13 Jan 2022 09:33 )  PTT:30.8 sec  - patient is aware that he has a high risk for future TMA and BKA left foot.   - Booked for L foot partial 5th ray resection, L foot 1st metatarsal bone biopsy and R foot partial 2nd digit amputation tomorrow, Friday 1/14 w/ Dr. Gordon at 3:15pm   - Please document medical and cardiac clearance for podiatric surgery under light sedation with local block   - NPO at midnight  - Please re-swab for COVID PCR STAT    Assessment and plan  ---------------------------  OM of the toe with +mri  podiatry/ id appreciated continue IV abx  events noted today and discussed with NP, pt with tachycardia , no chest pain ecg ?st elevation in inferior leads, first trop negartive  new onset a fib/ flutter  asa  beta blocker  fu trop  has transferred to tele  will observe closely  trop/ cpk negative  increase beta blocker, add dig to control hr  continue iv heparin  s/p LAD stent  pt is clear for peripheral angio today  continue asa/plavix  increase beta blocker  social work consult  awaiting podiart surgery  will switch heparin to eliquis prior to dc

## 2022-01-13 NOTE — PROGRESS NOTE ADULT - SUBJECTIVE AND OBJECTIVE BOX
infectious diseases progress note:    Patient is a 61y old  Male who presents with a chief complaint of OM toe (13 Jan 2022 08:34)        Other acute osteomyelitis, ankle and foot               Allergies    No Known Allergies    Intolerances        ANTIBIOTICS/RELEVANT:  antimicrobials  piperacillin/tazobactam IVPB.. 3.375 Gram(s) IV Intermittent every 8 hours    immunologic:    OTHER:  acetaminophen     Tablet .. 650 milliGRAM(s) Oral every 6 hours PRN  aspirin enteric coated 81 milliGRAM(s) Oral daily  atorvastatin 80 milliGRAM(s) Oral at bedtime  clopidogrel Tablet 75 milliGRAM(s) Oral daily  Dakins Solution - 1/2 Strength 1 Application(s) Topical daily  dextrose 5% + sodium chloride 0.45%. 1000 milliLiter(s) IV Continuous <Continuous>  heparin  Infusion 1000 Unit(s)/Hr IV Continuous <Continuous>  metoprolol tartrate 75 milliGRAM(s) Oral two times a day  nicotine - 21 mG/24Hr(s) Patch 1 patch Transdermal daily  oxycodone    5 mG/acetaminophen 325 mG 1 Tablet(s) Oral every 4 hours PRN      Objective:  Vital Signs Last 24 Hrs  T(C): 36.7 (13 Jan 2022 05:09), Max: 37 (12 Jan 2022 18:39)  T(F): 98.1 (13 Jan 2022 05:09), Max: 98.6 (12 Jan 2022 18:39)  HR: 88 (13 Jan 2022 05:09) (80 - 112)  BP: 108/75 (13 Jan 2022 05:09) (101/64 - 149/70)  BP(mean): --  RR: 17 (13 Jan 2022 05:09) (10 - 20)  SpO2: 97% (13 Jan 2022 05:09) (95% - 99%)     Eyes:LELAND, EOMI  Ear/Nose/Throat: no oral lesion, no sinus tenderness on percussion	  Neck:no JVD, no lymphadenopathy, supple  Respiratory: CTA yas  Cardiovascular: S1S2 RRR, no murmurs  Gastrointestinal:soft, (+) BS, no HSM  Extremities:no e/e/c        LABS:                        11.1   8.97  )-----------( 481      ( 13 Jan 2022 06:06 )             35.2     01-12    139  |  102  |  10  ----------------------------<  121<H>  3.7   |  24  |  0.59    Ca    9.6      12 Jan 2022 06:54  Phos  3.6     01-12  Mg     2.1     01-12      PT/INR - ( 12 Jan 2022 06:54 )   PT: 16.2 sec;   INR: 1.37 ratio         PTT - ( 13 Jan 2022 01:57 )  PTT:33.0 sec        MICROBIOLOGY:    RECENT CULTURES:  01-07 @ 14:47 .Blood Blood-Peripheral                No Growth Final    01-07 @ 14:42 .Abscess R foot wound                Few Streptococcus dysgalactiae (Group C/G)  Few Corynebacterium striatum group  Rare Bacteroides fragilis  "Susceptibilities not performed"          RESPIRATORY CULTURES:              RADIOLOGY & ADDITIONAL STUDIES:        Pager [phone]  After 5 pm/weekends or if no response :[phone]

## 2022-01-13 NOTE — PROGRESS NOTE ADULT - ASSESSMENT
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 Patient is s/p b/l LE angiogram with R external iliac stent placement 1/12.     PLAN:  - ASA, Plavix  - Hep gtt  - C/w care per primary team      Fartun Solis, PGY-2  Vascular Surgery  #[id number]  61y male with PMH of Charcot-Lakia-Tooth disease substance abuse in remission, peripheral vascular disease and surgical hx of hammer toe correction left presenting with one month of non-healing 5th left toe s/p bedside I&D by podiatry with osteomyelitis. Patient is s/p b/l LE angiogram with R external iliac stent placement 1/12.     PLAN:  - ASA, Plavix  - Hep gtt  - Will f/u podiatry plan for possible further intervention  - C/w care per primary team      Fartun Solis, PGY-2  Vascular Surgery  #[id number]

## 2022-01-13 NOTE — PROGRESS NOTE ADULT - ASSESSMENT
61M with severe RA, PVD, active smoker with L foot OM.  For angiogram tomorrow.  No MRSA isolated.    PVD, OM L foot  - continue zosyn for now    angiogram for vascular   intervention  yesterday    cultures noted strept and anaerobes covered by zosyn  surgery after revascularization planned for tomorrow with resection  length of therapy needed will be determined post op after we see if the margins are clean  ID service  will cover starting tomorrow

## 2022-01-13 NOTE — PROGRESS NOTE ADULT - SUBJECTIVE AND OBJECTIVE BOX
Subjective:  Patient seen at bedside this AM. Reports feeling well, without complaints. Denies chest pain, SOB. He states that his legs are feeling better after the angiogram yesterday.    24h Events:   - Overnight, no acute events    Objective:  Vital Signs  T(C): 36.7 (01-13 @ 05:09), Max: 37 (01-12 @ 18:39)  HR: 88 (01-13 @ 05:09) (80 - 112)  BP: 108/75 (01-13 @ 05:09) (101/64 - 149/70)  RR: 17 (01-13 @ 05:09) (10 - 20)  SpO2: 97% (01-13 @ 05:09) (95% - 99%)  01-12-22 @ 07:01  -  01-13-22 @ 07:00  --------------------------------------------------------  IN:  Total IN: 0 mL    OUT:    Voided (mL): 2100 mL  Total OUT: 2100 mL    Total NET: -2100 mL      Physical Exam:  GEN: resting in bed comfortably in NAD  NEURO: awake, alert  RESP: no increased WOB  EXTR: b/l UE warm, well-perfused, spontaneous movement  - RLE: Dopplerable PT signal, foot dressing c/d/i; groin soft, no hematomas or fluid collections, palpable femoral pulse  - LLE: Doppler AT and PT signals, foot dressing c/d/i, palpable femoral pulse      Labs:             11.1   8.97  )-----------( 481      ( 13 Jan 2022 06:06 )             35.2     139  |  102  |  10  ----------------------------<  121<H>  3.7   |  24  |  0.59    Ca    9.6      12 Jan 2022 06:54  Phos  3.6     01-12  Mg     2.1     01-12      Medications:   MEDICATIONS  (STANDING):  aspirin enteric coated 81 milliGRAM(s) Oral daily  atorvastatin 80 milliGRAM(s) Oral at bedtime  clopidogrel Tablet 75 milliGRAM(s) Oral daily  Dakins Solution - 1/2 Strength 1 Application(s) Topical daily  dextrose 5% + sodium chloride 0.45%. 1000 milliLiter(s) (100 mL/Hr) IV Continuous <Continuous>  heparin  Infusion 1000 Unit(s)/Hr (11 mL/Hr) IV Continuous <Continuous>  metoprolol tartrate 75 milliGRAM(s) Oral two times a day  nicotine - 21 mG/24Hr(s) Patch 1 patch Transdermal daily  piperacillin/tazobactam IVPB.. 3.375 Gram(s) IV Intermittent every 8 hours    MEDICATIONS  (PRN):  acetaminophen     Tablet .. 650 milliGRAM(s) Oral every 6 hours PRN Mild Pain (1 - 3)  oxycodone    5 mG/acetaminophen 325 mG 1 Tablet(s) Oral every 4 hours PRN Moderate Pain (4 - 6)   Subjective:  Patient seen at bedside this AM. Reports feeling well, without complaints. Denies chest pain, SOB. He states that his legs are feeling better after the angiogram yesterday.    24h Events:   - Overnight, no acute events    Objective:  Vital Signs  T(C): 36.7 (01-13 @ 05:09), Max: 37 (01-12 @ 18:39)  HR: 88 (01-13 @ 05:09) (80 - 112)  BP: 108/75 (01-13 @ 05:09) (101/64 - 149/70)  RR: 17 (01-13 @ 05:09) (10 - 20)  SpO2: 97% (01-13 @ 05:09) (95% - 99%)  01-12-22 @ 07:01  -  01-13-22 @ 07:00  --------------------------------------------------------  IN:  Total IN: 0 mL    OUT:    Voided (mL): 2100 mL  Total OUT: 2100 mL    Total NET: -2100 mL      Physical Exam:  GEN: resting in bed comfortably in NAD  NEURO: awake, alert  RESP: no increased WOB  EXTR: b/l UE warm, well-perfused, spontaneous movement  - RLE: Dopplerable PT signal, foot dressing c/d/i; groin soft, no hematomas or fluid collections, palpable femoral pulse  - LLE: Doppler AT and PT signals, foot dressing c/d/i, palpable femoral pulse  rt groin  punct site c/d/i    Labs:             11.1   8.97  )-----------( 481      ( 13 Jan 2022 06:06 )             35.2     139  |  102  |  10  ----------------------------<  121<H>  3.7   |  24  |  0.59    Ca    9.6      12 Jan 2022 06:54  Phos  3.6     01-12  Mg     2.1     01-12      Medications:   MEDICATIONS  (STANDING):  aspirin enteric coated 81 milliGRAM(s) Oral daily  atorvastatin 80 milliGRAM(s) Oral at bedtime  clopidogrel Tablet 75 milliGRAM(s) Oral daily  Dakins Solution - 1/2 Strength 1 Application(s) Topical daily  dextrose 5% + sodium chloride 0.45%. 1000 milliLiter(s) (100 mL/Hr) IV Continuous <Continuous>  heparin  Infusion 1000 Unit(s)/Hr (11 mL/Hr) IV Continuous <Continuous>  metoprolol tartrate 75 milliGRAM(s) Oral two times a day  nicotine - 21 mG/24Hr(s) Patch 1 patch Transdermal daily  piperacillin/tazobactam IVPB.. 3.375 Gram(s) IV Intermittent every 8 hours    MEDICATIONS  (PRN):  acetaminophen     Tablet .. 650 milliGRAM(s) Oral every 6 hours PRN Mild Pain (1 - 3)  oxycodone    5 mG/acetaminophen 325 mG 1 Tablet(s) Oral every 4 hours PRN Moderate Pain (4 - 6)

## 2022-01-13 NOTE — PROGRESS NOTE ADULT - SUBJECTIVE AND OBJECTIVE BOX
Podiatry pager #: [phone] ([place])/ [phone] ([facility])    Patient is a 61y old  Male who presents with a chief complaint of OM toe (13 Jan 2022 08:40)       INTERVAL HPI/OVERNIGHT EVENTS:  Patient seen and evaluated at bedside.  Pt is resting comfortable in NAD. Denies N/V/F/C.     Allergies    No Known Allergies    Intolerances        Vital Signs Last 24 Hrs  T(C): 36.7 (13 Jan 2022 05:09), Max: 37 (12 Jan 2022 18:39)  T(F): 98.1 (13 Jan 2022 05:09), Max: 98.6 (12 Jan 2022 18:39)  HR: 88 (13 Jan 2022 05:09) (80 - 112)  BP: 108/75 (13 Jan 2022 05:09) (101/64 - 149/70)  BP(mean): --  RR: 17 (13 Jan 2022 05:09) (10 - 20)  SpO2: 97% (13 Jan 2022 05:09) (95% - 99%)    LABS:                        11.1   8.97  )-----------( 481      ( 13 Jan 2022 06:06 )             35.2     01-12    139  |  102  |  10  ----------------------------<  121<H>  3.7   |  24  |  0.59    Ca    9.6      12 Jan 2022 06:54  Phos  3.6     01-12  Mg     2.1     01-12      PT/INR - ( 12 Jan 2022 06:54 )   PT: 16.2 sec;   INR: 1.37 ratio         PTT - ( 13 Jan 2022 01:57 )  PTT:33.0 sec    CAPILLARY BLOOD GLUCOSE          Lower Extremity Physical Exam:  Vascular: DP/PT 0/4, B/L, CFT <3 seconds B/L, Temperature gradient warm to cool, B/L.   Neuro: Epicritic sensation intact to the level of digits, B/L.  Musculoskeletal/Ortho: unremarkable  Skin:    LEFT foot: lateral aspect of metatarsal 5 fibronecrotic wound to bone with no malodor, wound tracks about 4cm proximally along lateral aspect of 5th mt shaft, no purulence, improved periwound erythema to the 5th mt base. Dorsal hallux fibrogranular wound to subQ with no acute signs of infection. Plantar 1st MPJ wound probing to bone, no acute signs of infection.     Right foot: Distal aspect of 2nd digit wound to bone with no purulence, no tracking, no malodor, periwound erythema to digit, no tracking, no crepitus.  sub mt 2 plantar hyperkeratosis with no acute signs of infection.   RLE cellulitis improving from tibial tuberosity to mid tibial shaft w/ warmth.     RADIOLOGY & ADDITIONAL TESTS:

## 2022-01-13 NOTE — PROGRESS NOTE ADULT - ASSESSMENT
61M with b/l foot wounds  - Pt seen and evalulated   - Afebrile, no leukocytosis, ESR 61, CRP 43.4  - LEFT foot: lateral aspect of metatarsal 5 fibronecrotic wound to bone with no malodor, wound tracks about 4cm proximally along lateral aspect of 5th mt shaft, no purulence, improved periwound erythema to the 5th mt base. Dorsal hallux fibrogranular wound to subQ with no acute signs of infection. Plantar 1st MPJ wound to bone, no acute signs of infection.   - RIGHT foot: Distal aspect of 2nd digit wound to bone with no purulence, no tracking, no malodor, periwound erythema to digit, no tracking, no crepitus. sub mt 2 plantar hyperkeratosis with no acute signs of infection.  - RLE cellulitis improving from tibial tuberosity to level of mid tibial shaft   - Wound culture of L 5th wound growing Strep dysgalactiae, Bacteriodes  - Wound culture R 2nd digit growing strep dysgalactiae, Corynebacterium and Bacteriodes  - Left foot MR showing osteomyelitis of 1st metatarsal and 5th metatarsal and digit   - Per discussion w/ pt and attending, patient refused TMA left foot even though MRI reveals suspected osteomyelitis of left 1 metatarsal. Patient would like to attempt at resection of osteomyelitis left 5th ray and 6 weeks of IV antibiotics for remaining OM at 1 metatarsal. Dr. Gordon will attempt at bone biopsy of 1 metatarsal left foot. Patient understands that he will have chronic Osteomyelitis 1 metatarsal left foot which could reactivate and cause progressive spread of infection.  - s/p B/L LE angio w/ vasc yesterday, recs appreciated   - patient is aware that he has a high risk for future TMA and BKA left foot.   - Booked for L foot partial 5th ray resection, L foot 1st metatarsal bone biopsy and R foot partial 2nd digit amputation tomorrow, Friday 1/14 w/ Dr. Gordon at 3:15pm   - Please document medical and cardiac clearance for podiatric surgery under light sedation with local block   - NPO at midnight  - Please re-swab for COVID PCR STAT  - Discussed with attending

## 2022-01-14 ENCOUNTER — RESULT REVIEW (OUTPATIENT)
Age: 62
End: 2022-01-14

## 2022-01-14 ENCOUNTER — TRANSCRIPTION ENCOUNTER (OUTPATIENT)
Age: 62
End: 2022-01-14

## 2022-01-14 DIAGNOSIS — M86.9 OSTEOMYELITIS, UNSPECIFIED: ICD-10-CM

## 2022-01-14 LAB
ANION GAP SERPL CALC-SCNC: 12 MMOL/L — SIGNIFICANT CHANGE UP (ref 5–17)
APTT BLD: 37.2 SEC — HIGH (ref 27.5–35.5)
APTT BLD: 53.2 SEC — HIGH (ref 27.5–35.5)
BUN SERPL-MCNC: 10 MG/DL — SIGNIFICANT CHANGE UP (ref 7–23)
CALCIUM SERPL-MCNC: 9.6 MG/DL — SIGNIFICANT CHANGE UP (ref 8.4–10.5)
CHLORIDE SERPL-SCNC: 104 MMOL/L — SIGNIFICANT CHANGE UP (ref 96–108)
CO2 SERPL-SCNC: 23 MMOL/L — SIGNIFICANT CHANGE UP (ref 22–31)
CREAT SERPL-MCNC: 0.53 MG/DL — SIGNIFICANT CHANGE UP (ref 0.5–1.3)
GLUCOSE SERPL-MCNC: 118 MG/DL — HIGH (ref 70–99)
HCT VFR BLD CALC: 34.3 % — LOW (ref 39–50)
HGB BLD-MCNC: 11 G/DL — LOW (ref 13–17)
INR BLD: 1.34 RATIO — HIGH (ref 0.88–1.16)
MCHC RBC-ENTMCNC: 27.3 PG — SIGNIFICANT CHANGE UP (ref 27–34)
MCHC RBC-ENTMCNC: 32.1 GM/DL — SIGNIFICANT CHANGE UP (ref 32–36)
MCV RBC AUTO: 85.1 FL — SIGNIFICANT CHANGE UP (ref 80–100)
NRBC # BLD: 0 /100 WBCS — SIGNIFICANT CHANGE UP (ref 0–0)
PLATELET # BLD AUTO: 463 K/UL — HIGH (ref 150–400)
POTASSIUM SERPL-MCNC: 3.5 MMOL/L — SIGNIFICANT CHANGE UP (ref 3.5–5.3)
POTASSIUM SERPL-SCNC: 3.5 MMOL/L — SIGNIFICANT CHANGE UP (ref 3.5–5.3)
PROTHROM AB SERPL-ACNC: 15.9 SEC — HIGH (ref 10.6–13.6)
RBC # BLD: 4.03 M/UL — LOW (ref 4.2–5.8)
RBC # FLD: 14.1 % — SIGNIFICANT CHANGE UP (ref 10.3–14.5)
SODIUM SERPL-SCNC: 139 MMOL/L — SIGNIFICANT CHANGE UP (ref 135–145)
WBC # BLD: 8.69 K/UL — SIGNIFICANT CHANGE UP (ref 3.8–10.5)
WBC # FLD AUTO: 8.69 K/UL — SIGNIFICANT CHANGE UP (ref 3.8–10.5)

## 2022-01-14 PROCEDURE — 73630 X-RAY EXAM OF FOOT: CPT | Mod: 26,50

## 2022-01-14 PROCEDURE — 88311 DECALCIFY TISSUE: CPT | Mod: 26

## 2022-01-14 PROCEDURE — 88305 TISSUE EXAM BY PATHOLOGIST: CPT | Mod: 26

## 2022-01-14 PROCEDURE — 88304 TISSUE EXAM BY PATHOLOGIST: CPT | Mod: 26

## 2022-01-14 PROCEDURE — 99232 SBSQ HOSP IP/OBS MODERATE 35: CPT

## 2022-01-14 RX ORDER — APIXABAN 2.5 MG/1
5 TABLET, FILM COATED ORAL EVERY 12 HOURS
Refills: 0 | Status: DISCONTINUED | OUTPATIENT
Start: 2022-01-14 | End: 2022-01-18

## 2022-01-14 RX ORDER — ASPIRIN/CALCIUM CARB/MAGNESIUM 324 MG
81 TABLET ORAL DAILY
Refills: 0 | Status: DISCONTINUED | OUTPATIENT
Start: 2022-01-14 | End: 2022-01-22

## 2022-01-14 RX ORDER — HYDROMORPHONE HYDROCHLORIDE 2 MG/ML
0.25 INJECTION INTRAMUSCULAR; INTRAVENOUS; SUBCUTANEOUS
Refills: 0 | Status: DISCONTINUED | OUTPATIENT
Start: 2022-01-14 | End: 2022-01-14

## 2022-01-14 RX ORDER — HEPARIN SODIUM 5000 [USP'U]/ML
5000 INJECTION INTRAVENOUS; SUBCUTANEOUS ONCE
Refills: 0 | Status: COMPLETED | OUTPATIENT
Start: 2022-01-14 | End: 2022-01-14

## 2022-01-14 RX ORDER — MORPHINE SULFATE 50 MG/1
2 CAPSULE, EXTENDED RELEASE ORAL EVERY 4 HOURS
Refills: 0 | Status: DISCONTINUED | OUTPATIENT
Start: 2022-01-14 | End: 2022-01-17

## 2022-01-14 RX ORDER — CLOPIDOGREL BISULFATE 75 MG/1
75 TABLET, FILM COATED ORAL DAILY
Refills: 0 | Status: DISCONTINUED | OUTPATIENT
Start: 2022-01-14 | End: 2022-01-25

## 2022-01-14 RX ORDER — PIPERACILLIN AND TAZOBACTAM 4; .5 G/20ML; G/20ML
3.38 INJECTION, POWDER, LYOPHILIZED, FOR SOLUTION INTRAVENOUS EVERY 8 HOURS
Refills: 0 | Status: DISCONTINUED | OUTPATIENT
Start: 2022-01-14 | End: 2022-01-18

## 2022-01-14 RX ORDER — METOPROLOL TARTRATE 50 MG
25 TABLET ORAL THREE TIMES A DAY
Refills: 0 | Status: DISCONTINUED | OUTPATIENT
Start: 2022-01-14 | End: 2022-01-22

## 2022-01-14 RX ORDER — HEPARIN SODIUM 5000 [USP'U]/ML
2300 INJECTION INTRAVENOUS; SUBCUTANEOUS
Qty: 25000 | Refills: 0 | Status: DISCONTINUED | OUTPATIENT
Start: 2022-01-14 | End: 2022-01-14

## 2022-01-14 RX ORDER — OXYCODONE AND ACETAMINOPHEN 5; 325 MG/1; MG/1
1 TABLET ORAL EVERY 4 HOURS
Refills: 0 | Status: DISCONTINUED | OUTPATIENT
Start: 2022-01-14 | End: 2022-01-21

## 2022-01-14 RX ORDER — ATORVASTATIN CALCIUM 80 MG/1
80 TABLET, FILM COATED ORAL AT BEDTIME
Refills: 0 | Status: DISCONTINUED | OUTPATIENT
Start: 2022-01-14 | End: 2022-01-25

## 2022-01-14 RX ORDER — NICOTINE POLACRILEX 2 MG
1 GUM BUCCAL DAILY
Refills: 0 | Status: DISCONTINUED | OUTPATIENT
Start: 2022-01-14 | End: 2022-01-25

## 2022-01-14 RX ORDER — ACETAMINOPHEN 500 MG
650 TABLET ORAL EVERY 6 HOURS
Refills: 0 | Status: DISCONTINUED | OUTPATIENT
Start: 2022-01-14 | End: 2022-01-25

## 2022-01-14 RX ADMIN — HEPARIN SODIUM 21 UNIT(S)/HR: 5000 INJECTION INTRAVENOUS; SUBCUTANEOUS at 00:16

## 2022-01-14 RX ADMIN — ATORVASTATIN CALCIUM 80 MILLIGRAM(S): 80 TABLET, FILM COATED ORAL at 21:31

## 2022-01-14 RX ADMIN — PIPERACILLIN AND TAZOBACTAM 25 GRAM(S): 4; .5 INJECTION, POWDER, LYOPHILIZED, FOR SOLUTION INTRAVENOUS at 02:40

## 2022-01-14 RX ADMIN — Medication 81 MILLIGRAM(S): at 21:31

## 2022-01-14 RX ADMIN — PIPERACILLIN AND TAZOBACTAM 25 GRAM(S): 4; .5 INJECTION, POWDER, LYOPHILIZED, FOR SOLUTION INTRAVENOUS at 09:40

## 2022-01-14 RX ADMIN — CLOPIDOGREL BISULFATE 75 MILLIGRAM(S): 75 TABLET, FILM COATED ORAL at 21:31

## 2022-01-14 RX ADMIN — HEPARIN SODIUM 23 UNIT(S)/HR: 5000 INJECTION INTRAVENOUS; SUBCUTANEOUS at 08:32

## 2022-01-14 RX ADMIN — PIPERACILLIN AND TAZOBACTAM 25 GRAM(S): 4; .5 INJECTION, POWDER, LYOPHILIZED, FOR SOLUTION INTRAVENOUS at 20:27

## 2022-01-14 RX ADMIN — Medication 25 MILLIGRAM(S): at 21:31

## 2022-01-14 RX ADMIN — Medication 1 PATCH: at 11:04

## 2022-01-14 RX ADMIN — SODIUM CHLORIDE 100 MILLILITER(S): 9 INJECTION, SOLUTION INTRAVENOUS at 08:32

## 2022-01-14 RX ADMIN — HEPARIN SODIUM 21 UNIT(S)/HR: 5000 INJECTION INTRAVENOUS; SUBCUTANEOUS at 07:19

## 2022-01-14 RX ADMIN — Medication 75 MILLIGRAM(S): at 05:34

## 2022-01-14 RX ADMIN — HEPARIN SODIUM 5000 UNIT(S): 5000 INJECTION INTRAVENOUS; SUBCUTANEOUS at 00:16

## 2022-01-14 RX ADMIN — Medication 1 PATCH: at 07:16

## 2022-01-14 RX ADMIN — Medication 1 PATCH: at 20:27

## 2022-01-14 RX ADMIN — APIXABAN 5 MILLIGRAM(S): 2.5 TABLET, FILM COATED ORAL at 20:32

## 2022-01-14 NOTE — PROGRESS NOTE ADULT - NSPROGADDITIONALINFOA_GEN_ALL_CORE
ID coverage available over [facility] 3-day weekend ([place] 15-Jan 17) if needed. Call #[phone] for questions/concerns.

## 2022-01-14 NOTE — DISCHARGE NOTE PROVIDER - NSDCCPTREATMENT_GEN_ALL_CORE_FT
PRINCIPAL PROCEDURE  Procedure: Partial amputation of toe of right foot  Findings and Treatment: Left foot bone biopsy taken of the first metatarsal shaft - bone was of good quality   Left foot partial 5th ray resection - bone at proximal resection was of poor quality, all necrotic and non viable soft tissue removed, no purulence or tracking   Right foot 2nd digit - 2nd digit partial amputation at level of PIPJ - bone was of good quality      SECONDARY PROCEDURE  Procedure: Insertion, stent, artery, external iliac  Findings and Treatment: Bilateral LE angiogram. L external iliac artery with stenotic segment - stent deployed across stenosis.   LLE - single vessel runoff (AT only); PT and peroneal arteries occluded distal to PT trunk bifurcation  RLE - single vessel runoff (PT only); R SFA occlusion with distal reconstitution at AK popliteal artery  Mynx closure at conclusion of case. External pressure held for >10minutes.    Procedure: Angiogram, with bilateral lower extremity runoff  Findings and Treatment:     Procedure: Biopsy of metatarsal bone of right foot  Findings and Treatment:

## 2022-01-14 NOTE — PROGRESS NOTE ADULT - SUBJECTIVE AND OBJECTIVE BOX
DAVJOHNJOY NESS 61y [id number]    Patient is a 61y old  Male who presents with a chief complaint of OM toe (14 Jan 2022 11:40)      Follow Up/CC:  ID following for    Interval History/ROS:    Allergies    No Known Allergies    Intolerances        ANTIMICROBIALS:  piperacillin/tazobactam IVPB.. 3.375 every 8 hours      MEDICATIONS  (STANDING):  aspirin enteric coated 81 milliGRAM(s) Oral daily  atorvastatin 80 milliGRAM(s) Oral at bedtime  clopidogrel Tablet 75 milliGRAM(s) Oral daily  Dakins Solution - 1/2 Strength 1 Application(s) Topical daily  dextrose 5% + sodium chloride 0.45%. 1000 milliLiter(s) (100 mL/Hr) IV Continuous <Continuous>  heparin  Infusion 2300 Unit(s)/Hr (23 mL/Hr) IV Continuous <Continuous>  metoprolol tartrate 75 milliGRAM(s) Oral two times a day  nicotine - 21 mG/24Hr(s) Patch 1 patch Transdermal daily  piperacillin/tazobactam IVPB.. 3.375 Gram(s) IV Intermittent every 8 hours    MEDICATIONS  (PRN):  acetaminophen     Tablet .. 650 milliGRAM(s) Oral every 6 hours PRN Mild Pain (1 - 3)  oxycodone    5 mG/acetaminophen 325 mG 1 Tablet(s) Oral every 4 hours PRN Moderate Pain (4 - 6)        Vital Signs Last 24 Hrs  T(C): 37.2 (14 Jan 2022 11:10), Max: 37.2 (14 Jan 2022 11:10)  T(F): 98.9 (14 Jan 2022 11:10), Max: 98.9 (14 Jan 2022 11:10)  HR: 86 (14 Jan 2022 11:10) (86 - 110)  BP: 103/72 (14 Jan 2022 11:10) (103/72 - 129/90)  BP(mean): --  RR: 18 (14 Jan 2022 11:10) (18 - 19)  SpO2: 99% (14 Jan 2022 11:10) (96% - 99%)    CBC Full  -  ( 14 Jan 2022 07:13 )  WBC Count : 8.69 K/uL  RBC Count : 4.03 M/uL  Hemoglobin : 11.0 g/dL  Hematocrit : 34.3 %  Platelet Count - Automated : 463 K/uL  Mean Cell Volume : 85.1 fl  Mean Cell Hemoglobin : 27.3 pg  Mean Cell Hemoglobin Concentration : 32.1 gm/dL  Auto Neutrophil # : x  Auto Lymphocyte # : x  Auto Monocyte # : x  Auto Eosinophil # : x  Auto Basophil # : x  Auto Neutrophil % : x  Auto Lymphocyte % : x  Auto Monocyte % : x  Auto Eosinophil % : x  Auto Basophil % : x    01-14    139  |  104  |  10  ----------------------------<  118<H>  3.5   |  23  |  0.53    Ca    9.6      14 Jan 2022 07:09            MICROBIOLOGY:  .Blood Blood-Peripheral  01-07-22   No Growth Final  --  --      .Abscess R foot wound  01-07-22   Few Streptococcus dysgalactiae (Group C/G)  Few Corynebacterium striatum group  Rare Bacteroides fragilis  "Susceptibilities not performed"  --  --              v            RADIOLOGY     JOY BALTAZAR 61y [id number]    Patient is a 61y old  Male who presents with a chief complaint of OM toe (14 Jan 2022 11:40)      Follow Up/CC:  ID following for OM    Interval History/ROS: no fever, for OR today     Allergies    No Known Allergies    Intolerances        ANTIMICROBIALS:  piperacillin/tazobactam IVPB.. 3.375 every 8 hours      MEDICATIONS  (STANDING):  aspirin enteric coated 81 milliGRAM(s) Oral daily  atorvastatin 80 milliGRAM(s) Oral at bedtime  clopidogrel Tablet 75 milliGRAM(s) Oral daily  Dakins Solution - 1/2 Strength 1 Application(s) Topical daily  dextrose 5% + sodium chloride 0.45%. 1000 milliLiter(s) (100 mL/Hr) IV Continuous <Continuous>  heparin  Infusion 2300 Unit(s)/Hr (23 mL/Hr) IV Continuous <Continuous>  metoprolol tartrate 75 milliGRAM(s) Oral two times a day  nicotine - 21 mG/24Hr(s) Patch 1 patch Transdermal daily  piperacillin/tazobactam IVPB.. 3.375 Gram(s) IV Intermittent every 8 hours    MEDICATIONS  (PRN):  acetaminophen     Tablet .. 650 milliGRAM(s) Oral every 6 hours PRN Mild Pain (1 - 3)  oxycodone    5 mG/acetaminophen 325 mG 1 Tablet(s) Oral every 4 hours PRN Moderate Pain (4 - 6)        Vital Signs Last 24 Hrs  T(C): 37.2 (14 Jan 2022 11:10), Max: 37.2 (14 Jan 2022 11:10)  T(F): 98.9 (14 Jan 2022 11:10), Max: 98.9 (14 Jan 2022 11:10)  HR: 86 (14 Jan 2022 11:10) (86 - 110)  BP: 103/72 (14 Jan 2022 11:10) (103/72 - 129/90)  BP(mean): --  RR: 18 (14 Jan 2022 11:10) (18 - 19)  SpO2: 99% (14 Jan 2022 11:10) (96% - 99%)    CBC Full  -  ( 14 Jan 2022 07:13 )  WBC Count : 8.69 K/uL  RBC Count : 4.03 M/uL  Hemoglobin : 11.0 g/dL  Hematocrit : 34.3 %  Platelet Count - Automated : 463 K/uL  Mean Cell Volume : 85.1 fl  Mean Cell Hemoglobin : 27.3 pg  Mean Cell Hemoglobin Concentration : 32.1 gm/dL  Auto Neutrophil # : x  Auto Lymphocyte # : x  Auto Monocyte # : x  Auto Eosinophil # : x  Auto Basophil # : x  Auto Neutrophil % : x  Auto Lymphocyte % : x  Auto Monocyte % : x  Auto Eosinophil % : x  Auto Basophil % : x    01-14    139  |  104  |  10  ----------------------------<  118<H>  3.5   |  23  |  0.53    Ca    9.6      14 Jan 2022 07:09            MICROBIOLOGY:  .Blood Blood-Peripheral  01-07-22   No Growth Final  --  --      .Abscess R foot wound  01-07-22   Few Streptococcus dysgalactiae (Group C/G)  Few Corynebacterium striatum group  Rare Bacteroides fragilis  "Susceptibilities not performed"  --  --      RADIOLOGY    < from: MR Foot w/wo IV Cont, Right (01.11.22 @ 18:21) >  IMPRESSION: Osteomyelitis of the second toe distal phalanx, as above.    < end of copied text >

## 2022-01-14 NOTE — BRIEF OPERATIVE NOTE - SPECIMENS
Pathology: Left first mt bone biopsy, Left fifth mt bone and tissue, Right second toe, Right second toe clean bone. Microbiology: Left first mt bone biopsy, Left fifth mt  culture, R second toe clean bone, R second toe dirty bone.
none

## 2022-01-14 NOTE — PROGRESS NOTE ADULT - ASSESSMENT
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 Patient is s/p b/l LE angiogram with R external iliac stent placement 1/12.     PLAN:  - ASA, Plavix  - Hep gtt  - Will f/u podiatry plan for possible further intervention  - C/w care per primary team    Vascular Surgery  #[id number]  61y male with PMH of Charcot-Lakia-Tooth disease substance abuse in remission, peripheral vascular disease and surgical hx of hammer toe correction left presenting with one month of non-healing 5th left toe s/p bedside I&D by podiatry with osteomyelitis. Patient is s/p b/l LE angiogram with R external iliac stent placement 1/12.     PLAN:  doing well from vasc surg standpoint   - ASA, Plavix  - Hep gtt  - Will f/u podiatry plan for possible further intervention  - C/w care per primary team    Vascular Surgery  #[id number]

## 2022-01-14 NOTE — DISCHARGE NOTE PROVIDER - PROVIDER TOKENS
PROVIDER:[TOKEN:[[id number]],FOLLOWUP:[1 week]],PROVIDER:[TOKEN:[[id number]],FOLLOWUP:[2 weeks]],PROVIDER:[TOKEN:[[id number]],FOLLOWUP:[2 weeks]] PROVIDER:[TOKEN:[[id number]],FOLLOWUP:[1 week]],PROVIDER:[TOKEN:[[id number]],FOLLOWUP:[2 weeks]],PROVIDER:[TOKEN:[[id number]],FOLLOWUP:[2 weeks]],PROVIDER:[TOKEN:[[id number]],FOLLOWUP:[1 month]]

## 2022-01-14 NOTE — DISCHARGE NOTE PROVIDER - NSDCCAREPROVSEEN_GEN_ALL_CORE_FT
Ana, Tony Blanco, Catherine Starks, Alexander Hendrickson, Corrie Burton, [facility] Fartun Galdamez, Woody Tirado

## 2022-01-14 NOTE — PROGRESS NOTE ADULT - SUBJECTIVE AND OBJECTIVE BOX
Patient is a 61y old  Male who presents with a chief complaint of OM toe (13 Jan 2022 11:27)      INTERVAL HPI/OVERNIGHT EVENTS:   Pt is scheduled for Left foot partial 5th ray resection and 1st metatarsal bone biopsy, Right foot 2nd toe amputation with Dr. Gordon at 3pm. Patient is aware of procedure and is NPO since midnight.    MEDICATIONS  (STANDING):  aspirin enteric coated 81 milliGRAM(s) Oral daily  atorvastatin 80 milliGRAM(s) Oral at bedtime  clopidogrel Tablet 75 milliGRAM(s) Oral daily  Dakins Solution - 1/2 Strength 1 Application(s) Topical daily  dextrose 5% + sodium chloride 0.45%. 1000 milliLiter(s) (100 mL/Hr) IV Continuous <Continuous>  heparin  Infusion 1700 Unit(s)/Hr (21 mL/Hr) IV Continuous <Continuous>  metoprolol tartrate 75 milliGRAM(s) Oral two times a day  nicotine - 21 mG/24Hr(s) Patch 1 patch Transdermal daily  piperacillin/tazobactam IVPB.. 3.375 Gram(s) IV Intermittent every 8 hours    MEDICATIONS  (PRN):  acetaminophen     Tablet .. 650 milliGRAM(s) Oral every 6 hours PRN Mild Pain (1 - 3)  oxycodone    5 mG/acetaminophen 325 mG 1 Tablet(s) Oral every 4 hours PRN Moderate Pain (4 - 6)      Allergies    No Known Allergies    Intolerances        Vital Signs Last 24 Hrs  T(C): 36.4 (14 Jan 2022 04:16), Max: 37.3 (13 Jan 2022 11:53)  T(F): 97.6 (14 Jan 2022 04:16), Max: 99.2 (13 Jan 2022 11:53)  HR: 92 (14 Jan 2022 04:16) (92 - 110)  BP: 129/90 (14 Jan 2022 04:16) (106/61 - 129/90)  BP(mean): --  RR: 18 (14 Jan 2022 04:16) (18 - 19)  SpO2: 98% (14 Jan 2022 04:16) (96% - 98%)    LABS:                        11.1   8.97  )-----------( 481      ( 13 Jan 2022 06:06 )             35.2     01-12    139  |  102  |  10  ----------------------------<  121<H>  3.7   |  24  |  0.59    Ca    9.6      12 Jan 2022 06:54  Phos  3.6     01-12  Mg     2.1     01-12      PT/INR - ( 12 Jan 2022 06:54 )   PT: 16.2 sec;   INR: 1.37 ratio         PTT - ( 13 Jan 2022 23:02 )  PTT:36.0 sec    CAPILLARY BLOOD GLUCOSE          RADIOLOGY & ADDITIONAL TESTS:

## 2022-01-14 NOTE — BRIEF OPERATIVE NOTE - NSICDXBRIEFPROCEDURE_GEN_ALL_CORE_FT
PROCEDURES:  Partial amputation of first ray of left foot by open approach 14-Jan-2022 17:13:49  Kedar Beck  Open biopsy of metatarsal bone 14-Jan-2022 17:14:49 Left first metatarsal bone biospy Kedar Beck  Partial amputation of toe of right foot 14-Jan-2022 17:15:10 2nd digit Kedar Beck

## 2022-01-14 NOTE — DISCHARGE NOTE PROVIDER - CARE PROVIDERS DIRECT ADDRESSES
[email or web address]

## 2022-01-14 NOTE — DISCHARGE NOTE PROVIDER - NSDCMRMEDTOKEN_GEN_ALL_CORE_FT
nicotine 14 mg/24 hr transdermal film, extended release: 1 patch transdermal once a day  pantoprazole 40 mg oral delayed release tablet: 1 tab(s) orally once a day (before a meal)  predniSONE 5 mg oral tablet: 3 tab(s) orally once a day   acetaminophen 325 mg oral tablet: 2 tab(s) orally every 6 hours, As needed, Mild Pain (1 - 3)  aspirin 81 mg oral delayed release tablet: 1 tab(s) orally once a day  atorvastatin 80 mg oral tablet: 1 tab(s) orally once a day (at bedtime)  clopidogrel 75 mg oral tablet: 1 tab(s) orally once a day  ertapenem 1 g injection: 1 gram(s) injectable once a day for one month   metoprolol tartrate 25 mg oral tablet: 1 tab(s) orally 3 times a day  nicotine 14 mg/24 hr transdermal film, extended release: 1 patch transdermal once a day  oxycodone-acetaminophen 5 mg-325 mg oral tablet: 1 tab(s) orally every 4 hours, As needed, Moderate Pain (4 - 6)  pantoprazole 40 mg oral delayed release tablet: 1 tab(s) orally once a day (before a meal)  predniSONE 5 mg oral tablet: 3 tab(s) orally once a day   acetaminophen 325 mg oral tablet: 2 tab(s) orally every 6 hours, As needed, Mild Pain (1 - 3)  aspirin 81 mg oral delayed release tablet: 1 tab(s) orally once a day  atorvastatin 80 mg oral tablet: 1 tab(s) orally once a day (at bedtime)  clopidogrel 75 mg oral tablet: 1 tab(s) orally once a day  ertapenem 1 g injection: 1 gram(s) injectable once a day for one month   IV antibiotics: Ertapenem 1000mg iv every 24 hours for 30 days.   Fax weekly CBC and CMP to [phone].  Follow up with Dr Aelxander Starks after hospital discharge at [address] [place] [address].  metoprolol tartrate 25 mg oral tablet: 1 tab(s) orally 3 times a day  nicotine 14 mg/24 hr transdermal film, extended release: 1 patch transdermal once a day  oxycodone-acetaminophen 5 mg-325 mg oral tablet: 1 tab(s) orally every 4 hours, As needed, Moderate Pain (4 - 6)  pantoprazole 40 mg oral delayed release tablet: 1 tab(s) orally once a day (before a meal)  predniSONE 5 mg oral tablet: 3 tab(s) orally once a day   3-n-1 commode :   acetaminophen 325 mg oral tablet: 2 tab(s) orally every 6 hours, As needed, Mild Pain (1 - 3)  apixaban 5 mg oral tablet: 1 tab(s) orally every 12 hours   aspirin 81 mg oral delayed release tablet: 1 tab(s) orally once a day  atorvastatin 80 mg oral tablet: 1 tab(s) orally once a day (at bedtime)  clopidogrel 75 mg oral tablet: 1 tab(s) orally once a day  ertapenem 1 g injection: 1 gram(s) injectable once a day for one month   IV antibiotics: Ertapenem 1000mg iv every 24 hours for 30 days.   Fax weekly CBC and CMP to [phone].  Follow up with Dr Alexander Starks after hospital discharge at [address] [place] [address].  metoprolol tartrate 50 mg oral tablet: 1 tab(s) orally 2 times a day  nicotine 14 mg/24 hr transdermal film, extended release: 1 patch transdermal once a day  oxycodone-acetaminophen 5 mg-325 mg oral tablet: 1 tab(s) orally every 4 hours, As needed, Moderate Pain (4 - 6)  pantoprazole 40 mg oral delayed release tablet: 1 tab(s) orally once a day (before a meal)  predniSONE 5 mg oral tablet: 3 tab(s) orally once a day  shower chair:   transport wheel chair :    acetaminophen 325 mg oral tablet: 2 tab(s) orally every 6 hours, As needed, Mild Pain (1 - 3)  apixaban 5 mg oral tablet: 1 tab(s) orally every 12 hours   aspirin 81 mg oral delayed release tablet: 1 tab(s) orally once a day  atorvastatin 80 mg oral tablet: 1 tab(s) orally once a day (at bedtime)  clopidogrel 75 mg oral tablet: 1 tab(s) orally once a day  ertapenem 1 g injection: 1 gram(s) injectable once a day for one month   metoprolol tartrate 50 mg oral tablet: 1 tab(s) orally 2 times a day  nicotine 14 mg/24 hr transdermal film, extended release: 1 patch transdermal once a day  oxycodone-acetaminophen 5 mg-325 mg oral tablet: 1 tab(s) orally every 4 hours, As needed, Moderate Pain (4 - 6)  oxycodone-acetaminophen 5 mg-325 mg oral tablet: 1 tab(s) orally every 8 hours, As Needed -Moderate Pain (4 - 6) MDD:3 tabs  pantoprazole 40 mg oral delayed release tablet: 1 tab(s) orally once a day (before a meal)  predniSONE 5 mg oral tablet: 3 tab(s) orally once a day   acetaminophen 325 mg oral tablet: 2 tab(s) orally every 6 hours, As needed, Mild Pain (1 - 3)  apixaban 5 mg oral tablet: 1 tab(s) orally every 12 hours   aspirin 81 mg oral delayed release tablet: 1 tab(s) orally once a day  atorvastatin 80 mg oral tablet: 1 tab(s) orally once a day (at bedtime)  clopidogrel 75 mg oral tablet: 1 tab(s) orally once a day  ertapenem 1 g injection: 1 gram(s) injectable once a day for one month   metoprolol tartrate 50 mg oral tablet: 1 tab(s) orally 2 times a day  nicotine 14 mg/24 hr transdermal film, extended release: 1 patch transdermal once a day  oxycodone-acetaminophen 5 mg-325 mg oral tablet: 1 tab(s) orally every 8 hours, As Needed -Moderate Pain (4 - 6) MDD:3 tabs  pantoprazole 40 mg oral delayed release tablet: 1 tab(s) orally once a day (before a meal)

## 2022-01-14 NOTE — DISCHARGE NOTE PROVIDER - NSDCFUADDAPPT_GEN_ALL_CORE_FT
Podiatry Discharge Instructions:  Follow up: Please follow up with Dr. Gordon within 1 week of discharge from the hospital at wound care center on Friday, please call [phone] for appointment and discuss that you recently were seen in the hospital.  Wound Care: Please leave your dressing clean dry intact until your follow up appointment to the Right foot. Please apply 1/2 iodoform packing to the left foot 5th surgical site, then apply 4x4 gauze, abd pads, chaz and ace to the 5th and 1st mt surgical site.   Weight bearing: Please weight bear as tolerated in a surgical shoe.  Antibiotics: Please continue as instructed. Podiatry Discharge Instructions:  Follow up: Please follow up with Dr. Gordon within 1 week of discharge from the hospital at wound care center on Friday, please call [phone] for appointment and discuss that you recently were seen in the hospital. Please also make appointment for Hyperbaric oxygen therapy using the same number.   Wound Care: Please leave your dressing clean dry intact until your follow up appointment to the Right foot. Please apply 1/2 iodoform packing to the left foot 5th surgical site, then apply 4x4 gauze, abd pads, chaz and ace to the 5th and 1st mt surgical site.   Weight bearing: Please weight bear as tolerated in a surgical shoe.  Antibiotics: Please continue as instructed. Podiatry Discharge Instructions:  Follow up: Please follow up with Dr. Gordon within 1 week of discharge from the hospital at wound care center on Friday, please call [phone] for appointment and discuss that you recently were seen in the hospital. Please also make appointment for Hyperbaric oxygen therapy using the same number.   Wound Care: Please leave your dressing clean dry intact until your follow up appointment to the Right foot. Please apply 1/2 iodoform packing to the left foot 5th surgical site, then apply 4x4 gauze, abd pads, chaz and ace to the 5th and 1st mt surgical site.   Weight bearing: Please weight bear as tolerated in a surgical shoe.  Antibiotics: Please continue as instructed.    APPTS ARE READY TO BE MADE: [x ] YES    Best Family or Patient Contact (if needed):    Additional Information about above appointments (if needed):    1:   2:   3:     Other comments or requests:    Podiatry Discharge Instructions:  Follow up: Please follow up with Dr. Florez within 1 week of discharge from the hospital at wound care center on Friday, please call [phone] for appointment and discuss that you recently were seen in the hospital. Please also make appointment for Hyperbaric oxygen therapy using the same number.   Wound Care: Please leave your dressing clean dry intact until your follow up appointment to the Right foot. Please apply 1/2 iodoform packing to the left foot 5th surgical site, then apply 4x4 gauze, abd pads, chaz and ace to the 5th and 1st mt surgical site.   Weight bearing: Please weight bear as tolerated in a surgical shoe.  Antibiotics: Please continue as instructed.    APPTS ARE READY TO BE MADE: [x ] YES    Best Family or Patient Contact (if needed):    Additional Information about above appointments (if needed):    1: Patient was previously scheduled with Dr. FLOREZ on (1/28/22) (11:30AM) at ([address] [place] [address])  2: Patient was previously scheduled with Dr. AGUAYO  on (2/7/22) (9AM) at (Location)  3: Patient was previously scheduled with Dr. WYNN on (2/11/22) (1PM) at (Location)  4. Patient was previously scheduled with Dr. ELISE on (2/8/22) (10AM) at (Location)    Other comments or requests:

## 2022-01-14 NOTE — BRIEF OPERATIVE NOTE - NSICDXBRIEFPOSTOP_GEN_ALL_CORE_FT
POST-OP DIAGNOSIS:  Osteomyelitis of foot 14-Jan-2022 17:16:01 b/l foot OM Kedar Beck  
POST-OP DIAGNOSIS:  Peripheral artery disease 12-Jan-2022 18:11:22  Fartun Solis

## 2022-01-14 NOTE — BRIEF OPERATIVE NOTE - COMMENTS
Left foot 5th partial ray: high concern for residual soft tissue and bone infection, high concern for viability. Surgical site left partially open with 1/2 inch iodoform packing. Poor intra operative bleeding    Left foot 1st mt: bone was of good quality, biopsy sent to pathology and culture     Right foot 2nd digit: bone was of good quality, bleeding was adequate low concern for residual soft tissue or bone infection. Low concern for viability.     Pod plan for long term IV antibiotics per ID     Restart anticoagulation per cardiology

## 2022-01-14 NOTE — BRIEF OPERATIVE NOTE - OPERATION/FINDINGS
Bilateral LE angiogram. L external iliac artery with stenotic segment - stent deployed across stenosis.   LLE - single vessel runoff (AT only); PT and peroneal arteries occluded distal to PT trunk bifurcation  RLE - single vessel runoff (PT only); R SFA occlusion with distal reconstitution at AK popliteal artery  Mynx closure at conclusion of case. External pressure held for >10minutes.
Left foot bone biopsy taken of the first metatarsal shaft - bone was of good quality   Left foot partial 5th ray resection - bone at proximal resection was of poor quality, all necrotic and non viable soft tissue removed, no purulence or tracking     Right foot 2nd digit - 2nd digit partial amputation at level of PIPJ - bone was of good quality

## 2022-01-14 NOTE — DISCHARGE NOTE PROVIDER - NSDCCPCAREPLAN_GEN_ALL_CORE_FT
PRINCIPAL DISCHARGE DIAGNOSIS  Diagnosis: Osteomyelitis of ankle or foot, acute  Assessment and Plan of Treatment: Presenting with one month of non-healing 5th left toe - status post bedside incision and drainage by podiatry with osteomyelitis.   You had a PICC placed for continued treatment with IV antibiotics. 1 gram Ertapenem every 24 hours for 1 month.   Pain management with 5 mg percocet - every 4 hours as needed.  Wound Care: Please leave your dressing clean dry intact until your follow up appointment to the Right foot. Please apply 1/2 iodoform packing to the left foot 5th surgical site, then apply 4x4 gauze, abd pads, chaz and ace to the 5th and 1st mt surgical site.   Make sure to follow up with Dr. Gordon within 1 week of discharge  If you develop increasing pain, drainage, malodorous smell, inflammation, or fever call your doctor and go to the emergency room.      SECONDARY DISCHARGE DIAGNOSES  Diagnosis: Arterial insufficiency with ischemic ulcer  Assessment and Plan of Treatment: Patient is status post bilateral lower extremity angiogram with right external iliac stent placement on 1/12.  Please make sure to follow up with Dr. Dillon within 1-2 weeks of discharge.    Diagnosis: CAD (coronary artery disease)  Assessment and Plan of Treatment: Status post cobra stent to LAD pn 1/10.   Started on asprin and plavix   Continue with Lipitor once a day   continue with Metoprolol 25 mg 3x a day - Make sure to follow up with primary care doctor  Follow up with cardiologist, Dr. Burton in 1-2 weeks from discharge     PRINCIPAL DISCHARGE DIAGNOSIS  Diagnosis: Osteomyelitis of ankle or foot, acute  Assessment and Plan of Treatment: Presenting with one month of non-healing 5th left toe - status post bedside incision and drainage by podiatry with osteomyelitis.   You had a PICC placed for continued treatment with IV antibiotics. 1 gram Ertapenem every 24 hours for 1 month.   Pain management with 5 mg percocet - every 4 hours as needed.  Wound Care: Please leave your dressing clean dry intact until your follow up appointment to the Right foot. Please apply 1/2 iodoform packing to the left foot 5th surgical site, then apply 4x4 gauze, abd pads, chaz and ace to the 5th and 1st mt surgical site.   Make sure to follow up with Dr. Gordon within 1 week of discharge  If you develop increasing pain, drainage, malodorous smell, inflammation, or fever call your doctor and go to the emergency room.  Follow up with Infectious Disease Dr Alexander Starks after completion of antibiotics. Please fax weekly CBC and CMP to [phone].      SECONDARY DISCHARGE DIAGNOSES  Diagnosis: Arterial insufficiency with ischemic ulcer  Assessment and Plan of Treatment: Patient is status post bilateral lower extremity angiogram with right external iliac stent placement on 1/12.  Please make sure to follow up with Dr. Dillon within 1-2 weeks of discharge.    Diagnosis: CAD (coronary artery disease)  Assessment and Plan of Treatment: Status post cobra stent to LAD pn 1/10.   Started on asprin and plavix   Continue with Lipitor once a day   continue with Metoprolol 25 mg 3x a day - Make sure to follow up with primary care doctor  Follow up with cardiologist, Dr. Burton in 1-2 weeks from discharge     PRINCIPAL DISCHARGE DIAGNOSIS  Diagnosis: Osteomyelitis of ankle or foot, acute  Assessment and Plan of Treatment: Presenting with one month of non-healing 5th left toe - status post bedside incision and drainage by podiatry with osteomyelitis.   You had a PICC placed for continued treatment with IV antibiotics. 1 gram Ertapenem every 24 hours for 1 month.   Pain management with 5 mg percocet - every 4 hours as needed.  Wound Care: Please leave your dressing clean dry intact until your follow up appointment to the Right foot. Please apply 1/2 iodoform packing to the left foot 5th surgical site, then apply 4x4 gauze, abd pads, chaz and ace to the 5th and 1st mt surgical site.   Make sure to follow up with Dr. Gordon within 1 week of discharge  If you develop increasing pain, drainage, malodorous smell, inflammation, or fever call your doctor and go to the emergency room.  Follow up with Infectious Disease Dr Alexander Starks after completion of antibiotics. Please fax weekly CBC and CMP to [phone].      SECONDARY DISCHARGE DIAGNOSES  Diagnosis: Arterial insufficiency with ischemic ulcer  Assessment and Plan of Treatment: Patient is status post bilateral lower extremity angiogram with right external iliac stent placement on 1/12.  Please make sure to follow up with Dr. Dillon within 1-2 weeks of discharge.    Diagnosis: Osteomyelitis of foot  Assessment and Plan of Treatment: follow up with Podiatry for dressing changes   VNS for administration  of IV antibiotic   B/L boot for ambulation    Diagnosis: CAD (coronary artery disease)  Assessment and Plan of Treatment: Status post cobra stent to LAD pn 1/10.   Started on asprin and plavix   Continue with Lipitor once a day   continue with Metoprolol 25 mg 3x a day - Make sure to follow up with primary care doctor  Follow up with cardiologist, Dr. Burton in 1-2 weeks from discharge

## 2022-01-14 NOTE — PROGRESS NOTE ADULT - ASSESSMENT
Plan:   To OR today at 3pm with Dr. Gordon for Left foot partial 5th ray resection, 1st metatarsal bone biopsy and Right foot 2nd digit amputation.   CXR on sunrise.  EKG on sunrise.  Medical/Cardiac clearance since 1/12 and documented in chart.  Consent signed and in chart.  Procedure was explained to patient in detail. All alternatives, risks and complications were discussed. All questions answered.

## 2022-01-14 NOTE — PROGRESS NOTE ADULT - SUBJECTIVE AND OBJECTIVE BOX
CARDIOLOGY     PROGRESS  NOTE   ________________________________________________    CHIEF COMPLAINT:Patient is a 61y old  Male who presents with a chief complaint of OM toe (14 Jan 2022 07:45)  no complain.  	  REVIEW OF SYSTEMS:  CONSTITUTIONAL: No fever, weight loss, or fatigue  EYES: No eye pain, visual disturbances, or discharge  ENT:  No difficulty hearing, tinnitus, vertigo; No sinus or throat pain  NECK: No pain or stiffness  RESPIRATORY: No cough, wheezing, chills or hemoptysis; No Shortness of Breath  CARDIOVASCULAR: No chest pain, palpitations, passing out, dizziness, or leg swelling  GASTROINTESTINAL: No abdominal or epigastric pain. No nausea, vomiting, or hematemesis; No diarrhea or constipation. No melena or hematochezia.  GENITOURINARY: No dysuria, frequency, hematuria, or incontinence  NEUROLOGICAL: No headaches, memory loss, loss of strength, numbness, or tremors  SKIN: No itching, burning, rashes, or lesions   LYMPH Nodes: No enlarged glands  ENDOCRINE: No heat or cold intolerance; No hair loss  MUSCULOSKELETAL: No joint pain or swelling; No muscle, back, or extremity pain  PSYCHIATRIC: No depression, anxiety, mood swings, or difficulty sleeping  HEME/LYMPH: No easy bruising, or bleeding gums  ALLERGY AND IMMUNOLOGIC: No hives or eczema	    [ ] All others negative	  [ ] Unable to obtain    PHYSICAL EXAM:  T(C): 37.2 (01-14-22 @ 11:10), Max: 37.3 (01-13-22 @ 11:53)  HR: 86 (01-14-22 @ 11:10) (86 - 110)  BP: 103/72 (01-14-22 @ 11:10) (103/72 - 129/90)  RR: 18 (01-14-22 @ 11:10) (18 - 19)  SpO2: 99% (01-14-22 @ 11:10) (96% - 99%)  Wt(kg): --  I&O's Summary    13 Jan 2022 07:01  -  14 Jan 2022 07:00  --------------------------------------------------------  IN: 3584 mL / OUT: 1080 mL / NET: 2504 mL    14 Jan 2022 07:01  -  14 Jan 2022 11:41  --------------------------------------------------------  IN: 714 mL / OUT: 0 mL / NET: 714 mL        Appearance: Normal	  HEENT:   Normal oral mucosa, PERRL, EOMI	  Lymphatic: No lymphadenopathy  Cardiovascular: Normal S1 S2, No JVD, + murmurs, No edema  Respiratory: Lungs clear to auscultation	  Psychiatry: A & O x 3, Mood & affect appropriate  Gastrointestinal:  Soft, Non-tender, + BS	  Skin: No rashes, No ecchymoses, No cyanosis	  Neurologic: Non-focal  Extremities: Normal range of motion   Vascular: +pvd    MEDICATIONS  (STANDING):  aspirin enteric coated 81 milliGRAM(s) Oral daily  atorvastatin 80 milliGRAM(s) Oral at bedtime  clopidogrel Tablet 75 milliGRAM(s) Oral daily  Dakins Solution - 1/2 Strength 1 Application(s) Topical daily  dextrose 5% + sodium chloride 0.45%. 1000 milliLiter(s) (100 mL/Hr) IV Continuous <Continuous>  heparin  Infusion 2300 Unit(s)/Hr (23 mL/Hr) IV Continuous <Continuous>  metoprolol tartrate 75 milliGRAM(s) Oral two times a day  nicotine - 21 mG/24Hr(s) Patch 1 patch Transdermal daily  piperacillin/tazobactam IVPB.. 3.375 Gram(s) IV Intermittent every 8 hours      TELEMETRY: 	    ECG:  	  RADIOLOGY:  OTHER: 	  	  LABS:	 	    CARDIAC MARKERS:                                11.0   8.69  )-----------( 463      ( 14 Jan 2022 07:13 )             34.3     01-14    139  |  104  |  10  ----------------------------<  118<H>  3.5   |  23  |  0.53    Ca    9.6      14 Jan 2022 07:09      proBNP:   Lipid Profile: Cholesterol 118  LDL --  HDL 25      HgA1c:   TSH:   PT/INR - ( 14 Jan 2022 07:13 )   PT: 15.9 sec;   INR: 1.34 ratio         PTT - ( 14 Jan 2022 07:13 )  PTT:53.2 sec      Assessment and plan  ---------------------------  OM of the toe with +mri  podiatry/ id appreciated continue IV abx  events noted today and discussed with NP, pt with tachycardia , no chest pain ecg ?st elevation in inferior leads, first trop negartive  new onset a fib/ flutter  asa  beta blocker  fu trop  has transferred to tele  will observe closely  trop/ cpk negative  increase beta blocker, add dig to control hr  continue iv heparin  s/p LAD stent  pt is clear for peripheral angio today  continue asa/plavix  increase beta blocker  social work consult  awaiting surgery  re start on ac post op, hr is well controlled

## 2022-01-14 NOTE — DISCHARGE NOTE PROVIDER - CARE PROVIDER_API CALL
Alphonso Gordon (DPM)  Podiatric Medicine and Surgery  [address] [facility], [place] [address]  Phone: [phone]  Fax: [phone]  Follow Up Time: 1 week    Kian Dillon)  Vascular Surgery  [address] [facility], [address]  [place] [address]  Phone: [phone]  Fax: [phone]  Follow Up Time: 2 weeks    James Burton  CARDIOVASCULAR DISEASE  [address] [place] [address], [address]  [place] [address]  Phone: [phone]  Fax: [phone]  Follow Up Time: 2 weeks   Alphonso Gordon (DPM)  Podiatric Medicine and Surgery  [address] [facility], [place] [address]  Phone: [phone]  Fax: [phone]  Follow Up Time: 1 week    Kian Dillon)  Vascular Surgery  [address] [facility], [address]  [place] [address]  Phone: [phone]  Fax: [phone]  Follow Up Time: 2 weeks    James Burton  CARDIOVASCULAR DISEASE  [address] [place] [address], [address]  [place] [address]  Phone: [phone]  Fax: [phone]  Follow Up Time: 2 weeks    Alexander Starks)  Infectious Disease; Internal Medicine  [address] [place] [address]  Phone: [phone]  Fax: [phone]  Follow Up Time: 1 month

## 2022-01-14 NOTE — DISCHARGE NOTE PROVIDER - NSDCFUSCHEDAPPT_GEN_ALL_CORE_FT
JOY BALTAZAR ; 02/07/2022 ; NPP Med  Comm JO[facility] ; 02/08/2022 ; NPP Surg Vasc [address] ; 02/08/2022 ; NPP Surg Vasc [address] JOY BALTAZAR ; 01/28/2022 ; NPP Surg Wound [address] ; 02/07/2022 ; P Med  Comm JOY Grande ; 02/08/2022 ; [facility] Surg Vasc [address] ; 02/08/2022 ; P Surg Vasc [address]

## 2022-01-14 NOTE — PRE-ANESTHESIA EVALUATION ADULT - NSANTHPMHFT_GEN_ALL_CORE
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 Patient is s/p b/l LE angiogram with R external iliac stent placement 1/12  s/p bare metal stent placement to proximal LAD   on IV heparin for atrial fibrillation (held for OR)  osteomeylitis of 1st and 5th metatarsal of left foot, 2nd toe right foot

## 2022-01-14 NOTE — PROGRESS NOTE ADULT - SUBJECTIVE AND OBJECTIVE BOX
Subjective:  Patient seen at bedside this AM. Reports feeling well, without complaints. Denies chest pain, SOB.     24h Events:   - Overnight, no acute events    Objective:  Vital Signs Last 24 Hrs  T(C): 36.4 (14 Jan 2022 04:16), Max: 37.3 (13 Jan 2022 11:53)  T(F): 97.6 (14 Jan 2022 04:16), Max: 99.2 (13 Jan 2022 11:53)  HR: 92 (14 Jan 2022 04:16) (92 - 110)  BP: 129/90 (14 Jan 2022 04:16) (106/61 - 129/90)  BP(mean): --  RR: 18 (14 Jan 2022 04:16) (18 - 19)  SpO2: 98% (14 Jan 2022 04:16) (96% - 98%)      --------------------------------------------------------    I&O's Detail    13 Jan 2022 07:01  -  14 Jan 2022 07:00  --------------------------------------------------------  IN:    dextrose 5% + sodium chloride 0.45%: 2400 mL    Heparin: 139 mL    Heparin: 51 mL    Heparin: 33 mL    IV PiggyBack: 200 mL    Oral Fluid: 740 mL  Total IN: 3563 mL    OUT:    Voided (mL): 1080 mL  Total OUT: 1080 mL    Total NET: 2483 mL      Physical Exam:  GEN: resting in bed comfortably in NAD  NEURO: awake, alert  RESP: no increased WOB  EXTR: b/l UE warm, well-perfused, spontaneous movement  - RLE: Dopplerable PT signal, foot dressing c/d/i; groin soft, no hematomas or fluid collections, palpable femoral pulse  - LLE: Doppler AT and PT signals, foot dressing c/d/i, palpable femoral pulse  rt groin  punct site c/d/i    Labs:             11.1   8.97  )-----------( 481      ( 13 Jan 2022 06:06 )             35.2     139  |  102  |  10  ----------------------------<  121<H>  3.7   |  24  |  0.59    Ca    9.6      12 Jan 2022 06:54  Phos  3.6     01-12  Mg     2.1     01-12      Medications:   MEDICATIONS  (STANDING):  aspirin enteric coated 81 milliGRAM(s) Oral daily  atorvastatin 80 milliGRAM(s) Oral at bedtime  clopidogrel Tablet 75 milliGRAM(s) Oral daily  Dakins Solution - 1/2 Strength 1 Application(s) Topical daily  dextrose 5% + sodium chloride 0.45%. 1000 milliLiter(s) (100 mL/Hr) IV Continuous <Continuous>  heparin  Infusion 1000 Unit(s)/Hr (11 mL/Hr) IV Continuous <Continuous>  metoprolol tartrate 75 milliGRAM(s) Oral two times a day  nicotine - 21 mG/24Hr(s) Patch 1 patch Transdermal daily  piperacillin/tazobactam IVPB.. 3.375 Gram(s) IV Intermittent every 8 hours    MEDICATIONS  (PRN):  acetaminophen     Tablet .. 650 milliGRAM(s) Oral every 6 hours PRN Mild Pain (1 - 3)  oxycodone    5 mG/acetaminophen 325 mG 1 Tablet(s) Oral every 4 hours PRN Moderate Pain (4 - 6)   Subjective:  Patient seen at bedside this AM. Reports feeling well, without complaints. Denies chest pain, SOB.     24h Events:   - Overnight, no acute events  - OR today with Podiatry Team for Left foot partial 5th ray resection and 1st metatarsal bone biopsy + Right foot 2nd toe amputation.       Objective:  Vital Signs Last 24 Hrs  T(C): 36.4 (14 Jan 2022 04:16), Max: 37.3 (13 Jan 2022 11:53)  T(F): 97.6 (14 Jan 2022 04:16), Max: 99.2 (13 Jan 2022 11:53)  HR: 92 (14 Jan 2022 04:16) (92 - 110)  BP: 129/90 (14 Jan 2022 04:16) (106/61 - 129/90)  BP(mean): --  RR: 18 (14 Jan 2022 04:16) (18 - 19)  SpO2: 98% (14 Jan 2022 04:16) (96% - 98%)      --------------------------------------------------------    I&O's Detail    13 Jan 2022 07:01  -  14 Jan 2022 07:00  --------------------------------------------------------  IN:    dextrose 5% + sodium chloride 0.45%: 2400 mL    Heparin: 139 mL    Heparin: 51 mL    Heparin: 33 mL    IV PiggyBack: 200 mL    Oral Fluid: 740 mL  Total IN: 3563 mL    OUT:    Voided (mL): 1080 mL  Total OUT: 1080 mL    Total NET: 2483 mL      Physical Exam:  GEN: resting in bed comfortably in NAD  NEURO: awake, alert  RESP: no increased WOB  EXTR: b/l UE warm, well-perfused, spontaneous movement  - RLE: Dopplerable PT signal, foot dressing c/d/i; groin soft, no hematomas or fluid collections, palpable femoral pulse  - LLE: Doppler AT and PT signals, foot dressing c/d/i, palpable femoral pulse  rt groin  punct site c/d/i    Labs:             11.1   8.97  )-----------( 481      ( 13 Jan 2022 06:06 )             35.2     139  |  102  |  10  ----------------------------<  121<H>  3.7   |  24  |  0.59    Ca    9.6      12 Jan 2022 06:54  Phos  3.6     01-12  Mg     2.1     01-12      Medications:   MEDICATIONS  (STANDING):  aspirin enteric coated 81 milliGRAM(s) Oral daily  atorvastatin 80 milliGRAM(s) Oral at bedtime  clopidogrel Tablet 75 milliGRAM(s) Oral daily  Dakins Solution - 1/2 Strength 1 Application(s) Topical daily  dextrose 5% + sodium chloride 0.45%. 1000 milliLiter(s) (100 mL/Hr) IV Continuous <Continuous>  heparin  Infusion 1000 Unit(s)/Hr (11 mL/Hr) IV Continuous <Continuous>  metoprolol tartrate 75 milliGRAM(s) Oral two times a day  nicotine - 21 mG/24Hr(s) Patch 1 patch Transdermal daily  piperacillin/tazobactam IVPB.. 3.375 Gram(s) IV Intermittent every 8 hours    MEDICATIONS  (PRN):  acetaminophen     Tablet .. 650 milliGRAM(s) Oral every 6 hours PRN Mild Pain (1 - 3)  oxycodone    5 mG/acetaminophen 325 mG 1 Tablet(s) Oral every 4 hours PRN Moderate Pain (4 - 6)

## 2022-01-14 NOTE — DISCHARGE NOTE PROVIDER - NSDCFUADDINST_GEN_ALL_CORE_FT
Wound Care Nurse : Keep wound dressing clean and dry, dressing will be done by Podiatry,   Patient will follow up in 1 week    Wound Care Nurse : Keep wound dressing clean and dry, dressing will be done by Podiatry,   Patient will follow up in 1 week   Dressing changes are to be done once a week by Podiatry   Wound Care Nurse : Keep wound dressing clean and dry, dressing will be done by Podiatry,   Patient will follow up in 1 week   Dressing changes to both feet are to be done once a week by Podiatry

## 2022-01-14 NOTE — PRE-ANESTHESIA EVALUATION ADULT - NSANTHADDINFOFT_GEN_ALL_CORE
Anesthetic plan, including risks and benefits, discussed with patient. Anesthetic plan, including risks and benefits, discussed with patient.  The patient was seen and evaluated in the holding area prior to entering the operating room.

## 2022-01-14 NOTE — BRIEF OPERATIVE NOTE - NSICDXBRIEFPREOP_GEN_ALL_CORE_FT
PRE-OP DIAGNOSIS:  Peripheral artery disease 12-Jan-2022 18:11:14  Fartun Solis  
PRE-OP DIAGNOSIS:  Osteomyelitis of foot 14-Jan-2022 17:15:41 b/l Kedar Beck

## 2022-01-14 NOTE — PROGRESS NOTE ADULT - ASSESSMENT
61M with severe RA, PVD, active smoker with L foot OM.  For angiogram tomorrow.  No MRSA isolated.    PVD, OM L foot  -continue zosyn for now  -for OR today   -cultures noted strept and anaerobes covered by zosyn  -surgery after revascularization planned for tomorrow with resection  -length of therapy needed will depend on podiatry intraop findings    Momo Chavez  Attending Physician   Division of Infectious Disease  Pager #[phone]  Available on Microsoft Teams also  After 5pm/weekend or no response, call #[phone]

## 2022-01-14 NOTE — CHART NOTE - NSCHARTNOTEFT_GEN_A_CORE
-Informed by RN of PTT result Activated Partial Thromboplastin Time (01.13.22 @ 23:02)                                              APTT Time: 36.0    Above value on Hep gtt rate 17 mL/H, patient specific   Goal PTT 55 - 99 sec.   -Heparin nomogram referenced for Full AC protocol, and based on PTT result and pt. weight, 5000 u IV bolus Heparin given X 1 dose, and rate increased by 4 mL to 21 mL/H  -PTT check  in 6 hrs.

## 2022-01-14 NOTE — DISCHARGE NOTE PROVIDER - HOSPITAL COURSE
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 Patient is s/p b/l LE angiogram with R external iliac stent placement 1/12.     podiatry/ id appreciated - continue IV abx  new onset a fib/ flutter  asa  beta blocker  monitored on tele during admission  trop/ cpk negative  s/p LAD stent  s/p iv heparin. now on aspirin   pt is clear for peripheral angio today  continue asa/plavix  increase beta blocker  social work consult  id appreciated  picc line dc with iv abx  converted to NSR , continue meds  dfc planning    	 61y male with PMH of Charcot-Lakia-Tooth disease substance abuse in remission, peripheral vascular disease and surgical hx of hammer toe correction left presenting with one month of non-healing 5th left toe s/p bedside I&D by podiatry with osteomyelitis. Patient is s/p b/l LE angiogram with R external iliac stent placement 1/12.     podiatry/ id appreciated - continue IV abx  new onset a fib/ flutter  asa  beta blocker  monitored on tele during admission  trop/ cpk negative  s/p LAD stent  s/p iv heparin. now on aspirin and plavix, to continue aspirin indefinitely and to complete one month of plavix   c/w beta blocker  picc line placed on 1/19- dc with iv Ertapenem for 1 month   converted to NSR , continue meds    Clear for dc to [facility] 1 month of Iv abx via picc.       	 61y male with PMH of Charcot-Lakia-Tooth disease, peripheral vascular disease and surgical hx of hammer toe correction left presenting with one month of non-healing 5th left toe s/p bedside I&D by podiatry with osteomyelitis. Patient is s/p b/l LE angiogram with R external iliac stent placement 1/12.     podiatry/ id appreciated - continue IV abx  new onset a fib/ flutter  asa  beta blocker  monitored on tele during admission  trop/ cpk negative  s/p LAD stent  s/p iv heparin. now on aspirin and plavix, to continue aspirin indefinitely and to complete one month of plavix   c/w beta blocker  picc line placed on 1/19- dc with iv Ertapenem for 1 month   converted to NSR , continue meds    Clear for dc to [facility] 1 month of Iv abx via picc.

## 2022-01-15 LAB
GRAM STN FLD: SIGNIFICANT CHANGE UP
HCT VFR BLD CALC: 34.7 % — LOW (ref 39–50)
HGB BLD-MCNC: 10.8 G/DL — LOW (ref 13–17)
MCHC RBC-ENTMCNC: 26.9 PG — LOW (ref 27–34)
MCHC RBC-ENTMCNC: 31.1 GM/DL — LOW (ref 32–36)
MCV RBC AUTO: 86.3 FL — SIGNIFICANT CHANGE UP (ref 80–100)
NRBC # BLD: 0 /100 WBCS — SIGNIFICANT CHANGE UP (ref 0–0)
PLATELET # BLD AUTO: 550 K/UL — HIGH (ref 150–400)
RBC # BLD: 4.02 M/UL — LOW (ref 4.2–5.8)
RBC # FLD: 13.9 % — SIGNIFICANT CHANGE UP (ref 10.3–14.5)
SPECIMEN SOURCE: SIGNIFICANT CHANGE UP
WBC # BLD: 12.2 K/UL — HIGH (ref 3.8–10.5)
WBC # FLD AUTO: 12.2 K/UL — HIGH (ref 3.8–10.5)

## 2022-01-15 RX ADMIN — CLOPIDOGREL BISULFATE 75 MILLIGRAM(S): 75 TABLET, FILM COATED ORAL at 11:50

## 2022-01-15 RX ADMIN — Medication 650 MILLIGRAM(S): at 06:57

## 2022-01-15 RX ADMIN — OXYCODONE AND ACETAMINOPHEN 1 TABLET(S): 5; 325 TABLET ORAL at 05:47

## 2022-01-15 RX ADMIN — PIPERACILLIN AND TAZOBACTAM 25 GRAM(S): 4; .5 INJECTION, POWDER, LYOPHILIZED, FOR SOLUTION INTRAVENOUS at 10:53

## 2022-01-15 RX ADMIN — Medication 1 PATCH: at 11:49

## 2022-01-15 RX ADMIN — PIPERACILLIN AND TAZOBACTAM 25 GRAM(S): 4; .5 INJECTION, POWDER, LYOPHILIZED, FOR SOLUTION INTRAVENOUS at 18:12

## 2022-01-15 RX ADMIN — Medication 650 MILLIGRAM(S): at 01:11

## 2022-01-15 RX ADMIN — Medication 25 MILLIGRAM(S): at 05:42

## 2022-01-15 RX ADMIN — APIXABAN 5 MILLIGRAM(S): 2.5 TABLET, FILM COATED ORAL at 18:03

## 2022-01-15 RX ADMIN — APIXABAN 5 MILLIGRAM(S): 2.5 TABLET, FILM COATED ORAL at 05:42

## 2022-01-15 RX ADMIN — OXYCODONE AND ACETAMINOPHEN 1 TABLET(S): 5; 325 TABLET ORAL at 06:20

## 2022-01-15 RX ADMIN — ATORVASTATIN CALCIUM 80 MILLIGRAM(S): 80 TABLET, FILM COATED ORAL at 21:25

## 2022-01-15 RX ADMIN — Medication 81 MILLIGRAM(S): at 11:50

## 2022-01-15 RX ADMIN — Medication 25 MILLIGRAM(S): at 13:04

## 2022-01-15 RX ADMIN — Medication 25 MILLIGRAM(S): at 21:25

## 2022-01-15 RX ADMIN — PIPERACILLIN AND TAZOBACTAM 25 GRAM(S): 4; .5 INJECTION, POWDER, LYOPHILIZED, FOR SOLUTION INTRAVENOUS at 04:10

## 2022-01-15 RX ADMIN — Medication 1 PATCH: at 07:02

## 2022-01-15 RX ADMIN — Medication 1 PATCH: at 19:00

## 2022-01-15 NOTE — PHYSICAL THERAPY INITIAL EVALUATION ADULT - TRANSFER TRAINING, PT EVAL
GOAL: Pt will perform ALL transfers with independence, w/use of appropriate assistive device as needed, in 4 weeks. .

## 2022-01-15 NOTE — PROGRESS NOTE ADULT - SUBJECTIVE AND OBJECTIVE BOX
CARDIOLOGY     PROGRESS  NOTE   ________________________________________________    CHIEF COMPLAINT:Patient is a 61y old  Male who presents with a chief complaint of OM toe (15 Rodriguez 2022 09:12)  doing well.  	  REVIEW OF SYSTEMS:  CONSTITUTIONAL: No fever, weight loss, or fatigue  EYES: No eye pain, visual disturbances, or discharge  ENT:  No difficulty hearing, tinnitus, vertigo; No sinus or throat pain  NECK: No pain or stiffness  RESPIRATORY: No cough, wheezing, chills or hemoptysis; No Shortness of Breath  CARDIOVASCULAR: No chest pain, palpitations, passing out, dizziness, or leg swelling  GASTROINTESTINAL: No abdominal or epigastric pain. No nausea, vomiting, or hematemesis; No diarrhea or constipation. No melena or hematochezia.  GENITOURINARY: No dysuria, frequency, hematuria, or incontinence  NEUROLOGICAL: No headaches, memory loss, loss of strength, numbness, or tremors  SKIN: No itching, burning, rashes, or lesions   LYMPH Nodes: No enlarged glands  ENDOCRINE: No heat or cold intolerance; No hair loss  MUSCULOSKELETAL: No joint pain or swelling; No muscle, back, or extremity pain  PSYCHIATRIC: No depression, anxiety, mood swings, or difficulty sleeping  HEME/LYMPH: No easy bruising, or bleeding gums  ALLERGY AND IMMUNOLOGIC: No hives or eczema	    [ ] All others negative	  [ ] Unable to obtain    PHYSICAL EXAM:  T(C): 36.6 (01-15-22 @ 11:08), Max: 37.2 (01-14-22 @ 14:44)  HR: 75 (01-15-22 @ 11:08) (75 - 156)  BP: 114/68 (01-15-22 @ 11:08) (93/58 - 122/72)  RR: 19 (01-15-22 @ 11:08) (14 - 19)  SpO2: 96% (01-15-22 @ 11:08) (94% - 100%)  Wt(kg): --  I&O's Summary    14 Jan 2022 07:01  -  15 Rodriguez 2022 07:00  --------------------------------------------------------  IN: 1134 mL / OUT: 1200 mL / NET: -66 mL    15 Rodriguez 2022 07:01  -  15 Rodriguez 2022 13:01  --------------------------------------------------------  IN: 240 mL / OUT: 0 mL / NET: 240 mL        Appearance: Normal	  HEENT:   Normal oral mucosa, PERRL, EOMI	  Lymphatic: No lymphadenopathy  Cardiovascular: Normal S1 S2, No JVD, + murmurs, No edema  Respiratory: Lungs clear to auscultation	  Psychiatry: A & O x 3, Mood & affect appropriate  Gastrointestinal:  Soft, Non-tender, + BS	  Skin: No rashes, No ecchymoses, No cyanosis	  Neurologic: Non-focal  Extremities: Normal range of motion, No clubbing, cyanosis or edema  Vascular: Peripheral pulses palpable 2+ bilaterally    MEDICATIONS  (STANDING):  apixaban 5 milliGRAM(s) Oral every 12 hours  aspirin enteric coated 81 milliGRAM(s) Oral daily  atorvastatin 80 milliGRAM(s) Oral at bedtime  clopidogrel Tablet 75 milliGRAM(s) Oral daily  metoprolol tartrate 25 milliGRAM(s) Oral three times a day  nicotine - 21 mG/24Hr(s) Patch 1 patch Transdermal daily  piperacillin/tazobactam IVPB.. 3.375 Gram(s) IV Intermittent every 8 hours      TELEMETRY: 	    ECG:  	  RADIOLOGY:  OTHER: 	  	  LABS:	 	    CARDIAC MARKERS:                                10.8   12.20 )-----------( 550      ( 15 Rodriguez 2022 07:19 )             34.7     01-14    139  |  104  |  10  ----------------------------<  118<H>  3.5   |  23  |  0.53    Ca    9.6      14 Jan 2022 07:09      proBNP:   Lipid Profile: Cholesterol 118  LDL --  HDL 25      HgA1c:   TSH:   PT/INR - ( 14 Jan 2022 07:13 )   PT: 15.9 sec;   INR: 1.34 ratio         PTT - ( 14 Jan 2022 13:47 )  PTT:37.2 sec      Assessment and plan  ---------------------------  OM of the toe with +mri  podiatry/ id appreciated continue IV abx  events noted today and discussed with NP, pt with tachycardia , no chest pain ecg ?st elevation in inferior leads, first trop negartive  new onset a fib/ flutter  asa  beta blocker  fu trop  has transferred to tele  will observe closely  trop/ cpk negative  increase beta blocker, add dig to control hr  continue iv heparin  s/p LAD stent  pt is clear for peripheral angio today  continue asa/plavix  increase beta blocker  social work consult  dc planning as ID

## 2022-01-15 NOTE — PROGRESS NOTE ADULT - SUBJECTIVE AND OBJECTIVE BOX
Podiatry pager #: [phone] ([place])/ [phone] ([facility])    Patient is a 61y old  Male who presents with a chief complaint of OM toe (14 Jan 2022 17:24)       INTERVAL HPI/OVERNIGHT EVENTS:  Patient seen and evaluated at bedside.  Pt is resting comfortable in NAD. Denies N/V/F/C.     Allergies    No Known Allergies    Intolerances        Vital Signs Last 24 Hrs  T(C): 36.6 (15 Rodriguez 2022 04:39), Max: 37.2 (14 Jan 2022 11:10)  T(F): 97.9 (15 Rodriguez 2022 04:39), Max: 99 (14 Jan 2022 14:44)  HR: 105 (15 Rodriguez 2022 04:39) (81 - 156)  BP: 118/75 (15 Rodriguez 2022 04:39) (93/58 - 122/72)  BP(mean): 71 (14 Jan 2022 18:00) (67 - 75)  RR: 19 (15 Rodriguez 2022 04:39) (14 - 19)  SpO2: 96% (15 Rodriguez 2022 04:39) (94% - 100%)    LABS:                        10.8   12.20 )-----------( 550      ( 15 Rodriguez 2022 07:19 )             34.7     01-14    139  |  104  |  10  ----------------------------<  118<H>  3.5   |  23  |  0.53    Ca    9.6      14 Jan 2022 07:09      PT/INR - ( 14 Jan 2022 07:13 )   PT: 15.9 sec;   INR: 1.34 ratio         PTT - ( 14 Jan 2022 13:47 )  PTT:37.2 sec    CAPILLARY BLOOD GLUCOSE          Lower Extremity Physical Exam:  Vascular: DP/PT 0/4, B/L, CFT <3 seconds B/L, Temperature gradient warm to cool, B/L.   Neuro: Epicritic sensation intact to the level of digits, B/L.  Musculoskeletal/Ortho: unremarkable  Skin:  s/p 1/14 L foot partial 5th ray resection, partially open, and R foot partial 2nd toe amputation closed, no pus, no drainage, no malodor    RADIOLOGY & ADDITIONAL TESTS:

## 2022-01-15 NOTE — PROGRESS NOTE ADULT - ASSESSMENT
61 M s/p 1/14 L foot partial 5th ray resection, partially open, and R foot partial 2nd toe amputation closed  - pt seen and evaluated  - afebrile, WBC 12.2  - no pus, no drainage, no malodor  - high concern for residual infection/ viability  - rec long term abx per ID recs  - discharge pending final ID recs  - follow up information can be found in f/u section of provider d/c note  - will follow  - discussed w/ attending

## 2022-01-15 NOTE — PHYSICAL THERAPY INITIAL EVALUATION ADULT - PERTINENT HX OF CURRENT PROBLEM, REHAB EVAL
Pt is 61M admitted 1/7/22 pmhx of RA in hands presents to the ED with a L foot ulcer. He was seen in the office by Dane and was told to come to the ED yesterday. He states his ulcer stared about 3 weeks ago from a poorly fitting shoe. Since then, his ulcer became worse, prompting him to see Dr. Gordon. He admits to mild pain over his foot.

## 2022-01-15 NOTE — PHYSICAL THERAPY INITIAL EVALUATION ADULT - ADDITIONAL COMMENTS
Prior to admission pt reports being independent of all ADL's & functional mobility using RW. Pt resides in basement apt of house (sister upstairs), 7 steps to descend, (+) HR. All needs met on first floor. Pt has walk in shower. Pt owns RW.

## 2022-01-16 LAB
-  AMPICILLIN/SULBACTAM: SIGNIFICANT CHANGE UP
-  CEFAZOLIN: SIGNIFICANT CHANGE UP
-  CLINDAMYCIN: SIGNIFICANT CHANGE UP
-  ERYTHROMYCIN: SIGNIFICANT CHANGE UP
-  GENTAMICIN: SIGNIFICANT CHANGE UP
-  OXACILLIN: SIGNIFICANT CHANGE UP
-  PENICILLIN: SIGNIFICANT CHANGE UP
-  RIFAMPIN: SIGNIFICANT CHANGE UP
-  TETRACYCLINE: SIGNIFICANT CHANGE UP
-  TRIMETHOPRIM/SULFAMETHOXAZOLE: SIGNIFICANT CHANGE UP
-  VANCOMYCIN: SIGNIFICANT CHANGE UP
ANION GAP SERPL CALC-SCNC: 14 MMOL/L — SIGNIFICANT CHANGE UP (ref 5–17)
BUN SERPL-MCNC: 21 MG/DL — SIGNIFICANT CHANGE UP (ref 7–23)
CALCIUM SERPL-MCNC: 9.7 MG/DL — SIGNIFICANT CHANGE UP (ref 8.4–10.5)
CHLORIDE SERPL-SCNC: 100 MMOL/L — SIGNIFICANT CHANGE UP (ref 96–108)
CO2 SERPL-SCNC: 23 MMOL/L — SIGNIFICANT CHANGE UP (ref 22–31)
CREAT SERPL-MCNC: 0.65 MG/DL — SIGNIFICANT CHANGE UP (ref 0.5–1.3)
GLUCOSE SERPL-MCNC: 92 MG/DL — SIGNIFICANT CHANGE UP (ref 70–99)
HCT VFR BLD CALC: 31.8 % — LOW (ref 39–50)
HGB BLD-MCNC: 9.7 G/DL — LOW (ref 13–17)
MAGNESIUM SERPL-MCNC: 2 MG/DL — SIGNIFICANT CHANGE UP (ref 1.6–2.6)
MCHC RBC-ENTMCNC: 27.1 PG — SIGNIFICANT CHANGE UP (ref 27–34)
MCHC RBC-ENTMCNC: 30.5 GM/DL — LOW (ref 32–36)
MCV RBC AUTO: 88.8 FL — SIGNIFICANT CHANGE UP (ref 80–100)
METHOD TYPE: SIGNIFICANT CHANGE UP
NRBC # BLD: 0 /100 WBCS — SIGNIFICANT CHANGE UP (ref 0–0)
PLATELET # BLD AUTO: 463 K/UL — HIGH (ref 150–400)
POTASSIUM SERPL-MCNC: 3.9 MMOL/L — SIGNIFICANT CHANGE UP (ref 3.5–5.3)
POTASSIUM SERPL-SCNC: 3.9 MMOL/L — SIGNIFICANT CHANGE UP (ref 3.5–5.3)
RBC # BLD: 3.58 M/UL — LOW (ref 4.2–5.8)
RBC # FLD: 14.4 % — SIGNIFICANT CHANGE UP (ref 10.3–14.5)
SODIUM SERPL-SCNC: 137 MMOL/L — SIGNIFICANT CHANGE UP (ref 135–145)
WBC # BLD: 9.67 K/UL — SIGNIFICANT CHANGE UP (ref 3.8–10.5)
WBC # FLD AUTO: 9.67 K/UL — SIGNIFICANT CHANGE UP (ref 3.8–10.5)

## 2022-01-16 RX ADMIN — PIPERACILLIN AND TAZOBACTAM 25 GRAM(S): 4; .5 INJECTION, POWDER, LYOPHILIZED, FOR SOLUTION INTRAVENOUS at 09:17

## 2022-01-16 RX ADMIN — MORPHINE SULFATE 2 MILLIGRAM(S): 50 CAPSULE, EXTENDED RELEASE ORAL at 21:55

## 2022-01-16 RX ADMIN — CLOPIDOGREL BISULFATE 75 MILLIGRAM(S): 75 TABLET, FILM COATED ORAL at 11:46

## 2022-01-16 RX ADMIN — PIPERACILLIN AND TAZOBACTAM 25 GRAM(S): 4; .5 INJECTION, POWDER, LYOPHILIZED, FOR SOLUTION INTRAVENOUS at 17:27

## 2022-01-16 RX ADMIN — Medication 1 PATCH: at 19:05

## 2022-01-16 RX ADMIN — Medication 1 PATCH: at 11:46

## 2022-01-16 RX ADMIN — Medication 25 MILLIGRAM(S): at 05:57

## 2022-01-16 RX ADMIN — ATORVASTATIN CALCIUM 80 MILLIGRAM(S): 80 TABLET, FILM COATED ORAL at 21:26

## 2022-01-16 RX ADMIN — APIXABAN 5 MILLIGRAM(S): 2.5 TABLET, FILM COATED ORAL at 17:47

## 2022-01-16 RX ADMIN — APIXABAN 5 MILLIGRAM(S): 2.5 TABLET, FILM COATED ORAL at 05:57

## 2022-01-16 RX ADMIN — Medication 81 MILLIGRAM(S): at 11:45

## 2022-01-16 RX ADMIN — Medication 25 MILLIGRAM(S): at 21:27

## 2022-01-16 RX ADMIN — Medication 1 PATCH: at 07:07

## 2022-01-16 RX ADMIN — PIPERACILLIN AND TAZOBACTAM 25 GRAM(S): 4; .5 INJECTION, POWDER, LYOPHILIZED, FOR SOLUTION INTRAVENOUS at 02:22

## 2022-01-16 RX ADMIN — Medication 1 PATCH: at 11:45

## 2022-01-16 RX ADMIN — Medication 25 MILLIGRAM(S): at 15:35

## 2022-01-16 RX ADMIN — MORPHINE SULFATE 2 MILLIGRAM(S): 50 CAPSULE, EXTENDED RELEASE ORAL at 05:55

## 2022-01-16 RX ADMIN — MORPHINE SULFATE 2 MILLIGRAM(S): 50 CAPSULE, EXTENDED RELEASE ORAL at 06:50

## 2022-01-16 RX ADMIN — MORPHINE SULFATE 2 MILLIGRAM(S): 50 CAPSULE, EXTENDED RELEASE ORAL at 20:53

## 2022-01-16 NOTE — PROGRESS NOTE ADULT - SUBJECTIVE AND OBJECTIVE BOX
CARDIOLOGY     PROGRESS  NOTE   ________________________________________________    CHIEF COMPLAINT:Patient is a 61y old  Male who presents with a chief complaint of OM toe (15 Rodriguez 2022 13:01)    	  REVIEW OF SYSTEMS:  CONSTITUTIONAL: No fever, weight loss, or fatigue  EYES: No eye pain, visual disturbances, or discharge  ENT:  No difficulty hearing, tinnitus, vertigo; No sinus or throat pain  NECK: No pain or stiffness  RESPIRATORY: No cough, wheezing, chills or hemoptysis; No Shortness of Breath  CARDIOVASCULAR: No chest pain, palpitations, passing out, dizziness, or leg swelling  GASTROINTESTINAL: No abdominal or epigastric pain. No nausea, vomiting, or hematemesis; No diarrhea or constipation. No melena or hematochezia.  GENITOURINARY: No dysuria, frequency, hematuria, or incontinence  NEUROLOGICAL: No headaches, memory loss, loss of strength, numbness, or tremors  SKIN: No itching, burning, rashes, or lesions   LYMPH Nodes: No enlarged glands  ENDOCRINE: No heat or cold intolerance; No hair loss  MUSCULOSKELETAL: No joint pain or swelling; No muscle, back, or extremity pain  PSYCHIATRIC: No depression, anxiety, mood swings, or difficulty sleeping  HEME/LYMPH: No easy bruising, or bleeding gums  ALLERGY AND IMMUNOLOGIC: No hives or eczema	    [ ] All others negative	  [ ] Unable to obtain    PHYSICAL EXAM:  T(C): 37 (01-16-22 @ 11:20), Max: 37.5 (01-15-22 @ 20:03)  HR: 68 (01-16-22 @ 11:20) (64 - 68)  BP: 107/60 (01-16-22 @ 11:20) (107/60 - 131/68)  RR: 18 (01-16-22 @ 11:20) (18 - 18)  SpO2: 98% (01-16-22 @ 11:20) (95% - 98%)  Wt(kg): --  I&O's Summary    15 Rodriguez 2022 07:01  -  16 Jan 2022 07:00  --------------------------------------------------------  IN: 480 mL / OUT: 1000 mL / NET: -520 mL    16 Jan 2022 07:01  -  16 Jan 2022 11:51  --------------------------------------------------------  IN: 240 mL / OUT: 0 mL / NET: 240 mL        Appearance: Normal	  HEENT:   Normal oral mucosa, PERRL, EOMI	  Lymphatic: No lymphadenopathy  Cardiovascular: Normal S1 S2, No JVD, + murmurs, No edema  Respiratory: Lungs clear to auscultation	  Psychiatry: A & O x 3, Mood & affect appropriate  Gastrointestinal:  Soft, Non-tender, + BS	  Skin: No rashes, No ecchymoses, No cyanosis	  Neurologic: Non-focal  Extremities: Normal range of motion, bandaged  Vascular: Peripheral pulses palpable 2+ bilaterally    MEDICATIONS  (STANDING):  apixaban 5 milliGRAM(s) Oral every 12 hours  aspirin enteric coated 81 milliGRAM(s) Oral daily  atorvastatin 80 milliGRAM(s) Oral at bedtime  clopidogrel Tablet 75 milliGRAM(s) Oral daily  metoprolol tartrate 25 milliGRAM(s) Oral three times a day  nicotine - 21 mG/24Hr(s) Patch 1 patch Transdermal daily  piperacillin/tazobactam IVPB.. 3.375 Gram(s) IV Intermittent every 8 hours      TELEMETRY: 	    ECG:  	  RADIOLOGY:  OTHER: 	  	  LABS:	 	    CARDIAC MARKERS:                                9.7    9.67  )-----------( 463      ( 16 Jan 2022 07:08 )             31.8     01-16    137  |  100  |  21  ----------------------------<  92  3.9   |  23  |  0.65    Ca    9.7      16 Jan 2022 07:08  Mg     2.0     01-16      proBNP:   Lipid Profile: Cholesterol 118  LDL --  HDL 25      HgA1c:   TSH:   PTT - ( 14 Jan 2022 13:47 )  PTT:37.2 sec      Assessment and plan  ---------------------------  OM of the toe with +mri  podiatry/ id appreciated continue IV abx  events noted today and discussed with NP, pt with tachycardia , no chest pain ecg ?st elevation in inferior leads, first trop negartive  new onset a fib/ flutter  asa  beta blocker  fu trop  has transferred to tele  will observe closely  trop/ cpk negative  continue iv heparin  s/p LAD stent  pt is clear for peripheral angio today  continue asa/plavix  increase beta blocker  social work consult  dc planning as ID   converted to NSR , continue meds

## 2022-01-17 LAB
-  AMPICILLIN/SULBACTAM: SIGNIFICANT CHANGE UP
-  CEFAZOLIN: SIGNIFICANT CHANGE UP
-  CLINDAMYCIN: SIGNIFICANT CHANGE UP
-  ERYTHROMYCIN: SIGNIFICANT CHANGE UP
-  GENTAMICIN: SIGNIFICANT CHANGE UP
-  OXACILLIN: SIGNIFICANT CHANGE UP
-  PENICILLIN: SIGNIFICANT CHANGE UP
-  RIFAMPIN: SIGNIFICANT CHANGE UP
-  TETRACYCLINE: SIGNIFICANT CHANGE UP
-  TRIMETHOPRIM/SULFAMETHOXAZOLE: SIGNIFICANT CHANGE UP
-  VANCOMYCIN: SIGNIFICANT CHANGE UP
ANION GAP SERPL CALC-SCNC: 13 MMOL/L — SIGNIFICANT CHANGE UP (ref 5–17)
BUN SERPL-MCNC: 18 MG/DL — SIGNIFICANT CHANGE UP (ref 7–23)
CALCIUM SERPL-MCNC: 9.7 MG/DL — SIGNIFICANT CHANGE UP (ref 8.4–10.5)
CHLORIDE SERPL-SCNC: 101 MMOL/L — SIGNIFICANT CHANGE UP (ref 96–108)
CO2 SERPL-SCNC: 24 MMOL/L — SIGNIFICANT CHANGE UP (ref 22–31)
CREAT SERPL-MCNC: 0.61 MG/DL — SIGNIFICANT CHANGE UP (ref 0.5–1.3)
GLUCOSE SERPL-MCNC: 101 MG/DL — HIGH (ref 70–99)
HCT VFR BLD CALC: 31.7 % — LOW (ref 39–50)
HGB BLD-MCNC: 9.9 G/DL — LOW (ref 13–17)
MAGNESIUM SERPL-MCNC: 2 MG/DL — SIGNIFICANT CHANGE UP (ref 1.6–2.6)
MCHC RBC-ENTMCNC: 27.2 PG — SIGNIFICANT CHANGE UP (ref 27–34)
MCHC RBC-ENTMCNC: 31.2 GM/DL — LOW (ref 32–36)
MCV RBC AUTO: 87.1 FL — SIGNIFICANT CHANGE UP (ref 80–100)
METHOD TYPE: SIGNIFICANT CHANGE UP
NRBC # BLD: 0 /100 WBCS — SIGNIFICANT CHANGE UP (ref 0–0)
PLATELET # BLD AUTO: 492 K/UL — HIGH (ref 150–400)
POTASSIUM SERPL-MCNC: 4.7 MMOL/L — SIGNIFICANT CHANGE UP (ref 3.5–5.3)
POTASSIUM SERPL-SCNC: 4.7 MMOL/L — SIGNIFICANT CHANGE UP (ref 3.5–5.3)
RBC # BLD: 3.64 M/UL — LOW (ref 4.2–5.8)
RBC # FLD: 14.5 % — SIGNIFICANT CHANGE UP (ref 10.3–14.5)
SODIUM SERPL-SCNC: 138 MMOL/L — SIGNIFICANT CHANGE UP (ref 135–145)
WBC # BLD: 10.63 K/UL — HIGH (ref 3.8–10.5)
WBC # FLD AUTO: 10.63 K/UL — HIGH (ref 3.8–10.5)

## 2022-01-17 RX ADMIN — OXYCODONE AND ACETAMINOPHEN 1 TABLET(S): 5; 325 TABLET ORAL at 18:30

## 2022-01-17 RX ADMIN — OXYCODONE AND ACETAMINOPHEN 1 TABLET(S): 5; 325 TABLET ORAL at 19:30

## 2022-01-17 RX ADMIN — Medication 25 MILLIGRAM(S): at 21:35

## 2022-01-17 RX ADMIN — ATORVASTATIN CALCIUM 80 MILLIGRAM(S): 80 TABLET, FILM COATED ORAL at 21:35

## 2022-01-17 RX ADMIN — APIXABAN 5 MILLIGRAM(S): 2.5 TABLET, FILM COATED ORAL at 05:30

## 2022-01-17 RX ADMIN — MORPHINE SULFATE 2 MILLIGRAM(S): 50 CAPSULE, EXTENDED RELEASE ORAL at 04:21

## 2022-01-17 RX ADMIN — Medication 1 PATCH: at 11:19

## 2022-01-17 RX ADMIN — PIPERACILLIN AND TAZOBACTAM 25 GRAM(S): 4; .5 INJECTION, POWDER, LYOPHILIZED, FOR SOLUTION INTRAVENOUS at 02:35

## 2022-01-17 RX ADMIN — Medication 25 MILLIGRAM(S): at 05:31

## 2022-01-17 RX ADMIN — Medication 81 MILLIGRAM(S): at 13:40

## 2022-01-17 RX ADMIN — APIXABAN 5 MILLIGRAM(S): 2.5 TABLET, FILM COATED ORAL at 17:06

## 2022-01-17 RX ADMIN — PIPERACILLIN AND TAZOBACTAM 25 GRAM(S): 4; .5 INJECTION, POWDER, LYOPHILIZED, FOR SOLUTION INTRAVENOUS at 10:14

## 2022-01-17 RX ADMIN — Medication 1 PATCH: at 13:40

## 2022-01-17 RX ADMIN — MORPHINE SULFATE 2 MILLIGRAM(S): 50 CAPSULE, EXTENDED RELEASE ORAL at 05:20

## 2022-01-17 RX ADMIN — CLOPIDOGREL BISULFATE 75 MILLIGRAM(S): 75 TABLET, FILM COATED ORAL at 13:40

## 2022-01-17 RX ADMIN — PIPERACILLIN AND TAZOBACTAM 25 GRAM(S): 4; .5 INJECTION, POWDER, LYOPHILIZED, FOR SOLUTION INTRAVENOUS at 17:06

## 2022-01-17 RX ADMIN — Medication 1 PATCH: at 06:52

## 2022-01-17 RX ADMIN — Medication 1 PATCH: at 20:10

## 2022-01-17 RX ADMIN — Medication 25 MILLIGRAM(S): at 13:40

## 2022-01-17 NOTE — PROGRESS NOTE ADULT - SUBJECTIVE AND OBJECTIVE BOX
CARDIOLOGY     PROGRESS  NOTE   ________________________________________________    CHIEF COMPLAINT:Patient is a 61y old  Male who presents with a chief complaint of OM toe (16 Jan 2022 11:51)    	  REVIEW OF SYSTEMS:  CONSTITUTIONAL: No fever, weight loss, or fatigue  EYES: No eye pain, visual disturbances, or discharge  ENT:  No difficulty hearing, tinnitus, vertigo; No sinus or throat pain  NECK: No pain or stiffness  RESPIRATORY: No cough, wheezing, chills or hemoptysis; No Shortness of Breath  CARDIOVASCULAR: No chest pain, palpitations, passing out, dizziness, or leg swelling  GASTROINTESTINAL: No abdominal or epigastric pain. No nausea, vomiting, or hematemesis; No diarrhea or constipation. No melena or hematochezia.  GENITOURINARY: No dysuria, frequency, hematuria, or incontinence  NEUROLOGICAL: No headaches, memory loss, loss of strength, numbness, or tremors  SKIN: No itching, burning, rashes, or lesions   LYMPH Nodes: No enlarged glands  ENDOCRINE: No heat or cold intolerance; No hair loss  MUSCULOSKELETAL: No joint pain or swelling; No muscle, back, or extremity pain  PSYCHIATRIC: No depression, anxiety, mood swings, or difficulty sleeping  HEME/LYMPH: No easy bruising, or bleeding gums  ALLERGY AND IMMUNOLOGIC: No hives or eczema	    [ ] All others negative	  [ ] Unable to obtain    PHYSICAL EXAM:  T(C): 37.3 (01-17-22 @ 04:19), Max: 37.3 (01-17-22 @ 04:19)  HR: 73 (01-17-22 @ 04:19) (67 - 73)  BP: 121/76 (01-17-22 @ 04:19) (107/60 - 121/76)  RR: 18 (01-17-22 @ 04:19) (18 - 18)  SpO2: 96% (01-17-22 @ 04:19) (95% - 98%)  Wt(kg): --  I&O's Summary    16 Jan 2022 07:01  -  17 Jan 2022 07:00  --------------------------------------------------------  IN: 480 mL / OUT: 500 mL / NET: -20 mL        Appearance: Normal	  HEENT:   Normal oral mucosa, PERRL, EOMI	  Lymphatic: No lymphadenopathy  Cardiovascular: Normal S1 S2, No JVD, No murmurs, No edema  Respiratory: Lungs clear to auscultation	  Psychiatry: A & O x 3, Mood & affect appropriate  Gastrointestinal:  Soft, Non-tender, + BS	  Skin: No rashes, No ecchymoses, No cyanosis	  Neurologic: Non-focal  Extremities: Normal range of motion, + bandage  Vascular: Peripheral pulses palpable 2+ bilaterally    MEDICATIONS  (STANDING):  apixaban 5 milliGRAM(s) Oral every 12 hours  aspirin enteric coated 81 milliGRAM(s) Oral daily  atorvastatin 80 milliGRAM(s) Oral at bedtime  clopidogrel Tablet 75 milliGRAM(s) Oral daily  metoprolol tartrate 25 milliGRAM(s) Oral three times a day  nicotine - 21 mG/24Hr(s) Patch 1 patch Transdermal daily  piperacillin/tazobactam IVPB.. 3.375 Gram(s) IV Intermittent every 8 hours      TELEMETRY: 	    ECG:  	  RADIOLOGY:  OTHER: 	  	  LABS:	 	    CARDIAC MARKERS:                                9.9    10.63 )-----------( 492      ( 17 Jan 2022 06:47 )             31.7     01-17    138  |  101  |  18  ----------------------------<  101<H>  4.7   |  24  |  0.61    Ca    9.7      17 Jan 2022 06:47  Mg     2.0     01-17      proBNP:   Lipid Profile: Cholesterol 118  LDL --  HDL 25      HgA1c:   TSH:   Culture - Tissue with Gram Stain (01.15.22 @ 01:04)    Gram Stain:   No polymorphonuclear cells seen per low power field  No organisms seen per oil power field    Specimen Source: .Tissue RIGHT 2ND TOE DIRTY BONE    Culture Results:   Rare Staphylococcus caprae  Rare Corynebacterium striatum group "Susceptibilities not performed"  Rare Corynebacterium amycolatum "Susceptibilities not performed"          Assessment and plan  ---------------------------  OM of the toe with +mri  podiatry/ id appreciated continue IV abx  events noted today and discussed with NP, pt with tachycardia , no chest pain ecg ?st elevation in inferior leads, first trop negartive  new onset a fib/ flutter  asa  beta blocker  fu trop  has transferred to tele  will observe closely  trop/ cpk negative  continue iv heparin  s/p LAD stent  pt is clear for peripheral angio today  continue asa/plavix  increase beta blocker  social work consult  dc planning as ID , awaiting abx recommendation  converted to NSR , continue meds

## 2022-01-17 NOTE — PROGRESS NOTE ADULT - SUBJECTIVE AND OBJECTIVE BOX
Podiatry pager #: [phone] ([place])/ [phone] ([facility])    Patient is a 61y old  Male who presents with a chief complaint of OM toe (17 Jan 2022 08:59)       INTERVAL HPI/OVERNIGHT EVENTS:  Patient seen and evaluated at bedside.  Pt is resting comfortable in NAD. Denies N/V/F/C.     Allergies    No Known Allergies    Intolerances        Vital Signs Last 24 Hrs  T(C): 37.3 (17 Jan 2022 04:19), Max: 37.3 (17 Jan 2022 04:19)  T(F): 99.1 (17 Jan 2022 04:19), Max: 99.1 (17 Jan 2022 04:19)  HR: 73 (17 Jan 2022 04:19) (67 - 73)  BP: 121/76 (17 Jan 2022 04:19) (107/60 - 121/76)  BP(mean): --  RR: 18 (17 Jan 2022 04:19) (18 - 18)  SpO2: 96% (17 Jan 2022 04:19) (95% - 98%)    LABS:                        9.9    10.63 )-----------( 492      ( 17 Jan 2022 06:47 )             31.7     01-17    138  |  101  |  18  ----------------------------<  101<H>  4.7   |  24  |  0.61    Ca    9.7      17 Jan 2022 06:47  Mg     2.0     01-17          CAPILLARY BLOOD GLUCOSE          Lower Extremity Physical Exam:  Vascular: DP/PT 0/4, B/L, CFT <3 seconds B/L, Temperature gradient warm to cool, B/L.   Neuro: Epicritic sensation intact to the level of digits, B/L.  Musculoskeletal/Ortho: unremarkable  Skin:  s/p 1/14 L foot partial 5th ray resection, partially open, and R foot partial 2nd toe amputation closed, no pus, no drainage, no malodor    RADIOLOGY & ADDITIONAL TESTS:

## 2022-01-17 NOTE — PROGRESS NOTE ADULT - ASSESSMENT
61 M s/p 1/14 L foot partial 5th ray resection, partially open, and R foot partial 2nd toe amputation closed    - afebrile, WBC 10.6  - no pus, no drainage, no malodor  - high concern for residual infection/ viability  - rec long term abx per ID recs  - discharge pending final ID recs  - follow up information can be found in f/u section of provider d/c note  - will follow   61 M s/p 1/14 L foot partial 5th ray resection, partially open, and R foot partial 2nd toe amputation closed  - pt seen and evaluated  - afebrile, WBC 10.6  - no pus, no drainage, no malodor  - high concern for residual infection/ viability  - rec long term abx per ID recs  - podiatry stable for discharge pending final ID recs  - follow up information can be found in f/u section of provider d/c note  - will follow  - seen w/ attending

## 2022-01-18 LAB
APTT BLD: 37.8 SEC — HIGH (ref 27.5–35.5)
HCT VFR BLD CALC: 35.1 % — LOW (ref 39–50)
HGB BLD-MCNC: 11 G/DL — LOW (ref 13–17)
MCHC RBC-ENTMCNC: 27.1 PG — SIGNIFICANT CHANGE UP (ref 27–34)
MCHC RBC-ENTMCNC: 31.3 GM/DL — LOW (ref 32–36)
MCV RBC AUTO: 86.5 FL — SIGNIFICANT CHANGE UP (ref 80–100)
NRBC # BLD: 0 /100 WBCS — SIGNIFICANT CHANGE UP (ref 0–0)
PLATELET # BLD AUTO: 495 K/UL — HIGH (ref 150–400)
RBC # BLD: 4.06 M/UL — LOW (ref 4.2–5.8)
RBC # FLD: 14.5 % — SIGNIFICANT CHANGE UP (ref 10.3–14.5)
WBC # BLD: 9.2 K/UL — SIGNIFICANT CHANGE UP (ref 3.8–10.5)
WBC # FLD AUTO: 9.2 K/UL — SIGNIFICANT CHANGE UP (ref 3.8–10.5)

## 2022-01-18 PROCEDURE — 99232 SBSQ HOSP IP/OBS MODERATE 35: CPT

## 2022-01-18 RX ORDER — ERTAPENEM SODIUM 1 G/1
1000 INJECTION, POWDER, LYOPHILIZED, FOR SOLUTION INTRAMUSCULAR; INTRAVENOUS ONCE
Refills: 0 | Status: COMPLETED | OUTPATIENT
Start: 2022-01-18 | End: 2022-01-18

## 2022-01-18 RX ORDER — ERTAPENEM SODIUM 1 G/1
INJECTION, POWDER, LYOPHILIZED, FOR SOLUTION INTRAMUSCULAR; INTRAVENOUS
Refills: 0 | Status: DISCONTINUED | OUTPATIENT
Start: 2022-01-18 | End: 2022-01-25

## 2022-01-18 RX ORDER — ERTAPENEM SODIUM 1 G/1
1000 INJECTION, POWDER, LYOPHILIZED, FOR SOLUTION INTRAMUSCULAR; INTRAVENOUS EVERY 24 HOURS
Refills: 0 | Status: DISCONTINUED | OUTPATIENT
Start: 2022-01-19 | End: 2022-01-25

## 2022-01-18 RX ADMIN — Medication 25 MILLIGRAM(S): at 22:56

## 2022-01-18 RX ADMIN — Medication 25 MILLIGRAM(S): at 12:24

## 2022-01-18 RX ADMIN — OXYCODONE AND ACETAMINOPHEN 1 TABLET(S): 5; 325 TABLET ORAL at 22:56

## 2022-01-18 RX ADMIN — CLOPIDOGREL BISULFATE 75 MILLIGRAM(S): 75 TABLET, FILM COATED ORAL at 12:23

## 2022-01-18 RX ADMIN — Medication 1 PATCH: at 07:43

## 2022-01-18 RX ADMIN — PIPERACILLIN AND TAZOBACTAM 25 GRAM(S): 4; .5 INJECTION, POWDER, LYOPHILIZED, FOR SOLUTION INTRAVENOUS at 02:29

## 2022-01-18 RX ADMIN — Medication 1 PATCH: at 12:26

## 2022-01-18 RX ADMIN — APIXABAN 5 MILLIGRAM(S): 2.5 TABLET, FILM COATED ORAL at 05:29

## 2022-01-18 RX ADMIN — Medication 25 MILLIGRAM(S): at 05:29

## 2022-01-18 RX ADMIN — Medication 1 PATCH: at 16:35

## 2022-01-18 RX ADMIN — Medication 81 MILLIGRAM(S): at 12:23

## 2022-01-18 RX ADMIN — ERTAPENEM SODIUM 120 MILLIGRAM(S): 1 INJECTION, POWDER, LYOPHILIZED, FOR SOLUTION INTRAMUSCULAR; INTRAVENOUS at 09:55

## 2022-01-18 RX ADMIN — ATORVASTATIN CALCIUM 80 MILLIGRAM(S): 80 TABLET, FILM COATED ORAL at 22:56

## 2022-01-18 RX ADMIN — OXYCODONE AND ACETAMINOPHEN 1 TABLET(S): 5; 325 TABLET ORAL at 23:50

## 2022-01-18 RX ADMIN — Medication 1 PATCH: at 12:23

## 2022-01-18 NOTE — PROGRESS NOTE ADULT - ASSESSMENT
61M with severe RA, PVD, active smoker with L foot OM.  For angiogram tomorrow.  No MRSA isolated.    PVD, OM L foot  -   -cultures noted strept and anaerobes covered by zosyn  -surgery after revascularization  done with partial open wd  high risk of residual infection  all cultures reviewed  not concerned about the significance of the s cap[itus and corynebacterium        plan     change to Invanz as it is a once aday option  picc for mid line  plan home IV ab for another month

## 2022-01-18 NOTE — PROGRESS NOTE ADULT - SUBJECTIVE AND OBJECTIVE BOX
Subjective:  Patient seen at bedside this AM. Reports feeling well, without complaints. Denies chest pain, SOB.      24h Events:   - Overnight, no acute events    Objective:  Vital Signs  T(C): 36.8 (01-18 @ 11:18), Max: 37.9 (01-17 @ 20:55)  HR: 64 (01-18 @ 11:18) (64 - 74)  BP: 125/56 (01-18 @ 11:18) (105/58 - 125/56)  RR: 18 (01-18 @ 11:18) (18 - 18)  SpO2: 98% (01-18 @ 11:18) (96% - 98%)  01-17-22 @ 07:01  -  01-18-22 @ 07:00  --------------------------------------------------------  IN:  Total IN: 0 mL    OUT:    Voided (mL): 1620 mL  Total OUT: 1620 mL    Total NET: -1620 mL    Physical Exam:  GEN: resting in bed comfortably in NAD  NEURO: awake, alert  RESP: no increased WOB  EXTR: b/l UE warm, well-perfused, spontaneous movement  - RLE: Dopplerable PT signal, foot dressing c/d/i; groin soft, no hematomas or fluid collections, puncture healing well, palpable femoral pulse,  - LLE: Doppler AT and PT signals, foot dressing c/d/i, palpable femoral pulse    Labs:             11.0   9.20  )-----------( 495      ( 18 Jan 2022 06:31 )             35.1     138  |  101  |  18  ----------------------------<  101<H>  4.7   |  24  |  0.61    Ca    9.7      17 Jan 2022 06:47  Mg     2.0     01-17      Medications:   MEDICATIONS  (STANDING):  aspirin enteric coated 81 milliGRAM(s) Oral daily  atorvastatin 80 milliGRAM(s) Oral at bedtime  clopidogrel Tablet 75 milliGRAM(s) Oral daily  ertapenem  IVPB      metoprolol tartrate 25 milliGRAM(s) Oral three times a day  nicotine - 21 mG/24Hr(s) Patch 1 patch Transdermal daily    MEDICATIONS  (PRN):  acetaminophen     Tablet .. 650 milliGRAM(s) Oral every 6 hours PRN Mild Pain (1 - 3)  morphine  - Injectable 2 milliGRAM(s) IV Push every 4 hours PRN Severe Pain (7 - 10)  oxycodone    5 mG/acetaminophen 325 mG 1 Tablet(s) Oral every 4 hours PRN Moderate Pain (4 - 6)

## 2022-01-18 NOTE — PROGRESS NOTE ADULT - SUBJECTIVE AND OBJECTIVE BOX
infectious diseases progress note:    Patient is a 61y old  Male who presents with a chief complaint of OM toe (17 Jan 2022 09:19)        Other acute osteomyelitis, ankle and foot         lightheadedness    Allergies    No Known Allergies    Intolerances        ANTIBIOTICS/RELEVANT:  antimicrobials  piperacillin/tazobactam IVPB.. 3.375 Gram(s) IV Intermittent every 8 hours    immunologic:    OTHER:  acetaminophen     Tablet .. 650 milliGRAM(s) Oral every 6 hours PRN  apixaban 5 milliGRAM(s) Oral every 12 hours  aspirin enteric coated 81 milliGRAM(s) Oral daily  atorvastatin 80 milliGRAM(s) Oral at bedtime  clopidogrel Tablet 75 milliGRAM(s) Oral daily  metoprolol tartrate 25 milliGRAM(s) Oral three times a day  morphine  - Injectable 2 milliGRAM(s) IV Push every 4 hours PRN  nicotine - 21 mG/24Hr(s) Patch 1 patch Transdermal daily  oxycodone    5 mG/acetaminophen 325 mG 1 Tablet(s) Oral every 4 hours PRN      Objective:  Vital Signs Last 24 Hrs  T(C): 37 (18 Jan 2022 04:11), Max: 37.9 (17 Jan 2022 20:55)  T(F): 98.6 (18 Jan 2022 04:11), Max: 100.3 (17 Jan 2022 20:55)  HR: 74 (18 Jan 2022 04:11) (68 - 74)  BP: 105/58 (18 Jan 2022 04:11) (105/58 - 116/73)  BP(mean): --  RR: 18 (18 Jan 2022 04:11) (18 - 18)  SpO2: 98% (18 Jan 2022 04:11) (95% - 98%)    PHYSICAL EXAM:  Constitutional:Well-developed, well nourished--no acute distress  Eyes:[place], EOMI  Ear/Nose/Throat: no oral lesion, no sinus tenderness on percussion	  Neck:no JVD, no lymphadenopathy, supple  Respiratory: CTA yas  Cardiovascular: S1S2 RRR, no murmurs  Gastrointestinal:soft, (+) BS, no HSM  Extremities wd wrapped       LABS:                        11.0   9.20  )-----------( 495      ( 18 Jan 2022 06:31 )             35.1     01-17    138  |  101  |  18  ----------------------------<  101<H>  4.7   |  24  |  0.61    Ca    9.7      17 Jan 2022 06:47  Mg     2.0     01-17              MICROBIOLOGY:    RECENT CULTURES:  01-15 @ 01:04 .Tissue RIGHT 2ND TOE DIRTY BONE   NAWAF    No polymorphonuclear cells seen per low power field  No organisms seen per oil power field    Staphylococcus caprae  Staphylococcus caprae     Rare Staphylococcus caprae  Rare Corynebacterium striatum group "Susceptibilities not performed"  Rare Corynebacterium amycolatum "Susceptibilities not performed"    01-15 @ 01:02 .Tissue RIUGHT 2ND TOE CLEAN BONE       No polymorphonuclear cells seen per low power field  No organisms seen per oil power field           Rare Corynebacterium species  "Susceptibilities not performed"    01-15 @ 01:00 .Tissue Other   NAWAF    No polymorphonuclear cells seen per low power field  No organisms seen per oil power field    Coag Negative Staphylococcus  Coag Negative Staphylococcus     Few Coag Negative Staphylococcus  Rare Corynebacterium species  "Susceptibilities not performed"          RESPIRATORY CULTURES:              RADIOLOGY & ADDITIONAL STUDIES:        Pager [phone]  After 5 pm/weekends or if no response :[phone]

## 2022-01-18 NOTE — PROGRESS NOTE ADULT - ASSESSMENT
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 Patient is s/p b/l LE angiogram with R external iliac stent placement 1/12.     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 Patient is s/p b/l LE angiogram with R external iliac stent placement 1/12.     PLAN:  - Doing well from vasc surg standpoint   - ASA, Plavix  - Hep gtt  - C/w care per primary team  -woundcare and  d/c planning as per cardiology and podiatry     Fartun Solis, PGY-2  Vascular Surgery  #[id number]

## 2022-01-18 NOTE — PROGRESS NOTE ADULT - SUBJECTIVE AND OBJECTIVE BOX
CARDIOLOGY     PROGRESS  NOTE   ________________________________________________    CHIEF COMPLAINT:Patient is a 61y old  Male who presents with a chief complaint of OM toe (18 Jan 2022 08:16)  no complain.  	  REVIEW OF SYSTEMS:  CONSTITUTIONAL: No fever, weight loss, or fatigue  EYES: No eye pain, visual disturbances, or discharge  ENT:  No difficulty hearing, tinnitus, vertigo; No sinus or throat pain  NECK: No pain or stiffness  RESPIRATORY: No cough, wheezing, chills or hemoptysis; No Shortness of Breath  CARDIOVASCULAR: No chest pain, palpitations, passing out, dizziness, or leg swelling  GASTROINTESTINAL: No abdominal or epigastric pain. No nausea, vomiting, or hematemesis; No diarrhea or constipation. No melena or hematochezia.  GENITOURINARY: No dysuria, frequency, hematuria, or incontinence  NEUROLOGICAL: No headaches, memory loss, loss of strength, numbness, or tremors  SKIN: No itching, burning, rashes, or lesions   LYMPH Nodes: No enlarged glands  ENDOCRINE: No heat or cold intolerance; No hair loss  MUSCULOSKELETAL: No joint pain or swelling; No muscle, back, or extremity pain  PSYCHIATRIC: No depression, anxiety, mood swings, or difficulty sleeping  HEME/LYMPH: No easy bruising, or bleeding gums  ALLERGY AND IMMUNOLOGIC: No hives or eczema	    [ ] All others negative	  [ ] Unable to obtain    PHYSICAL EXAM:  T(C): 37 (01-18-22 @ 04:11), Max: 37.9 (01-17-22 @ 20:55)  HR: 74 (01-18-22 @ 04:11) (68 - 74)  BP: 105/58 (01-18-22 @ 04:11) (105/58 - 116/73)  RR: 18 (01-18-22 @ 04:11) (18 - 18)  SpO2: 98% (01-18-22 @ 04:11) (95% - 98%)  Wt(kg): --  I&O's Summary    17 Jan 2022 07:01  -  18 Jan 2022 07:00  --------------------------------------------------------  IN: 440 mL / OUT: 1620 mL / NET: -1180 mL        Appearance: Normal	  HEENT:   Normal oral mucosa, PERRL, EOMI	  Lymphatic: No lymphadenopathy  Cardiovascular: Normal S1 S2, No JVD, + murmurs, No edema  Respiratory: Lungs clear to auscultation	  Psychiatry: A & O x 3, Mood & affect appropriate  Gastrointestinal:  Soft, Non-tender, + BS	  Skin: No rashes, No ecchymoses, No cyanosis	  Neurologic: Non-focal  Extremities: Normal range of motion, bandaged  Vascular: Peripheral pulses palpable 2+ bilaterally    MEDICATIONS  (STANDING):  apixaban 5 milliGRAM(s) Oral every 12 hours  aspirin enteric coated 81 milliGRAM(s) Oral daily  atorvastatin 80 milliGRAM(s) Oral at bedtime  clopidogrel Tablet 75 milliGRAM(s) Oral daily  ertapenem  IVPB 1000 milliGRAM(s) IV Intermittent once  ertapenem  IVPB      metoprolol tartrate 25 milliGRAM(s) Oral three times a day  nicotine - 21 mG/24Hr(s) Patch 1 patch Transdermal daily      TELEMETRY: 	    ECG:  	  RADIOLOGY:  OTHER: 	  	  LABS:	 	    CARDIAC MARKERS:                                11.0   9.20  )-----------( 495      ( 18 Jan 2022 06:31 )             35.1     01-17    138  |  101  |  18  ----------------------------<  101<H>  4.7   |  24  |  0.61    Ca    9.7      17 Jan 2022 06:47  Mg     2.0     01-17      proBNP:   Lipid Profile: Cholesterol 118  LDL --  HDL 25      HgA1c:   TSH:   change to Invanz as it is a once aday option  picc for mid line  plan home IV ab for another month       Assessment and plan  ---------------------------  OM of the toe with +mri  podiatry/ id appreciated continue IV abx  events noted today and discussed with NP, pt with tachycardia , no chest pain ecg ?st elevation in inferior leads, first trop negartive  new onset a fib/ flutter  asa  beta blocker  fu trop  has transferred to tele  will observe closely  trop/ cpk negative  continue iv heparin  s/p LAD stent  pt is clear for peripheral angio today  continue asa/plavix  increase beta blocker  social work consult  id appreciated  picc line dc with iv abx  converted to NSR , continue meds

## 2022-01-18 NOTE — PROGRESS NOTE ADULT - ATTENDING COMMENTS
I Kian Dillon MD have seen and examined the patient today and agree with  the  evaluation, assessment and plan of the surgical house officer  ROHINI Dillon MD have personally seen and examined the patient at bedside today at 8am
I Kian Dillon MD have seen and examined the patient today and agree with  the  evaluation, assessment and plan of the surgical house officer  ROHINI Dillon MD have personally seen and examined the patient at bedside today at 8am
I Kian Dillon MD have seen and examined the patient today and agree with  the  evaluation, assessment and plan of the surgical house officer  ROHINI Dillon MD have personally seen and examined the patient at bedside today at 5  pm
I Kian Dillon MD have seen and examined the patient today and agree with  the  evaluation, assessment and plan of the surgical house officer  ROHINI Dillon MD have personally seen and examined the patient at bedside today at 7pm
I Kian Dillon MD have seen and examined the patient today and agree with  the  evaluation, assessment and plan of the surgical house officer  ROHINI Dillon MD have personally seen and examined the patient at bedside today at 7pm

## 2022-01-19 LAB
CULTURE RESULTS: SIGNIFICANT CHANGE UP
HCT VFR BLD CALC: 35.3 % — LOW (ref 39–50)
HGB BLD-MCNC: 11 G/DL — LOW (ref 13–17)
MCHC RBC-ENTMCNC: 27 PG — SIGNIFICANT CHANGE UP (ref 27–34)
MCHC RBC-ENTMCNC: 31.2 GM/DL — LOW (ref 32–36)
MCV RBC AUTO: 86.5 FL — SIGNIFICANT CHANGE UP (ref 80–100)
NRBC # BLD: 0 /100 WBCS — SIGNIFICANT CHANGE UP (ref 0–0)
ORGANISM # SPEC MICROSCOPIC CNT: SIGNIFICANT CHANGE UP
ORGANISM # SPEC MICROSCOPIC CNT: SIGNIFICANT CHANGE UP
PLATELET # BLD AUTO: 507 K/UL — HIGH (ref 150–400)
RBC # BLD: 4.08 M/UL — LOW (ref 4.2–5.8)
RBC # FLD: 14.5 % — SIGNIFICANT CHANGE UP (ref 10.3–14.5)
SPECIMEN SOURCE: SIGNIFICANT CHANGE UP
WBC # BLD: 8.29 K/UL — SIGNIFICANT CHANGE UP (ref 3.8–10.5)
WBC # FLD AUTO: 8.29 K/UL — SIGNIFICANT CHANGE UP (ref 3.8–10.5)

## 2022-01-19 PROCEDURE — 71045 X-RAY EXAM CHEST 1 VIEW: CPT | Mod: 26

## 2022-01-19 PROCEDURE — 99232 SBSQ HOSP IP/OBS MODERATE 35: CPT

## 2022-01-19 RX ADMIN — Medication 81 MILLIGRAM(S): at 12:07

## 2022-01-19 RX ADMIN — Medication 1 PATCH: at 07:19

## 2022-01-19 RX ADMIN — Medication 25 MILLIGRAM(S): at 06:03

## 2022-01-19 RX ADMIN — ERTAPENEM SODIUM 120 MILLIGRAM(S): 1 INJECTION, POWDER, LYOPHILIZED, FOR SOLUTION INTRAMUSCULAR; INTRAVENOUS at 09:04

## 2022-01-19 RX ADMIN — OXYCODONE AND ACETAMINOPHEN 1 TABLET(S): 5; 325 TABLET ORAL at 20:47

## 2022-01-19 RX ADMIN — Medication 1 PATCH: at 12:10

## 2022-01-19 RX ADMIN — Medication 1 PATCH: at 19:46

## 2022-01-19 RX ADMIN — Medication 25 MILLIGRAM(S): at 14:12

## 2022-01-19 RX ADMIN — Medication 25 MILLIGRAM(S): at 23:44

## 2022-01-19 RX ADMIN — OXYCODONE AND ACETAMINOPHEN 1 TABLET(S): 5; 325 TABLET ORAL at 12:53

## 2022-01-19 RX ADMIN — Medication 1 PATCH: at 12:06

## 2022-01-19 RX ADMIN — OXYCODONE AND ACETAMINOPHEN 1 TABLET(S): 5; 325 TABLET ORAL at 12:23

## 2022-01-19 RX ADMIN — CLOPIDOGREL BISULFATE 75 MILLIGRAM(S): 75 TABLET, FILM COATED ORAL at 12:06

## 2022-01-19 RX ADMIN — OXYCODONE AND ACETAMINOPHEN 1 TABLET(S): 5; 325 TABLET ORAL at 19:37

## 2022-01-19 RX ADMIN — ATORVASTATIN CALCIUM 80 MILLIGRAM(S): 80 TABLET, FILM COATED ORAL at 23:44

## 2022-01-19 NOTE — PROGRESS NOTE ADULT - ASSESSMENT
61M with severe RA, PVD, active smoker with L foot OM.  For angiogram tomorrow.  No MRSA isolated.    PVD, OM L foot  -   -cultures noted strept and anaerobes covered by zosyn  -surgery after revascularization  done with partial open wd  high risk of residual infection  all cultures reviewed  not concerned about the significance of the s cap[itus and corynebacterium        plan     change to Invanz as it is a once aday option  picc or mid line  plan home IV ab for another month or in rehab.

## 2022-01-19 NOTE — PROGRESS NOTE ADULT - SUBJECTIVE AND OBJECTIVE BOX
Podiatry pager #: [phone] ([place])/ [phone] ([facility])    Patient is a 61y old  Male who presents with a chief complaint of OM toe (19 Jan 2022 11:36)       INTERVAL HPI/OVERNIGHT EVENTS:  Patient seen and evaluated at bedside.  Pt is resting comfortable in NAD. Denies N/V/F/C.     Allergies    No Known Allergies    Intolerances        Vital Signs Last 24 Hrs  T(C): 36.7 (19 Jan 2022 04:17), Max: 37.1 (18 Jan 2022 20:22)  T(F): 98 (19 Jan 2022 04:17), Max: 98.8 (18 Jan 2022 20:22)  HR: 67 (19 Jan 2022 04:17) (67 - 71)  BP: 116/67 (19 Jan 2022 04:17) (116/67 - 124/69)  BP(mean): --  RR: 17 (19 Jan 2022 04:17) (17 - 18)  SpO2: 98% (19 Jan 2022 04:17) (96% - 98%)    LABS:                        11.0   8.29  )-----------( 507      ( 19 Jan 2022 06:53 )             35.3           PTT - ( 18 Jan 2022 21:04 )  PTT:37.8 sec    CAPILLARY BLOOD GLUCOSE          Lower Extremity Physical Exam:  Vascular: DP/PT 0/4, B/L, CFT <3 seconds B/L, Temperature gradient warm to cool, B/L.   Neuro: Epicritic sensation intact to the level of digits, B/L.  Musculoskeletal/Ortho: unremarkable  Skin:  s/p 1/14 L foot partial 5th ray resection, partially open, and R foot partial 2nd toe amputation closed, no pus, no drainage, no malodor    RADIOLOGY & ADDITIONAL TESTS:

## 2022-01-19 NOTE — PROGRESS NOTE ADULT - SUBJECTIVE AND OBJECTIVE BOX
CARDIOLOGY     PROGRESS  NOTE   ________________________________________________    CHIEF COMPLAINT:Patient is a 61y old  Male who presents with a chief complaint of OM toe (19 Jan 2022 08:12)  no complain.  	  REVIEW OF SYSTEMS:  CONSTITUTIONAL: No fever, weight loss, or fatigue  EYES: No eye pain, visual disturbances, or discharge  ENT:  No difficulty hearing, tinnitus, vertigo; No sinus or throat pain  NECK: No pain or stiffness  RESPIRATORY: No cough, wheezing, chills or hemoptysis; No Shortness of Breath  CARDIOVASCULAR: No chest pain, palpitations, passing out, dizziness, or leg swelling  GASTROINTESTINAL: No abdominal or epigastric pain. No nausea, vomiting, or hematemesis; No diarrhea or constipation. No melena or hematochezia.  GENITOURINARY: No dysuria, frequency, hematuria, or incontinence  NEUROLOGICAL: No headaches, memory loss, loss of strength, numbness, or tremors  SKIN: No itching, burning, rashes, or lesions   LYMPH Nodes: No enlarged glands  ENDOCRINE: No heat or cold intolerance; No hair loss  MUSCULOSKELETAL: No joint pain or swelling; No muscle, back, or extremity pain  PSYCHIATRIC: No depression, anxiety, mood swings, or difficulty sleeping  HEME/LYMPH: No easy bruising, or bleeding gums  ALLERGY AND IMMUNOLOGIC: No hives or eczema	    [ ] All others negative	  [ ] Unable to obtain    PHYSICAL EXAM:  T(C): 36.7 (01-19-22 @ 04:17), Max: 37.1 (01-18-22 @ 20:22)  HR: 67 (01-19-22 @ 04:17) (67 - 71)  BP: 116/67 (01-19-22 @ 04:17) (116/67 - 124/69)  RR: 17 (01-19-22 @ 04:17) (17 - 18)  SpO2: 98% (01-19-22 @ 04:17) (96% - 98%)  Wt(kg): --  I&O's Summary    18 Jan 2022 07:01  -  19 Jan 2022 07:00  --------------------------------------------------------  IN: 960 mL / OUT: 2350 mL / NET: -1390 mL    19 Jan 2022 07:01  -  19 Jan 2022 11:36  --------------------------------------------------------  IN: 240 mL / OUT: 0 mL / NET: 240 mL        Appearance: Normal	  HEENT:   Normal oral mucosa, PERRL, EOMI	  Lymphatic: No lymphadenopathy  Cardiovascular: Normal S1 S2, No JVD, +murmurs, No edema  Respiratory: Lungs clear to auscultation	  Psychiatry: A & O x 3, Mood & affect appropriate  Gastrointestinal:  Soft, Non-tender, + BS	  Skin: No rashes, No ecchymoses, No cyanosis	  Neurologic: Non-focal  Extremities: Normal range of motion, bandaged l foot  Vascular: Peripheral pulses palpable 2+ bilaterally    MEDICATIONS  (STANDING):  aspirin enteric coated 81 milliGRAM(s) Oral daily  atorvastatin 80 milliGRAM(s) Oral at bedtime  clopidogrel Tablet 75 milliGRAM(s) Oral daily  ertapenem  IVPB 1000 milliGRAM(s) IV Intermittent every 24 hours  ertapenem  IVPB      metoprolol tartrate 25 milliGRAM(s) Oral three times a day  nicotine - 21 mG/24Hr(s) Patch 1 patch Transdermal daily      TELEMETRY: 	    ECG:  	  RADIOLOGY:  OTHER: 	  	  LABS:	 	    CARDIAC MARKERS:                                11.0   8.29  )-----------( 507      ( 19 Jan 2022 06:53 )             35.3           proBNP:   Lipid Profile: Cholesterol 118  LDL --  HDL 25      HgA1c:   TSH:   PTT - ( 18 Jan 2022 21:04 )  PTT:37.8 sec      Assessment and plan  ---------------------------  OM of the toe with +mri  podiatry/ id appreciated continue IV abx  events noted today and discussed with NP, pt with tachycardia , no chest pain ecg ?st elevation in inferior leads, first trop negartive  new onset a fib/ flutter  asa  beta blocker  fu trop  has transferred to tele  will observe closely  trop/ cpk negative  continue iv heparin  s/p LAD stent  pt is clear for peripheral angio today  continue asa/plavix  increase beta blocker  social work consult  id appreciated  picc line dc with iv abx  converted to NSR , continue meds

## 2022-01-19 NOTE — PROGRESS NOTE ADULT - ASSESSMENT
61 M s/p 1/14 L foot partial 5th ray resection, partially open, and R foot partial 2nd toe amputation closed  - pt seen and evaluated  - afebrile, WBC 8.2  - no pus, no drainage, no malodor  - high concern for residual infection/ viability  - PICC line placed, long term abx per ID recs  - podiatry stable for discharge  - follow up information can be found in f/u section of provider d/c note  - will follow  - seen w/ attending

## 2022-01-19 NOTE — PROGRESS NOTE ADULT - ASSESSMENT
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 Patient is s/p b/l LE angiogram with R external iliac stent placement 1/12.     PLAN:  - Doing well from vasc surg standpoint   - ASA, Plavix  - Hep gtt  - C/w care per primary team  -woundcare and  d/c planning as per cardiology and podiatry     Fartun Solis, PGY-2  Vascular Surgery  #[id number]

## 2022-01-19 NOTE — PROGRESS NOTE ADULT - SUBJECTIVE AND OBJECTIVE BOX
infectious diseases progress note:    Patient is a 61y old  Male who presents with a chief complaint of OM toe (18 Jan 2022 12:18)        Other acute osteomyelitis, ankle and foot               Allergies    No Known Allergies    Intolerances        ANTIBIOTICS/RELEVANT:  antimicrobials  ertapenem  IVPB 1000 milliGRAM(s) IV Intermittent every 24 hours  ertapenem  IVPB        immunologic:    OTHER:  acetaminophen     Tablet .. 650 milliGRAM(s) Oral every 6 hours PRN  aspirin enteric coated 81 milliGRAM(s) Oral daily  atorvastatin 80 milliGRAM(s) Oral at bedtime  clopidogrel Tablet 75 milliGRAM(s) Oral daily  metoprolol tartrate 25 milliGRAM(s) Oral three times a day  morphine  - Injectable 2 milliGRAM(s) IV Push every 4 hours PRN  nicotine - 21 mG/24Hr(s) Patch 1 patch Transdermal daily  oxycodone    5 mG/acetaminophen 325 mG 1 Tablet(s) Oral every 4 hours PRN      Objective:  Vital Signs Last 24 Hrs  T(C): 36.7 (19 Jan 2022 04:17), Max: 37.1 (18 Jan 2022 20:22)  T(F): 98 (19 Jan 2022 04:17), Max: 98.8 (18 Jan 2022 20:22)  HR: 67 (19 Jan 2022 04:17) (64 - 71)  BP: 116/67 (19 Jan 2022 04:17) (116/67 - 125/56)  BP(mean): --  RR: 17 (19 Jan 2022 04:17) (17 - 18)  SpO2: 98% (19 Jan 2022 04:17) (96% - 98%)    PHYSICAL EXAM:  Constitutional:Well-developed, well nourished--no acute distress  Eyes:LELAND, EOMI  Ear/Nose/Throat: no oral lesion, no sinus tenderness on percussion	  Neck:no JVD, no lymphadenopathy, supple  Respiratory: CTA yas  Cardiovascular: S1S2 RRR, no murmurs  Gastrointestinal:soft, (+) BS, no HSM  Extremities:no e/e/c        LABS:                        11.0   8.29  )-----------( 507      ( 19 Jan 2022 06:53 )             35.3           PTT - ( 18 Jan 2022 21:04 )  PTT:37.8 sec        MICROBIOLOGY:    RECENT CULTURES:  01-15 @ 01:04 .Tissue RIGHT 2ND TOE DIRTY BONE   NAWAF    No polymorphonuclear cells seen per low power field  No organisms seen per oil power field    Staphylococcus caprae  Staphylococcus caprae     Rare Staphylococcus caprae  Rare Corynebacterium striatum group "Susceptibilities not performed"  Rare Corynebacterium amycolatum "Susceptibilities not performed"    01-15 @ 01:02 .Tissue RIUGHT 2ND TOE CLEAN BONE       No polymorphonuclear cells seen per low power field  No organisms seen per oil power field           Rare Corynebacterium species  "Susceptibilities not performed"    01-15 @ 01:00 .Tissue Other   NAWAF    No polymorphonuclear cells seen per low power field  No organisms seen per oil power field    Coag Negative Staphylococcus  Coag Negative Staphylococcus     Few Coag Negative Staphylococcus  Rare Corynebacterium species  "Susceptibilities not performed"          RESPIRATORY CULTURES:              RADIOLOGY & ADDITIONAL STUDIES:        Pager [phone]  After 5 pm/weekends or if no response :[phone]

## 2022-01-19 NOTE — OCCUPATIONAL THERAPY INITIAL EVALUATION ADULT - SHARP/DULL DISCRIMINATION, LLE, REHAB EVAL
Recommendations:  -	At this time, patient is resistant to changes in medication regimen. Recommend continuing current medications as prescribed with fall precautions given high dose of Klonopin.   -	Psychiatric team will attempt to f/u with OP psychiatrist. As per primary team outpatient psychiatrist would like to continue current medication regiment.   -	Pt is future-oriented, wants to go home, enjoys spending time with her cats and hopes to play her cello again. No SI currently, no psychiatric contraindication for discharge home with support from A given difficulty with ADLs.  
within normal limits

## 2022-01-19 NOTE — PROGRESS NOTE ADULT - SUBJECTIVE AND OBJECTIVE BOX
Patient is a 61y old  Male who presents with a chief complaint of OM toe (19 Jan 2022 12:38)      Vascular Surgery Attending Progress Note    Interval HPI: pt w/o new c/o     Medications:  acetaminophen     Tablet .. 650 milliGRAM(s) Oral every 6 hours PRN  aspirin enteric coated 81 milliGRAM(s) Oral daily  atorvastatin 80 milliGRAM(s) Oral at bedtime  clopidogrel Tablet 75 milliGRAM(s) Oral daily  ertapenem  IVPB 1000 milliGRAM(s) IV Intermittent every 24 hours  ertapenem  IVPB      metoprolol tartrate 25 milliGRAM(s) Oral three times a day  morphine  - Injectable 2 milliGRAM(s) IV Push every 4 hours PRN  nicotine - 21 mG/24Hr(s) Patch 1 patch Transdermal daily  oxycodone    5 mG/acetaminophen 325 mG 1 Tablet(s) Oral every 4 hours PRN      Vital Signs Last 24 Hrs  T(C): 36.9 (19 Jan 2022 20:32), Max: 36.9 (19 Jan 2022 12:43)  T(F): 98.4 (19 Jan 2022 20:32), Max: 98.4 (19 Jan 2022 12:43)  HR: 73 (19 Jan 2022 20:32) (67 - 73)  BP: 108/71 (19 Jan 2022 20:32) (108/71 - 116/71)  BP(mean): --  RR: 18 (19 Jan 2022 20:32) (17 - 18)  SpO2: 96% (19 Jan 2022 20:32) (96% - 98%)  I&O's Summary    18 Jan 2022 07:01  -  19 Jan 2022 07:00  --------------------------------------------------------  IN: 960 mL / OUT: 2350 mL / NET: -1390 mL    19 Jan 2022 07:01  -  19 Jan 2022 21:55  --------------------------------------------------------  IN: 660 mL / OUT: 350 mL / NET: 310 mL        Physical Exam:  Neuro  A&Ox3 VSS  Vascular:  yas foot dressing c/d/i  lle warm well perfused     LABS:                        11.0   8.29  )-----------( 507      ( 19 Jan 2022 06:53 )             35.3           PTT - ( 18 Jan 2022 21:04 )  PTT:37.8 sec    DM ELISE MD  [phone] Cell [phone]

## 2022-01-20 LAB
CULTURE RESULTS: SIGNIFICANT CHANGE UP
HCT VFR BLD CALC: 34.5 % — LOW (ref 39–50)
HGB BLD-MCNC: 10.9 G/DL — LOW (ref 13–17)
MCHC RBC-ENTMCNC: 27 PG — SIGNIFICANT CHANGE UP (ref 27–34)
MCHC RBC-ENTMCNC: 31.6 GM/DL — LOW (ref 32–36)
MCV RBC AUTO: 85.4 FL — SIGNIFICANT CHANGE UP (ref 80–100)
NRBC # BLD: 0 /100 WBCS — SIGNIFICANT CHANGE UP (ref 0–0)
ORGANISM # SPEC MICROSCOPIC CNT: SIGNIFICANT CHANGE UP
ORGANISM # SPEC MICROSCOPIC CNT: SIGNIFICANT CHANGE UP
PLATELET # BLD AUTO: 533 K/UL — HIGH (ref 150–400)
RBC # BLD: 4.04 M/UL — LOW (ref 4.2–5.8)
RBC # FLD: 14.5 % — SIGNIFICANT CHANGE UP (ref 10.3–14.5)
SARS-COV-2 RNA SPEC QL NAA+PROBE: SIGNIFICANT CHANGE UP
SPECIMEN SOURCE: SIGNIFICANT CHANGE UP
WBC # BLD: 9.1 K/UL — SIGNIFICANT CHANGE UP (ref 3.8–10.5)
WBC # FLD AUTO: 9.1 K/UL — SIGNIFICANT CHANGE UP (ref 3.8–10.5)

## 2022-01-20 PROCEDURE — 99232 SBSQ HOSP IP/OBS MODERATE 35: CPT

## 2022-01-20 RX ORDER — ASPIRIN/CALCIUM CARB/MAGNESIUM 324 MG
1 TABLET ORAL
Qty: 0 | Refills: 0 | DISCHARGE
Start: 2022-01-20

## 2022-01-20 RX ORDER — ATORVASTATIN CALCIUM 80 MG/1
1 TABLET, FILM COATED ORAL
Qty: 0 | Refills: 0 | DISCHARGE
Start: 2022-01-20

## 2022-01-20 RX ORDER — CLOPIDOGREL BISULFATE 75 MG/1
1 TABLET, FILM COATED ORAL
Qty: 0 | Refills: 0 | DISCHARGE
Start: 2022-01-20

## 2022-01-20 RX ORDER — METOPROLOL TARTRATE 50 MG
1 TABLET ORAL
Qty: 0 | Refills: 0 | DISCHARGE
Start: 2022-01-20

## 2022-01-20 RX ORDER — ACETAMINOPHEN 500 MG
2 TABLET ORAL
Qty: 0 | Refills: 0 | DISCHARGE
Start: 2022-01-20

## 2022-01-20 RX ORDER — CHLORHEXIDINE GLUCONATE 213 G/1000ML
1 SOLUTION TOPICAL
Refills: 0 | Status: DISCONTINUED | OUTPATIENT
Start: 2022-01-20 | End: 2022-01-25

## 2022-01-20 RX ORDER — ERTAPENEM SODIUM 1 G/1
1 INJECTION, POWDER, LYOPHILIZED, FOR SOLUTION INTRAMUSCULAR; INTRAVENOUS
Qty: 0 | Refills: 0 | DISCHARGE
Start: 2022-01-20

## 2022-01-20 RX ADMIN — Medication 1 PATCH: at 11:17

## 2022-01-20 RX ADMIN — Medication 25 MILLIGRAM(S): at 06:14

## 2022-01-20 RX ADMIN — Medication 25 MILLIGRAM(S): at 13:21

## 2022-01-20 RX ADMIN — Medication 1 PATCH: at 06:23

## 2022-01-20 RX ADMIN — Medication 81 MILLIGRAM(S): at 11:15

## 2022-01-20 RX ADMIN — CHLORHEXIDINE GLUCONATE 1 APPLICATION(S): 213 SOLUTION TOPICAL at 15:58

## 2022-01-20 RX ADMIN — Medication 1 PATCH: at 19:57

## 2022-01-20 RX ADMIN — ERTAPENEM SODIUM 120 MILLIGRAM(S): 1 INJECTION, POWDER, LYOPHILIZED, FOR SOLUTION INTRAMUSCULAR; INTRAVENOUS at 10:49

## 2022-01-20 RX ADMIN — OXYCODONE AND ACETAMINOPHEN 1 TABLET(S): 5; 325 TABLET ORAL at 06:19

## 2022-01-20 RX ADMIN — OXYCODONE AND ACETAMINOPHEN 1 TABLET(S): 5; 325 TABLET ORAL at 07:20

## 2022-01-20 RX ADMIN — Medication 1 PATCH: at 11:20

## 2022-01-20 RX ADMIN — OXYCODONE AND ACETAMINOPHEN 1 TABLET(S): 5; 325 TABLET ORAL at 12:15

## 2022-01-20 RX ADMIN — ATORVASTATIN CALCIUM 80 MILLIGRAM(S): 80 TABLET, FILM COATED ORAL at 21:41

## 2022-01-20 RX ADMIN — CLOPIDOGREL BISULFATE 75 MILLIGRAM(S): 75 TABLET, FILM COATED ORAL at 11:15

## 2022-01-20 RX ADMIN — OXYCODONE AND ACETAMINOPHEN 1 TABLET(S): 5; 325 TABLET ORAL at 11:15

## 2022-01-20 NOTE — PROGRESS NOTE ADULT - ASSESSMENT
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 Patient is s/p b/l LE angiogram with R external iliac stent placement 1/12.     PLAN:  - Doing well from vasc surg standpoint   - ASA, Plavix  - Hep gtt  - C/w care per primary team  -woundcare and  d/c planning as per cardiology and podiatry     Fartun Solis, PGY-2  Vascular Surgery  #[id number]

## 2022-01-20 NOTE — PROGRESS NOTE ADULT - SUBJECTIVE AND OBJECTIVE BOX
THANK YOU FROM YOUR CARE TEAM    Our staff would like to THANK YOU for choosing [facility]. Our goal is to always provide you with the best of care and we continue to look for better ways to improve the services we provide.     You may receive a survey in the mail with questions specific to your encounter with our clinic. Should you receive a survey, please take a few minutes to rate your experience.   Our providers and staff value your feedback.  Thank you in advance for your time and participation.     We appreciate the opportunity to partner with you to meet your health care needs. THANK YOU, again, for choosing us to be your care team.     Medical Assistant: Gladis  Provider: Kendall Velasquez MD  Care Coordinator:  TOO Hernández    [facility]  [address], [address]  [place] [address]  Phone:  [phone]  Fax:  [phone]    Sandra Omer, Manager Clinic operations  [email or web address]     [phone]                                            ...        Olya Campos    DURING YOUR APPOINTMENT TODAY    Please review the following instructions carefully for the next steps in your care.         DURING YOUR APPOINTMENT TODAY    Please review the following instructions carefully for the next steps in your care.           X-Ray  X-Ray can be done today, you do not need an appointment.  • Location: [facility], Medical Office Building [address].  [address], check-in on the computer at the desk.    • X-Ray hours: 8:00 am - 4:00 pm.   • Phone: [phone]      If you are not able to have your X-Ray done today, you may have them done at any [facility] department.  Please have these done within a few days so that your provider can review them.     RESULTS  The results will be reviewed during your follow up visit.      If there are unexpected findings that change your plan of care, you will be notified.                  INJECTION    DAY  OF PROCEDURE    Diet  • It is required that you do not eat or drink for 2 hours prior to the actual injection.   (except sips of water for taking routine medication)  • If your injection is in the morning you may eat a very early light breakfast at least 2 hours prior to the injection.    Medications on the day of procedure  • Take your usual medications with a sip of water.   • Make sure you take your blood pressure and/or heart medicines.  • If your blood pressure is too high on the day of your procedure, your procedure may need to be rescheduled.    IMPORTANT  • You will need a  for the procedure  • Arrive 30 minutes prior to your scheduled procedure  • Plan for approximately 2½ hours     OTHER POSSIBLE CONSIDERATIONS    Diabetes  • Your blood sugar needs to be controlled and below 250 on the day of your procedure.  If your sugars are too high, your procedure may need to be rescheduled.  • You may be asked to check your blood sugars for a couple days prior to the injection appointment.  • Be aware that if a steroid is used for your injection, it may cause your blood sugar to elevate following the procedure.   • If you have concerns, you may contact the doctor managing your diabetes.    Allergies to PREDNISONE (Steroid)  • You may need to be premedicated prior to the procedure.  o You may be instructed to take medicine before arriving to the hospital  ORAL BENADRYL  or   o You may be given medicine by an IV right before your procedure  • In some cases, a different type of contrast may be used, and pre-medications may not be needed.  • Make sure to tell the procedure nurse about your allergies.    Some procedures may require an IV       You were given a copy of the [facility]'s teaching sheet:  \"General Guideline's About Epidural and Other Spinal Injection, fasting.\"      LOCATION:    Ambulatory Services/Pain Management Center  [facility] entrance  [address] [place]  [address]    • Enter off [address] [place] [address] entrance next to the ER  • Follow the main hallway straight ahead to the glass elevator  • Ambulatory Services/Pain Management will be on the left  • Check in at the Pain Management Desk          MEDICAL EQUIPMENT/SUPPLIES   BACK BRACE   A brace for your back has been ordered today. Our supplier, [employer], will be contacting you to make arrangements to fit you for the brace. Their number is [phone].  • We recommend wearing the brace for comfort BUT restrict daily/continuous use  • Wearing it too much can weaken your core muscles and increase your pain                       Steroids and COVID Vaccine Information  This information may or not pertain to your visit today    If you need to take an oral steroid, schedule a steroid injection and are getting the COVID vaccine:    • It is unclear if there is any impact with steroid use and the effectiveness of the COVID vaccine  • Based on that, our suggestion is to space out the vaccine and the steroids    • Pfizer and Moderna Vaccine (2 doses):  Allow 2 weeks before and 1 week after the COVID vaccine before having any steroid    • Florencio & Florencio (1 dose):  Allow 2 weeks before and 2 weeks after the COVID vaccine before having any steroid          Patient Responsibility - Insurance Disclaimer  Insurance Disclaimer: A quote of benefits and/or authorization does not guarantee payment or verify eligibility. Payment of benefits are subject to all terms, conditions, limitations, and exclusions of the member's contract at time of service.   Insurance Liability for Payment: Your health insurance company will only pay for services that it determines to be “reasonable and necessary.” Every effort will be made by this office to have all services and procedures preauthorized by your health insurance company, when applicable. If your health insurance company determines that a service is not reasonable and  necessary, or that a service is not covered under the plan, your insurer will deny payment for that service. We suggest to all patients that they contact their insurance to confirm that these services are covered.     Patient is a 61y old  Male who presents with a chief complaint of OM toe (19 Jan 2022 21:54)      Vascular Surgery Attending Progress Note    Interval HPI: pt w/o new c/o     Medications:  acetaminophen     Tablet .. 650 milliGRAM(s) Oral every 6 hours PRN  aspirin enteric coated 81 milliGRAM(s) Oral daily  atorvastatin 80 milliGRAM(s) Oral at bedtime  clopidogrel Tablet 75 milliGRAM(s) Oral daily  ertapenem  IVPB 1000 milliGRAM(s) IV Intermittent every 24 hours  ertapenem  IVPB      metoprolol tartrate 25 milliGRAM(s) Oral three times a day  morphine  - Injectable 2 milliGRAM(s) IV Push every 4 hours PRN  nicotine - 21 mG/24Hr(s) Patch 1 patch Transdermal daily  oxycodone    5 mG/acetaminophen 325 mG 1 Tablet(s) Oral every 4 hours PRN      Vital Signs Last 24 Hrs  T(C): 36.4 (20 Jan 2022 04:39), Max: 36.9 (19 Jan 2022 12:43)  T(F): 97.6 (20 Jan 2022 04:39), Max: 98.4 (19 Jan 2022 12:43)  HR: 74 (20 Jan 2022 06:15) (68 - 74)  BP: 131/75 (20 Jan 2022 06:15) (108/71 - 131/75)  BP(mean): --  RR: 18 (20 Jan 2022 04:39) (18 - 18)  SpO2: 98% (20 Jan 2022 04:39) (96% - 98%)  I&O's Summary    19 Jan 2022 07:01  -  20 Jan 2022 07:00  --------------------------------------------------------  IN: 660 mL / OUT: 750 mL / NET: -90 mL        Physical Exam:  Vascular:  no acute changes    LABS:                        11.0   8.29  )-----------( 507      ( 19 Jan 2022 06:53 )             35.3           PTT - ( 18 Jan 2022 21:04 )  PTT:37.8 sec    DM ELISE MD  [phone] Cell [phone]

## 2022-01-20 NOTE — PROGRESS NOTE ADULT - SUBJECTIVE AND OBJECTIVE BOX
CARDIOLOGY     PROGRESS  NOTE   ________________________________________________    CHIEF COMPLAINT:Patient is a 61y old  Male who presents with a chief complaint of OM toe (20 Jan 2022 07:51)  no complain.  	  REVIEW OF SYSTEMS:  CONSTITUTIONAL: No fever, weight loss, or fatigue  EYES: No eye pain, visual disturbances, or discharge  ENT:  No difficulty hearing, tinnitus, vertigo; No sinus or throat pain  NECK: No pain or stiffness  RESPIRATORY: No cough, wheezing, chills or hemoptysis; No Shortness of Breath  CARDIOVASCULAR: No chest pain, palpitations, passing out, dizziness, or leg swelling  GASTROINTESTINAL: No abdominal or epigastric pain. No nausea, vomiting, or hematemesis; No diarrhea or constipation. No melena or hematochezia.  GENITOURINARY: No dysuria, frequency, hematuria, or incontinence  NEUROLOGICAL: No headaches, memory loss, loss of strength, numbness, or tremors  SKIN: No itching, burning, rashes, or lesions   LYMPH Nodes: No enlarged glands  ENDOCRINE: No heat or cold intolerance; No hair loss  MUSCULOSKELETAL: No joint pain or swelling; No muscle, back, or extremity pain  PSYCHIATRIC: No depression, anxiety, mood swings, or difficulty sleeping  HEME/LYMPH: No easy bruising, or bleeding gums  ALLERGY AND IMMUNOLOGIC: No hives or eczema	    [ ] All others negative	  [ ] Unable to obtain    PHYSICAL EXAM:  T(C): 36.4 (01-20-22 @ 04:39), Max: 36.9 (01-19-22 @ 12:43)  HR: 74 (01-20-22 @ 06:15) (68 - 74)  BP: 131/75 (01-20-22 @ 06:15) (108/71 - 131/75)  RR: 18 (01-20-22 @ 04:39) (18 - 18)  SpO2: 98% (01-20-22 @ 04:39) (96% - 98%)  Wt(kg): --  I&O's Summary    19 Jan 2022 07:01  -  20 Jan 2022 07:00  --------------------------------------------------------  IN: 660 mL / OUT: 750 mL / NET: -90 mL        Appearance: Normal	  HEENT:   Normal oral mucosa, PERRL, EOMI	  Lymphatic: No lymphadenopathy  Cardiovascular: Normal S1 S2, No JVD, + murmurs, No edema  Respiratory: Lungs clear to auscultation	  Psychiatry: A & O x 3, Mood & affect appropriate  Gastrointestinal:  Soft, Non-tender, + BS	  Skin: No rashes, No ecchymoses, No cyanosis	  Neurologic: Non-focal  Extremities: Normal range of motion, No clubbing, cyanosis or edema  Vascular: Peripheral pulses palpable 2+ bilaterally    MEDICATIONS  (STANDING):  aspirin enteric coated 81 milliGRAM(s) Oral daily  atorvastatin 80 milliGRAM(s) Oral at bedtime  clopidogrel Tablet 75 milliGRAM(s) Oral daily  ertapenem  IVPB 1000 milliGRAM(s) IV Intermittent every 24 hours  ertapenem  IVPB      metoprolol tartrate 25 milliGRAM(s) Oral three times a day  nicotine - 21 mG/24Hr(s) Patch 1 patch Transdermal daily      TELEMETRY: 	    ECG:  	  RADIOLOGY:  OTHER: 	  	  LABS:	 	    CARDIAC MARKERS:                                10.9   9.10  )-----------( 533      ( 20 Jan 2022 07:44 )             34.5           proBNP:   Lipid Profile: Cholesterol 118  LDL --  HDL 25      HgA1c:   TSH:   PTT - ( 18 Jan 2022 21:04 )  PTT:37.8 sec      Assessment and plan  ---------------------------  OM of the toe with +mri  podiatry/ id appreciated continue IV abx  events noted today and discussed with NP, pt with tachycardia , no chest pain ecg ?st elevation in inferior leads, first trop negartive  new onset a fib/ flutter  asa  beta blocker  fu trop  has transferred to tele  will observe closely  trop/ cpk negative  continue iv heparin  s/p LAD stent  pt is clear for peripheral angio today  continue asa/plavix  increase beta blocker  social work consult  id appreciated  picc line dc with iv abx  converted to NSR , continue meds  dfc planning

## 2022-01-21 PROCEDURE — 99232 SBSQ HOSP IP/OBS MODERATE 35: CPT

## 2022-01-21 RX ORDER — BNT162B2 0.23 MG/2.25ML
0.3 INJECTION, SUSPENSION INTRAMUSCULAR ONCE
Refills: 0 | Status: COMPLETED | OUTPATIENT
Start: 2022-01-21 | End: 2022-01-21

## 2022-01-21 RX ORDER — JNJ-78436735 50000000000 [PFU]/.5ML
0.5 SUSPENSION INTRAMUSCULAR ONCE
Refills: 0 | Status: DISCONTINUED | OUTPATIENT
Start: 2022-01-21 | End: 2022-01-21

## 2022-01-21 RX ADMIN — BNT162B2 0.3 MILLILITER(S): 0.23 INJECTION, SUSPENSION INTRAMUSCULAR at 14:48

## 2022-01-21 RX ADMIN — Medication 1 PATCH: at 11:29

## 2022-01-21 RX ADMIN — Medication 1 PATCH: at 08:18

## 2022-01-21 RX ADMIN — CLOPIDOGREL BISULFATE 75 MILLIGRAM(S): 75 TABLET, FILM COATED ORAL at 11:27

## 2022-01-21 RX ADMIN — ERTAPENEM SODIUM 120 MILLIGRAM(S): 1 INJECTION, POWDER, LYOPHILIZED, FOR SOLUTION INTRAMUSCULAR; INTRAVENOUS at 08:21

## 2022-01-21 RX ADMIN — Medication 25 MILLIGRAM(S): at 21:54

## 2022-01-21 RX ADMIN — Medication 81 MILLIGRAM(S): at 11:28

## 2022-01-21 RX ADMIN — OXYCODONE AND ACETAMINOPHEN 1 TABLET(S): 5; 325 TABLET ORAL at 08:37

## 2022-01-21 RX ADMIN — Medication 25 MILLIGRAM(S): at 13:06

## 2022-01-21 RX ADMIN — ATORVASTATIN CALCIUM 80 MILLIGRAM(S): 80 TABLET, FILM COATED ORAL at 21:53

## 2022-01-21 RX ADMIN — Medication 25 MILLIGRAM(S): at 05:28

## 2022-01-21 RX ADMIN — OXYCODONE AND ACETAMINOPHEN 1 TABLET(S): 5; 325 TABLET ORAL at 09:10

## 2022-01-21 RX ADMIN — Medication 1 PATCH: at 19:00

## 2022-01-21 RX ADMIN — Medication 1 PATCH: at 11:28

## 2022-01-21 RX ADMIN — CHLORHEXIDINE GLUCONATE 1 APPLICATION(S): 213 SOLUTION TOPICAL at 05:30

## 2022-01-21 NOTE — PROGRESS NOTE ADULT - SUBJECTIVE AND OBJECTIVE BOX
CARDIOLOGY     PROGRESS  NOTE   ________________________________________________    CHIEF COMPLAINT:Patient is a 61y old  Male who presents with a chief complaint of OM toe (21 Jan 2022 08:40)  no complain.  	  REVIEW OF SYSTEMS:  CONSTITUTIONAL: No fever, weight loss, or fatigue  EYES: No eye pain, visual disturbances, or discharge  ENT:  No difficulty hearing, tinnitus, vertigo; No sinus or throat pain  NECK: No pain or stiffness  RESPIRATORY: No cough, wheezing, chills or hemoptysis; No Shortness of Breath  CARDIOVASCULAR: No chest pain, palpitations, passing out, dizziness, or leg swelling  GASTROINTESTINAL: No abdominal or epigastric pain. No nausea, vomiting, or hematemesis; No diarrhea or constipation. No melena or hematochezia.  GENITOURINARY: No dysuria, frequency, hematuria, or incontinence  NEUROLOGICAL: No headaches, memory loss, loss of strength, numbness, or tremors  SKIN: No itching, burning, rashes, or lesions   LYMPH Nodes: No enlarged glands  ENDOCRINE: No heat or cold intolerance; No hair loss  MUSCULOSKELETAL: No joint pain or swelling; No muscle, back, or extremity pain  PSYCHIATRIC: No depression, anxiety, mood swings, or difficulty sleeping  HEME/LYMPH: No easy bruising, or bleeding gums  ALLERGY AND IMMUNOLOGIC: No hives or eczema	    [ ] All others negative	  [ ] Unable to obtain    PHYSICAL EXAM:  T(C): 36.6 (01-21-22 @ 04:40), Max: 37 (01-20-22 @ 12:12)  HR: 75 (01-21-22 @ 08:37) (58 - 77)  BP: 110/68 (01-21-22 @ 08:37) (103/63 - 128/72)  RR: 18 (01-21-22 @ 08:37) (17 - 18)  SpO2: 96% (01-21-22 @ 08:37) (95% - 98%)  Wt(kg): --  I&O's Summary    20 Jan 2022 07:01  -  21 Jan 2022 07:00  --------------------------------------------------------  IN: 480 mL / OUT: 850 mL / NET: -370 mL    21 Jan 2022 07:01  -  21 Jan 2022 09:19  --------------------------------------------------------  IN: 240 mL / OUT: 0 mL / NET: 240 mL        Appearance: Normal	  HEENT:   Normal oral mucosa, PERRL, EOMI	  Lymphatic: No lymphadenopathy  Cardiovascular: Normal S1 S2, No JVD, + murmurs, No edema  Respiratory: Lungs clear to auscultation	  Psychiatry: A & O x 3, Mood & affect appropriate  Gastrointestinal:  Soft, Non-tender, + BS	  Skin: No rashes, No ecchymoses, No cyanosis	  Neurologic: Non-focal  Extremities: Normal range of motion, No clubbing, cyanosis or edema  Vascular: Peripheral pulses palpable 2+ bilaterally    MEDICATIONS  (STANDING):  aspirin enteric coated 81 milliGRAM(s) Oral daily  atorvastatin 80 milliGRAM(s) Oral at bedtime  chlorhexidine 2% Cloths 1 Application(s) Topical <User Schedule>  clopidogrel Tablet 75 milliGRAM(s) Oral daily  ertapenem  IVPB      ertapenem  IVPB 1000 milliGRAM(s) IV Intermittent every 24 hours  metoprolol tartrate 25 milliGRAM(s) Oral three times a day  nicotine - 21 mG/24Hr(s) Patch 1 patch Transdermal daily      TELEMETRY: 	    ECG:  	  RADIOLOGY:  OTHER: 	  	  LABS:	 	    CARDIAC MARKERS:                                10.9   8.82  )-----------( 488      ( 21 Jan 2022 06:27 )             34.5           proBNP:   Lipid Profile: Cholesterol 118  LDL --  HDL 25      HgA1c:   TSH:         Assessment and plan  ---------------------------  OM of the toe with +mri  podiatry/ id appreciated continue IV abx  events noted today and discussed with NP, pt with tachycardia , no chest pain ecg ?st elevation in inferior leads, first trop negartive  new onset a fib/ flutter  asa  beta blocker  fu trop  has transferred to tele  will observe closely  trop/ cpk negative  continue iv heparin  s/p LAD stent  pt is clear for peripheral angio today  continue asa/plavix  increase beta blocker  social work consult  id appreciated  picc line dc with iv abx  converted to NSR , continue meds  dfc planning

## 2022-01-21 NOTE — PROGRESS NOTE ADULT - PROBLEM SELECTOR PLAN 2
I Kian Dillon MD have personally  seen and examined the patient today and have noted the findings and formulated the plan of care.  I Kian Dillon MD have personally seen and examined the patient at bedside today at  8am
I Kian Dillon MD have personally  seen and examined the patient today and have noted the findings and formulated the plan of care.  I Kian Dillon MD have personally seen and examined the patient at bedside today at  7pm
I Kian Dillon MD have personally  seen and examined the patient today and have noted the findings and formulated the plan of care.  I Kian Dillon MD have personally seen and examined the patient at bedside today at  2 pm
I Kian Dillon MD have seen and examined the patient today and agree with  the  evaluation, assessment and plan of the surgical house officer  ROHINI Dillon MD have personally seen and examined the patient at bedside today at 7pm
I Kian Dillon MD have seen and examined the patient today and agree with  the  evaluation, assessment and plan of the surgical house officer  ROHINI Dillon MD have personally seen and examined the patient at bedside today at 8am
I Kian Dillon MD have seen and examined the patient today and agree with  the  evaluation, assessment and plan of the surgical house officer  ROHINI Dillon MD have personally seen and examined the patient at bedside today at 8am
I Kian Dillon MD have seen and examined the patient today and agree with  the  evaluation, assessment and plan of the surgical house officer  ROHINI Dillon MD have personally seen and examined the patient at bedside today at 7pm
I Kian Dillon MD have personally  seen and examined the patient today and have noted the findings and formulated the plan of care.  I Kian Dillon MD have personally seen and examined the patient at bedside today at  2 pm
I Kian Dillon MD have seen and examined the patient today and agree with  the  evaluation, assessment and plan of the surgical house officer  ROHINI Dillon MD have personally seen and examined the patient at bedside today at 8am
I Kian Dillon MD have personally  seen and examined the patient today and have noted the findings and formulated the plan of care.  I Kian Dillon MD have personally seen and examined the patient at bedside today at  11am
I Kian Dillon MD have seen and examined the patient today and agree with  the  evaluation, assessment and plan of the surgical house officer  ROHINI Dillon MD have personally seen and examined the patient at bedside today at 5  pm

## 2022-01-21 NOTE — PROGRESS NOTE ADULT - PROBLEM SELECTOR PLAN 1
I Kian Dillon MD have seen and examined the patient today and agree with  the  evaluation, assessment and plan of the surgical house officer  ROHINI Dillon MD have personally seen and examined the patient at bedside today at 7pm
I Kian Dillon MD have personally  seen and examined the patient today and have noted the findings and formulated the plan of care.  I Kian Dillon MD have personally seen and examined the patient at bedside today at  2 pm
I Kian Dillon MD have seen and examined the patient today and agree with  the  evaluation, assessment and plan of the surgical house officer  ROHINI Dillon MD have personally seen and examined the patient at bedside today at 5  pm
I Kian Dillon MD have personally  seen and examined the patient today and have noted the findings and formulated the plan of care.  I Kian Dillon MD have personally seen and examined the patient at bedside today at  8am
I Kian Dillon MD have personally  seen and examined the patient today and have noted the findings and formulated the plan of care.  I Kian Dillon MD have personally seen and examined the patient at bedside today at  11am
I Kian Dillon MD have personally  seen and examined the patient today and have noted the findings and formulated the plan of care.  I Kian Dillon MD have personally seen and examined the patient at bedside today at  2 pm
I Kian Dillon MD have personally  seen and examined the patient today and have noted the findings and formulated the plan of care.  I Kian Dillon MD have personally seen and examined the patient at bedside today at  7pm
I Kian Dillon MD have seen and examined the patient today and agree with  the  evaluation, assessment and plan of the surgical house officer  ROHINI Dillon MD have personally seen and examined the patient at bedside today at 8am
I Kian Dillon MD have seen and examined the patient today and agree with  the  evaluation, assessment and plan of the surgical house officer  ROHINI Dillon MD have personally seen and examined the patient at bedside today at 8am
I Kian Dillon MD have seen and examined the patient today and agree with  the  evaluation, assessment and plan of the surgical house officer  ROHINI Dillon MD have personally seen and examined the patient at bedside today at 7pm
I Kian Dillon MD have seen and examined the patient today and agree with  the  evaluation, assessment and plan of the surgical house officer  ROHINI Dillon MD have personally seen and examined the patient at bedside today at 8am

## 2022-01-21 NOTE — PROGRESS NOTE ADULT - SUBJECTIVE AND OBJECTIVE BOX
Patient is a 61y old  Male who presents with a chief complaint of OM toe (21 Jan 2022 09:18)       INTERVAL HPI/OVERNIGHT EVENTS:  Patient seen and evaluated at bedside.  Pt is resting comfortable in NAD. Denies N/V/F/C.    Allergies    No Known Allergies    Intolerances        Vital Signs Last 24 Hrs  T(C): 36.6 (21 Jan 2022 04:40), Max: 37 (20 Jan 2022 12:12)  T(F): 97.9 (21 Jan 2022 04:40), Max: 98.6 (20 Jan 2022 12:12)  HR: 75 (21 Jan 2022 08:37) (58 - 77)  BP: 110/68 (21 Jan 2022 08:37) (103/63 - 128/72)  BP(mean): --  RR: 18 (21 Jan 2022 08:37) (17 - 18)  SpO2: 96% (21 Jan 2022 08:37) (95% - 98%)    LABS:                        10.9   8.82  )-----------( 488      ( 21 Jan 2022 06:27 )             34.5               CAPILLARY BLOOD GLUCOSE          Lower Extremity Physical Exam:  Vascular: DP/PT 0/4, B/L, CFT <3 seconds B/L, Temperature gradient warm to cool, B/L.   Neuro: Epicritic sensation intact to the level of digits, B/L.  Musculoskeletal/Ortho: unremarkable  Skin:  s/p 1/14 L foot partial 5th ray resection, partially open, and R foot partial 2nd toe amputation closed, no pus, no drainage, no malodor  RADIOLOGY & ADDITIONAL TESTS:

## 2022-01-21 NOTE — PROGRESS NOTE ADULT - SUBJECTIVE AND OBJECTIVE BOX
infectious diseases progress note:    Patient is a 61y old  Male who presents with a chief complaint of OM toe (20 Jan 2022 09:23)        Other acute osteomyelitis, ankle and foot             Allergies    No Known Allergies    Intolerances        ANTIBIOTICS/RELEVANT:  antimicrobials  ertapenem  IVPB      ertapenem  IVPB 1000 milliGRAM(s) IV Intermittent every 24 hours    immunologic:    OTHER:  acetaminophen     Tablet .. 650 milliGRAM(s) Oral every 6 hours PRN  aspirin enteric coated 81 milliGRAM(s) Oral daily  atorvastatin 80 milliGRAM(s) Oral at bedtime  chlorhexidine 2% Cloths 1 Application(s) Topical <User Schedule>  clopidogrel Tablet 75 milliGRAM(s) Oral daily  metoprolol tartrate 25 milliGRAM(s) Oral three times a day  morphine  - Injectable 2 milliGRAM(s) IV Push every 4 hours PRN  nicotine - 21 mG/24Hr(s) Patch 1 patch Transdermal daily  oxycodone    5 mG/acetaminophen 325 mG 1 Tablet(s) Oral every 4 hours PRN      Objective:  Vital Signs Last 24 Hrs  T(C): 36.6 (21 Jan 2022 04:40), Max: 37 (20 Jan 2022 12:12)  T(F): 97.9 (21 Jan 2022 04:40), Max: 98.6 (20 Jan 2022 12:12)  HR: 75 (21 Jan 2022 08:37) (58 - 77)  BP: 110/68 (21 Jan 2022 08:37) (103/63 - 128/72)  BP(mean): --  RR: 18 (21 Jan 2022 08:37) (17 - 18)  SpO2: 96% (21 Jan 2022 08:37) (95% - 98%)       Eyes:LE[place], EOMI  Ear/Nose/Throat: no oral lesion, no sinus tenderness on percussion	  Neck:no JVD, no lymphadenopathy, supple  Respiratory: CTA yas  Cardiovascular: S1S2 RRR, no murmurs  Gastrointestinal:soft, (+) BS, no HSM  Extremities:no e/e/c        LABS:                        10.9   8.82  )-----------( 488      ( 21 Jan 2022 06:27 )             34.5                   MICROBIOLOGY:    RECENT CULTURES:  01-15 @ 01:04 .Tissue RIGHT 2ND TOE DIRTY BONE   NAWAF    No polymorphonuclear cells seen per low power field  No organisms seen per oil power field    Staphylococcus caprae  Staphylococcus caprae     Rare Staphylococcus caprae  Rare Corynebacterium striatum group "Susceptibilities not performed"  Rare Corynebacterium amycolatum "Susceptibilities not performed"    01-15 @ 01:02 .Tissue RIUGHT 2ND TOE CLEAN BONE       No polymorphonuclear cells seen per low power field  No organisms seen per oil power field           Rare Corynebacterium species  "Susceptibilities not performed"    01-15 @ 01:00 .Tissue Other   NAWAF    No polymorphonuclear cells seen per low power field  No organisms seen per oil power field    Coag Negative Staphylococcus  Coag Negative Staphylococcus     Few Coag Negative Staphylococcus  Rare Corynebacterium species  "Susceptibilities not performed"          RESPIRATORY CULTURES:              RADIOLOGY & ADDITIONAL STUDIES:        Pager [phone]  After 5 pm/weekends or if no response :[phone]

## 2022-01-21 NOTE — PROGRESS NOTE ADULT - SUBJECTIVE AND OBJECTIVE BOX
Patient is a 61y old  Male who presents with a chief complaint of OM toe (21 Jan 2022 09:47)      Vascular Surgery Attending Progress Note    Interval HPI: pt w/o c/o     Medications:  acetaminophen     Tablet .. 650 milliGRAM(s) Oral every 6 hours PRN  aspirin enteric coated 81 milliGRAM(s) Oral daily  atorvastatin 80 milliGRAM(s) Oral at bedtime  chlorhexidine 2% Cloths 1 Application(s) Topical <User Schedule>  clopidogrel Tablet 75 milliGRAM(s) Oral daily  coronavirus Vaccine (PFIZER) 0.3 milliLiter(s) IntraMuscular once  ertapenem  IVPB      ertapenem  IVPB 1000 milliGRAM(s) IV Intermittent every 24 hours  metoprolol tartrate 25 milliGRAM(s) Oral three times a day  morphine  - Injectable 2 milliGRAM(s) IV Push every 4 hours PRN  nicotine - 21 mG/24Hr(s) Patch 1 patch Transdermal daily  oxycodone    5 mG/acetaminophen 325 mG 1 Tablet(s) Oral every 4 hours PRN      Vital Signs Last 24 Hrs  T(C): 36.6 (21 Jan 2022 13:40), Max: 36.9 (20 Jan 2022 20:52)  T(F): 97.9 (21 Jan 2022 13:40), Max: 98.5 (20 Jan 2022 20:52)  HR: 72 (21 Jan 2022 13:40) (58 - 77)  BP: 105/70 (21 Jan 2022 13:40) (105/70 - 128/72)  BP(mean): --  RR: 18 (21 Jan 2022 13:40) (17 - 18)  SpO2: 99% (21 Jan 2022 13:40) (95% - 99%)  I&O's Summary    20 Jan 2022 07:01  -  21 Jan 2022 07:00  --------------------------------------------------------  IN: 480 mL / OUT: 850 mL / NET: -370 mL    21 Jan 2022 07:01  -  21 Jan 2022 14:07  --------------------------------------------------------  IN: 480 mL / OUT: 0 mL / NET: 480 mL        Physical Exam:  Neuro  A&Ox3 VSS  Vascular:  yas foot dressing c/d/i    LABS:                        10.9   8.82  )-----------( 488      ( 21 Jan 2022 06:27 )             34.5               DM ELISE MD  [phone] Cell [phone]

## 2022-01-21 NOTE — PROGRESS NOTE ADULT - PROBLEM SELECTOR PROBLEM 2
Osteomyelitis of great toe

## 2022-01-21 NOTE — PROGRESS NOTE ADULT - ASSESSMENT
61 M s/p 1/14 L foot partial 5th ray resection, partially open, and R foot partial 2nd toe amputation closed  - pt seen and evaluated  - afebrile, no leukocytosis  - s/p 1/14 L foot partial 5th ray resection, partially open, and R foot partial 2nd toe amputation closed, no pus, no drainage, no malodor  - high concern for residual infection/ viability  - PICC line placed, long term abx per ID recs  - podiatry stable for discharge  - follow up information can be found in f/u section of provider d/c note  - will follow  - discussed w/ attending

## 2022-01-21 NOTE — PROGRESS NOTE ADULT - PROBLEM SELECTOR PROBLEM 1
Arterial insufficiency with ischemic ulcer

## 2022-01-21 NOTE — PROGRESS NOTE ADULT - ASSESSMENT
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 Patient is s/p b/l LE angiogram with R external iliac stent placement 1/12.     PLAN:  - Doing well from vasc surg standpoint   - ASA, Plavix  - Hep gtt  - C/w care per primary team  -woundcare and  d/c planning as per cardiology and podiatry   fu as outpt     Fartun Solis, PGY-2  Vascular Surgery  #[id number]

## 2022-01-21 NOTE — PROGRESS NOTE ADULT - PROBLEM SELECTOR PROBLEM 3
Acute osteomyelitis of toe of left foot

## 2022-01-22 LAB
ANION GAP SERPL CALC-SCNC: 14 MMOL/L — SIGNIFICANT CHANGE UP (ref 5–17)
BUN SERPL-MCNC: 18 MG/DL — SIGNIFICANT CHANGE UP (ref 7–23)
CALCIUM SERPL-MCNC: 10 MG/DL — SIGNIFICANT CHANGE UP (ref 8.4–10.5)
CHLORIDE SERPL-SCNC: 101 MMOL/L — SIGNIFICANT CHANGE UP (ref 96–108)
CO2 SERPL-SCNC: 25 MMOL/L — SIGNIFICANT CHANGE UP (ref 22–31)
CREAT SERPL-MCNC: 0.49 MG/DL — LOW (ref 0.5–1.3)
GLUCOSE SERPL-MCNC: 100 MG/DL — HIGH (ref 70–99)
HCT VFR BLD CALC: 37.3 % — LOW (ref 39–50)
HGB BLD-MCNC: 11.9 G/DL — LOW (ref 13–17)
MAGNESIUM SERPL-MCNC: 1.9 MG/DL — SIGNIFICANT CHANGE UP (ref 1.6–2.6)
MCHC RBC-ENTMCNC: 27.4 PG — SIGNIFICANT CHANGE UP (ref 27–34)
MCHC RBC-ENTMCNC: 31.9 GM/DL — LOW (ref 32–36)
MCV RBC AUTO: 85.7 FL — SIGNIFICANT CHANGE UP (ref 80–100)
NRBC # BLD: 0 /100 WBCS — SIGNIFICANT CHANGE UP (ref 0–0)
PLATELET # BLD AUTO: 489 K/UL — HIGH (ref 150–400)
POTASSIUM SERPL-MCNC: 3.8 MMOL/L — SIGNIFICANT CHANGE UP (ref 3.5–5.3)
POTASSIUM SERPL-SCNC: 3.8 MMOL/L — SIGNIFICANT CHANGE UP (ref 3.5–5.3)
RBC # BLD: 4.35 M/UL — SIGNIFICANT CHANGE UP (ref 4.2–5.8)
RBC # FLD: 14.6 % — HIGH (ref 10.3–14.5)
SODIUM SERPL-SCNC: 140 MMOL/L — SIGNIFICANT CHANGE UP (ref 135–145)
WBC # BLD: 8.5 K/UL — SIGNIFICANT CHANGE UP (ref 3.8–10.5)
WBC # FLD AUTO: 8.5 K/UL — SIGNIFICANT CHANGE UP (ref 3.8–10.5)

## 2022-01-22 PROCEDURE — 93010 ELECTROCARDIOGRAM REPORT: CPT

## 2022-01-22 RX ORDER — METOPROLOL TARTRATE 50 MG
50 TABLET ORAL
Refills: 0 | Status: DISCONTINUED | OUTPATIENT
Start: 2022-01-22 | End: 2022-01-25

## 2022-01-22 RX ORDER — ASPIRIN/CALCIUM CARB/MAGNESIUM 324 MG
81 TABLET ORAL DAILY
Refills: 0 | Status: DISCONTINUED | OUTPATIENT
Start: 2022-01-22 | End: 2022-01-25

## 2022-01-22 RX ORDER — APIXABAN 2.5 MG/1
5 TABLET, FILM COATED ORAL EVERY 12 HOURS
Refills: 0 | Status: DISCONTINUED | OUTPATIENT
Start: 2022-01-22 | End: 2022-01-25

## 2022-01-22 RX ORDER — OXYCODONE AND ACETAMINOPHEN 5; 325 MG/1; MG/1
1 TABLET ORAL EVERY 4 HOURS
Refills: 0 | Status: DISCONTINUED | OUTPATIENT
Start: 2022-01-22 | End: 2022-01-25

## 2022-01-22 RX ADMIN — CLOPIDOGREL BISULFATE 75 MILLIGRAM(S): 75 TABLET, FILM COATED ORAL at 11:38

## 2022-01-22 RX ADMIN — Medication 25 MILLIGRAM(S): at 06:28

## 2022-01-22 RX ADMIN — Medication 1 PATCH: at 11:38

## 2022-01-22 RX ADMIN — CHLORHEXIDINE GLUCONATE 1 APPLICATION(S): 213 SOLUTION TOPICAL at 06:28

## 2022-01-22 RX ADMIN — OXYCODONE AND ACETAMINOPHEN 1 TABLET(S): 5; 325 TABLET ORAL at 12:13

## 2022-01-22 RX ADMIN — Medication 81 MILLIGRAM(S): at 11:41

## 2022-01-22 RX ADMIN — Medication 1 PATCH: at 07:39

## 2022-01-22 RX ADMIN — ERTAPENEM SODIUM 120 MILLIGRAM(S): 1 INJECTION, POWDER, LYOPHILIZED, FOR SOLUTION INTRAMUSCULAR; INTRAVENOUS at 08:38

## 2022-01-22 RX ADMIN — OXYCODONE AND ACETAMINOPHEN 1 TABLET(S): 5; 325 TABLET ORAL at 12:43

## 2022-01-22 RX ADMIN — ATORVASTATIN CALCIUM 80 MILLIGRAM(S): 80 TABLET, FILM COATED ORAL at 22:17

## 2022-01-22 RX ADMIN — Medication 1 PATCH: at 19:00

## 2022-01-22 RX ADMIN — Medication 50 MILLIGRAM(S): at 17:53

## 2022-01-22 RX ADMIN — APIXABAN 5 MILLIGRAM(S): 2.5 TABLET, FILM COATED ORAL at 17:53

## 2022-01-22 RX ADMIN — Medication 1 PATCH: at 11:45

## 2022-01-22 RX ADMIN — Medication 25 MILLIGRAM(S): at 12:58

## 2022-01-22 NOTE — PROVIDER CONTACT NOTE (OTHER) - SITUATION
Pt. HR palpated and noted to be 125
Patient noted with Hr 139- 150 on tele monitor  for 15 minutes. Afib.
pt ordered to telemetry, as per policy provider must come to bedside pending transfer
Pt's HR ranging from 130-150's on tele.
pt aPTT still subtherapeutic 34.8. heparin gtt increased from 25ml/hr to 29ml/hr with a 7500 unit bolus
pt had 8 beats of WCT on tele
Notified by [employer] that patient converted from Afib to Sinus Rhythm at 524 am
Patient converted back to Afib 90-110s

## 2022-01-22 NOTE — PROVIDER CONTACT NOTE (OTHER) - ACTION/TREATMENT ORDERED:
PA ordered to give 2 pm dose of metoprolol 25 mg now. Also  increased  the metoprolol dose to 50 mg twice daily. Medication given as per order. Will continue to monitor the pt.

## 2022-01-22 NOTE — PROVIDER CONTACT NOTE (OTHER) - DATE AND TIME:
09-Jan-2022 09:24
22-Jan-2022 06:00
22-Jan-2022 12:55
08-Jan-2022 08:54
08-Jan-2022 10:21
09-Jan-2022 11:37
08-Jan-2022 21:14
15-Rodriguez-2022 09:15

## 2022-01-22 NOTE — PROVIDER CONTACT NOTE (OTHER) - RECOMMENDATIONS
SARAH Giang made aware.
To review
notify provider
ECG
medication.
notify provider, provider to bedside to stay w/ pt pending transfer to telemetry

## 2022-01-22 NOTE — PROVIDER CONTACT NOTE (OTHER) - NAME OF MD/NP/PA/DO NOTIFIED:
Reyes NP
Roseline Guo NP
Ksenia Morales , NP
Neetu Oneil NP
Woody SMITH
SARAH Giang
Roseline Guo NP
WADE west

## 2022-01-22 NOTE — PROVIDER CONTACT NOTE (OTHER) - REASON
Pt's HR ranging from 130-150's on tele.
Tachycardia
Conversion of Rhythm
elevated 
ectopy
pt ordered to telemetry, as per policy provider must come to bedside pending transfer
Patient converted back to Afib 90-110s
subtherapeutic PTT

## 2022-01-22 NOTE — PROVIDER CONTACT NOTE (OTHER) - BACKGROUND
admitted for OM hx of anemia, RA, CAD s/p cobra stent to LAD. new onset afib/aflutter this admission.
Pt admitted for other acute osteomyelitis, ankle & foot. PMH of palate deformity, in remission for substance abuse, CMT.
admitted for osteomyelitis of foot. new AF  on heparin gtt
Admitted for acute OM. s/p I & D. Patient Afib since last night.  Hx of Afib.
pt admitted w/ osteomyelitis
admitted to OM of foot. new AF on heparin gtt, 25mg lopressor TID, currently on IVP dig load last dose 10am next dose 2pm. K+ 3.5, no recent Mg drawn
Pt. admitted with osteomyelitis
Acute OM Ankle/Foot

## 2022-01-22 NOTE — CHART NOTE - NSCHARTNOTEFT_GEN_A_CORE
MEDICINE PA NOTE    CM requesting more information regarding substance abuse history in PMH for discharge planning. Reached out to Dr Gordon yesterday who stated pt may have a possible history of abusing pain medications, but not completely sure. Reached out to other medical teams (cardiology and vascular) and unable to collect further information.    SARAH Dominguez

## 2022-01-22 NOTE — PROGRESS NOTE ADULT - SUBJECTIVE AND OBJECTIVE BOX
CARDIOLOGY     PROGRESS  NOTE   ________________________________________________    CHIEF COMPLAINT:Patient is a 61y old  Male who presents with a chief complaint of OM toe (21 Jan 2022 14:07)  no compalin.  	  REVIEW OF SYSTEMS:  CONSTITUTIONAL: No fever, weight loss, or fatigue  EYES: No eye pain, visual disturbances, or discharge  ENT:  No difficulty hearing, tinnitus, vertigo; No sinus or throat pain  NECK: No pain or stiffness  RESPIRATORY: No cough, wheezing, chills or hemoptysis; No Shortness of Breath  CARDIOVASCULAR: No chest pain, palpitations, passing out, dizziness, or leg swelling  GASTROINTESTINAL: No abdominal or epigastric pain. No nausea, vomiting, or hematemesis; No diarrhea or constipation. No melena or hematochezia.  GENITOURINARY: No dysuria, frequency, hematuria, or incontinence  NEUROLOGICAL: No headaches, memory loss, loss of strength, numbness, or tremors  SKIN: No itching, burning, rashes, or lesions   LYMPH Nodes: No enlarged glands  ENDOCRINE: No heat or cold intolerance; No hair loss  MUSCULOSKELETAL: No joint pain or swelling; No muscle, back, or extremity pain  PSYCHIATRIC: No depression, anxiety, mood swings, or difficulty sleeping  HEME/LYMPH: No easy bruising, or bleeding gums  ALLERGY AND IMMUNOLOGIC: No hives or eczema	    [ ] All others negative	  [ ] Unable to obtain    PHYSICAL EXAM:  T(C): 37 (01-22-22 @ 12:50), Max: 37 (01-22-22 @ 12:50)  HR: 129 (01-22-22 @ 12:50) (65 - 129)  BP: 101/77 (01-22-22 @ 12:50) (101/77 - 145/91)  RR: 18 (01-22-22 @ 12:50) (18 - 18)  SpO2: 98% (01-22-22 @ 12:50) (95% - 99%)  Wt(kg): --  I&O's Summary    21 Jan 2022 07:01  -  22 Jan 2022 07:00  --------------------------------------------------------  IN: 720 mL / OUT: 1425 mL / NET: -705 mL    22 Jan 2022 07:01  -  22 Jan 2022 13:19  --------------------------------------------------------  IN: 720 mL / OUT: 0 mL / NET: 720 mL        Appearance: Normal	  HEENT:   Normal oral mucosa, PERRL, EOMI	  Lymphatic: No lymphadenopathy  Cardiovascular: Normal S1 S2, No JVD, N+murmurs, No edema  Respiratory: Lungs clear to auscultation	  Psychiatry: A & O x 3, Mood & affect appropriate  Gastrointestinal:  Soft, Non-tender, + BS	  Skin: No rashes, No ecchymoses, No cyanosis	  Neurologic: Non-focal  Extremities: Normal range of motion, No clubbing, cyanosis or edema  Vascular: Peripheral pulses palpable 2+ bilaterally    MEDICATIONS  (STANDING):  apixaban 5 milliGRAM(s) Oral every 12 hours  aspirin  chewable 81 milliGRAM(s) Oral daily  atorvastatin 80 milliGRAM(s) Oral at bedtime  chlorhexidine 2% Cloths 1 Application(s) Topical <User Schedule>  clopidogrel Tablet 75 milliGRAM(s) Oral daily  ertapenem  IVPB 1000 milliGRAM(s) IV Intermittent every 24 hours  ertapenem  IVPB      metoprolol tartrate 25 milliGRAM(s) Oral three times a day  nicotine - 21 mG/24Hr(s) Patch 1 patch Transdermal daily      TELEMETRY: 	    ECG:  	  RADIOLOGY:  OTHER: 	  	  LABS:	 	    CARDIAC MARKERS:                                11.9   8.50  )-----------( 489      ( 22 Jan 2022 07:14 )             37.3     01-22    140  |  101  |  18  ----------------------------<  100<H>  3.8   |  25  |  0.49<L>    Ca    10.0      22 Jan 2022 07:14  Mg     1.9     01-22      proBNP:   Lipid Profile: Cholesterol 118  LDL --  HDL 25      HgA1c:   TSH:         Assessment and plan  ---------------------------  OM of the toe with +mri  podiatry/ id appreciated continue IV abx  events noted today and discussed with NP, pt with tachycardia , no chest pain ecg ?st elevation in inferior leads, first trop negartive  new onset a fib/ flutter  asa  beta blocker  fu trop  has transferred to tele  will observe closely  trop/ cpk negative  s/p LAD stent  social work consult  id appreciated  picc line dc with iv abx   paf, increase beta blocker ?amio/ ablation  continue AC

## 2022-01-22 NOTE — PROVIDER CONTACT NOTE (OTHER) - ASSESSMENT
Notified by [employer] that patient converted from Afib to Sinus Rhythm at 524 am
Patient is A&Ox4, VSS. Patient is asymptomatic.
Pt. HR palpated and noted to be 125, pt. denies any pain, no distress noted.
Patient A & O x 4. Patient was getting washed during the episode.  Asymptomatic. BP- 101/77. Pt on metoprolol 25 mg three times a day.
Pt's HR ranging from 130-150's on tele. Pt has no c/o pain, CP, heart palpitations, HA, SOB/trouble breathing. V/s; temp PO 99.5, , /88.
aox4 asymptomatic VSS
pt resting in bed, HR remains 150, no apparent distress, pt denies any chest pain, all other VSS, EKG done, troponins sent, pt placed on monitor pending transfer, NP Reyes aware of provider required at bedside when pt on monitor

## 2022-01-23 LAB
HCT VFR BLD CALC: 37.9 % — LOW (ref 39–50)
HGB BLD-MCNC: 12.4 G/DL — LOW (ref 13–17)
MCHC RBC-ENTMCNC: 27.4 PG — SIGNIFICANT CHANGE UP (ref 27–34)
MCHC RBC-ENTMCNC: 32.7 GM/DL — SIGNIFICANT CHANGE UP (ref 32–36)
MCV RBC AUTO: 83.8 FL — SIGNIFICANT CHANGE UP (ref 80–100)
NRBC # BLD: 0 /100 WBCS — SIGNIFICANT CHANGE UP (ref 0–0)
PLATELET # BLD AUTO: 496 K/UL — HIGH (ref 150–400)
RBC # BLD: 4.52 M/UL — SIGNIFICANT CHANGE UP (ref 4.2–5.8)
RBC # FLD: 14.6 % — HIGH (ref 10.3–14.5)
WBC # BLD: 8.18 K/UL — SIGNIFICANT CHANGE UP (ref 3.8–10.5)
WBC # FLD AUTO: 8.18 K/UL — SIGNIFICANT CHANGE UP (ref 3.8–10.5)

## 2022-01-23 PROCEDURE — 93010 ELECTROCARDIOGRAM REPORT: CPT

## 2022-01-23 RX ORDER — SODIUM CHLORIDE 9 MG/ML
10 INJECTION INTRAMUSCULAR; INTRAVENOUS; SUBCUTANEOUS
Refills: 0 | Status: DISCONTINUED | OUTPATIENT
Start: 2022-01-23 | End: 2022-01-25

## 2022-01-23 RX ORDER — CHLORHEXIDINE GLUCONATE 213 G/1000ML
1 SOLUTION TOPICAL
Refills: 0 | Status: DISCONTINUED | OUTPATIENT
Start: 2022-01-23 | End: 2022-01-25

## 2022-01-23 RX ADMIN — OXYCODONE AND ACETAMINOPHEN 1 TABLET(S): 5; 325 TABLET ORAL at 21:55

## 2022-01-23 RX ADMIN — Medication 50 MILLIGRAM(S): at 17:30

## 2022-01-23 RX ADMIN — ATORVASTATIN CALCIUM 80 MILLIGRAM(S): 80 TABLET, FILM COATED ORAL at 21:55

## 2022-01-23 RX ADMIN — Medication 1 PATCH: at 19:11

## 2022-01-23 RX ADMIN — Medication 1 PATCH: at 11:05

## 2022-01-23 RX ADMIN — CLOPIDOGREL BISULFATE 75 MILLIGRAM(S): 75 TABLET, FILM COATED ORAL at 11:05

## 2022-01-23 RX ADMIN — CHLORHEXIDINE GLUCONATE 1 APPLICATION(S): 213 SOLUTION TOPICAL at 05:51

## 2022-01-23 RX ADMIN — APIXABAN 5 MILLIGRAM(S): 2.5 TABLET, FILM COATED ORAL at 17:30

## 2022-01-23 RX ADMIN — ERTAPENEM SODIUM 120 MILLIGRAM(S): 1 INJECTION, POWDER, LYOPHILIZED, FOR SOLUTION INTRAMUSCULAR; INTRAVENOUS at 11:06

## 2022-01-23 RX ADMIN — Medication 1 PATCH: at 11:38

## 2022-01-23 RX ADMIN — Medication 50 MILLIGRAM(S): at 05:52

## 2022-01-23 RX ADMIN — Medication 81 MILLIGRAM(S): at 11:05

## 2022-01-23 RX ADMIN — APIXABAN 5 MILLIGRAM(S): 2.5 TABLET, FILM COATED ORAL at 05:52

## 2022-01-23 RX ADMIN — Medication 1 PATCH: at 07:26

## 2022-01-23 NOTE — PROGRESS NOTE ADULT - SUBJECTIVE AND OBJECTIVE BOX
CARDIOLOGY     PROGRESS  NOTE   ________________________________________________    CHIEF COMPLAINT:Patient is a 61y old  Male who presents with a chief complaint of OM toe (22 Jan 2022 13:18)  no complain.  	  REVIEW OF SYSTEMS:  CONSTITUTIONAL: No fever, weight loss, or fatigue  EYES: No eye pain, visual disturbances, or discharge  ENT:  No difficulty hearing, tinnitus, vertigo; No sinus or throat pain  NECK: No pain or stiffness  RESPIRATORY: No cough, wheezing, chills or hemoptysis; No Shortness of Breath  CARDIOVASCULAR: No chest pain, palpitations, passing out, dizziness, or leg swelling  GASTROINTESTINAL: No abdominal or epigastric pain. No nausea, vomiting, or hematemesis; No diarrhea or constipation. No melena or hematochezia.  GENITOURINARY: No dysuria, frequency, hematuria, or incontinence  NEUROLOGICAL: No headaches, memory loss, loss of strength, numbness, or tremors  SKIN: No itching, burning, rashes, or lesions   LYMPH Nodes: No enlarged glands  ENDOCRINE: No heat or cold intolerance; No hair loss  MUSCULOSKELETAL: No joint pain or swelling; No muscle, back, or extremity pain  PSYCHIATRIC: No depression, anxiety, mood swings, or difficulty sleeping  HEME/LYMPH: No easy bruising, or bleeding gums  ALLERGY AND IMMUNOLOGIC: No hives or eczema	    [ ] All others negative	  [ ] Unable to obtain    PHYSICAL EXAM:  T(C): 36.4 (01-23-22 @ 04:20), Max: 37 (01-22-22 @ 12:50)  HR: 88 (01-23-22 @ 04:20) (88 - 129)  BP: 123/80 (01-23-22 @ 04:20) (99/62 - 133/82)  RR: 18 (01-23-22 @ 04:20) (18 - 18)  SpO2: 98% (01-23-22 @ 04:20) (97% - 98%)  Wt(kg): --  I&O's Summary    22 Jan 2022 07:01  -  23 Jan 2022 07:00  --------------------------------------------------------  IN: 1420 mL / OUT: 0 mL / NET: 1420 mL        Appearance: Normal	  HEENT:   Normal oral mucosa, PERRL, EOMI	  Lymphatic: No lymphadenopathy  Cardiovascular: Normal S1 S2, No JVD, + murmurs, No edema  Respiratory: Lungs clear to auscultation	  Psychiatry: A & O x 3, Mood & affect appropriate  Gastrointestinal:  Soft, Non-tender, + BS	  Skin: No rashes, No ecchymoses, No cyanosis	  Neurologic: Non-focal  Extremities: Normal range of motion, bandaged foot  Vascular: Peripheral pulses palpable 2+ bilaterally    MEDICATIONS  (STANDING):  apixaban 5 milliGRAM(s) Oral every 12 hours  aspirin  chewable 81 milliGRAM(s) Oral daily  atorvastatin 80 milliGRAM(s) Oral at bedtime  chlorhexidine 2% Cloths 1 Application(s) Topical <User Schedule>  chlorhexidine 4% Liquid 1 Application(s) Topical <User Schedule>  clopidogrel Tablet 75 milliGRAM(s) Oral daily  ertapenem  IVPB 1000 milliGRAM(s) IV Intermittent every 24 hours  ertapenem  IVPB      metoprolol tartrate 50 milliGRAM(s) Oral two times a day  nicotine - 21 mG/24Hr(s) Patch 1 patch Transdermal daily      TELEMETRY: 	    ECG:  	  RADIOLOGY:  OTHER: 	  	  LABS:	 	    CARDIAC MARKERS:                                12.4   8.18  )-----------( 496      ( 23 Jan 2022 06:56 )             37.9     01-22    140  |  101  |  18  ----------------------------<  100<H>  3.8   |  25  |  0.49<L>    Ca    10.0      22 Jan 2022 07:14  Mg     1.9     01-22      proBNP:   Lipid Profile: Cholesterol 118  LDL --  HDL 25      HgA1c:   TSH:   < from: 12 Lead ECG (01.22.22 @ 06:35) >  Diagnosis Line ATRIAL FIBRILLATION WITH RAPID VENTRICULAR RESPONSE  NONSPECIFIC ST AND T WAVE ABNORMALITY  ABNORMAL ECG  Poor septal R waves  WHEN COMPARED WITH ECG OF `1/10/22   NO SIGNIFICANT CHANGE WAS FOUNd    Notes: Covering : Per discussion in interdisciplianry rounds and  instruction from Director JILLIAN Cruz sent globally to facilities in [facility] and [place]. Awaiting contact    Assessment and plan  ---------------------------  OM of the toe with +mri  podiatry/ id appreciated continue IV abx  events noted today and discussed with NP, pt with tachycardia , no chest pain ecg ?st elevation in inferior leads, first trop negartive  new onset a fib/ flutter  asa  beta blocker  fu trop  has transferred to tele  will observe closely  trop/ cpk negative  s/p LAD stent  social work consult  id appreciated  picc line dc with iv abx   paf, increase beta blocker/  ?amio/ ablation as out pt  continue AC  dc planning

## 2022-01-24 LAB
ANION GAP SERPL CALC-SCNC: 14 MMOL/L — SIGNIFICANT CHANGE UP (ref 5–17)
BUN SERPL-MCNC: 25 MG/DL — HIGH (ref 7–23)
CALCIUM SERPL-MCNC: 9.9 MG/DL — SIGNIFICANT CHANGE UP (ref 8.4–10.5)
CHLORIDE SERPL-SCNC: 101 MMOL/L — SIGNIFICANT CHANGE UP (ref 96–108)
CO2 SERPL-SCNC: 24 MMOL/L — SIGNIFICANT CHANGE UP (ref 22–31)
CREAT SERPL-MCNC: 0.62 MG/DL — SIGNIFICANT CHANGE UP (ref 0.5–1.3)
GLUCOSE SERPL-MCNC: 91 MG/DL — SIGNIFICANT CHANGE UP (ref 70–99)
HCT VFR BLD CALC: 36.1 % — LOW (ref 39–50)
HGB BLD-MCNC: 11.5 G/DL — LOW (ref 13–17)
MCHC RBC-ENTMCNC: 27.4 PG — SIGNIFICANT CHANGE UP (ref 27–34)
MCHC RBC-ENTMCNC: 31.9 GM/DL — LOW (ref 32–36)
MCV RBC AUTO: 86.2 FL — SIGNIFICANT CHANGE UP (ref 80–100)
NRBC # BLD: 0 /100 WBCS — SIGNIFICANT CHANGE UP (ref 0–0)
PLATELET # BLD AUTO: 431 K/UL — HIGH (ref 150–400)
POTASSIUM SERPL-MCNC: 3.7 MMOL/L — SIGNIFICANT CHANGE UP (ref 3.5–5.3)
POTASSIUM SERPL-SCNC: 3.7 MMOL/L — SIGNIFICANT CHANGE UP (ref 3.5–5.3)
RBC # BLD: 4.19 M/UL — LOW (ref 4.2–5.8)
RBC # FLD: 14.9 % — HIGH (ref 10.3–14.5)
SODIUM SERPL-SCNC: 139 MMOL/L — SIGNIFICANT CHANGE UP (ref 135–145)
WBC # BLD: 6.84 K/UL — SIGNIFICANT CHANGE UP (ref 3.8–10.5)
WBC # FLD AUTO: 6.84 K/UL — SIGNIFICANT CHANGE UP (ref 3.8–10.5)

## 2022-01-24 RX ORDER — APIXABAN 2.5 MG/1
1 TABLET, FILM COATED ORAL
Qty: 60 | Refills: 0
Start: 2022-01-24 | End: 2022-02-22

## 2022-01-24 RX ORDER — METOPROLOL TARTRATE 50 MG
1 TABLET ORAL
Qty: 60 | Refills: 0
Start: 2022-01-24 | End: 2022-02-22

## 2022-01-24 RX ADMIN — ERTAPENEM SODIUM 120 MILLIGRAM(S): 1 INJECTION, POWDER, LYOPHILIZED, FOR SOLUTION INTRAMUSCULAR; INTRAVENOUS at 09:05

## 2022-01-24 RX ADMIN — ATORVASTATIN CALCIUM 80 MILLIGRAM(S): 80 TABLET, FILM COATED ORAL at 21:21

## 2022-01-24 RX ADMIN — CHLORHEXIDINE GLUCONATE 1 APPLICATION(S): 213 SOLUTION TOPICAL at 06:34

## 2022-01-24 RX ADMIN — CHLORHEXIDINE GLUCONATE 1 APPLICATION(S): 213 SOLUTION TOPICAL at 06:33

## 2022-01-24 RX ADMIN — CLOPIDOGREL BISULFATE 75 MILLIGRAM(S): 75 TABLET, FILM COATED ORAL at 11:35

## 2022-01-24 RX ADMIN — Medication 1 PATCH: at 07:34

## 2022-01-24 RX ADMIN — APIXABAN 5 MILLIGRAM(S): 2.5 TABLET, FILM COATED ORAL at 06:02

## 2022-01-24 RX ADMIN — Medication 50 MILLIGRAM(S): at 06:03

## 2022-01-24 RX ADMIN — Medication 81 MILLIGRAM(S): at 11:35

## 2022-01-24 RX ADMIN — Medication 1 PATCH: at 11:34

## 2022-01-24 RX ADMIN — OXYCODONE AND ACETAMINOPHEN 1 TABLET(S): 5; 325 TABLET ORAL at 03:40

## 2022-01-24 RX ADMIN — Medication 50 MILLIGRAM(S): at 17:27

## 2022-01-24 RX ADMIN — APIXABAN 5 MILLIGRAM(S): 2.5 TABLET, FILM COATED ORAL at 17:27

## 2022-01-24 NOTE — PROGRESS NOTE ADULT - SUBJECTIVE AND OBJECTIVE BOX
Podiatry pager #: [phone] ([place])/ [phone] ([facility])    Patient is a 61y old  Male who presents with a chief complaint of OM toe (23 Jan 2022 10:46)       INTERVAL HPI/OVERNIGHT EVENTS:  Patient seen and evaluated at bedside.  Pt is resting comfortable in NAD. Denies N/V/F/C.     Allergies    No Known Allergies    Intolerances        Vital Signs Last 24 Hrs  T(C): 36.4 (24 Jan 2022 04:19), Max: 37.1 (23 Jan 2022 12:28)  T(F): 97.6 (24 Jan 2022 04:19), Max: 98.7 (23 Jan 2022 12:28)  HR: 66 (24 Jan 2022 04:19) (66 - 79)  BP: 114/74 (24 Jan 2022 06:02) (105/71 - 136/79)  BP(mean): --  RR: 69 (24 Jan 2022 06:02) (18 - 69)  SpO2: 95% (24 Jan 2022 04:19) (95% - 98%)    LABS:                        12.4   8.18  )-----------( 496      ( 23 Jan 2022 06:56 )             37.9               CAPILLARY BLOOD GLUCOSE          Lower Extremity Physical Exam:    Vascular: DP/PT 0/4, B/L, CFT <3 seconds B/L, Temperature gradient warm to cool, B/L.   Neuro: Epicritic sensation intact to the level of digits, B/L.  Musculoskeletal/Ortho: unremarkable  Skin:  s/p 1/14 L foot partial 5th ray resection, partially open, and R foot partial 2nd toe amputation closed, no pus, no drainage, no malodor    RADIOLOGY & ADDITIONAL TESTS:

## 2022-01-24 NOTE — CHART NOTE - NSCHARTNOTEFT_GEN_A_CORE
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  Patient will require  a transport chair.  Patient is unable to self propel in a standard wheel chair.  This is necessary to achieve daily tasks and therapies which cannot   be achieved with the use if a cane or rolling walker.  Patient and family are in agreement with transport chair use at home and assistance will provided if needed.

## 2022-01-24 NOTE — PROGRESS NOTE ADULT - ASSESSMENT
61 M s/p 1/14 L foot partial 5th ray resection, partially open, and R foot partial 2nd toe amputation closed  - pt seen and evaluated  - afebrile, no leukocytosis  - s/p 1/14 L foot partial 5th ray resection, partially open, and R foot partial 2nd toe amputation closed, no pus, no drainage, no malodor, skin flaps feel warm to touch, no necrosis   - high concern for residual infection/ viability  - PICC line placed, long term abx per ID recs  - podiatry stable for discharge  - follow up information can be found in f/u section of provider d/c note  - discussed w/ attending

## 2022-01-24 NOTE — PROGRESS NOTE ADULT - SUBJECTIVE AND OBJECTIVE BOX
CARDIOLOGY     PROGRESS  NOTE   ________________________________________________    CHIEF COMPLAINT:Patient is a 61y old  Male who presents with a chief complaint of OM toe (24 Jan 2022 07:21)  no complain.  	  REVIEW OF SYSTEMS:  CONSTITUTIONAL: No fever, weight loss, or fatigue  EYES: No eye pain, visual disturbances, or discharge  ENT:  No difficulty hearing, tinnitus, vertigo; No sinus or throat pain  NECK: No pain or stiffness  RESPIRATORY: No cough, wheezing, chills or hemoptysis; No Shortness of Breath  CARDIOVASCULAR: No chest pain, palpitations, passing out, dizziness, or leg swelling  GASTROINTESTINAL: No abdominal or epigastric pain. No nausea, vomiting, or hematemesis; No diarrhea or constipation. No melena or hematochezia.  GENITOURINARY: No dysuria, frequency, hematuria, or incontinence  NEUROLOGICAL: No headaches, memory loss, loss of strength, numbness, or tremors  SKIN: No itching, burning, rashes, or lesions   LYMPH Nodes: No enlarged glands  ENDOCRINE: No heat or cold intolerance; No hair loss  MUSCULOSKELETAL: No joint pain or swelling; No muscle, back, or extremity pain  PSYCHIATRIC: No depression, anxiety, mood swings, or difficulty sleeping  HEME/LYMPH: No easy bruising, or bleeding gums  ALLERGY AND IMMUNOLOGIC: No hives or eczema	    [ ] All others negative	  [ ] Unable to obtain    PHYSICAL EXAM:  T(C): 36.8 (01-24-22 @ 08:30), Max: 37.1 (01-23-22 @ 12:28)  HR: 66 (01-24-22 @ 08:30) (66 - 79)  BP: 113/66 (01-24-22 @ 08:30) (105/71 - 136/79)  RR: 18 (01-24-22 @ 08:30) (18 - 19)  SpO2: 96% (01-24-22 @ 08:30) (95% - 98%)  Wt(kg): --  I&O's Summary    23 Jan 2022 07:01  -  24 Jan 2022 07:00  --------------------------------------------------------  IN: 740 mL / OUT: 300 mL / NET: 440 mL        Appearance: Normal	  HEENT:   Normal oral mucosa, PERRL, EOMI	  Lymphatic: No lymphadenopathy  Cardiovascular: Normal S1 S2, No JVD, + murmurs, No edema  Respiratory: rhonchi  Psychiatry: A & O x 3, Mood & affect appropriate  Gastrointestinal:  Soft, Non-tender, + BS	  Skin: No rashes, No ecchymoses, No cyanosis	  Neurologic: Non-focal  Extremities: Normal range of motion, No clubbing, cyanosis or edema  Vascular: Peripheral pulses palpable 2+ bilaterally    MEDICATIONS  (STANDING):  apixaban 5 milliGRAM(s) Oral every 12 hours  aspirin  chewable 81 milliGRAM(s) Oral daily  atorvastatin 80 milliGRAM(s) Oral at bedtime  chlorhexidine 2% Cloths 1 Application(s) Topical <User Schedule>  chlorhexidine 4% Liquid 1 Application(s) Topical <User Schedule>  clopidogrel Tablet 75 milliGRAM(s) Oral daily  ertapenem  IVPB      ertapenem  IVPB 1000 milliGRAM(s) IV Intermittent every 24 hours  metoprolol tartrate 50 milliGRAM(s) Oral two times a day  nicotine - 21 mG/24Hr(s) Patch 1 patch Transdermal daily      TELEMETRY: 	    ECG:  	  RADIOLOGY:  OTHER: 	  	  LABS:	 	    CARDIAC MARKERS:                                11.5   6.84  )-----------( 431      ( 24 Jan 2022 07:21 )             36.1     01-24    139  |  101  |  25<H>  ----------------------------<  91  3.7   |  24  |  0.62    Ca    9.9      24 Jan 2022 07:19      proBNP:   Lipid Profile: Cholesterol 118  LDL --  HDL 25      HgA1c:   TSH:         Assessment and plan  ---------------------------  OM of the toe with +mri  podiatry/ id appreciated continue IV abx  events noted today and discussed with NP, pt with tachycardia , no chest pain ecg ?st elevation in inferior leads, first trop negartive  new onset a fib/ flutter  asa  beta blocker  fu trop  has transferred to tele  will observe closely  trop/ cpk negative  s/p LAD stent  social work consult  id appreciated  picc line dc with iv abx  paf, increase beta blocker/  ?amio/ ablation as out pt  continue AC  dc planning

## 2022-01-25 ENCOUNTER — TRANSCRIPTION ENCOUNTER (OUTPATIENT)
Age: 62
End: 2022-01-25

## 2022-01-25 VITALS
HEART RATE: 70 BPM | SYSTOLIC BLOOD PRESSURE: 118 MMHG | DIASTOLIC BLOOD PRESSURE: 70 MMHG | OXYGEN SATURATION: 98 % | RESPIRATION RATE: 18 BRPM

## 2022-01-25 LAB
ANION GAP SERPL CALC-SCNC: 12 MMOL/L — SIGNIFICANT CHANGE UP (ref 5–17)
BUN SERPL-MCNC: 22 MG/DL — SIGNIFICANT CHANGE UP (ref 7–23)
CALCIUM SERPL-MCNC: 10 MG/DL — SIGNIFICANT CHANGE UP (ref 8.4–10.5)
CHLORIDE SERPL-SCNC: 100 MMOL/L — SIGNIFICANT CHANGE UP (ref 96–108)
CO2 SERPL-SCNC: 25 MMOL/L — SIGNIFICANT CHANGE UP (ref 22–31)
CREAT SERPL-MCNC: 0.55 MG/DL — SIGNIFICANT CHANGE UP (ref 0.5–1.3)
GLUCOSE SERPL-MCNC: 93 MG/DL — SIGNIFICANT CHANGE UP (ref 70–99)
HCT VFR BLD CALC: 35.2 % — LOW (ref 39–50)
HGB BLD-MCNC: 11.2 G/DL — LOW (ref 13–17)
MCHC RBC-ENTMCNC: 27.2 PG — SIGNIFICANT CHANGE UP (ref 27–34)
MCHC RBC-ENTMCNC: 31.8 GM/DL — LOW (ref 32–36)
MCV RBC AUTO: 85.4 FL — SIGNIFICANT CHANGE UP (ref 80–100)
NRBC # BLD: 0 /100 WBCS — SIGNIFICANT CHANGE UP (ref 0–0)
PLATELET # BLD AUTO: 367 K/UL — SIGNIFICANT CHANGE UP (ref 150–400)
POTASSIUM SERPL-MCNC: 3.8 MMOL/L — SIGNIFICANT CHANGE UP (ref 3.5–5.3)
POTASSIUM SERPL-SCNC: 3.8 MMOL/L — SIGNIFICANT CHANGE UP (ref 3.5–5.3)
RBC # BLD: 4.12 M/UL — LOW (ref 4.2–5.8)
RBC # FLD: 14.8 % — HIGH (ref 10.3–14.5)
SODIUM SERPL-SCNC: 137 MMOL/L — SIGNIFICANT CHANGE UP (ref 135–145)
WBC # BLD: 7.16 K/UL — SIGNIFICANT CHANGE UP (ref 3.8–10.5)
WBC # FLD AUTO: 7.16 K/UL — SIGNIFICANT CHANGE UP (ref 3.8–10.5)

## 2022-01-25 PROCEDURE — 87075 CULTR BACTERIA EXCEPT BLOOD: CPT

## 2022-01-25 PROCEDURE — 82330 ASSAY OF CALCIUM: CPT

## 2022-01-25 PROCEDURE — 84295 ASSAY OF SERUM SODIUM: CPT

## 2022-01-25 PROCEDURE — 96375 TX/PRO/DX INJ NEW DRUG ADDON: CPT

## 2022-01-25 PROCEDURE — C9600: CPT | Mod: LD

## 2022-01-25 PROCEDURE — 93923 UPR/LXTR ART STDY 3+ LVLS: CPT

## 2022-01-25 PROCEDURE — 71045 X-RAY EXAM CHEST 1 VIEW: CPT

## 2022-01-25 PROCEDURE — 82550 ASSAY OF CK (CPK): CPT

## 2022-01-25 PROCEDURE — C1887: CPT

## 2022-01-25 PROCEDURE — 99152 MOD SED SAME PHYS/QHP 5/>YRS: CPT

## 2022-01-25 PROCEDURE — 96374 THER/PROPH/DIAG INJ IV PUSH: CPT

## 2022-01-25 PROCEDURE — 80202 ASSAY OF VANCOMYCIN: CPT

## 2022-01-25 PROCEDURE — 97162 PT EVAL MOD COMPLEX 30 MIN: CPT

## 2022-01-25 PROCEDURE — C8929: CPT

## 2022-01-25 PROCEDURE — 82962 GLUCOSE BLOOD TEST: CPT

## 2022-01-25 PROCEDURE — U0003: CPT

## 2022-01-25 PROCEDURE — 87186 SC STD MICRODIL/AGAR DIL: CPT

## 2022-01-25 PROCEDURE — 85014 HEMATOCRIT: CPT

## 2022-01-25 PROCEDURE — 83605 ASSAY OF LACTIC ACID: CPT

## 2022-01-25 PROCEDURE — 88304 TISSUE EXAM BY PATHOLOGIST: CPT

## 2022-01-25 PROCEDURE — 99153 MOD SED SAME PHYS/QHP EA: CPT

## 2022-01-25 PROCEDURE — 36415 COLL VENOUS BLD VENIPUNCTURE: CPT

## 2022-01-25 PROCEDURE — 82553 CREATINE MB FRACTION: CPT

## 2022-01-25 PROCEDURE — C1751: CPT

## 2022-01-25 PROCEDURE — 84484 ASSAY OF TROPONIN QUANT: CPT

## 2022-01-25 PROCEDURE — C1760: CPT

## 2022-01-25 PROCEDURE — 99285 EMERGENCY DEPT VISIT HI MDM: CPT

## 2022-01-25 PROCEDURE — 85025 COMPLETE CBC W/AUTO DIFF WBC: CPT

## 2022-01-25 PROCEDURE — C1874: CPT

## 2022-01-25 PROCEDURE — 87077 CULTURE AEROBIC IDENTIFY: CPT

## 2022-01-25 PROCEDURE — 87205 SMEAR GRAM STAIN: CPT

## 2022-01-25 PROCEDURE — 80048 BASIC METABOLIC PNL TOTAL CA: CPT

## 2022-01-25 PROCEDURE — 85027 COMPLETE CBC AUTOMATED: CPT

## 2022-01-25 PROCEDURE — 88311 DECALCIFY TISSUE: CPT

## 2022-01-25 PROCEDURE — 93005 ELECTROCARDIOGRAM TRACING: CPT

## 2022-01-25 PROCEDURE — 84132 ASSAY OF SERUM POTASSIUM: CPT

## 2022-01-25 PROCEDURE — 85610 PROTHROMBIN TIME: CPT

## 2022-01-25 PROCEDURE — 93458 L HRT ARTERY/VENTRICLE ANGIO: CPT | Mod: 59

## 2022-01-25 PROCEDURE — 82947 ASSAY GLUCOSE BLOOD QUANT: CPT

## 2022-01-25 PROCEDURE — C1876: CPT

## 2022-01-25 PROCEDURE — 86900 BLOOD TYPING SEROLOGIC ABO: CPT

## 2022-01-25 PROCEDURE — C1769: CPT

## 2022-01-25 PROCEDURE — 85652 RBC SED RATE AUTOMATED: CPT

## 2022-01-25 PROCEDURE — 97530 THERAPEUTIC ACTIVITIES: CPT

## 2022-01-25 PROCEDURE — A9585: CPT

## 2022-01-25 PROCEDURE — 87040 BLOOD CULTURE FOR BACTERIA: CPT

## 2022-01-25 PROCEDURE — 83615 LACTATE (LD) (LDH) ENZYME: CPT

## 2022-01-25 PROCEDURE — 87641 MR-STAPH DNA AMP PROBE: CPT

## 2022-01-25 PROCEDURE — 82803 BLOOD GASES ANY COMBINATION: CPT

## 2022-01-25 PROCEDURE — 88305 TISSUE EXAM BY PATHOLOGIST: CPT

## 2022-01-25 PROCEDURE — 80061 LIPID PANEL: CPT

## 2022-01-25 PROCEDURE — C1894: CPT

## 2022-01-25 PROCEDURE — 84100 ASSAY OF PHOSPHORUS: CPT

## 2022-01-25 PROCEDURE — 85018 HEMOGLOBIN: CPT

## 2022-01-25 PROCEDURE — 82435 ASSAY OF BLOOD CHLORIDE: CPT

## 2022-01-25 PROCEDURE — 85730 THROMBOPLASTIN TIME PARTIAL: CPT

## 2022-01-25 PROCEDURE — 73720 MRI LWR EXTREMITY W/O&W/DYE: CPT

## 2022-01-25 PROCEDURE — 83735 ASSAY OF MAGNESIUM: CPT

## 2022-01-25 PROCEDURE — 37221: CPT

## 2022-01-25 PROCEDURE — 86850 RBC ANTIBODY SCREEN: CPT

## 2022-01-25 PROCEDURE — 87640 STAPH A DNA AMP PROBE: CPT

## 2022-01-25 PROCEDURE — 86140 C-REACTIVE PROTEIN: CPT

## 2022-01-25 PROCEDURE — U0005: CPT

## 2022-01-25 PROCEDURE — 87070 CULTURE OTHR SPECIMN AEROBIC: CPT

## 2022-01-25 PROCEDURE — 86803 HEPATITIS C AB TEST: CPT

## 2022-01-25 PROCEDURE — 97116 GAIT TRAINING THERAPY: CPT

## 2022-01-25 PROCEDURE — 73630 X-RAY EXAM OF FOOT: CPT

## 2022-01-25 PROCEDURE — 36569 INSJ PICC 5 YR+ W/O IMAGING: CPT

## 2022-01-25 PROCEDURE — 81001 URINALYSIS AUTO W/SCOPE: CPT

## 2022-01-25 PROCEDURE — 75716 ARTERY X-RAYS ARMS/LEGS: CPT | Mod: 59

## 2022-01-25 PROCEDURE — 87637 SARSCOV2&INF A&B&RSV AMP PRB: CPT

## 2022-01-25 PROCEDURE — 86901 BLOOD TYPING SEROLOGIC RH(D): CPT

## 2022-01-25 PROCEDURE — 80053 COMPREHEN METABOLIC PANEL: CPT

## 2022-01-25 PROCEDURE — 83036 HEMOGLOBIN GLYCOSYLATED A1C: CPT

## 2022-01-25 RX ORDER — PANTOPRAZOLE SODIUM 20 MG/1
1 TABLET, DELAYED RELEASE ORAL
Qty: 30 | Refills: 0
Start: 2022-01-25 | End: 2022-02-23

## 2022-01-25 RX ORDER — NICOTINE POLACRILEX 2 MG
1 GUM BUCCAL
Qty: 30 | Refills: 0
Start: 2022-01-25 | End: 2022-02-23

## 2022-01-25 RX ORDER — ATORVASTATIN CALCIUM 80 MG/1
1 TABLET, FILM COATED ORAL
Qty: 30 | Refills: 0
Start: 2022-01-25 | End: 2022-02-23

## 2022-01-25 RX ORDER — OXYCODONE AND ACETAMINOPHEN 5; 325 MG/1; MG/1
1 TABLET ORAL
Qty: 6 | Refills: 0
Start: 2022-01-25 | End: 2022-01-26

## 2022-01-25 RX ORDER — CLOPIDOGREL BISULFATE 75 MG/1
1 TABLET, FILM COATED ORAL
Qty: 30 | Refills: 0
Start: 2022-01-25 | End: 2022-02-23

## 2022-01-25 RX ADMIN — Medication 50 MILLIGRAM(S): at 17:37

## 2022-01-25 RX ADMIN — Medication 1 PATCH: at 12:12

## 2022-01-25 RX ADMIN — CLOPIDOGREL BISULFATE 75 MILLIGRAM(S): 75 TABLET, FILM COATED ORAL at 12:12

## 2022-01-25 RX ADMIN — CHLORHEXIDINE GLUCONATE 1 APPLICATION(S): 213 SOLUTION TOPICAL at 05:17

## 2022-01-25 RX ADMIN — APIXABAN 5 MILLIGRAM(S): 2.5 TABLET, FILM COATED ORAL at 05:17

## 2022-01-25 RX ADMIN — Medication 81 MILLIGRAM(S): at 12:12

## 2022-01-25 RX ADMIN — Medication 50 MILLIGRAM(S): at 05:17

## 2022-01-25 RX ADMIN — CHLORHEXIDINE GLUCONATE 1 APPLICATION(S): 213 SOLUTION TOPICAL at 05:23

## 2022-01-25 RX ADMIN — ERTAPENEM SODIUM 120 MILLIGRAM(S): 1 INJECTION, POWDER, LYOPHILIZED, FOR SOLUTION INTRAMUSCULAR; INTRAVENOUS at 08:36

## 2022-01-25 RX ADMIN — APIXABAN 5 MILLIGRAM(S): 2.5 TABLET, FILM COATED ORAL at 17:37

## 2022-01-25 NOTE — CHART NOTE - NSCHARTNOTESELECT_GEN_ALL_CORE
----- Message from Laurita Bean sent at 12/6/2017  2:47 PM CST -----  Contact: Rody @[phone]  [facility] pharmacy is following up on a request they faxed over for the pt Please call  at your earliest convenience.  Thanks !  
Event Note
Informed representative that we have not received any faxes from them; I gave them correct fax number and they will refax orders  
sheath pull/Event Note
Event Note
POC/Event Note
pre-op

## 2022-01-25 NOTE — CHART NOTE - NSCHARTNOTEFT_GEN_A_CORE
MEDICINE NP     JOY BALTAZAR  61y Male  Patient is a 61y old  Male who presents with a chief complaint of OM toe (25 Jan 2022 09:21)       In query regarding to patient's drug addiction.  Patient 's chart was review with finding from 8/15/2017 which states: "Patient is a recovered drug addict., last use  was an year ago."      Patient was then interviewed by myself and  together, Patient states her was never a drug addict, the only drug he use was Vicodin when he had problems  with his teeth and that was years ago, also states he used Vicodin when he had surgery on his foot. He denies every using any drug and states he drinks on occasions.   asked the question if he was ever a IV drug uses and patient denies using any form of IV drugs.     After the conversation with patient both SW and myself feel comfortable in allowing patient to be discharged with PICC line for IV antibiotics until 2/18/2022          Sandra Loera, JESSICA-C  Medicine Department

## 2022-01-25 NOTE — PROGRESS NOTE ADULT - SUBJECTIVE AND OBJECTIVE BOX
CARDIOLOGY     PROGRESS  NOTE   ________________________________________________    CHIEF COMPLAINT:Patient is a 61y old  Male who presents with a chief complaint of OM toe (24 Jan 2022 09:37)  no complain.  	  REVIEW OF SYSTEMS:  CONSTITUTIONAL: No fever, weight loss, or fatigue  EYES: No eye pain, visual disturbances, or discharge  ENT:  No difficulty hearing, tinnitus, vertigo; No sinus or throat pain  NECK: No pain or stiffness  RESPIRATORY: No cough, wheezing, chills or hemoptysis; No Shortness of Breath  CARDIOVASCULAR: No chest pain, palpitations, passing out, dizziness, or leg swelling  GASTROINTESTINAL: No abdominal or epigastric pain. No nausea, vomiting, or hematemesis; No diarrhea or constipation. No melena or hematochezia.  GENITOURINARY: No dysuria, frequency, hematuria, or incontinence  NEUROLOGICAL: No headaches, memory loss, loss of strength, numbness, or tremors  SKIN: No itching, burning, rashes, or lesions   LYMPH Nodes: No enlarged glands  ENDOCRINE: No heat or cold intolerance; No hair loss  MUSCULOSKELETAL: No joint pain or swelling; No muscle, back, or extremity pain  PSYCHIATRIC: No depression, anxiety, mood swings, or difficulty sleeping  HEME/LYMPH: No easy bruising, or bleeding gums  ALLERGY AND IMMUNOLOGIC: No hives or eczema	    [ ] All others negative	  [ ] Unable to obtain    PHYSICAL EXAM:  T(C): 36.8 (01-25-22 @ 04:21), Max: 36.8 (01-24-22 @ 11:20)  HR: 70 (01-25-22 @ 04:21) (66 - 70)  BP: 104/71 (01-25-22 @ 04:21) (104/71 - 119/62)  RR: 18 (01-25-22 @ 04:21) (18 - 18)  SpO2: 96% (01-25-22 @ 04:21) (96% - 97%)  Wt(kg): --  I&O's Summary    24 Jan 2022 07:01  -  25 Jan 2022 07:00  --------------------------------------------------------  IN: 240 mL / OUT: 0 mL / NET: 240 mL    25 Jan 2022 07:01  -  25 Jan 2022 09:21  --------------------------------------------------------  IN: 240 mL / OUT: 0 mL / NET: 240 mL        Appearance: Normal	  HEENT:   Normal oral mucosa, PERRL, EOMI	  Lymphatic: No lymphadenopathy  Cardiovascular: Normal S1 S2, No JVD, + murmurs, No edema  Respiratory: Lungs clear to auscultation	  Psychiatry: A & O x 3, Mood & affect appropriate  Gastrointestinal:  Soft, Non-tender, + BS	  Skin: No rashes, No ecchymoses, No cyanosis	  Neurologic: Non-focal  Extremities: Normal range of motion, No clubbing, cyanosis or edema  Vascular: Peripheral pulses palpable 2+ bilaterally    MEDICATIONS  (STANDING):  apixaban 5 milliGRAM(s) Oral every 12 hours  aspirin  chewable 81 milliGRAM(s) Oral daily  atorvastatin 80 milliGRAM(s) Oral at bedtime  chlorhexidine 2% Cloths 1 Application(s) Topical <User Schedule>  chlorhexidine 4% Liquid 1 Application(s) Topical <User Schedule>  clopidogrel Tablet 75 milliGRAM(s) Oral daily  ertapenem  IVPB      ertapenem  IVPB 1000 milliGRAM(s) IV Intermittent every 24 hours  metoprolol tartrate 50 milliGRAM(s) Oral two times a day  nicotine - 21 mG/24Hr(s) Patch 1 patch Transdermal daily      TELEMETRY: 	    ECG:  	  RADIOLOGY:  OTHER: 	  	  LABS:	 	    CARDIAC MARKERS:                                11.2   7.16  )-----------( 367      ( 25 Jan 2022 06:36 )             35.2     01-25    137  |  100  |  22  ----------------------------<  93  3.8   |  25  |  0.55    Ca    10.0      25 Jan 2022 06:36      proBNP:   Lipid Profile: Cholesterol 118  LDL --  HDL 25      HgA1c:   TSH:     Notes: Discussed dischsrge plan  with pt and his sister:Antelmo who both  request homecare be set up with [facility] and [facility]  IV infusion.Soc will be on Wednesday 01/26.Both decline rehab.    Assessment and plan  ---------------------------  OM of the toe with +mri  podiatry/ id appreciated continue IV abx  events noted today and discussed with NP, pt with tachycardia , no chest pain ecg ?st elevation in inferior leads, first trop negartive  new onset a fib/ flutter  asa  beta blocker  fu trop  has transferred to tele  will observe closely  trop/ cpk negative  s/p LAD stent  social work consult  id appreciated  picc line dc with iv abx  paf, increase beta blocker/  ?amio/ ablation as out pt  continue AC  dc planning

## 2022-01-25 NOTE — DISCHARGE NOTE NURSING/CASE MANAGEMENT/SOCIAL WORK - NSDCFUADDAPPT_GEN_ALL_CORE_FT
Podiatry Discharge Instructions:  Follow up: Please follow up with Dr. Gordon within 1 week of discharge from the hospital at wound care center on Friday, please call [phone] for appointment and discuss that you recently were seen in the hospital. Please also make appointment for Hyperbaric oxygen therapy using the same number.   Wound Care: Please leave your dressing clean dry intact until your follow up appointment to the Right foot. Please apply 1/2 iodoform packing to the left foot 5th surgical site, then apply 4x4 gauze, abd pads, chaz and ace to the 5th and 1st mt surgical site.   Weight bearing: Please weight bear as tolerated in a surgical shoe.  Antibiotics: Please continue as instructed.    APPTS ARE READY TO BE MADE: [x ] YES    Best Family or Patient Contact (if needed):    Additional Information about above appointments (if needed):    1:   2:   3:     Other comments or requests:

## 2022-01-25 NOTE — DISCHARGE NOTE NURSING/CASE MANAGEMENT/SOCIAL WORK - NSDCPEFALRISK_GEN_ALL_CORE
For information on Fall & Injury Prevention, visit: [email or web address] OR  [email or web address] OR  https://www.cdc.gov/steadi/patient.html

## 2022-01-25 NOTE — PROGRESS NOTE ADULT - REASON FOR ADMISSION
OM toe

## 2022-01-25 NOTE — DISCHARGE NOTE NURSING/CASE MANAGEMENT/SOCIAL WORK - PATIENT PORTAL LINK FT
You can access the FollowMyHealth Patient Portal offered by [facility] by registering at the following website: [email or web address]. By joining [employer]’s FollowMyHealth portal, you will also be able to view your health information using other applications (apps) compatible with our system.

## 2022-01-27 LAB — SURGICAL PATHOLOGY STUDY: SIGNIFICANT CHANGE UP

## 2022-01-28 ENCOUNTER — APPOINTMENT (OUTPATIENT)
Dept: WOUND CARE | Facility: CLINIC | Age: 62
End: 2022-01-28
Payer: MEDICAID

## 2022-01-28 PROCEDURE — 99213 OFFICE O/P EST LOW 20 MIN: CPT

## 2022-01-28 PROCEDURE — 99204 OFFICE O/P NEW MOD 45 MIN: CPT

## 2022-01-28 NOTE — PLAN
[FreeTextEntry1] : no debridement needed. Apply mupirocin to incision sites with adaptic gauze chaz and ace bandages both feet\par Healing amp site right foot with no signs of cellulitis both feet\par \par Continue IV antibiosis per ID ertapenim with PICC\par keep both feet clean and dry\par await HBO evaluation\par patient told he needs to follow up with ID for antibiotic treatment and vascular for PVD\par return 1 week for possbile suture removal both feet\par recommend gait and balance training for poor unsteady gait

## 2022-01-28 NOTE — HISTORY OF PRESENT ILLNESS
[FreeTextEntry1] : 61 year old male presents to wound center with sister Carmen s/p 5th ray resection left foot with bone biopsy 1 met head left foot and distal amputation right 2nd toe\par Patient is getting 6 weeks of IV antibiosis with PICC line per ID\par Open wound 5th met area with minimal dehisence no signs of cellulitis both feet\par sutures intact 5th met and dorsal 1 met left foot and distal right 2nd toe\par Dramactic improvement in right leg no signs of acute infection or cellulitis\par Patient not changing bandages \par history of osteomyelitis 5th met left foot and right 2nd toe\par +DM RBS unknown but under control according \par PVD both feet treated by Dr. Dillon

## 2022-02-04 ENCOUNTER — APPOINTMENT (OUTPATIENT)
Dept: WOUND CARE | Facility: CLINIC | Age: 62
End: 2022-02-04
Payer: MEDICAID

## 2022-02-04 VITALS — TEMPERATURE: 97.2 F

## 2022-02-04 PROCEDURE — 99213 OFFICE O/P EST LOW 20 MIN: CPT

## 2022-02-04 NOTE — HISTORY OF PRESENT ILLNESS
[FreeTextEntry1] : 61 year old male presents to wound center with sister Carmen s/p 5th ray resection left foot with bone biopsy 1 met head left foot and distal amputation right 2nd toe\par Patient is getting 6 weeks of IV antibiosis with PICC line per ID\par Open wound 5th met area with minimal dehisence no signs of cellulitis both feet\par sutures intact 5th met and dorsal 1 met left foot and distal right 2nd toe\par Dramatic improvement in right leg no signs of acute infection or cellulitis\par Patient not changing bandages \par history of osteomyelitis 5th met left foot and right 2nd toe\par +DM RBS unknown but under control according \par PVD both feet treated by Dr. Dillon

## 2022-02-04 NOTE — PLAN
[FreeTextEntry1] : no debridement needed. Apply mupirocin to incision sites with adaptic gauze chaz and ace bandages both feet\par Healing amp site right foot with no signs of cellulitis both feet\par \par Continue IV antibiosis per ID ertapenim with PICC\par patient going to see Dr. Starks next week\par keep both feet clean and dry\par await HBO evaluation\par patient told he needs to follow up with ID for antibiotic treatment and vascular for PVD\par 2nd toe right foot suture removal and 1 met head left foot suture removal without incident\par Apply mupirocin and dsd both feet-keep bandages clean and dry\par return 1 week for possible suture removal 5th met resection left foot\par recommend gait and balance training for poor unsteady gait\par recommend patient go for HBO as soon as he is approved

## 2022-02-07 ENCOUNTER — APPOINTMENT (OUTPATIENT)
Dept: INFECTIOUS DISEASE | Facility: CLINIC | Age: 62
End: 2022-02-07
Payer: MEDICAID

## 2022-02-07 VITALS
TEMPERATURE: 98 F | BODY MASS INDEX: 23.97 KG/M2 | OXYGEN SATURATION: 96 % | HEART RATE: 69 BPM | WEIGHT: 160 LBS | SYSTOLIC BLOOD PRESSURE: 106 MMHG | DIASTOLIC BLOOD PRESSURE: 70 MMHG | HEIGHT: 68.5 IN | RESPIRATION RATE: 12 BRPM

## 2022-02-07 PROCEDURE — 99214 OFFICE O/P EST MOD 30 MIN: CPT

## 2022-02-07 NOTE — HISTORY OF PRESENT ILLNESS
[FreeTextEntry1] : doing well  completed 3 weeks of invanz and 4 weeks of IV ab\par no constitutional sings of infections

## 2022-02-07 NOTE — PHYSICAL EXAM
[General Appearance - Alert] : alert [General Appearance - In No Acute Distress] : in no acute distress [Sclera] : the sclera and conjunctiva were normal [PERRL With Normal Accommodation] : pupils were equal in size, round, reactive to light [Extraocular Movements] : extraocular movements were intact [FreeTextEntry1] : wds are clean and dry on right foot  no signs of infection closed

## 2022-02-07 NOTE — ASSESSMENT
[FreeTextEntry1] : pt with pvd, underwent vascular revascularization followed by amputation with closed wd  or cultrues grew skin contaminants but preop he grew beta strep\par finished 4 weeks of IV ab\par \par reasonable to convert to oral ab for another few weeks \par complete invanz this week and then go to amox/clav \par

## 2022-02-08 ENCOUNTER — APPOINTMENT (OUTPATIENT)
Dept: VASCULAR SURGERY | Facility: CLINIC | Age: 62
End: 2022-02-08
Payer: MEDICAID

## 2022-02-08 VITALS
HEART RATE: 58 BPM | BODY MASS INDEX: 24.25 KG/M2 | WEIGHT: 160 LBS | SYSTOLIC BLOOD PRESSURE: 113 MMHG | DIASTOLIC BLOOD PRESSURE: 70 MMHG | TEMPERATURE: 97.8 F | HEIGHT: 68 IN

## 2022-02-08 PROCEDURE — 99213 OFFICE O/P EST LOW 20 MIN: CPT

## 2022-02-08 PROCEDURE — 93922 UPR/L XTREMITY ART 2 LEVELS: CPT

## 2022-02-08 NOTE — PHYSICAL EXAM
[1+] : left 1+ [Alert] : alert [Oriented to Person] : oriented to person [Oriented to Place] : oriented to place [Oriented to Time] : oriented to time [Calm] : calm [Ankle Swelling (On Exam)] : present [Ankle Swelling Bilaterally] : bilaterally  [Ankle Swelling On The Right] : mild [de-identified] : well in nad  [FreeTextEntry1] : LLE lateral foot 5th resc site w/ sutures clean dry and intact\par RLE 2nd digit tip amp site dry w/ eschar\par Bilateral lower extremity w/ arterial insuff w/ mod trophic skin changes  [de-identified] : flat, soft, NTND [de-identified] : WDL except uses wheelchair

## 2022-02-08 NOTE — REASON FOR VISIT
[Follow-Up: _____] : a [unfilled] follow-up visit [Family Member] : family member [FreeTextEntry1] : poor circulation

## 2022-02-08 NOTE — ASSESSMENT
[Arterial/Venous Disease] : arterial/venous disease [Medication Management] : medication management [Foot care/Footwear] : foot care/footwear [Leg Bypass] : leg bypass [FreeTextEntry1] : Impression CMT and arterial insuff s/p left EIA stent w/ bilateral OM and foot sx by podiatry\par \par Medical Conservative Management skin and foot protection measures\par f/u w/ cardiology regarding Eliquis and Plavix\par Continue asa \par Discussed w/ both pt and sister that there are very limited options for LLE revascularization if needed in the future and pt may require a rle bypass if there is limited healing/deterioration to right foot.\par f/u w/ podiatry\par RTO in 2 mos April 2022 w/ Aortoiliac duplex s/o left EIA stent stenosis and f/u visit \par RTO in 1 year Feb 2023 w/ GERALDO s/o arterial insuff and f/u visit

## 2022-02-08 NOTE — HISTORY OF PRESENT ILLNESS
[FreeTextEntry1] : 62 y/o m w/ CMT and bilateral feet osteo seen in hospital s/p bilateral lower extremity angio w/ left EIA stent on 1/12/22 w/ Dr Dillon. Under went left foot 5th partial ray resection and right foot 2nd toe amp w/ Dr Gordon on 1/14/22. No longer on IV ABx now taking PO ABx, however PICC still remains in place. Taking ASA, Eliquis for AFib/Flutter, and discharged on Plavix however pts sister states he is not taking it d/t pharmacy issue. Denies any new complaints since procedure. Denies rest pain or claudication. Denies fever or chills.

## 2022-02-11 ENCOUNTER — APPOINTMENT (OUTPATIENT)
Dept: WOUND CARE | Facility: CLINIC | Age: 62
End: 2022-02-11
Payer: MEDICAID

## 2022-02-11 DIAGNOSIS — T86.821 SKIN GRAFT (ALLOGRAFT) (AUTOGRAFT) FAILURE: ICD-10-CM

## 2022-02-11 DIAGNOSIS — M79.89 OTHER SPECIFIED SOFT TISSUE DISORDERS: ICD-10-CM

## 2022-02-11 PROCEDURE — 99212 OFFICE O/P EST SF 10 MIN: CPT

## 2022-02-11 NOTE — PLAN
[FreeTextEntry1] : no debridement needed. Apply mupirocin to incision sites with adaptic gauze chaz and ace bandages both feet\par Healing amp site right foot with no signs of cellulitis both feet\par patient going to see Dr. Starks no more PICC line and patient taking oral antibiosis-Augmentin \par keep both feet clean and dry\par await HBO evaluation\par patient told he needs to follow up with ID for antibiotic treatment and vascular for PVD\par 2nd toe right foot suture removal healed wound right foot\par d 1 met head left foot suture removal healed\par Apply mupirocin and dsd both feet-keep bandages clean and dry\par suture removal 5th met resection left foot-healed left foot\par recommend gait and balance training for poor unsteady gait-patient really likes physical therapy\par recommend patient go for HBO as soon as he is approved\par

## 2022-02-14 LAB — SARS-COV-2 N GENE NPH QL NAA+PROBE: NOT DETECTED

## 2022-02-14 NOTE — ASSESSMENT
[FreeTextEntry1] : no debridement needed. Apply mupirocin to incision sites with adaptic gauze chaz and ace bandages both feet\par Healing amp site right foot with no signs of cellulitis both feet\par \par Continue IV antibiosis per ID ertapenim with PICC\par keep both feet clean and dry\par await HBO evaluation\par patient told he needs to follow up with ID for antibiotic treatment and vascular for PVD\par return 1 week for possbile suture removal both feet\par recommend gait and balance training for poor unsteady gait\par \par Patient candidate for HBO treatment for failed flap, chronic osteomylitis\par Thorough education and orientation provided to the patient\par Patient signed the consents\par Chest xray clear\par Await for auth

## 2022-02-15 ENCOUNTER — APPOINTMENT (OUTPATIENT)
Dept: HYPERBARIC MEDICINE | Facility: CLINIC | Age: 62
End: 2022-02-15
Payer: MEDICAID

## 2022-02-15 ENCOUNTER — OUTPATIENT (OUTPATIENT)
Dept: OUTPATIENT SERVICES | Facility: HOSPITAL | Age: 62
LOS: 1 days | End: 2022-02-15
Payer: MEDICAID

## 2022-02-15 VITALS
TEMPERATURE: 98.5 F | HEART RATE: 88 BPM | DIASTOLIC BLOOD PRESSURE: 72 MMHG | SYSTOLIC BLOOD PRESSURE: 114 MMHG | RESPIRATION RATE: 16 BRPM | OXYGEN SATURATION: 98 %

## 2022-02-15 VITALS
RESPIRATION RATE: 18 BRPM | SYSTOLIC BLOOD PRESSURE: 122 MMHG | OXYGEN SATURATION: 98 % | DIASTOLIC BLOOD PRESSURE: 76 MMHG | TEMPERATURE: 98.2 F | HEART RATE: 98 BPM

## 2022-02-15 DIAGNOSIS — Z98.890 OTHER SPECIFIED POSTPROCEDURAL STATES: Chronic | ICD-10-CM

## 2022-02-15 PROCEDURE — 99183 HYPERBARIC OXYGEN THERAPY: CPT

## 2022-02-15 PROCEDURE — G0277: CPT

## 2022-02-15 PROCEDURE — 82962 GLUCOSE BLOOD TEST: CPT

## 2022-02-16 ENCOUNTER — OUTPATIENT (OUTPATIENT)
Dept: OUTPATIENT SERVICES | Facility: HOSPITAL | Age: 62
LOS: 1 days | End: 2022-02-16
Payer: MEDICAID

## 2022-02-16 ENCOUNTER — APPOINTMENT (OUTPATIENT)
Dept: HYPERBARIC MEDICINE | Facility: CLINIC | Age: 62
End: 2022-02-16
Payer: MEDICAID

## 2022-02-16 VITALS
RESPIRATION RATE: 16 BRPM | TEMPERATURE: 98.3 F | OXYGEN SATURATION: 98 % | SYSTOLIC BLOOD PRESSURE: 118 MMHG | DIASTOLIC BLOOD PRESSURE: 88 MMHG | HEART RATE: 88 BPM

## 2022-02-16 VITALS
RESPIRATION RATE: 18 BRPM | TEMPERATURE: 98.2 F | SYSTOLIC BLOOD PRESSURE: 126 MMHG | HEART RATE: 88 BPM | OXYGEN SATURATION: 98 % | DIASTOLIC BLOOD PRESSURE: 78 MMHG

## 2022-02-16 DIAGNOSIS — Z98.890 OTHER SPECIFIED POSTPROCEDURAL STATES: Chronic | ICD-10-CM

## 2022-02-16 DIAGNOSIS — M79.673 PAIN IN UNSPECIFIED FOOT: ICD-10-CM

## 2022-02-16 PROCEDURE — G0277: CPT

## 2022-02-16 PROCEDURE — 99183 HYPERBARIC OXYGEN THERAPY: CPT

## 2022-02-16 NOTE — PROCEDURE
[Outpatient] : Outpatient [Wheelchair] : wheelchair [THIS CHAMBER HAS BEEN CLEANED / DISINFECTED] : This chamber has been cleaned / disinfected according to local and hospital policy and procedure prior to this treatment. [____] : Post-Dive: Time - [unfilled] [___] : Post-Dive: Value - [unfilled] mg/dL [Patient demonstrated and verbalized proper technique for using air break mask] : Patient demonstrated and verbalized proper technique for using air break mask [Patient educated on the risks of SMOKING prior to HBOT with understanding] : Patient educated on the risks of SMOKING prior to HBOT with understanding [Patient educated on the risks of CONSUMING ALCOHOL prior to HBOT with understanding] : Patient educated on the risks of CONSUMING ALCOHOL prior to HBOT with understanding [100% Cotton] : 100% cotton [Empty all pockets] : empty all pockets [No hair oils, wigs, hairpieces, pins] : no hair oils, wigs, hairpieces, pins  [Pre tx medications] : pre tx medications  [No make-up, creams] : no make-up, creams  [No jewelry] : no jewelry  [No matches, cigarettes, lighters] : no matches, cigarettes, lighters  [Hearing aid removed] : hearing aid removed [Dentures removed] : dentures removed [Ground bracelet on pt's wrist] : ground bracelet on pt's wrist  [Contacts removed] : contacts removed  [Remove nail polish] : remove nail polish  [No reading material] : no reading material  [Bra, undergarments removed] : bra, undergarments removed  [No contraindicated dressings] : no contraindicated dressings [Ground Wire - VISUAL Verification - Intact/Free of Obstruction] : Ground Wire - VISUAL Verification - Intact/Free of Obstruction  [Ground Continuity - Verified < 1 ohm w/ Wrist Strap Meliza] : Ground Continuity - Verified < 1 ohm w/ Wrist Strap Meliza [Diagnosis: ___] : Diagnosis: [unfilled] [Number: ___] : Number: [unfilled] [] : No [Clear all fields] : clear all fields [FreeTextEntry1] : 2.0 [FreeTextEntry3] : 90 mins [FreeTextEntry5] : 0491 [facility] : 0440 [FreeTextEntry9] : 3630 [de-identified] : 0347 [de-identified] : 110 Mins/ 4 units

## 2022-02-16 NOTE — ADDENDUM
[FreeTextEntry1] : P descended to 2.0 BELEN @ 1.5 PSI/min without incident in chamber #4\par Pt resting @ depth with chest rise and fall observed throughout tx. \par Pt ascended from 2.0 BELEN @ 1.5 PSI/min without incident. \par Pt tolerated tx well.\par

## 2022-02-17 ENCOUNTER — APPOINTMENT (OUTPATIENT)
Dept: HYPERBARIC MEDICINE | Facility: CLINIC | Age: 62
End: 2022-02-17
Payer: MEDICAID

## 2022-02-17 ENCOUNTER — OUTPATIENT (OUTPATIENT)
Dept: OUTPATIENT SERVICES | Facility: HOSPITAL | Age: 62
LOS: 1 days | End: 2022-02-17
Payer: MEDICAID

## 2022-02-17 VITALS
SYSTOLIC BLOOD PRESSURE: 148 MMHG | HEART RATE: 88 BPM | TEMPERATURE: 98.2 F | DIASTOLIC BLOOD PRESSURE: 78 MMHG | OXYGEN SATURATION: 99 % | RESPIRATION RATE: 18 BRPM

## 2022-02-17 VITALS
RESPIRATION RATE: 18 BRPM | DIASTOLIC BLOOD PRESSURE: 66 MMHG | HEART RATE: 88 BPM | OXYGEN SATURATION: 99 % | TEMPERATURE: 98.3 F | SYSTOLIC BLOOD PRESSURE: 99 MMHG

## 2022-02-17 DIAGNOSIS — Z98.890 OTHER SPECIFIED POSTPROCEDURAL STATES: Chronic | ICD-10-CM

## 2022-02-17 DIAGNOSIS — T86.821 SKIN GRAFT (ALLOGRAFT) (AUTOGRAFT) FAILURE: ICD-10-CM

## 2022-02-17 PROCEDURE — G0277: CPT

## 2022-02-17 PROCEDURE — 99183 HYPERBARIC OXYGEN THERAPY: CPT

## 2022-02-17 NOTE — PROCEDURE
[Outpatient] : Outpatient [Wheelchair] : wheelchair [THIS CHAMBER HAS BEEN CLEANED / DISINFECTED] : This chamber has been cleaned / disinfected according to local and hospital policy and procedure prior to this treatment. [____] : Post-Dive: Time - [unfilled] [___] : Post-Dive: Value - [unfilled] mg/dL [Patient demonstrated and verbalized proper technique for using air break mask] : Patient demonstrated and verbalized proper technique for using air break mask [Patient educated on the risks of SMOKING prior to HBOT with understanding] : Patient educated on the risks of SMOKING prior to HBOT with understanding [Patient educated on the risks of CONSUMING ALCOHOL prior to HBOT with understanding] : Patient educated on the risks of CONSUMING ALCOHOL prior to HBOT with understanding [100% Cotton] : 100% cotton [Empty all pockets] : empty all pockets [No hair oils, wigs, hairpieces, pins] : no hair oils, wigs, hairpieces, pins  [Pre tx medications] : pre tx medications  [No make-up, creams] : no make-up, creams  [No jewelry] : no jewelry  [No matches, cigarettes, lighters] : no matches, cigarettes, lighters  [Hearing aid removed] : hearing aid removed [Dentures removed] : dentures removed [Ground bracelet on pt's wrist] : ground bracelet on pt's wrist  [Contacts removed] : contacts removed  [Remove nail polish] : remove nail polish  [No reading material] : no reading material  [Bra, undergarments removed] : bra, undergarments removed  [No contraindicated dressings] : no contraindicated dressings [Ground Wire - VISUAL Verification - Intact/Free of Obstruction] : Ground Wire - VISUAL Verification - Intact/Free of Obstruction  [Ground Continuity - Verified < 1 ohm w/ Wrist Strap Meliza] : Ground Continuity - Verified < 1 ohm w/ Wrist Strap Meliza [Diagnosis: ___] : Diagnosis: [unfilled] [Number: ___] : Number: [unfilled] [Clear all fields] : clear all fields [] : No [FreeTextEntry1] : 2.0 [FreeTextEntry3] : 90 mins [[facility]5] : 0139 [facility] 17[facility] [FreeTextEntry9] : 2115 [de-identified] : 7839 [de-identified] : 110 Mins/ 4 units

## 2022-02-18 ENCOUNTER — APPOINTMENT (OUTPATIENT)
Dept: HYPERBARIC MEDICINE | Facility: CLINIC | Age: 62
End: 2022-02-18

## 2022-02-18 ENCOUNTER — APPOINTMENT (OUTPATIENT)
Dept: WOUND CARE | Facility: CLINIC | Age: 62
End: 2022-02-18
Payer: MEDICAID

## 2022-02-18 PROCEDURE — 11042 DBRDMT SUBQ TIS 1ST 20SQCM/<: CPT | Mod: 79

## 2022-02-18 NOTE — PLAN
[FreeTextEntry1] : full thickness debridement of ulcerative area lateral left foot with sterile Curette down to the level of the subcutaneous tissue\par scizzoring gait bilaterally\par  Apply mupirocin to wound let foot\par incision sites with adaptic gauze chaz and ace bandages both feet\par Healing amp site right foot with no signs of cellulitis both feet\par patient going to see Dr. Starks no more PICC line and patient taking oral antibiosis-Augmentin \par keep both feet clean and dry\par patient doing HBO evaluation\par patient told he needs to follow up with ID for antibiotic treatment and vascular for PVD\par 1 met head healed and 2nd toe right foot healed\par Apply mupirocin and dsd both feet-keep bandages clean and dry\par 5th met resection left foot-healed left foot\par recommend gait and balance training for poor unsteady gait-patient really likes physical therapy\par Rx extra depth shoes both feet with tridensity custom made orthotic devices both feet with filler left foot for fifth ray resection left foot\par patient needs 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

## 2022-02-21 NOTE — ASSESSMENT
[Vital Signs stable] : Vital Signs stable [A physician was present throughout the entire HBOT] : A physician was present throughout the entire HBOT

## 2022-02-21 NOTE — ADDENDUM
[FreeTextEntry1] : pt was given brief intro about HBOT daily routine and how to minimize risk of Barotrauma using different methods by Technician prior to tx. \par Pt's TM evaluated by NP prior to tx.\par \par Pt descended to 2.0 BELEN @ 1.5 PSI/min without incident in chamber #4\par Pt resting @ depth with chest rise and fall observed throughout tx. \par Pt ascended from 2.0 EBLEN @ 1.5 PSI/min without incident. \par Pt tolerated tx well.\par

## 2022-02-21 NOTE — PROCEDURE
[Outpatient] : Outpatient [Wheelchair] : wheelchair [THIS CHAMBER HAS BEEN CLEANED / DISINFECTED] : This chamber has been cleaned / disinfected according to local and hospital policy and procedure prior to this treatment. [____] : Post-Dive: Time - [unfilled] [___] : Post-Dive: Value - [unfilled] mg/dL [Patient demonstrated and verbalized proper technique for using air break mask] : Patient demonstrated and verbalized proper technique for using air break mask [Patient educated on the risks of SMOKING prior to HBOT with understanding] : Patient educated on the risks of SMOKING prior to HBOT with understanding [Patient educated on the risks of CONSUMING ALCOHOL prior to HBOT with understanding] : Patient educated on the risks of CONSUMING ALCOHOL prior to HBOT with understanding [100% Cotton] : 100% cotton [Empty all pockets] : empty all pockets [No hair oils, wigs, hairpieces, pins] : no hair oils, wigs, hairpieces, pins  [Pre tx medications] : pre tx medications  [No make-up, creams] : no make-up, creams  [No jewelry] : no jewelry  [No matches, cigarettes, lighters] : no matches, cigarettes, lighters  [Hearing aid removed] : hearing aid removed [Ground bracelet on pt's wrist] : ground bracelet on pt's wrist  [Dentures removed] : dentures removed [Contacts removed] : contacts removed  [Remove nail polish] : remove nail polish  [No reading material] : no reading material  [Bra, undergarments removed] : bra, undergarments removed  [No contraindicated dressings] : no contraindicated dressings [Ground Wire - VISUAL Verification - Intact/Free of Obstruction] : Ground Wire - VISUAL Verification - Intact/Free of Obstruction  [Ground Continuity - Verified < 1 ohm w/ Wrist Strap Meliza] : Ground Continuity - Verified < 1 ohm w/ Wrist Strap Meliza [Number: ___] : Number: [unfilled] [Diagnosis: ___] : Diagnosis: [unfilled] [Clear all fields] : clear all fields [I have ordered 1 HBOT session at the indicated protocol as seen below] : I have ordered 1 HBOT session at the indicated protocol as seen below [] : No [FreeTextEntry1] : 2.0 [FreeTextEntry3] : 90 mins [[facility]3] : 5931 [[facility]7] : 3162 [FreeTextEntry9] : 1730 [de-identified] : 2139 [de-identified] : 110 Mins/ 4 units [de-identified] : NP

## 2022-02-22 ENCOUNTER — OUTPATIENT (OUTPATIENT)
Dept: OUTPATIENT SERVICES | Facility: HOSPITAL | Age: 62
LOS: 1 days | End: 2022-02-22
Payer: MEDICAID

## 2022-02-22 ENCOUNTER — APPOINTMENT (OUTPATIENT)
Dept: HYPERBARIC MEDICINE | Facility: CLINIC | Age: 62
End: 2022-02-22
Payer: MEDICAID

## 2022-02-22 VITALS
HEART RATE: 62 BPM | SYSTOLIC BLOOD PRESSURE: 162 MMHG | DIASTOLIC BLOOD PRESSURE: 80 MMHG | OXYGEN SATURATION: 98 % | RESPIRATION RATE: 18 BRPM | TEMPERATURE: 98.2 F

## 2022-02-22 VITALS
HEART RATE: 64 BPM | SYSTOLIC BLOOD PRESSURE: 124 MMHG | DIASTOLIC BLOOD PRESSURE: 78 MMHG | OXYGEN SATURATION: 98 % | RESPIRATION RATE: 18 BRPM | TEMPERATURE: 98.4 F

## 2022-02-22 DIAGNOSIS — M79.673 PAIN IN UNSPECIFIED FOOT: ICD-10-CM

## 2022-02-22 DIAGNOSIS — Z98.890 OTHER SPECIFIED POSTPROCEDURAL STATES: Chronic | ICD-10-CM

## 2022-02-22 PROCEDURE — 99183 HYPERBARIC OXYGEN THERAPY: CPT

## 2022-02-22 PROCEDURE — G0277: CPT

## 2022-02-23 ENCOUNTER — OUTPATIENT (OUTPATIENT)
Dept: OUTPATIENT SERVICES | Facility: HOSPITAL | Age: 62
LOS: 1 days | End: 2022-02-23
Payer: MEDICAID

## 2022-02-23 ENCOUNTER — APPOINTMENT (OUTPATIENT)
Dept: HYPERBARIC MEDICINE | Facility: CLINIC | Age: 62
End: 2022-02-23
Payer: MEDICAID

## 2022-02-23 VITALS
HEART RATE: 66 BPM | SYSTOLIC BLOOD PRESSURE: 148 MMHG | TEMPERATURE: 98.6 F | DIASTOLIC BLOOD PRESSURE: 74 MMHG | RESPIRATION RATE: 18 BRPM | OXYGEN SATURATION: 98 %

## 2022-02-23 VITALS
SYSTOLIC BLOOD PRESSURE: 158 MMHG | TEMPERATURE: 98.2 F | RESPIRATION RATE: 18 BRPM | OXYGEN SATURATION: 99 % | DIASTOLIC BLOOD PRESSURE: 78 MMHG | HEART RATE: 58 BPM

## 2022-02-23 DIAGNOSIS — Z98.890 OTHER SPECIFIED POSTPROCEDURAL STATES: Chronic | ICD-10-CM

## 2022-02-23 PROCEDURE — G0277: CPT

## 2022-02-23 PROCEDURE — 99183 HYPERBARIC OXYGEN THERAPY: CPT

## 2022-02-23 NOTE — PROCEDURE
[Outpatient] : Outpatient [Wheelchair] : wheelchair [THIS CHAMBER HAS BEEN CLEANED / DISINFECTED] : This chamber has been cleaned / disinfected according to local and hospital policy and procedure prior to this treatment. [____] : Post-Dive: Time - [unfilled] [___] : Post-Dive: Value - [unfilled] mg/dL [Patient demonstrated and verbalized proper technique for using air break mask] : Patient demonstrated and verbalized proper technique for using air break mask [Patient educated on the risks of SMOKING prior to HBOT with understanding] : Patient educated on the risks of SMOKING prior to HBOT with understanding [Patient educated on the risks of CONSUMING ALCOHOL prior to HBOT with understanding] : Patient educated on the risks of CONSUMING ALCOHOL prior to HBOT with understanding [100% Cotton] : 100% cotton [Empty all pockets] : empty all pockets [No hair oils, wigs, hairpieces, pins] : no hair oils, wigs, hairpieces, pins  [Pre tx medications] : pre tx medications  [No make-up, creams] : no make-up, creams  [No jewelry] : no jewelry  [No matches, cigarettes, lighters] : no matches, cigarettes, lighters  [Hearing aid removed] : hearing aid removed [Dentures removed] : dentures removed [Ground bracelet on pt's wrist] : ground bracelet on pt's wrist  [Contacts removed] : contacts removed  [Remove nail polish] : remove nail polish  [No reading material] : no reading material  [Bra, undergarments removed] : bra, undergarments removed  [No contraindicated dressings] : no contraindicated dressings [Ground Wire - VISUAL Verification - Intact/Free of Obstruction] : Ground Wire - VISUAL Verification - Intact/Free of Obstruction  [Ground Continuity - Verified < 1 ohm w/ Wrist Strap Meliza] : Ground Continuity - Verified < 1 ohm w/ Wrist Strap Meliza [Diagnosis: ___] : Diagnosis: [unfilled] [Number: ___] : Number: [unfilled] [] : No [Clear all fields] : clear all fields [FreeTextEntry1] : 2.0 [FreeTextEntry3] : 90 mins [FreeTextEntry5] : 4702 [FreeTextEntry7] : 9870 [FreeTextEntry9] : 3389 [de-identified] : 2347 [de-identified] : 110 Mins/ 4 units

## 2022-02-24 ENCOUNTER — OUTPATIENT (OUTPATIENT)
Dept: OUTPATIENT SERVICES | Facility: HOSPITAL | Age: 62
LOS: 1 days | End: 2022-02-24
Payer: MEDICAID

## 2022-02-24 ENCOUNTER — APPOINTMENT (OUTPATIENT)
Dept: HYPERBARIC MEDICINE | Facility: CLINIC | Age: 62
End: 2022-02-24
Payer: MEDICAID

## 2022-02-24 VITALS
HEART RATE: 88 BPM | DIASTOLIC BLOOD PRESSURE: 88 MMHG | RESPIRATION RATE: 18 BRPM | TEMPERATURE: 98.3 F | SYSTOLIC BLOOD PRESSURE: 130 MMHG | OXYGEN SATURATION: 98 %

## 2022-02-24 VITALS
RESPIRATION RATE: 18 BRPM | TEMPERATURE: 98.7 F | DIASTOLIC BLOOD PRESSURE: 88 MMHG | SYSTOLIC BLOOD PRESSURE: 121 MMHG | HEART RATE: 66 BPM | OXYGEN SATURATION: 98 %

## 2022-02-24 DIAGNOSIS — T86.821 SKIN GRAFT (ALLOGRAFT) (AUTOGRAFT) FAILURE: ICD-10-CM

## 2022-02-24 DIAGNOSIS — Z98.890 OTHER SPECIFIED POSTPROCEDURAL STATES: Chronic | ICD-10-CM

## 2022-02-24 PROCEDURE — G0277: CPT

## 2022-02-24 PROCEDURE — 99183 HYPERBARIC OXYGEN THERAPY: CPT

## 2022-02-24 NOTE — ASSESSMENT
[A physician was present throughout the entire HBOT] : A physician was present throughout the entire HBOT

## 2022-02-24 NOTE — PROCEDURE
[Outpatient] : Outpatient [Wheelchair] : wheelchair [THIS CHAMBER HAS BEEN CLEANED / DISINFECTED] : This chamber has been cleaned / disinfected according to local and hospital policy and procedure prior to this treatment. [____] : Post-Dive: Time - [unfilled] [___] : Post-Dive: Value - [unfilled] mg/dL [Patient demonstrated and verbalized proper technique for using air break mask] : Patient demonstrated and verbalized proper technique for using air break mask [Patient educated on the risks of SMOKING prior to HBOT with understanding] : Patient educated on the risks of SMOKING prior to HBOT with understanding [Patient educated on the risks of CONSUMING ALCOHOL prior to HBOT with understanding] : Patient educated on the risks of CONSUMING ALCOHOL prior to HBOT with understanding [100% Cotton] : 100% cotton [Empty all pockets] : empty all pockets [No hair oils, wigs, hairpieces, pins] : no hair oils, wigs, hairpieces, pins  [Pre tx medications] : pre tx medications  [No make-up, creams] : no make-up, creams  [No jewelry] : no jewelry  [No matches, cigarettes, lighters] : no matches, cigarettes, lighters  [Hearing aid removed] : hearing aid removed [Dentures removed] : dentures removed [Ground bracelet on pt's wrist] : ground bracelet on pt's wrist  [Contacts removed] : contacts removed  [Remove nail polish] : remove nail polish  [No reading material] : no reading material  [Bra, undergarments removed] : bra, undergarments removed  [No contraindicated dressings] : no contraindicated dressings [Ground Wire - VISUAL Verification - Intact/Free of Obstruction] : Ground Wire - VISUAL Verification - Intact/Free of Obstruction  [Ground Continuity - Verified < 1 ohm w/ Wrist Strap Meliza] : Ground Continuity - Verified < 1 ohm w/ Wrist Strap Meliza [Clear all fields] : clear all fields [Diagnosis: ___] : Diagnosis: [unfilled] [Number: ___] : Number: [unfilled] [I have ordered 1 HBOT session at the indicated protocol as seen below] : I have ordered 1 HBOT session at the indicated protocol as seen below [] : No [FreeTextEntry1] : 2.0 [FreeTextEntry3] : 90 mins [FreeTextEntry5] : 5273 [FreeTextEntry7] : 2794 [FreeTextEntry9] : 1709 [de-identified] : 8255 [de-identified] : 110 Mins/ 4 units

## 2022-02-25 ENCOUNTER — APPOINTMENT (OUTPATIENT)
Dept: HYPERBARIC MEDICINE | Facility: CLINIC | Age: 62
End: 2022-02-25
Payer: MEDICAID

## 2022-02-25 ENCOUNTER — APPOINTMENT (OUTPATIENT)
Dept: WOUND CARE | Facility: CLINIC | Age: 62
End: 2022-02-25
Payer: MEDICAID

## 2022-02-25 ENCOUNTER — OUTPATIENT (OUTPATIENT)
Dept: OUTPATIENT SERVICES | Facility: HOSPITAL | Age: 62
LOS: 1 days | End: 2022-02-25
Payer: MEDICAID

## 2022-02-25 VITALS
RESPIRATION RATE: 18 BRPM | OXYGEN SATURATION: 99 % | TEMPERATURE: 98.2 F | SYSTOLIC BLOOD PRESSURE: 151 MMHG | DIASTOLIC BLOOD PRESSURE: 70 MMHG | HEART RATE: 88 BPM

## 2022-02-25 VITALS
RESPIRATION RATE: 18 BRPM | SYSTOLIC BLOOD PRESSURE: 170 MMHG | TEMPERATURE: 98.2 F | OXYGEN SATURATION: 99 % | HEART RATE: 88 BPM | DIASTOLIC BLOOD PRESSURE: 88 MMHG

## 2022-02-25 DIAGNOSIS — M79.673 PAIN IN UNSPECIFIED FOOT: ICD-10-CM

## 2022-02-25 DIAGNOSIS — Z98.890 OTHER SPECIFIED POSTPROCEDURAL STATES: Chronic | ICD-10-CM

## 2022-02-25 DIAGNOSIS — M86.679 OTHER CHRONIC OSTEOMYELITIS, UNSPECIFIED ANKLE AND FOOT: ICD-10-CM

## 2022-02-25 DIAGNOSIS — L97.522 NON-PRESSURE CHRONIC ULCER OF OTHER PART OF LEFT FOOT WITH FAT LAYER EXPOSED: ICD-10-CM

## 2022-02-25 PROCEDURE — G0277: CPT

## 2022-02-25 PROCEDURE — 99183 HYPERBARIC OXYGEN THERAPY: CPT

## 2022-02-25 PROCEDURE — 99213 OFFICE O/P EST LOW 20 MIN: CPT

## 2022-02-25 NOTE — PLAN
[FreeTextEntry1] : healed ulcerative area lateral left foot with no signs of infection\par 1= edema both feet\par scizzoring gait bilaterally\par  All incision sites are healed \par Healed amp site right and left foot with no signs of cellulitis both feet\par patient going to see Dr. Starks no more PICC line and patient taking oral antibiosis-Augmentin \par keep both feet clean and dry\par patient doing HBO evaluation\par patient told he needs to follow up with ID for antibiotic treatment and vascular for PVD\par 1 met head healed and 2nd toe right foot healed\par no need for daily bandages since all wounds have healed\par Chronic OM left 1 met. 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

## 2022-02-25 NOTE — HISTORY OF PRESENT ILLNESS
[FreeTextEntry1] : 61 year old male presents to wound center with sister Carmen s/p 5th ray resection left foot with bone biopsy 1 met head left foot and distal amputation right 2nd toe\par Patient is getting 6 weeks of IV antibiosis with PICC line per ID\par Open wound 5th met area with minimal dehisence no signs of cellulitis both feet\par sutures intact 5th met and dorsal 1 met left foot and distal right 2nd toe\par Dramatic improvement in right leg no signs of acute infection or cellulitis\par Patient not changing bandages \par history of osteomyelitis 5th met left foot and right 2nd toe\par +DM RBS unknown but under control according \par PVD both feet treated by Dr. Dillon\par dramatic clinical improvement in both feet

## 2022-02-28 ENCOUNTER — OUTPATIENT (OUTPATIENT)
Dept: OUTPATIENT SERVICES | Facility: HOSPITAL | Age: 62
LOS: 1 days | End: 2022-02-28
Payer: MEDICAID

## 2022-02-28 ENCOUNTER — APPOINTMENT (OUTPATIENT)
Dept: HYPERBARIC MEDICINE | Facility: CLINIC | Age: 62
End: 2022-02-28
Payer: MEDICAID

## 2022-02-28 VITALS
RESPIRATION RATE: 18 BRPM | OXYGEN SATURATION: 99 % | DIASTOLIC BLOOD PRESSURE: 97 MMHG | HEART RATE: 73 BPM | SYSTOLIC BLOOD PRESSURE: 172 MMHG | TEMPERATURE: 98.7 F

## 2022-02-28 VITALS
RESPIRATION RATE: 18 BRPM | TEMPERATURE: 98.9 F | HEART RATE: 78 BPM | SYSTOLIC BLOOD PRESSURE: 132 MMHG | DIASTOLIC BLOOD PRESSURE: 60 MMHG | OXYGEN SATURATION: 99 %

## 2022-02-28 DIAGNOSIS — Z98.890 OTHER SPECIFIED POSTPROCEDURAL STATES: Chronic | ICD-10-CM

## 2022-02-28 PROCEDURE — 99183 HYPERBARIC OXYGEN THERAPY: CPT

## 2022-02-28 PROCEDURE — G0277: CPT

## 2022-02-28 NOTE — PROCEDURE
[Outpatient] : Outpatient [Wheelchair] : wheelchair [THIS CHAMBER HAS BEEN CLEANED / DISINFECTED] : This chamber has been cleaned / disinfected according to local and hospital policy and procedure prior to this treatment. [____] : Post-Dive: Time - [unfilled] [___] : Post-Dive: Value - [unfilled] mg/dL [Patient demonstrated and verbalized proper technique for using air break mask] : Patient demonstrated and verbalized proper technique for using air break mask [Patient educated on the risks of SMOKING prior to HBOT with understanding] : Patient educated on the risks of SMOKING prior to HBOT with understanding [Patient educated on the risks of CONSUMING ALCOHOL prior to HBOT with understanding] : Patient educated on the risks of CONSUMING ALCOHOL prior to HBOT with understanding [100% Cotton] : 100% cotton [Empty all pockets] : empty all pockets [No hair oils, wigs, hairpieces, pins] : no hair oils, wigs, hairpieces, pins  [Pre tx medications] : pre tx medications  [No make-up, creams] : no make-up, creams  [No jewelry] : no jewelry  [No matches, cigarettes, lighters] : no matches, cigarettes, lighters  [Hearing aid removed] : hearing aid removed [Dentures removed] : dentures removed [Ground bracelet on pt's wrist] : ground bracelet on pt's wrist  [Contacts removed] : contacts removed  [Remove nail polish] : remove nail polish  [No reading material] : no reading material  [Bra, undergarments removed] : bra, undergarments removed  [No contraindicated dressings] : no contraindicated dressings [Ground Wire - VISUAL Verification - Intact/Free of Obstruction] : Ground Wire - VISUAL Verification - Intact/Free of Obstruction  [Ground Continuity - Verified < 1 ohm w/ Wrist Strap Meliza] : Ground Continuity - Verified < 1 ohm w/ Wrist Strap Meliza [Clear all fields] : clear all fields [Diagnosis: ___] : Diagnosis: [unfilled] [Number: ___] : Number: [unfilled] [] : No [FreeTextEntry1] : 2.0 [FreeTextEntry3] : 90 mins [[facility]5] : 4831 [[facility] 0755 [FreeTextEntry9] : 1479 [de-identified] : 0457 [de-identified] : 110 Mins/ 4 units

## 2022-03-01 ENCOUNTER — OUTPATIENT (OUTPATIENT)
Dept: OUTPATIENT SERVICES | Facility: HOSPITAL | Age: 62
LOS: 1 days | End: 2022-03-01
Payer: MEDICAID

## 2022-03-01 ENCOUNTER — APPOINTMENT (OUTPATIENT)
Dept: HYPERBARIC MEDICINE | Facility: CLINIC | Age: 62
End: 2022-03-01
Payer: MEDICAID

## 2022-03-01 VITALS
OXYGEN SATURATION: 99 % | TEMPERATURE: 98.7 F | DIASTOLIC BLOOD PRESSURE: 94 MMHG | HEART RATE: 100 BPM | SYSTOLIC BLOOD PRESSURE: 134 MMHG | RESPIRATION RATE: 18 BRPM

## 2022-03-01 VITALS
OXYGEN SATURATION: 98 % | RESPIRATION RATE: 18 BRPM | SYSTOLIC BLOOD PRESSURE: 134 MMHG | TEMPERATURE: 98.3 F | DIASTOLIC BLOOD PRESSURE: 88 MMHG | HEART RATE: 88 BPM

## 2022-03-01 DIAGNOSIS — M79.673 PAIN IN UNSPECIFIED FOOT: ICD-10-CM

## 2022-03-01 DIAGNOSIS — Z98.890 OTHER SPECIFIED POSTPROCEDURAL STATES: Chronic | ICD-10-CM

## 2022-03-01 DIAGNOSIS — T86.821 SKIN GRAFT (ALLOGRAFT) (AUTOGRAFT) FAILURE: ICD-10-CM

## 2022-03-01 PROCEDURE — 99183 HYPERBARIC OXYGEN THERAPY: CPT

## 2022-03-01 PROCEDURE — G0277: CPT

## 2022-03-02 ENCOUNTER — OUTPATIENT (OUTPATIENT)
Dept: OUTPATIENT SERVICES | Facility: HOSPITAL | Age: 62
LOS: 1 days | End: 2022-03-02
Payer: MEDICAID

## 2022-03-02 ENCOUNTER — APPOINTMENT (OUTPATIENT)
Dept: HYPERBARIC MEDICINE | Facility: CLINIC | Age: 62
End: 2022-03-02
Payer: MEDICAID

## 2022-03-02 VITALS
TEMPERATURE: 98.2 F | RESPIRATION RATE: 16 BRPM | DIASTOLIC BLOOD PRESSURE: 90 MMHG | HEART RATE: 68 BPM | SYSTOLIC BLOOD PRESSURE: 161 MMHG | OXYGEN SATURATION: 98 %

## 2022-03-02 VITALS
DIASTOLIC BLOOD PRESSURE: 70 MMHG | SYSTOLIC BLOOD PRESSURE: 130 MMHG | HEART RATE: 78 BPM | RESPIRATION RATE: 18 BRPM | OXYGEN SATURATION: 99 % | TEMPERATURE: 98.3 F

## 2022-03-02 DIAGNOSIS — Z98.890 OTHER SPECIFIED POSTPROCEDURAL STATES: Chronic | ICD-10-CM

## 2022-03-02 PROCEDURE — 99183 HYPERBARIC OXYGEN THERAPY: CPT

## 2022-03-02 PROCEDURE — G0277: CPT

## 2022-03-02 NOTE — PROCEDURE
[Outpatient] : Outpatient [Wheelchair] : wheelchair [THIS CHAMBER HAS BEEN CLEANED / DISINFECTED] : This chamber has been cleaned / disinfected according to local and hospital policy and procedure prior to this treatment. [____] : Post-Dive: Time - [unfilled] [___] : Post-Dive: Value - [unfilled] mg/dL [Patient demonstrated and verbalized proper technique for using air break mask] : Patient demonstrated and verbalized proper technique for using air break mask [Patient educated on the risks of SMOKING prior to HBOT with understanding] : Patient educated on the risks of SMOKING prior to HBOT with understanding [Patient educated on the risks of CONSUMING ALCOHOL prior to HBOT with understanding] : Patient educated on the risks of CONSUMING ALCOHOL prior to HBOT with understanding [100% Cotton] : 100% cotton [Empty all pockets] : empty all pockets [No hair oils, wigs, hairpieces, pins] : no hair oils, wigs, hairpieces, pins  [Pre tx medications] : pre tx medications  [No make-up, creams] : no make-up, creams  [No jewelry] : no jewelry  [No matches, cigarettes, lighters] : no matches, cigarettes, lighters  [Hearing aid removed] : hearing aid removed [Dentures removed] : dentures removed [Ground bracelet on pt's wrist] : ground bracelet on pt's wrist  [Contacts removed] : contacts removed  [Remove nail polish] : remove nail polish  [No reading material] : no reading material  [Bra, undergarments removed] : bra, undergarments removed  [No contraindicated dressings] : no contraindicated dressings [Ground Wire - VISUAL Verification - Intact/Free of Obstruction] : Ground Wire - VISUAL Verification - Intact/Free of Obstruction  [Ground Continuity - Verified < 1 ohm w/ Wrist Strap Meliza] : Ground Continuity - Verified < 1 ohm w/ Wrist Strap Meliza [Clear all fields] : clear all fields [Diagnosis: ___] : Diagnosis: [unfilled] [Number: ___] : Number: [unfilled] [] : No [FreeTextEntry1] : 2.0 [FreeTextEntry3] : 90 mins [FreeTextEntry5] : 1 [FreeTextEntry7] : 2564 [FreeTextEntry9] : 7681 [de-identified] : 8755 [de-identified] : 110 Mins/ 4 units

## 2022-03-03 ENCOUNTER — OUTPATIENT (OUTPATIENT)
Dept: OUTPATIENT SERVICES | Facility: HOSPITAL | Age: 62
LOS: 1 days | End: 2022-03-03
Payer: MEDICAID

## 2022-03-03 ENCOUNTER — APPOINTMENT (OUTPATIENT)
Dept: HYPERBARIC MEDICINE | Facility: CLINIC | Age: 62
End: 2022-03-03
Payer: MEDICAID

## 2022-03-03 VITALS
TEMPERATURE: 98.2 F | SYSTOLIC BLOOD PRESSURE: 160 MMHG | HEART RATE: 70 BPM | OXYGEN SATURATION: 98 % | RESPIRATION RATE: 18 BRPM | DIASTOLIC BLOOD PRESSURE: 82 MMHG

## 2022-03-03 VITALS
SYSTOLIC BLOOD PRESSURE: 162 MMHG | RESPIRATION RATE: 18 BRPM | TEMPERATURE: 97.2 F | OXYGEN SATURATION: 98 % | HEART RATE: 70 BPM | DIASTOLIC BLOOD PRESSURE: 80 MMHG

## 2022-03-03 DIAGNOSIS — T86.821 SKIN GRAFT (ALLOGRAFT) (AUTOGRAFT) FAILURE: ICD-10-CM

## 2022-03-03 DIAGNOSIS — Z98.890 OTHER SPECIFIED POSTPROCEDURAL STATES: Chronic | ICD-10-CM

## 2022-03-03 PROCEDURE — G0277: CPT

## 2022-03-03 PROCEDURE — 99183 HYPERBARIC OXYGEN THERAPY: CPT

## 2022-03-03 NOTE — PROCEDURE
[Outpatient] : Outpatient [Wheelchair] : wheelchair [THIS CHAMBER HAS BEEN CLEANED / DISINFECTED] : This chamber has been cleaned / disinfected according to local and hospital policy and procedure prior to this treatment. [____] : Post-Dive: Time - [unfilled] [___] : Post-Dive: Value - [unfilled] mg/dL [Patient demonstrated and verbalized proper technique for using air break mask] : Patient demonstrated and verbalized proper technique for using air break mask [Patient educated on the risks of SMOKING prior to HBOT with understanding] : Patient educated on the risks of SMOKING prior to HBOT with understanding [Patient educated on the risks of CONSUMING ALCOHOL prior to HBOT with understanding] : Patient educated on the risks of CONSUMING ALCOHOL prior to HBOT with understanding [100% Cotton] : 100% cotton [Empty all pockets] : empty all pockets [No hair oils, wigs, hairpieces, pins] : no hair oils, wigs, hairpieces, pins  [Pre tx medications] : pre tx medications  [No make-up, creams] : no make-up, creams  [No jewelry] : no jewelry  [No matches, cigarettes, lighters] : no matches, cigarettes, lighters  [Hearing aid removed] : hearing aid removed [Dentures removed] : dentures removed [Ground bracelet on pt's wrist] : ground bracelet on pt's wrist  [Contacts removed] : contacts removed  [Remove nail polish] : remove nail polish  [No reading material] : no reading material  [Bra, undergarments removed] : bra, undergarments removed  [No contraindicated dressings] : no contraindicated dressings [Ground Wire - VISUAL Verification - Intact/Free of Obstruction] : Ground Wire - VISUAL Verification - Intact/Free of Obstruction  [Ground Continuity - Verified < 1 ohm w/ Wrist Strap Meliza] : Ground Continuity - Verified < 1 ohm w/ Wrist Strap Meliza [Number: ___] : Number: [unfilled] [Diagnosis: ___] : Diagnosis: [unfilled] [] : No [Clear all fields] : clear all fields [FreeTextEntry1] : 2.0 [FreeTextEntry3] : 90 mins [FreeTextEntry5] : 6492 [[facility]] : 5655 [FreeTextEntry9] : 9931 [de-identified] : 3426 [de-identified] : 110 Mins/ 4 units

## 2022-03-04 ENCOUNTER — OUTPATIENT (OUTPATIENT)
Dept: OUTPATIENT SERVICES | Facility: HOSPITAL | Age: 62
LOS: 1 days | End: 2022-03-04
Payer: MEDICAID

## 2022-03-04 ENCOUNTER — APPOINTMENT (OUTPATIENT)
Dept: HYPERBARIC MEDICINE | Facility: CLINIC | Age: 62
End: 2022-03-04
Payer: MEDICAID

## 2022-03-04 VITALS
RESPIRATION RATE: 18 BRPM | OXYGEN SATURATION: 98 % | DIASTOLIC BLOOD PRESSURE: 80 MMHG | TEMPERATURE: 98.3 F | HEART RATE: 88 BPM | SYSTOLIC BLOOD PRESSURE: 186 MMHG

## 2022-03-04 VITALS
SYSTOLIC BLOOD PRESSURE: 176 MMHG | DIASTOLIC BLOOD PRESSURE: 90 MMHG | OXYGEN SATURATION: 98 % | RESPIRATION RATE: 18 BRPM | TEMPERATURE: 98.1 F | HEART RATE: 70 BPM

## 2022-03-04 DIAGNOSIS — Z98.890 OTHER SPECIFIED POSTPROCEDURAL STATES: Chronic | ICD-10-CM

## 2022-03-04 PROCEDURE — G0277: CPT

## 2022-03-04 PROCEDURE — 99183 HYPERBARIC OXYGEN THERAPY: CPT

## 2022-03-04 NOTE — PROCEDURE
[Outpatient] : Outpatient [Wheelchair] : wheelchair [THIS CHAMBER HAS BEEN CLEANED / DISINFECTED] : This chamber has been cleaned / disinfected according to local and hospital policy and procedure prior to this treatment. [____] : Post-Dive: Time - [unfilled] [___] : Post-Dive: Value - [unfilled] mg/dL [Patient demonstrated and verbalized proper technique for using air break mask] : Patient demonstrated and verbalized proper technique for using air break mask [Patient educated on the risks of SMOKING prior to HBOT with understanding] : Patient educated on the risks of SMOKING prior to HBOT with understanding [Patient educated on the risks of CONSUMING ALCOHOL prior to HBOT with understanding] : Patient educated on the risks of CONSUMING ALCOHOL prior to HBOT with understanding [Empty all pockets] : empty all pockets [100% Cotton] : 100% cotton [No hair oils, wigs, hairpieces, pins] : no hair oils, wigs, hairpieces, pins  [Pre tx medications] : pre tx medications  [No make-up, creams] : no make-up, creams  [No jewelry] : no jewelry  [No matches, cigarettes, lighters] : no matches, cigarettes, lighters  [Hearing aid removed] : hearing aid removed [Dentures removed] : dentures removed [Ground bracelet on pt's wrist] : ground bracelet on pt's wrist  [Contacts removed] : contacts removed  [Remove nail polish] : remove nail polish  [No reading material] : no reading material  [Bra, undergarments removed] : bra, undergarments removed  [No contraindicated dressings] : no contraindicated dressings [Ground Wire - VISUAL Verification - Intact/Free of Obstruction] : Ground Wire - VISUAL Verification - Intact/Free of Obstruction  [Ground Continuity - Verified < 1 ohm w/ Wrist Strap Meliza] : Ground Continuity - Verified < 1 ohm w/ Wrist Strap Meliza [Clear all fields] : clear all fields [Diagnosis: ___] : Diagnosis: [unfilled] [Number: ___] : Number: [unfilled] [] : No [FreeTextEntry1] : 2.0 [FreeTextEntry3] : 90 mins [FreeTextEntry5] : 8290 [FreeTextEntry7] : 1920 [FreeTextEntry9] : 7162 [de-identified] : 5997 [de-identified] : 110 Mins/ 4 units

## 2022-03-04 NOTE — PROCEDURE
[Outpatient] : Outpatient [Wheelchair] : wheelchair [THIS CHAMBER HAS BEEN CLEANED / DISINFECTED] : This chamber has been cleaned / disinfected according to local and hospital policy and procedure prior to this treatment. [____] : Post-Dive: Time - [unfilled] [___] : Post-Dive: Value - [unfilled] mg/dL [Patient demonstrated and verbalized proper technique for using air break mask] : Patient demonstrated and verbalized proper technique for using air break mask [Patient educated on the risks of SMOKING prior to HBOT with understanding] : Patient educated on the risks of SMOKING prior to HBOT with understanding [Patient educated on the risks of CONSUMING ALCOHOL prior to HBOT with understanding] : Patient educated on the risks of CONSUMING ALCOHOL prior to HBOT with understanding [100% Cotton] : 100% cotton [Empty all pockets] : empty all pockets [No hair oils, wigs, hairpieces, pins] : no hair oils, wigs, hairpieces, pins  [Pre tx medications] : pre tx medications  [No make-up, creams] : no make-up, creams  [No jewelry] : no jewelry  [No matches, cigarettes, lighters] : no matches, cigarettes, lighters  [Hearing aid removed] : hearing aid removed [Dentures removed] : dentures removed [Ground bracelet on pt's wrist] : ground bracelet on pt's wrist  [Contacts removed] : contacts removed  [Remove nail polish] : remove nail polish  [No reading material] : no reading material  [Bra, undergarments removed] : bra, undergarments removed  [No contraindicated dressings] : no contraindicated dressings [Ground Wire - VISUAL Verification - Intact/Free of Obstruction] : Ground Wire - VISUAL Verification - Intact/Free of Obstruction  [Ground Continuity - Verified < 1 ohm w/ Wrist Strap Meliza] : Ground Continuity - Verified < 1 ohm w/ Wrist Strap Meliza [Diagnosis: ___] : Diagnosis: [unfilled] [Number: ___] : Number: [unfilled] [Clear all fields] : clear all fields [I have ordered 1 HBOT session at the indicated protocol as seen below] : I have ordered 1 HBOT session at the indicated protocol as seen below [] : No [FreeTextEntry1] : 2.0 [FreeTextEntry3] : 90 mins [FreeTextEntry5] : 7716 [FreeTextEntry7] : 3431 [FreeTextEntry9] : 3681 [de-identified] : 7285 [de-identified] : 110 Mins/ 4 units

## 2022-03-04 NOTE — PROCEDURE
[Outpatient] : Outpatient [Wheelchair] : wheelchair [THIS CHAMBER HAS BEEN CLEANED / DISINFECTED] : This chamber has been cleaned / disinfected according to local and hospital policy and procedure prior to this treatment. [____] : Post-Dive: Time - [unfilled] [___] : Post-Dive: Value - [unfilled] mg/dL [Patient demonstrated and verbalized proper technique for using air break mask] : Patient demonstrated and verbalized proper technique for using air break mask [Patient educated on the risks of SMOKING prior to HBOT with understanding] : Patient educated on the risks of SMOKING prior to HBOT with understanding [Patient educated on the risks of CONSUMING ALCOHOL prior to HBOT with understanding] : Patient educated on the risks of CONSUMING ALCOHOL prior to HBOT with understanding [100% Cotton] : 100% cotton [Empty all pockets] : empty all pockets [No hair oils, wigs, hairpieces, pins] : no hair oils, wigs, hairpieces, pins  [Pre tx medications] : pre tx medications  [No make-up, creams] : no make-up, creams  [No jewelry] : no jewelry  [No matches, cigarettes, lighters] : no matches, cigarettes, lighters  [Hearing aid removed] : hearing aid removed [Dentures removed] : dentures removed [Ground bracelet on pt's wrist] : ground bracelet on pt's wrist  [Contacts removed] : contacts removed  [Remove nail polish] : remove nail polish  [No reading material] : no reading material  [Bra, undergarments removed] : bra, undergarments removed  [No contraindicated dressings] : no contraindicated dressings [Ground Wire - VISUAL Verification - Intact/Free of Obstruction] : Ground Wire - VISUAL Verification - Intact/Free of Obstruction  [Ground Continuity - Verified < 1 ohm w/ Wrist Strap Meliza] : Ground Continuity - Verified < 1 ohm w/ Wrist Strap Meliza [Clear all fields] : clear all fields [Diagnosis: ___] : Diagnosis: [unfilled] [Number: ___] : Number: [unfilled] [I have ordered 1 HBOT session at the indicated protocol as seen below] : I have ordered 1 HBOT session at the indicated protocol as seen below [] : No [FreeTextEntry1] : 2.0 [FreeTextEntry3] : 90 mins [[facility]] : 2814 [FreeTextEntry7] : 3412 [FreeTextEntry9] : 0530 [de-identified] : 4394 [de-identified] : 110 Mins/ 4 units

## 2022-03-04 NOTE — PROCEDURE
[Outpatient] : Outpatient [Wheelchair] : wheelchair [THIS CHAMBER HAS BEEN CLEANED / DISINFECTED] : This chamber has been cleaned / disinfected according to local and hospital policy and procedure prior to this treatment. [____] : Post-Dive: Time - [unfilled] [___] : Post-Dive: Value - [unfilled] mg/dL [Patient demonstrated and verbalized proper technique for using air break mask] : Patient demonstrated and verbalized proper technique for using air break mask [Patient educated on the risks of SMOKING prior to HBOT with understanding] : Patient educated on the risks of SMOKING prior to HBOT with understanding [Patient educated on the risks of CONSUMING ALCOHOL prior to HBOT with understanding] : Patient educated on the risks of CONSUMING ALCOHOL prior to HBOT with understanding [100% Cotton] : 100% cotton [Empty all pockets] : empty all pockets [No hair oils, wigs, hairpieces, pins] : no hair oils, wigs, hairpieces, pins  [Pre tx medications] : pre tx medications  [No make-up, creams] : no make-up, creams  [No jewelry] : no jewelry  [No matches, cigarettes, lighters] : no matches, cigarettes, lighters  [Hearing aid removed] : hearing aid removed [Dentures removed] : dentures removed [Ground bracelet on pt's wrist] : ground bracelet on pt's wrist  [Contacts removed] : contacts removed  [Remove nail polish] : remove nail polish  [No reading material] : no reading material  [Bra, undergarments removed] : bra, undergarments removed  [No contraindicated dressings] : no contraindicated dressings [Ground Wire - VISUAL Verification - Intact/Free of Obstruction] : Ground Wire - VISUAL Verification - Intact/Free of Obstruction  [Ground Continuity - Verified < 1 ohm w/ Wrist Strap Meliza] : Ground Continuity - Verified < 1 ohm w/ Wrist Strap Meliza [Diagnosis: ___] : Diagnosis: [unfilled] [Number: ___] : Number: [unfilled] [Clear all fields] : clear all fields [I have ordered 1 HBOT session at the indicated protocol as seen below] : I have ordered 1 HBOT session at the indicated protocol as seen below [] : No [FreeTextEntry1] : 2.0 [FreeTextEntry3] : 90 mins [FreeTextEntry5] : 2884 [FreeTextEntry7] : 1436 [FreeTextEntry9] : 1935 [de-identified] : 0327 [de-identified] : 110 Mins/ 4 units

## 2022-03-05 DIAGNOSIS — M79.673 PAIN IN UNSPECIFIED FOOT: ICD-10-CM

## 2022-03-07 ENCOUNTER — APPOINTMENT (OUTPATIENT)
Dept: HYPERBARIC MEDICINE | Facility: CLINIC | Age: 62
End: 2022-03-07
Payer: MEDICAID

## 2022-03-07 ENCOUNTER — OUTPATIENT (OUTPATIENT)
Dept: OUTPATIENT SERVICES | Facility: HOSPITAL | Age: 62
LOS: 1 days | End: 2022-03-07
Payer: MEDICAID

## 2022-03-07 VITALS
OXYGEN SATURATION: 98 % | TEMPERATURE: 98.9 F | DIASTOLIC BLOOD PRESSURE: 78 MMHG | SYSTOLIC BLOOD PRESSURE: 142 MMHG | RESPIRATION RATE: 18 BRPM | HEART RATE: 88 BPM

## 2022-03-07 VITALS
RESPIRATION RATE: 18 BRPM | TEMPERATURE: 98.3 F | OXYGEN SATURATION: 98 % | DIASTOLIC BLOOD PRESSURE: 88 MMHG | SYSTOLIC BLOOD PRESSURE: 155 MMHG | HEART RATE: 88 BPM

## 2022-03-07 DIAGNOSIS — Z98.890 OTHER SPECIFIED POSTPROCEDURAL STATES: Chronic | ICD-10-CM

## 2022-03-07 PROCEDURE — 99183 HYPERBARIC OXYGEN THERAPY: CPT

## 2022-03-07 PROCEDURE — G0277: CPT

## 2022-03-07 PROCEDURE — 82962 GLUCOSE BLOOD TEST: CPT

## 2022-03-07 NOTE — PROCEDURE
[Outpatient] : Outpatient [Wheelchair] : wheelchair [THIS CHAMBER HAS BEEN CLEANED / DISINFECTED] : This chamber has been cleaned / disinfected according to local and hospital policy and procedure prior to this treatment. [____] : Post-Dive: Time - [unfilled] [___] : Post-Dive: Value - [unfilled] mg/dL [Patient demonstrated and verbalized proper technique for using air break mask] : Patient demonstrated and verbalized proper technique for using air break mask [Patient educated on the risks of SMOKING prior to HBOT with understanding] : Patient educated on the risks of SMOKING prior to HBOT with understanding [Patient educated on the risks of CONSUMING ALCOHOL prior to HBOT with understanding] : Patient educated on the risks of CONSUMING ALCOHOL prior to HBOT with understanding [100% Cotton] : 100% cotton [Empty all pockets] : empty all pockets [No hair oils, wigs, hairpieces, pins] : no hair oils, wigs, hairpieces, pins  [Pre tx medications] : pre tx medications  [No make-up, creams] : no make-up, creams  [No jewelry] : no jewelry  [No matches, cigarettes, lighters] : no matches, cigarettes, lighters  [Hearing aid removed] : hearing aid removed [Dentures removed] : dentures removed [Ground bracelet on pt's wrist] : ground bracelet on pt's wrist  [Contacts removed] : contacts removed  [Remove nail polish] : remove nail polish  [No reading material] : no reading material  [Bra, undergarments removed] : bra, undergarments removed  [No contraindicated dressings] : no contraindicated dressings [Ground Wire - VISUAL Verification - Intact/Free of Obstruction] : Ground Wire - VISUAL Verification - Intact/Free of Obstruction  [Ground Continuity - Verified < 1 ohm w/ Wrist Strap Meliza] : Ground Continuity - Verified < 1 ohm w/ Wrist Strap Meliza [Diagnosis: ___] : Diagnosis: [unfilled] [Clear all fields] : clear all fields [Number: ___] : Number: [unfilled] [] : No [FreeTextEntry1] : 2.0 [FreeTextEntry3] : 90 mins [FreeTextEntry5] : 4337 [[facility]] : 6769 [FreeTextEntry9] : 0115 [de-identified] : 7751 [de-identified] : 110 Mins/ 4 units

## 2022-03-08 ENCOUNTER — OUTPATIENT (OUTPATIENT)
Dept: OUTPATIENT SERVICES | Facility: HOSPITAL | Age: 62
LOS: 1 days | End: 2022-03-08
Payer: MEDICAID

## 2022-03-08 ENCOUNTER — APPOINTMENT (OUTPATIENT)
Dept: HYPERBARIC MEDICINE | Facility: CLINIC | Age: 62
End: 2022-03-08
Payer: MEDICAID

## 2022-03-08 VITALS
SYSTOLIC BLOOD PRESSURE: 158 MMHG | OXYGEN SATURATION: 99 % | TEMPERATURE: 98.3 F | HEART RATE: 66 BPM | RESPIRATION RATE: 18 BRPM | DIASTOLIC BLOOD PRESSURE: 74 MMHG

## 2022-03-08 VITALS
TEMPERATURE: 98.4 F | DIASTOLIC BLOOD PRESSURE: 66 MMHG | OXYGEN SATURATION: 99 % | RESPIRATION RATE: 18 BRPM | HEART RATE: 68 BPM | SYSTOLIC BLOOD PRESSURE: 168 MMHG

## 2022-03-08 DIAGNOSIS — T86.821 SKIN GRAFT (ALLOGRAFT) (AUTOGRAFT) FAILURE: ICD-10-CM

## 2022-03-08 DIAGNOSIS — Z98.890 OTHER SPECIFIED POSTPROCEDURAL STATES: Chronic | ICD-10-CM

## 2022-03-08 PROCEDURE — 82962 GLUCOSE BLOOD TEST: CPT

## 2022-03-08 PROCEDURE — 99183 HYPERBARIC OXYGEN THERAPY: CPT

## 2022-03-08 PROCEDURE — G0277: CPT

## 2022-03-08 NOTE — PROCEDURE
[Outpatient] : Outpatient [Wheelchair] : wheelchair [THIS CHAMBER HAS BEEN CLEANED / DISINFECTED] : This chamber has been cleaned / disinfected according to local and hospital policy and procedure prior to this treatment. [____] : Post-Dive: Time - [unfilled] [___] : Post-Dive: Value - [unfilled] mg/dL [Patient demonstrated and verbalized proper technique for using air break mask] : Patient demonstrated and verbalized proper technique for using air break mask [Patient educated on the risks of SMOKING prior to HBOT with understanding] : Patient educated on the risks of SMOKING prior to HBOT with understanding [Patient educated on the risks of CONSUMING ALCOHOL prior to HBOT with understanding] : Patient educated on the risks of CONSUMING ALCOHOL prior to HBOT with understanding [100% Cotton] : 100% cotton [Empty all pockets] : empty all pockets [No hair oils, wigs, hairpieces, pins] : no hair oils, wigs, hairpieces, pins  [Pre tx medications] : pre tx medications  [No make-up, creams] : no make-up, creams  [No jewelry] : no jewelry  [No matches, cigarettes, lighters] : no matches, cigarettes, lighters  [Hearing aid removed] : hearing aid removed [Dentures removed] : dentures removed [Ground bracelet on pt's wrist] : ground bracelet on pt's wrist  [Contacts removed] : contacts removed  [Remove nail polish] : remove nail polish  [No reading material] : no reading material  [Bra, undergarments removed] : bra, undergarments removed  [No contraindicated dressings] : no contraindicated dressings [Ground Wire - VISUAL Verification - Intact/Free of Obstruction] : Ground Wire - VISUAL Verification - Intact/Free of Obstruction  [Ground Continuity - Verified < 1 ohm w/ Wrist Strap Meliza] : Ground Continuity - Verified < 1 ohm w/ Wrist Strap Meliza [Diagnosis: ___] : Diagnosis: [unfilled] [Number: ___] : Number: [unfilled] [] : No [Clear all fields] : clear all fields [FreeTextEntry1] : 2.0 [FreeTextEntry3] : 90 mins [[facility]En[facility]] : 8953 [[facility] : 5081 [FreeTextEntry9] : 1828 [de-identified] : 0312 [de-identified] : 110 Mins/ 4 units

## 2022-03-09 ENCOUNTER — APPOINTMENT (OUTPATIENT)
Dept: HYPERBARIC MEDICINE | Facility: CLINIC | Age: 62
End: 2022-03-09

## 2022-03-10 ENCOUNTER — OUTPATIENT (OUTPATIENT)
Dept: OUTPATIENT SERVICES | Facility: HOSPITAL | Age: 62
LOS: 1 days | End: 2022-03-10
Payer: MEDICAID

## 2022-03-10 ENCOUNTER — APPOINTMENT (OUTPATIENT)
Dept: HYPERBARIC MEDICINE | Facility: CLINIC | Age: 62
End: 2022-03-10
Payer: MEDICAID

## 2022-03-10 VITALS
TEMPERATURE: 98.8 F | RESPIRATION RATE: 16 BRPM | SYSTOLIC BLOOD PRESSURE: 126 MMHG | HEART RATE: 86 BPM | OXYGEN SATURATION: 98 % | DIASTOLIC BLOOD PRESSURE: 82 MMHG

## 2022-03-10 VITALS
SYSTOLIC BLOOD PRESSURE: 158 MMHG | OXYGEN SATURATION: 99 % | TEMPERATURE: 98.3 F | HEART RATE: 88 BPM | RESPIRATION RATE: 16 BRPM | DIASTOLIC BLOOD PRESSURE: 88 MMHG

## 2022-03-10 VITALS
HEART RATE: 68 BPM | DIASTOLIC BLOOD PRESSURE: 68 MMHG | TEMPERATURE: 98 F | OXYGEN SATURATION: 98 % | SYSTOLIC BLOOD PRESSURE: 150 MMHG | RESPIRATION RATE: 18 BRPM

## 2022-03-10 DIAGNOSIS — M79.673 PAIN IN UNSPECIFIED FOOT: ICD-10-CM

## 2022-03-10 DIAGNOSIS — T86.821 SKIN GRAFT (ALLOGRAFT) (AUTOGRAFT) FAILURE: ICD-10-CM

## 2022-03-10 DIAGNOSIS — Z98.890 OTHER SPECIFIED POSTPROCEDURAL STATES: Chronic | ICD-10-CM

## 2022-03-10 PROCEDURE — G0277: CPT

## 2022-03-10 PROCEDURE — 99183 HYPERBARIC OXYGEN THERAPY: CPT

## 2022-03-11 ENCOUNTER — OUTPATIENT (OUTPATIENT)
Dept: OUTPATIENT SERVICES | Facility: HOSPITAL | Age: 62
LOS: 1 days | End: 2022-03-11
Payer: MEDICAID

## 2022-03-11 ENCOUNTER — APPOINTMENT (OUTPATIENT)
Dept: HYPERBARIC MEDICINE | Facility: CLINIC | Age: 62
End: 2022-03-11
Payer: MEDICAID

## 2022-03-11 VITALS
OXYGEN SATURATION: 99 % | TEMPERATURE: 98.6 F | RESPIRATION RATE: 18 BRPM | HEART RATE: 64 BPM | DIASTOLIC BLOOD PRESSURE: 72 MMHG | SYSTOLIC BLOOD PRESSURE: 172 MMHG

## 2022-03-11 VITALS
DIASTOLIC BLOOD PRESSURE: 76 MMHG | HEART RATE: 74 BPM | SYSTOLIC BLOOD PRESSURE: 144 MMHG | RESPIRATION RATE: 18 BRPM | OXYGEN SATURATION: 99 % | TEMPERATURE: 98.6 F

## 2022-03-11 DIAGNOSIS — Z98.890 OTHER SPECIFIED POSTPROCEDURAL STATES: Chronic | ICD-10-CM

## 2022-03-11 PROCEDURE — 99183 HYPERBARIC OXYGEN THERAPY: CPT

## 2022-03-11 PROCEDURE — G0277: CPT

## 2022-03-12 NOTE — PROCEDURE
[Outpatient] : Outpatient [Wheelchair] : wheelchair [THIS CHAMBER HAS BEEN CLEANED / DISINFECTED] : This chamber has been cleaned / disinfected according to local and hospital policy and procedure prior to this treatment. [____] : Post-Dive: Time - [unfilled] [___] : Post-Dive: Value - [unfilled] mg/dL [Patient demonstrated and verbalized proper technique for using air break mask] : Patient demonstrated and verbalized proper technique for using air break mask [Patient educated on the risks of SMOKING prior to HBOT with understanding] : Patient educated on the risks of SMOKING prior to HBOT with understanding [Patient educated on the risks of CONSUMING ALCOHOL prior to HBOT with understanding] : Patient educated on the risks of CONSUMING ALCOHOL prior to HBOT with understanding [100% Cotton] : 100% cotton [Empty all pockets] : empty all pockets [No hair oils, wigs, hairpieces, pins] : no hair oils, wigs, hairpieces, pins  [Pre tx medications] : pre tx medications  [No make-up, creams] : no make-up, creams  [No jewelry] : no jewelry  [No matches, cigarettes, lighters] : no matches, cigarettes, lighters  [Hearing aid removed] : hearing aid removed [Dentures removed] : dentures removed [Ground bracelet on pt's wrist] : ground bracelet on pt's wrist  [Contacts removed] : contacts removed  [Remove nail polish] : remove nail polish  [No reading material] : no reading material  [Bra, undergarments removed] : bra, undergarments removed  [No contraindicated dressings] : no contraindicated dressings [Ground Wire - VISUAL Verification - Intact/Free of Obstruction] : Ground Wire - VISUAL Verification - Intact/Free of Obstruction  [Ground Continuity - Verified < 1 ohm w/ Wrist Strap Meliza] : Ground Continuity - Verified < 1 ohm w/ Wrist Strap Meliza [Number: ___] : Number: [unfilled] [Diagnosis: ___] : Diagnosis: [unfilled] [Clear all fields] : clear all fields [I have ordered 1 HBOT session at the indicated protocol as seen below] : I have ordered 1 HBOT session at the indicated protocol as seen below [] : No [FreeTextEntry1] : 2.0 [FreeTextEntry3] : 90 mins [[facility]En[facility]5] : 7355 [[facility] : 8154 [FreeTextEntry9] : 5240 [de-identified] : 1014 [de-identified] : 110 Mins/ 4 units

## 2022-03-12 NOTE — ASSESSMENT
[Patient prepared for dive] : Patient prepared for dive [Vital Signs stable] : Vital Signs stable [A physician was present throughout the entire HBOT] : A physician was present throughout the entire HBOT

## 2022-03-14 ENCOUNTER — OUTPATIENT (OUTPATIENT)
Dept: OUTPATIENT SERVICES | Facility: HOSPITAL | Age: 62
LOS: 1 days | End: 2022-03-14
Payer: MEDICAID

## 2022-03-14 ENCOUNTER — APPOINTMENT (OUTPATIENT)
Dept: HYPERBARIC MEDICINE | Facility: CLINIC | Age: 62
End: 2022-03-14
Payer: MEDICAID

## 2022-03-14 VITALS
SYSTOLIC BLOOD PRESSURE: 138 MMHG | DIASTOLIC BLOOD PRESSURE: 74 MMHG | RESPIRATION RATE: 18 BRPM | TEMPERATURE: 98 F | HEART RATE: 88 BPM | OXYGEN SATURATION: 98 %

## 2022-03-14 DIAGNOSIS — Z98.890 OTHER SPECIFIED POSTPROCEDURAL STATES: Chronic | ICD-10-CM

## 2022-03-14 DIAGNOSIS — M79.673 PAIN IN UNSPECIFIED FOOT: ICD-10-CM

## 2022-03-14 PROCEDURE — G0277: CPT

## 2022-03-14 PROCEDURE — 99183 HYPERBARIC OXYGEN THERAPY: CPT

## 2022-03-14 NOTE — PROCEDURE
[Outpatient] : Outpatient [Wheelchair] : wheelchair [THIS CHAMBER HAS BEEN CLEANED / DISINFECTED] : This chamber has been cleaned / disinfected according to local and hospital policy and procedure prior to this treatment. [____] : Post-Dive: Time - [unfilled] [___] : Post-Dive: Value - [unfilled] mg/dL [Patient demonstrated and verbalized proper technique for using air break mask] : Patient demonstrated and verbalized proper technique for using air break mask [Patient educated on the risks of SMOKING prior to HBOT with understanding] : Patient educated on the risks of SMOKING prior to HBOT with understanding [Patient educated on the risks of CONSUMING ALCOHOL prior to HBOT with understanding] : Patient educated on the risks of CONSUMING ALCOHOL prior to HBOT with understanding [100% Cotton] : 100% cotton [Empty all pockets] : empty all pockets [No hair oils, wigs, hairpieces, pins] : no hair oils, wigs, hairpieces, pins  [Pre tx medications] : pre tx medications  [No make-up, creams] : no make-up, creams  [No jewelry] : no jewelry  [No matches, cigarettes, lighters] : no matches, cigarettes, lighters  [Hearing aid removed] : hearing aid removed [Dentures removed] : dentures removed [Ground bracelet on pt's wrist] : ground bracelet on pt's wrist  [Contacts removed] : contacts removed  [Remove nail polish] : remove nail polish  [No reading material] : no reading material  [Bra, undergarments removed] : bra, undergarments removed  [No contraindicated dressings] : no contraindicated dressings [Ground Wire - VISUAL Verification - Intact/Free of Obstruction] : Ground Wire - VISUAL Verification - Intact/Free of Obstruction  [Ground Continuity - Verified < 1 ohm w/ Wrist Strap Meliza] : Ground Continuity - Verified < 1 ohm w/ Wrist Strap Meliza [Clear all fields] : clear all fields [Diagnosis: ___] : Diagnosis: [unfilled] [] : No [Number: ___] : Number: [unfilled] [FreeTextEntry1] : 2.0 [FreeTextEntry3] : 90 mins [FreeTextEntry5] : 3724 [FreeTextEntry7] : 4811 [FreeTextEntry9] : 3022 [de-identified] : 2551 [de-identified] : 110 Mins/ 4 units

## 2022-03-15 ENCOUNTER — APPOINTMENT (OUTPATIENT)
Dept: HYPERBARIC MEDICINE | Facility: CLINIC | Age: 62
End: 2022-03-15
Payer: MEDICAID

## 2022-03-15 ENCOUNTER — OUTPATIENT (OUTPATIENT)
Dept: OUTPATIENT SERVICES | Facility: HOSPITAL | Age: 62
LOS: 1 days | End: 2022-03-15
Payer: MEDICAID

## 2022-03-15 VITALS
RESPIRATION RATE: 18 BRPM | SYSTOLIC BLOOD PRESSURE: 148 MMHG | DIASTOLIC BLOOD PRESSURE: 72 MMHG | HEART RATE: 70 BPM | OXYGEN SATURATION: 99 % | TEMPERATURE: 98.2 F

## 2022-03-15 VITALS
TEMPERATURE: 97.6 F | DIASTOLIC BLOOD PRESSURE: 73 MMHG | RESPIRATION RATE: 16 BRPM | HEART RATE: 73 BPM | SYSTOLIC BLOOD PRESSURE: 123 MMHG

## 2022-03-15 DIAGNOSIS — T86.821 SKIN GRAFT (ALLOGRAFT) (AUTOGRAFT) FAILURE: ICD-10-CM

## 2022-03-15 DIAGNOSIS — Z98.890 OTHER SPECIFIED POSTPROCEDURAL STATES: Chronic | ICD-10-CM

## 2022-03-15 PROCEDURE — G0277: CPT

## 2022-03-15 PROCEDURE — 99183 HYPERBARIC OXYGEN THERAPY: CPT

## 2022-03-16 ENCOUNTER — APPOINTMENT (OUTPATIENT)
Dept: HYPERBARIC MEDICINE | Facility: CLINIC | Age: 62
End: 2022-03-16
Payer: MEDICAID

## 2022-03-16 ENCOUNTER — OUTPATIENT (OUTPATIENT)
Dept: OUTPATIENT SERVICES | Facility: HOSPITAL | Age: 62
LOS: 1 days | End: 2022-03-16
Payer: MEDICAID

## 2022-03-16 VITALS
DIASTOLIC BLOOD PRESSURE: 64 MMHG | RESPIRATION RATE: 18 BRPM | TEMPERATURE: 98 F | SYSTOLIC BLOOD PRESSURE: 138 MMHG | OXYGEN SATURATION: 98 % | HEART RATE: 70 BPM

## 2022-03-16 VITALS
TEMPERATURE: 98.1 F | SYSTOLIC BLOOD PRESSURE: 156 MMHG | DIASTOLIC BLOOD PRESSURE: 88 MMHG | OXYGEN SATURATION: 98 % | HEART RATE: 76 BPM | RESPIRATION RATE: 18 BRPM

## 2022-03-16 DIAGNOSIS — T86.821 SKIN GRAFT (ALLOGRAFT) (AUTOGRAFT) FAILURE: ICD-10-CM

## 2022-03-16 DIAGNOSIS — M79.673 PAIN IN UNSPECIFIED FOOT: ICD-10-CM

## 2022-03-16 DIAGNOSIS — Z98.890 OTHER SPECIFIED POSTPROCEDURAL STATES: Chronic | ICD-10-CM

## 2022-03-16 PROCEDURE — G0277: CPT

## 2022-03-16 PROCEDURE — 99183 HYPERBARIC OXYGEN THERAPY: CPT

## 2022-03-16 NOTE — PROCEDURE
[Outpatient] : Outpatient [Wheelchair] : wheelchair [THIS CHAMBER HAS BEEN CLEANED / DISINFECTED] : This chamber has been cleaned / disinfected according to local and hospital policy and procedure prior to this treatment. [____] : Post-Dive: Time - [unfilled] [___] : Post-Dive: Value - [unfilled] mg/dL [Patient demonstrated and verbalized proper technique for using air break mask] : Patient demonstrated and verbalized proper technique for using air break mask [Patient educated on the risks of SMOKING prior to HBOT with understanding] : Patient educated on the risks of SMOKING prior to HBOT with understanding [Patient educated on the risks of CONSUMING ALCOHOL prior to HBOT with understanding] : Patient educated on the risks of CONSUMING ALCOHOL prior to HBOT with understanding [100% Cotton] : 100% cotton [Empty all pockets] : empty all pockets [No hair oils, wigs, hairpieces, pins] : no hair oils, wigs, hairpieces, pins  [Pre tx medications] : pre tx medications  [No make-up, creams] : no make-up, creams  [No jewelry] : no jewelry  [No matches, cigarettes, lighters] : no matches, cigarettes, lighters  [Hearing aid removed] : hearing aid removed [Dentures removed] : dentures removed [Ground bracelet on pt's wrist] : ground bracelet on pt's wrist  [Contacts removed] : contacts removed  [Remove nail polish] : remove nail polish  [No reading material] : no reading material  [Bra, undergarments removed] : bra, undergarments removed  [No contraindicated dressings] : no contraindicated dressings [Ground Wire - VISUAL Verification - Intact/Free of Obstruction] : Ground Wire - VISUAL Verification - Intact/Free of Obstruction  [Ground Continuity - Verified < 1 ohm w/ Wrist Strap Meliza] : Ground Continuity - Verified < 1 ohm w/ Wrist Strap Meliza [Diagnosis: ___] : Diagnosis: [unfilled] [Number: ___] : Number: [unfilled] [] : No [Clear all fields] : clear all fields [FreeTextEntry1] : 2.0 [FreeTextEntry3] : 90 mins [FreeTextEntry5] : 0897 [FreeTextEntry7] : 1556 [FreeTextEntry9] : 8543 [de-identified] : 3231 [de-identified] : 110 Mins/ 4 units

## 2022-03-17 ENCOUNTER — APPOINTMENT (OUTPATIENT)
Dept: HYPERBARIC MEDICINE | Facility: CLINIC | Age: 62
End: 2022-03-17
Payer: MEDICAID

## 2022-03-17 ENCOUNTER — OUTPATIENT (OUTPATIENT)
Dept: OUTPATIENT SERVICES | Facility: HOSPITAL | Age: 62
LOS: 1 days | End: 2022-03-17
Payer: MEDICAID

## 2022-03-17 VITALS
RESPIRATION RATE: 16 BRPM | HEART RATE: 88 BPM | OXYGEN SATURATION: 98 % | DIASTOLIC BLOOD PRESSURE: 76 MMHG | SYSTOLIC BLOOD PRESSURE: 152 MMHG | TEMPERATURE: 98.3 F

## 2022-03-17 VITALS
TEMPERATURE: 98.2 F | SYSTOLIC BLOOD PRESSURE: 172 MMHG | DIASTOLIC BLOOD PRESSURE: 74 MMHG | RESPIRATION RATE: 18 BRPM | HEART RATE: 56 BPM | OXYGEN SATURATION: 99 %

## 2022-03-17 DIAGNOSIS — Z98.890 OTHER SPECIFIED POSTPROCEDURAL STATES: Chronic | ICD-10-CM

## 2022-03-17 DIAGNOSIS — T86.821 SKIN GRAFT (ALLOGRAFT) (AUTOGRAFT) FAILURE: ICD-10-CM

## 2022-03-17 PROCEDURE — G0277: CPT

## 2022-03-17 PROCEDURE — 99183 HYPERBARIC OXYGEN THERAPY: CPT

## 2022-03-18 ENCOUNTER — OUTPATIENT (OUTPATIENT)
Dept: OUTPATIENT SERVICES | Facility: HOSPITAL | Age: 62
LOS: 1 days | End: 2022-03-18
Payer: MEDICAID

## 2022-03-18 ENCOUNTER — APPOINTMENT (OUTPATIENT)
Dept: HYPERBARIC MEDICINE | Facility: CLINIC | Age: 62
End: 2022-03-18
Payer: MEDICAID

## 2022-03-18 VITALS
TEMPERATURE: 98.4 F | HEART RATE: 66 BPM | DIASTOLIC BLOOD PRESSURE: 78 MMHG | OXYGEN SATURATION: 98 % | RESPIRATION RATE: 18 BRPM | SYSTOLIC BLOOD PRESSURE: 126 MMHG

## 2022-03-18 VITALS
TEMPERATURE: 98.2 F | RESPIRATION RATE: 16 BRPM | DIASTOLIC BLOOD PRESSURE: 88 MMHG | OXYGEN SATURATION: 98 % | HEART RATE: 84 BPM | SYSTOLIC BLOOD PRESSURE: 142 MMHG

## 2022-03-18 DIAGNOSIS — Z98.890 OTHER SPECIFIED POSTPROCEDURAL STATES: Chronic | ICD-10-CM

## 2022-03-18 PROCEDURE — 99183 HYPERBARIC OXYGEN THERAPY: CPT

## 2022-03-18 PROCEDURE — G0277: CPT

## 2022-03-18 NOTE — PROCEDURE
[] : No [FreeTextEntry1] : 2.0 [FreeTextEntry3] : 90 mins [FreeTextEntry5] : 5543 [FreeTextEntry7] : 4680 [FreeTextEntry9] : 1964 [de-identified] : 7131 [de-identified] : 110 Mins/ 4 units

## 2022-03-18 NOTE — PROCEDURE
[Outpatient] : Outpatient [Wheelchair] : wheelchair [THIS CHAMBER HAS BEEN CLEANED / DISINFECTED] : This chamber has been cleaned / disinfected according to local and hospital policy and procedure prior to this treatment. [____] : Post-Dive: Time - [unfilled] [___] : Post-Dive: Value - [unfilled] mg/dL [Patient demonstrated and verbalized proper technique for using air break mask] : Patient demonstrated and verbalized proper technique for using air break mask [Patient educated on the risks of SMOKING prior to HBOT with understanding] : Patient educated on the risks of SMOKING prior to HBOT with understanding [Patient educated on the risks of CONSUMING ALCOHOL prior to HBOT with understanding] : Patient educated on the risks of CONSUMING ALCOHOL prior to HBOT with understanding [100% Cotton] : 100% cotton [Empty all pockets] : empty all pockets [No hair oils, wigs, hairpieces, pins] : no hair oils, wigs, hairpieces, pins  [Pre tx medications] : pre tx medications  [No make-up, creams] : no make-up, creams  [No jewelry] : no jewelry  [No matches, cigarettes, lighters] : no matches, cigarettes, lighters  [Hearing aid removed] : hearing aid removed [Dentures removed] : dentures removed [Ground bracelet on pt's wrist] : ground bracelet on pt's wrist  [Contacts removed] : contacts removed  [Remove nail polish] : remove nail polish  [No reading material] : no reading material  [Bra, undergarments removed] : bra, undergarments removed  [No contraindicated dressings] : no contraindicated dressings [Ground Wire - VISUAL Verification - Intact/Free of Obstruction] : Ground Wire - VISUAL Verification - Intact/Free of Obstruction  [Ground Continuity - Verified < 1 ohm w/ Wrist Strap Meliza] : Ground Continuity - Verified < 1 ohm w/ Wrist Strap Meliza [Diagnosis: ___] : Diagnosis: [unfilled] [Number: ___] : Number: [unfilled] [] : No [Clear all fields] : clear all fields [FreeTextEntry1] : 2.0 [FreeTextEntry3] : 90 mins [FreeTextEntry5] : 2355 [[facility]] : 0216 [FreeTextEntry9] : 9741 [de-identified] : 1600 [de-identified] : 110 Mins/ 4 units

## 2022-03-21 ENCOUNTER — OUTPATIENT (OUTPATIENT)
Dept: OUTPATIENT SERVICES | Facility: HOSPITAL | Age: 62
LOS: 1 days | End: 2022-03-21
Payer: MEDICAID

## 2022-03-21 ENCOUNTER — APPOINTMENT (OUTPATIENT)
Dept: HYPERBARIC MEDICINE | Facility: CLINIC | Age: 62
End: 2022-03-21
Payer: MEDICAID

## 2022-03-21 VITALS
RESPIRATION RATE: 18 BRPM | HEART RATE: 90 BPM | DIASTOLIC BLOOD PRESSURE: 86 MMHG | TEMPERATURE: 98.3 F | SYSTOLIC BLOOD PRESSURE: 130 MMHG | OXYGEN SATURATION: 99 %

## 2022-03-21 VITALS
OXYGEN SATURATION: 98 % | TEMPERATURE: 98.1 F | SYSTOLIC BLOOD PRESSURE: 100 MMHG | DIASTOLIC BLOOD PRESSURE: 66 MMHG | HEART RATE: 90 BPM | RESPIRATION RATE: 18 BRPM

## 2022-03-21 DIAGNOSIS — Z98.890 OTHER SPECIFIED POSTPROCEDURAL STATES: Chronic | ICD-10-CM

## 2022-03-21 PROCEDURE — 99183 HYPERBARIC OXYGEN THERAPY: CPT

## 2022-03-21 PROCEDURE — G0277: CPT

## 2022-03-21 NOTE — PROCEDURE
[Outpatient] : Outpatient [Wheelchair] : wheelchair [THIS CHAMBER HAS BEEN CLEANED / DISINFECTED] : This chamber has been cleaned / disinfected according to local and hospital policy and procedure prior to this treatment. [____] : Post-Dive: Time - [unfilled] [___] : Post-Dive: Value - [unfilled] mg/dL [Patient demonstrated and verbalized proper technique for using air break mask] : Patient demonstrated and verbalized proper technique for using air break mask [Patient educated on the risks of SMOKING prior to HBOT with understanding] : Patient educated on the risks of SMOKING prior to HBOT with understanding [Patient educated on the risks of CONSUMING ALCOHOL prior to HBOT with understanding] : Patient educated on the risks of CONSUMING ALCOHOL prior to HBOT with understanding [100% Cotton] : 100% cotton [Empty all pockets] : empty all pockets [No hair oils, wigs, hairpieces, pins] : no hair oils, wigs, hairpieces, pins  [Pre tx medications] : pre tx medications  [No make-up, creams] : no make-up, creams  [No jewelry] : no jewelry  [No matches, cigarettes, lighters] : no matches, cigarettes, lighters  [Hearing aid removed] : hearing aid removed [Dentures removed] : dentures removed [Ground bracelet on pt's wrist] : ground bracelet on pt's wrist  [Contacts removed] : contacts removed  [Remove nail polish] : remove nail polish  [No reading material] : no reading material  [Bra, undergarments removed] : bra, undergarments removed  [No contraindicated dressings] : no contraindicated dressings [Ground Wire - VISUAL Verification - Intact/Free of Obstruction] : Ground Wire - VISUAL Verification - Intact/Free of Obstruction  [Ground Continuity - Verified < 1 ohm w/ Wrist Strap Meliza] : Ground Continuity - Verified < 1 ohm w/ Wrist Strap Meliza [Diagnosis: ___] : Diagnosis: [unfilled] [Number: ___] : Number: [unfilled] [] : No [Clear all fields] : clear all fields [FreeTextEntry1] : 2.0 [FreeTextEntry3] : 90 mins [FreeTextEntry5] : 4530 [FreeTextEntry7] : 2793 [FreeTextEntry9] : 2332 [de-identified] : 8608 [de-identified] : 110 Mins/ 4 units

## 2022-03-22 ENCOUNTER — OUTPATIENT (OUTPATIENT)
Dept: OUTPATIENT SERVICES | Facility: HOSPITAL | Age: 62
LOS: 1 days | End: 2022-03-22
Payer: MEDICAID

## 2022-03-22 ENCOUNTER — APPOINTMENT (OUTPATIENT)
Dept: HYPERBARIC MEDICINE | Facility: CLINIC | Age: 62
End: 2022-03-22
Payer: MEDICAID

## 2022-03-22 VITALS
HEART RATE: 88 BPM | OXYGEN SATURATION: 99 % | DIASTOLIC BLOOD PRESSURE: 70 MMHG | RESPIRATION RATE: 18 BRPM | SYSTOLIC BLOOD PRESSURE: 116 MMHG | TEMPERATURE: 98.1 F

## 2022-03-22 DIAGNOSIS — Z98.890 OTHER SPECIFIED POSTPROCEDURAL STATES: Chronic | ICD-10-CM

## 2022-03-22 DIAGNOSIS — M79.673 PAIN IN UNSPECIFIED FOOT: ICD-10-CM

## 2022-03-22 DIAGNOSIS — T86.821 SKIN GRAFT (ALLOGRAFT) (AUTOGRAFT) FAILURE: ICD-10-CM

## 2022-03-22 PROCEDURE — 99183 HYPERBARIC OXYGEN THERAPY: CPT

## 2022-03-22 PROCEDURE — G0277: CPT

## 2022-03-22 NOTE — PROCEDURE
[I have examined and evaluated this patient today, including ears and lungs and have cleared the patient] : I have examined and evaluated this patient today, including ears and lungs and have cleared the patient to continue HBOT as prescribed.  [Time MD/Provider assessed Patient: _____] : Time MD/Provider assessed Patient: [unfilled] [Outpatient] : Outpatient [Wheelchair] : wheelchair [THIS CHAMBER HAS BEEN CLEANED / DISINFECTED] : This chamber has been cleaned / disinfected according to local and hospital policy and procedure prior to this treatment. [____] : Post-Dive: Time - [unfilled] [___] : Post-Dive: Value - [unfilled] mg/dL [Patient demonstrated and verbalized proper technique for using air break mask] : Patient demonstrated and verbalized proper technique for using air break mask [Patient educated on the risks of SMOKING prior to HBOT with understanding] : Patient educated on the risks of SMOKING prior to HBOT with understanding [Patient educated on the risks of CONSUMING ALCOHOL prior to HBOT with understanding] : Patient educated on the risks of CONSUMING ALCOHOL prior to HBOT with understanding [100% Cotton] : 100% cotton [Empty all pockets] : empty all pockets [No hair oils, wigs, hairpieces, pins] : no hair oils, wigs, hairpieces, pins  [Pre tx medications] : pre tx medications  [No make-up, creams] : no make-up, creams  [No jewelry] : no jewelry  [No matches, cigarettes, lighters] : no matches, cigarettes, lighters  [Hearing aid removed] : hearing aid removed [Dentures removed] : dentures removed [Ground bracelet on pt's wrist] : ground bracelet on pt's wrist  [Contacts removed] : contacts removed  [Remove nail polish] : remove nail polish  [No reading material] : no reading material  [Bra, undergarments removed] : bra, undergarments removed  [No contraindicated dressings] : no contraindicated dressings [Ground Wire - VISUAL Verification - Intact/Free of Obstruction] : Ground Wire - VISUAL Verification - Intact/Free of Obstruction  [Ground Continuity - Verified < 1 ohm w/ Wrist Strap Meliza] : Ground Continuity - Verified < 1 ohm w/ Wrist Strap Meliza [Diagnosis: ___] : Diagnosis: [unfilled] [Number: ___] : Number: [unfilled] [Clear all fields] : clear all fields [I have ordered 1 HBOT session at the indicated protocol as seen below] : I have ordered 1 HBOT session at the indicated protocol as seen below [] : No [FreeTextEntry1] : 2.0 [FreeTextEntry3] : 90 mins [FreeTextEntry5] : 0546 [FreeTextEntry7] : 1401 [FreeTextEntry9] : 3357 [de-identified] : 5331 [de-identified] : 110 Mins/ 4 units

## 2022-03-22 NOTE — PROCEDURE
[Outpatient] : Outpatient [Wheelchair] : wheelchair [THIS CHAMBER HAS BEEN CLEANED / DISINFECTED] : This chamber has been cleaned / disinfected according to local and hospital policy and procedure prior to this treatment. [____] : Post-Dive: Time - [unfilled] [___] : Post-Dive: Value - [unfilled] mg/dL [Patient demonstrated and verbalized proper technique for using air break mask] : Patient demonstrated and verbalized proper technique for using air break mask [Patient educated on the risks of SMOKING prior to HBOT with understanding] : Patient educated on the risks of SMOKING prior to HBOT with understanding [Patient educated on the risks of CONSUMING ALCOHOL prior to HBOT with understanding] : Patient educated on the risks of CONSUMING ALCOHOL prior to HBOT with understanding [100% Cotton] : 100% cotton [Empty all pockets] : empty all pockets [No hair oils, wigs, hairpieces, pins] : no hair oils, wigs, hairpieces, pins  [Pre tx medications] : pre tx medications  [No make-up, creams] : no make-up, creams  [No jewelry] : no jewelry  [No matches, cigarettes, lighters] : no matches, cigarettes, lighters  [Hearing aid removed] : hearing aid removed [Dentures removed] : dentures removed [Ground bracelet on pt's wrist] : ground bracelet on pt's wrist  [Contacts removed] : contacts removed  [Remove nail polish] : remove nail polish  [No reading material] : no reading material  [Bra, undergarments removed] : bra, undergarments removed  [No contraindicated dressings] : no contraindicated dressings [Ground Wire - VISUAL Verification - Intact/Free of Obstruction] : Ground Wire - VISUAL Verification - Intact/Free of Obstruction  [Ground Continuity - Verified < 1 ohm w/ Wrist Strap Meliza] : Ground Continuity - Verified < 1 ohm w/ Wrist Strap Meliza [Clear all fields] : clear all fields [Diagnosis: ___] : Diagnosis: [unfilled] [Number: ___] : Number: [unfilled] [I have ordered 1 HBOT session at the indicated protocol as seen below] : I have ordered 1 HBOT session at the indicated protocol as seen below [] : No [FreeTextEntry1] : 2.0 [FreeTextEntry3] : 90 mins [FreeTextEntry5] : 4983 [FreeTextEntry7] : 1763 [FreeTextEntry9] : 8437 [de-identified] : 7033 [de-identified] : 110 Mins/ 4 units

## 2022-03-23 ENCOUNTER — APPOINTMENT (OUTPATIENT)
Dept: HYPERBARIC MEDICINE | Facility: CLINIC | Age: 62
End: 2022-03-23

## 2022-03-23 ENCOUNTER — APPOINTMENT (OUTPATIENT)
Dept: ORTHOPEDIC SURGERY | Facility: CLINIC | Age: 62
End: 2022-03-23
Payer: MEDICAID

## 2022-03-23 VITALS
DIASTOLIC BLOOD PRESSURE: 65 MMHG | OXYGEN SATURATION: 99 % | WEIGHT: 160 LBS | SYSTOLIC BLOOD PRESSURE: 124 MMHG | HEIGHT: 68 IN | BODY MASS INDEX: 24.25 KG/M2 | HEART RATE: 63 BPM

## 2022-03-23 PROCEDURE — 73564 X-RAY EXAM KNEE 4 OR MORE: CPT | Mod: LT

## 2022-03-23 PROCEDURE — 20611 DRAIN/INJ JOINT/BURSA W/US: CPT | Mod: LT

## 2022-03-23 PROCEDURE — 99204 OFFICE O/P NEW MOD 45 MIN: CPT | Mod: 25

## 2022-03-24 ENCOUNTER — OUTPATIENT (OUTPATIENT)
Dept: OUTPATIENT SERVICES | Facility: HOSPITAL | Age: 62
LOS: 1 days | End: 2022-03-24
Payer: MEDICAID

## 2022-03-24 ENCOUNTER — APPOINTMENT (OUTPATIENT)
Dept: HYPERBARIC MEDICINE | Facility: CLINIC | Age: 62
End: 2022-03-24
Payer: MEDICAID

## 2022-03-24 VITALS
OXYGEN SATURATION: 99 % | DIASTOLIC BLOOD PRESSURE: 70 MMHG | HEART RATE: 66 BPM | SYSTOLIC BLOOD PRESSURE: 132 MMHG | RESPIRATION RATE: 16 BRPM | TEMPERATURE: 98.7 F

## 2022-03-24 VITALS
DIASTOLIC BLOOD PRESSURE: 70 MMHG | HEART RATE: 78 BPM | RESPIRATION RATE: 18 BRPM | SYSTOLIC BLOOD PRESSURE: 136 MMHG | OXYGEN SATURATION: 99 % | TEMPERATURE: 98.2 F

## 2022-03-24 DIAGNOSIS — Z98.890 OTHER SPECIFIED POSTPROCEDURAL STATES: Chronic | ICD-10-CM

## 2022-03-24 PROCEDURE — G0277: CPT

## 2022-03-24 PROCEDURE — 99183 HYPERBARIC OXYGEN THERAPY: CPT

## 2022-03-24 NOTE — PROCEDURE
[Outpatient] : Outpatient [Wheelchair] : wheelchair [THIS CHAMBER HAS BEEN CLEANED / DISINFECTED] : This chamber has been cleaned / disinfected according to local and hospital policy and procedure prior to this treatment. [____] : Post-Dive: Time - [unfilled] [___] : Post-Dive: Value - [unfilled] mg/dL [Patient demonstrated and verbalized proper technique for using air break mask] : Patient demonstrated and verbalized proper technique for using air break mask [Patient educated on the risks of SMOKING prior to HBOT with understanding] : Patient educated on the risks of SMOKING prior to HBOT with understanding [Patient educated on the risks of CONSUMING ALCOHOL prior to HBOT with understanding] : Patient educated on the risks of CONSUMING ALCOHOL prior to HBOT with understanding [100% Cotton] : 100% cotton [Empty all pockets] : empty all pockets [No hair oils, wigs, hairpieces, pins] : no hair oils, wigs, hairpieces, pins  [Pre tx medications] : pre tx medications  [No make-up, creams] : no make-up, creams  [No jewelry] : no jewelry  [No matches, cigarettes, lighters] : no matches, cigarettes, lighters  [Hearing aid removed] : hearing aid removed [Dentures removed] : dentures removed [Ground bracelet on pt's wrist] : ground bracelet on pt's wrist  [Contacts removed] : contacts removed  [Remove nail polish] : remove nail polish  [No reading material] : no reading material  [Bra, undergarments removed] : bra, undergarments removed  [No contraindicated dressings] : no contraindicated dressings [Ground Wire - VISUAL Verification - Intact/Free of Obstruction] : Ground Wire - VISUAL Verification - Intact/Free of Obstruction  [Ground Continuity - Verified < 1 ohm w/ Wrist Strap Meliza] : Ground Continuity - Verified < 1 ohm w/ Wrist Strap Meliza [Clear all fields] : clear all fields [Diagnosis: ___] : Diagnosis: [unfilled] [Number: ___] : Number: [unfilled] [I have ordered 1 HBOT session at the indicated protocol as seen below] : I have ordered 1 HBOT session at the indicated protocol as seen below [] : No [FreeTextEntry1] : 2.0 [FreeTextEntry3] : 90 mins [FreeTextEntry5] : 6935 [FreeTextEntry7] : 9036 [FreeTextEntry9] : 6584 [de-identified] : 9638 [de-identified] : 110 Mins/ 4 units

## 2022-03-25 ENCOUNTER — OUTPATIENT (OUTPATIENT)
Dept: OUTPATIENT SERVICES | Facility: HOSPITAL | Age: 62
LOS: 1 days | End: 2022-03-25
Payer: MEDICAID

## 2022-03-25 ENCOUNTER — APPOINTMENT (OUTPATIENT)
Dept: WOUND CARE | Facility: CLINIC | Age: 62
End: 2022-03-25
Payer: MEDICAID

## 2022-03-25 ENCOUNTER — APPOINTMENT (OUTPATIENT)
Dept: HYPERBARIC MEDICINE | Facility: CLINIC | Age: 62
End: 2022-03-25
Payer: MEDICAID

## 2022-03-25 VITALS
SYSTOLIC BLOOD PRESSURE: 166 MMHG | TEMPERATURE: 98.2 F | RESPIRATION RATE: 18 BRPM | DIASTOLIC BLOOD PRESSURE: 70 MMHG | OXYGEN SATURATION: 99 % | HEART RATE: 58 BPM

## 2022-03-25 VITALS
SYSTOLIC BLOOD PRESSURE: 154 MMHG | HEART RATE: 68 BPM | DIASTOLIC BLOOD PRESSURE: 88 MMHG | TEMPERATURE: 98.6 F | RESPIRATION RATE: 18 BRPM | OXYGEN SATURATION: 99 %

## 2022-03-25 DIAGNOSIS — M86.172 OTHER ACUTE OSTEOMYELITIS, LEFT ANKLE AND FOOT: ICD-10-CM

## 2022-03-25 DIAGNOSIS — M79.673 PAIN IN UNSPECIFIED FOOT: ICD-10-CM

## 2022-03-25 DIAGNOSIS — Z98.890 OTHER SPECIFIED POSTPROCEDURAL STATES: Chronic | ICD-10-CM

## 2022-03-25 DIAGNOSIS — M86.171 OTHER ACUTE OSTEOMYELITIS, RIGHT ANKLE AND FOOT: ICD-10-CM

## 2022-03-25 DIAGNOSIS — B35.1 TINEA UNGUIUM: ICD-10-CM

## 2022-03-25 PROCEDURE — 99183 HYPERBARIC OXYGEN THERAPY: CPT

## 2022-03-25 PROCEDURE — G0277: CPT

## 2022-03-25 PROCEDURE — 99213 OFFICE O/P EST LOW 20 MIN: CPT

## 2022-03-25 NOTE — PLAN
[FreeTextEntry1] : healed ulcerative area lateral left foot with no signs of infection\par all wounds healed\par 1= edema both feet\par scizzoring gait bilaterally\par  All incision sites are healed \par Healed amp site right and left foot with no signs of cellulitis both feet\par keep both feet clean and dry\par patient doing HBO evaluation\par patient told he needs to follow up with vascular for PVD\par 1 met head healed and 2nd toe right foot healed\par debride all thick, mycotic dystrophic discolored nails with sterile nail clipper without incident\par Patient discharged from wound center\par Patient very appreciative that all wounds are healed and he is getting stronger and feeling better\par follow up as needed\par no need for daily bandages since all wounds have healed\par Chronic OM left 1 met. 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

## 2022-03-25 NOTE — HISTORY OF PRESENT ILLNESS
[FreeTextEntry1] : 61 year old male presents to wound center with sister Carmen s/p 5th ray resection left foot with bone biopsy 1 met head left foot and distal amputation right 2nd toe\par Patient is getting 6 weeks of IV antibiosis with PICC line per ID\par Open wound 5th met area with no dehisence no signs of cellulitis both feet \par Healed  5th met and dorsal 1 met left foot and distal right 2nd toe\par Dramatic improvement in right leg no signs of acute infection or cellulitis\par Patient not changing bandages \par history of osteomyelitis 5th met left foot and right 2nd toe\par +DM RBS unknown but under control according \par PVD both feet treated by Dr. Dillon\par dramatic clinical improvement in both feet

## 2022-03-28 ENCOUNTER — APPOINTMENT (OUTPATIENT)
Dept: HYPERBARIC MEDICINE | Facility: CLINIC | Age: 62
End: 2022-03-28
Payer: MEDICAID

## 2022-03-28 ENCOUNTER — OUTPATIENT (OUTPATIENT)
Dept: OUTPATIENT SERVICES | Facility: HOSPITAL | Age: 62
LOS: 1 days | End: 2022-03-28
Payer: MEDICAID

## 2022-03-28 VITALS
TEMPERATURE: 98.1 F | RESPIRATION RATE: 18 BRPM | OXYGEN SATURATION: 99 % | DIASTOLIC BLOOD PRESSURE: 70 MMHG | HEART RATE: 76 BPM | SYSTOLIC BLOOD PRESSURE: 146 MMHG

## 2022-03-28 VITALS
SYSTOLIC BLOOD PRESSURE: 150 MMHG | HEART RATE: 78 BPM | DIASTOLIC BLOOD PRESSURE: 78 MMHG | TEMPERATURE: 98.1 F | OXYGEN SATURATION: 99 % | RESPIRATION RATE: 18 BRPM

## 2022-03-28 DIAGNOSIS — Z98.890 OTHER SPECIFIED POSTPROCEDURAL STATES: Chronic | ICD-10-CM

## 2022-03-28 DIAGNOSIS — T86.821 SKIN GRAFT (ALLOGRAFT) (AUTOGRAFT) FAILURE: ICD-10-CM

## 2022-03-28 PROCEDURE — G0277: CPT

## 2022-03-28 PROCEDURE — 99183 HYPERBARIC OXYGEN THERAPY: CPT

## 2022-03-28 NOTE — PROCEDURE
[Outpatient] : Outpatient [Wheelchair] : wheelchair [THIS CHAMBER HAS BEEN CLEANED / DISINFECTED] : This chamber has been cleaned / disinfected according to local and hospital policy and procedure prior to this treatment. [____] : Post-Dive: Time - [unfilled] [___] : Post-Dive: Value - [unfilled] mg/dL [Patient demonstrated and verbalized proper technique for using air break mask] : Patient demonstrated and verbalized proper technique for using air break mask [Patient educated on the risks of SMOKING prior to HBOT with understanding] : Patient educated on the risks of SMOKING prior to HBOT with understanding [Patient educated on the risks of CONSUMING ALCOHOL prior to HBOT with understanding] : Patient educated on the risks of CONSUMING ALCOHOL prior to HBOT with understanding [100% Cotton] : 100% cotton [Empty all pockets] : empty all pockets [No hair oils, wigs, hairpieces, pins] : no hair oils, wigs, hairpieces, pins  [Pre tx medications] : pre tx medications  [No make-up, creams] : no make-up, creams  [No jewelry] : no jewelry  [No matches, cigarettes, lighters] : no matches, cigarettes, lighters  [Hearing aid removed] : hearing aid removed [Dentures removed] : dentures removed [Ground bracelet on pt's wrist] : ground bracelet on pt's wrist  [Contacts removed] : contacts removed  [Remove nail polish] : remove nail polish  [No reading material] : no reading material  [Bra, undergarments removed] : bra, undergarments removed  [No contraindicated dressings] : no contraindicated dressings [Ground Wire - VISUAL Verification - Intact/Free of Obstruction] : Ground Wire - VISUAL Verification - Intact/Free of Obstruction  [Ground Continuity - Verified < 1 ohm w/ Wrist Strap Meliza] : Ground Continuity - Verified < 1 ohm w/ Wrist Strap Meliza [Diagnosis: ___] : Diagnosis: [unfilled] [Number: ___] : Number: [unfilled] [] : No [Clear all fields] : clear all fields [FreeTextEntry1] : 2.0 [FreeTextEntry3] : 90 Mins [FreeTextEntry5] : 5801 [[facility] : 7658 [FreeTextEntry9] : 6307 [de-identified] : 6020 [de-identified] : 110 Mins/ 4 units

## 2022-03-29 ENCOUNTER — APPOINTMENT (OUTPATIENT)
Dept: HYPERBARIC MEDICINE | Facility: CLINIC | Age: 62
End: 2022-03-29
Payer: MEDICAID

## 2022-03-29 ENCOUNTER — OUTPATIENT (OUTPATIENT)
Dept: OUTPATIENT SERVICES | Facility: HOSPITAL | Age: 62
LOS: 1 days | End: 2022-03-29
Payer: MEDICAID

## 2022-03-29 VITALS
HEART RATE: 62 BPM | RESPIRATION RATE: 18 BRPM | OXYGEN SATURATION: 99 % | TEMPERATURE: 98.7 F | DIASTOLIC BLOOD PRESSURE: 78 MMHG | SYSTOLIC BLOOD PRESSURE: 121 MMHG

## 2022-03-29 DIAGNOSIS — Z98.890 OTHER SPECIFIED POSTPROCEDURAL STATES: Chronic | ICD-10-CM

## 2022-03-29 PROCEDURE — G0277: CPT

## 2022-03-29 PROCEDURE — 99183 HYPERBARIC OXYGEN THERAPY: CPT

## 2022-03-30 ENCOUNTER — APPOINTMENT (OUTPATIENT)
Dept: HYPERBARIC MEDICINE | Facility: CLINIC | Age: 62
End: 2022-03-30
Payer: MEDICAID

## 2022-03-30 ENCOUNTER — OUTPATIENT (OUTPATIENT)
Dept: OUTPATIENT SERVICES | Facility: HOSPITAL | Age: 62
LOS: 1 days | End: 2022-03-30
Payer: MEDICAID

## 2022-03-30 VITALS
SYSTOLIC BLOOD PRESSURE: 140 MMHG | TEMPERATURE: 98.2 F | OXYGEN SATURATION: 99 % | RESPIRATION RATE: 18 BRPM | DIASTOLIC BLOOD PRESSURE: 90 MMHG | HEART RATE: 76 BPM

## 2022-03-30 VITALS
SYSTOLIC BLOOD PRESSURE: 110 MMHG | DIASTOLIC BLOOD PRESSURE: 70 MMHG | RESPIRATION RATE: 16 BRPM | TEMPERATURE: 97.8 F | HEART RATE: 72 BPM

## 2022-03-30 DIAGNOSIS — Z98.890 OTHER SPECIFIED POSTPROCEDURAL STATES: Chronic | ICD-10-CM

## 2022-03-30 PROCEDURE — G0277: CPT

## 2022-03-30 PROCEDURE — 99183 HYPERBARIC OXYGEN THERAPY: CPT

## 2022-03-30 NOTE — ASSESSMENT
[No change from previous assessment] : No change from previous assessment [Time MD/Provider assessed Patient:_______] : Time MD/Provider assessed Patient: [unfilled] [Patient prepared for dive] : Patient prepared for dive [Patient undergoing HBO treatment for __________] : Patient undergoing HBO treatment for [unfilled] [Patient descended without problem for 9 minutes] : Patient descended without problem for 9 minutes [No dizziness or thirst] :  No dizziness or thirst [No ear problems] : No ear problems [Vital signs stable] : Vital signs stable [Tolerating dive well] : Tolerating dive well [No Chest Pain, shortness of breath] : No Chest Pain, shortness of breath [Respiratory Rate Stable] : Respiratory Rate Stable [No chest pain, shortness of breath, or ear pain] :  No chest pain, shortness of breath, or ear pain  [Tolerated Ascent well] : Tolerated Ascent well [A physician was present throughout the entire HBOT] : A physician was present throughout the entire HBOT [Vital Signs stable] : Vital Signs stable [Yes] : Yes [Clinically Stable] : Clinically stable [Continue Treatment Plan] : Continue treatment plan

## 2022-03-30 NOTE — PROCEDURE
[Outpatient] : Outpatient [Wheelchair] : wheelchair [THIS CHAMBER HAS BEEN CLEANED / DISINFECTED] : This chamber has been cleaned / disinfected according to local and hospital policy and procedure prior to this treatment. [____] : Post-Dive: Time - [unfilled] [___] : Post-Dive: Value - [unfilled] mg/dL [I have examined and evaluated this patient today, including ears and lungs and have cleared the patient] : I have examined and evaluated this patient today, including ears and lungs and have cleared the patient to continue HBOT as prescribed.  [Time MD/Provider assessed Patient: _____] : Time MD/Provider assessed Patient: [unfilled] [I have ordered 1 HBOT session at the indicated protocol as seen below] : I have ordered 1 HBOT session at the indicated protocol as seen below [Patient demonstrated and verbalized proper technique for using air break mask] : Patient demonstrated and verbalized proper technique for using air break mask [Patient educated on the risks of SMOKING prior to HBOT with understanding] : Patient educated on the risks of SMOKING prior to HBOT with understanding [Patient educated on the risks of CONSUMING ALCOHOL prior to HBOT with understanding] : Patient educated on the risks of CONSUMING ALCOHOL prior to HBOT with understanding [100% Cotton] : 100% cotton [Empty all pockets] : empty all pockets [No hair oils, wigs, hairpieces, pins] : no hair oils, wigs, hairpieces, pins  [Pre tx medications] : pre tx medications  [No make-up, creams] : no make-up, creams  [No jewelry] : no jewelry  [No matches, cigarettes, lighters] : no matches, cigarettes, lighters  [Hearing aid removed] : hearing aid removed [Dentures removed] : dentures removed [Ground bracelet on pt's wrist] : ground bracelet on pt's wrist  [Contacts removed] : contacts removed  [Remove nail polish] : remove nail polish  [No reading material] : no reading material  [Bra, undergarments removed] : bra, undergarments removed  [No contraindicated dressings] : no contraindicated dressings [Ground Continuity - Verified < 1 ohm w/ Wrist Strap Meliza] : Ground Continuity - Verified < 1 ohm w/ Wrist Strap Meliza [Ground Wire - VISUAL Verification - Intact/Free of Obstruction] : Ground Wire - VISUAL Verification - Intact/Free of Obstruction  [Number: ___] : Number: [unfilled] [Diagnosis: ___] : Diagnosis: [unfilled] [] : No [Clear all fields] : clear all fields [FreeTextEntry1] : 2.0 [FreeTextEntry3] : 90 Mins [FreeTextEntry5] : 5232 [[facility]] : 1268 [FreeTextEntry9] : 2343 [de-identified] : 1819 [de-identified] : 110 Mins/ 4 units

## 2022-03-31 ENCOUNTER — OUTPATIENT (OUTPATIENT)
Dept: OUTPATIENT SERVICES | Facility: HOSPITAL | Age: 62
LOS: 1 days | End: 2022-03-31
Payer: MEDICAID

## 2022-03-31 ENCOUNTER — APPOINTMENT (OUTPATIENT)
Dept: HYPERBARIC MEDICINE | Facility: CLINIC | Age: 62
End: 2022-03-31
Payer: MEDICAID

## 2022-03-31 VITALS
SYSTOLIC BLOOD PRESSURE: 134 MMHG | DIASTOLIC BLOOD PRESSURE: 73 MMHG | RESPIRATION RATE: 18 BRPM | HEART RATE: 94 BPM | OXYGEN SATURATION: 99 % | TEMPERATURE: 98.9 F

## 2022-03-31 VITALS
DIASTOLIC BLOOD PRESSURE: 70 MMHG | TEMPERATURE: 98.1 F | SYSTOLIC BLOOD PRESSURE: 134 MMHG | HEART RATE: 76 BPM | RESPIRATION RATE: 18 BRPM | OXYGEN SATURATION: 99 %

## 2022-03-31 DIAGNOSIS — M79.673 PAIN IN UNSPECIFIED FOOT: ICD-10-CM

## 2022-03-31 DIAGNOSIS — Z98.890 OTHER SPECIFIED POSTPROCEDURAL STATES: Chronic | ICD-10-CM

## 2022-03-31 PROCEDURE — G0277: CPT

## 2022-03-31 PROCEDURE — 99183 HYPERBARIC OXYGEN THERAPY: CPT

## 2022-04-01 DIAGNOSIS — T86.821 SKIN GRAFT (ALLOGRAFT) (AUTOGRAFT) FAILURE: ICD-10-CM

## 2022-04-04 ENCOUNTER — APPOINTMENT (OUTPATIENT)
Dept: HYPERBARIC MEDICINE | Facility: CLINIC | Age: 62
End: 2022-04-04
Payer: MEDICAID

## 2022-04-04 ENCOUNTER — OUTPATIENT (OUTPATIENT)
Dept: OUTPATIENT SERVICES | Facility: HOSPITAL | Age: 62
LOS: 1 days | End: 2022-04-04
Payer: MEDICAID

## 2022-04-04 VITALS
DIASTOLIC BLOOD PRESSURE: 68 MMHG | OXYGEN SATURATION: 99 % | HEART RATE: 68 BPM | RESPIRATION RATE: 18 BRPM | SYSTOLIC BLOOD PRESSURE: 150 MMHG | TEMPERATURE: 98.2 F

## 2022-04-04 VITALS
HEART RATE: 60 BPM | RESPIRATION RATE: 18 BRPM | OXYGEN SATURATION: 99 % | DIASTOLIC BLOOD PRESSURE: 90 MMHG | TEMPERATURE: 98.3 F | SYSTOLIC BLOOD PRESSURE: 166 MMHG

## 2022-04-04 DIAGNOSIS — Z98.890 OTHER SPECIFIED POSTPROCEDURAL STATES: Chronic | ICD-10-CM

## 2022-04-04 DIAGNOSIS — T86.821 SKIN GRAFT (ALLOGRAFT) (AUTOGRAFT) FAILURE: ICD-10-CM

## 2022-04-04 PROCEDURE — G0277: CPT

## 2022-04-04 PROCEDURE — 99183 HYPERBARIC OXYGEN THERAPY: CPT

## 2022-04-04 NOTE — PROCEDURE
[Outpatient] : Outpatient [Wheelchair] : wheelchair [THIS CHAMBER HAS BEEN CLEANED / DISINFECTED] : This chamber has been cleaned / disinfected according to local and hospital policy and procedure prior to this treatment. [____] : Post-Dive: Time - [unfilled] [___] : Post-Dive: Value - [unfilled] mg/dL [I have examined and evaluated this patient today, including ears and lungs and have cleared the patient] : I have examined and evaluated this patient today, including ears and lungs and have cleared the patient to continue HBOT as prescribed.  [Time MD/Provider assessed Patient: _____] : Time MD/Provider assessed Patient: [unfilled] [I have ordered 1 HBOT session at the indicated protocol as seen below] : I have ordered 1 HBOT session at the indicated protocol as seen below [Patient demonstrated and verbalized proper technique for using air break mask] : Patient demonstrated and verbalized proper technique for using air break mask [Patient educated on the risks of SMOKING prior to HBOT with understanding] : Patient educated on the risks of SMOKING prior to HBOT with understanding [Patient educated on the risks of CONSUMING ALCOHOL prior to HBOT with understanding] : Patient educated on the risks of CONSUMING ALCOHOL prior to HBOT with understanding [100% Cotton] : 100% cotton [Empty all pockets] : empty all pockets [No hair oils, wigs, hairpieces, pins] : no hair oils, wigs, hairpieces, pins  [Pre tx medications] : pre tx medications  [No make-up, creams] : no make-up, creams  [No jewelry] : no jewelry  [No matches, cigarettes, lighters] : no matches, cigarettes, lighters  [Hearing aid removed] : hearing aid removed [Dentures removed] : dentures removed [Ground bracelet on pt's wrist] : ground bracelet on pt's wrist  [Contacts removed] : contacts removed  [Remove nail polish] : remove nail polish  [No reading material] : no reading material  [Bra, undergarments removed] : bra, undergarments removed  [No contraindicated dressings] : no contraindicated dressings [Ground Wire - VISUAL Verification - Intact/Free of Obstruction] : Ground Wire - VISUAL Verification - Intact/Free of Obstruction  [Ground Continuity - Verified < 1 ohm w/ Wrist Strap Meliza] : Ground Continuity - Verified < 1 ohm w/ Wrist Strap Meliza [Diagnosis: ___] : Diagnosis: [unfilled] [Number: ___] : Number: [unfilled] [Clear all fields] : clear all fields [] : No [FreeTextEntry1] : 2.0 [FreeTextEntry3] : 90 Mins [[facility] : 9038 [[facility]] : 0903 [FreeTextEntry9] : 3673 [de-identified] : 4015 [de-identified] : 110 Mins/ 4 units

## 2022-04-07 ENCOUNTER — NON-APPOINTMENT (OUTPATIENT)
Age: 62
End: 2022-04-07

## 2022-04-11 DIAGNOSIS — M79.606 PAIN IN LEG, UNSPECIFIED: ICD-10-CM

## 2022-04-11 NOTE — PROCEDURE
[Outpatient] : Outpatient [Wheelchair] : wheelchair [THIS CHAMBER HAS BEEN CLEANED / DISINFECTED] : This chamber has been cleaned / disinfected according to local and hospital policy and procedure prior to this treatment. [____] : Post-Dive: Time - [unfilled] [___] : Post-Dive: Value - [unfilled] mg/dL [Patient demonstrated and verbalized proper technique for using air break mask] : Patient demonstrated and verbalized proper technique for using air break mask [Patient educated on the risks of SMOKING prior to HBOT with understanding] : Patient educated on the risks of SMOKING prior to HBOT with understanding [Patient educated on the risks of CONSUMING ALCOHOL prior to HBOT with understanding] : Patient educated on the risks of CONSUMING ALCOHOL prior to HBOT with understanding [100% Cotton] : 100% cotton [Empty all pockets] : empty all pockets [No hair oils, wigs, hairpieces, pins] : no hair oils, wigs, hairpieces, pins  [Pre tx medications] : pre tx medications  [No make-up, creams] : no make-up, creams  [No jewelry] : no jewelry  [No matches, cigarettes, lighters] : no matches, cigarettes, lighters  [Hearing aid removed] : hearing aid removed [Dentures removed] : dentures removed [Ground bracelet on pt's wrist] : ground bracelet on pt's wrist  [Contacts removed] : contacts removed  [Remove nail polish] : remove nail polish  [No reading material] : no reading material  [Bra, undergarments removed] : bra, undergarments removed  [No contraindicated dressings] : no contraindicated dressings [Ground Wire - VISUAL Verification - Intact/Free of Obstruction] : Ground Wire - VISUAL Verification - Intact/Free of Obstruction  [Ground Continuity - Verified < 1 ohm w/ Wrist Strap Meliza] : Ground Continuity - Verified < 1 ohm w/ Wrist Strap Meliza [Diagnosis: ___] : Diagnosis: [unfilled] [Clear all fields] : clear all fields [Number: ___] : Number: [unfilled] [I have ordered 1 HBOT session at the indicated protocol as seen below] : I have ordered 1 HBOT session at the indicated protocol as seen below [] : No [FreeTextEntry1] : 2.0 [FreeTextEntry3] : 90 Mins [FreeTextEntry5] : 1 [FreeTextEntry7] : 2748 [FreeTextEntry9] : 2123 [de-identified] : 3042 [de-identified] : 110 Mins/ 4 units

## 2022-04-12 ENCOUNTER — APPOINTMENT (OUTPATIENT)
Dept: VASCULAR SURGERY | Facility: CLINIC | Age: 62
End: 2022-04-12
Payer: MEDICAID

## 2022-04-12 VITALS
TEMPERATURE: 97.4 F | HEART RATE: 66 BPM | HEIGHT: 68 IN | BODY MASS INDEX: 24.25 KG/M2 | WEIGHT: 160 LBS | DIASTOLIC BLOOD PRESSURE: 77 MMHG | SYSTOLIC BLOOD PRESSURE: 120 MMHG

## 2022-04-12 DIAGNOSIS — I74.09 OTHER ARTERIAL EMBOLISM AND THROMBOSIS OF ABDOMINAL AORTA: ICD-10-CM

## 2022-04-12 PROCEDURE — 99214 OFFICE O/P EST MOD 30 MIN: CPT

## 2022-04-12 PROCEDURE — 93978 VASCULAR STUDY: CPT

## 2022-04-12 NOTE — ASSESSMENT
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

## 2022-04-12 NOTE — HISTORY OF PRESENT ILLNESS
[FreeTextEntry1] : pt is s/p left eia ba/stent\par pt is on eliquis and plavis\par pt states no new c/o

## 2022-04-12 NOTE — PHYSICAL EXAM
[JVD] : no jugular venous distention  [1+] : left 1+ [Ankle Swelling (On Exam)] : not present [Varicose Veins Of Lower Extremities] : not present [] : not present [Abdomen Masses] : No abdominal masses [No HSM] : no hepatosplenomegaly [No Rash or Lesion] : No rash or lesion [Alert] : alert [Oriented to Person] : oriented to person [Oriented to Place] : oriented to place [Oriented to Time] : oriented to time [Calm] : calm [de-identified] : nad [de-identified] : wnl [FreeTextEntry1] : Mild arterial insufficiency w mild  trophic skin changes \par no wounds/ulcers\par  [de-identified] : wnl [de-identified] : Tadeo Cranial nerves 2-12 tadeo grossly intact [de-identified] : cooperative

## 2022-04-25 NOTE — PROCEDURE
[Outpatient] : Outpatient [Wheelchair] : wheelchair [THIS CHAMBER HAS BEEN CLEANED / DISINFECTED] : This chamber has been cleaned / disinfected according to local and hospital policy and procedure prior to this treatment. [____] : Post-Dive: Time - [unfilled] [___] : Post-Dive: Value - [unfilled] mg/dL [I have examined and evaluated this patient today, including ears and lungs and have cleared the patient] : I have examined and evaluated this patient today, including ears and lungs and have cleared the patient to continue HBOT as prescribed.  [Time MD/Provider assessed Patient: _____] : Time MD/Provider assessed Patient: [unfilled] [I have ordered 1 HBOT session at the indicated protocol as seen below] : I have ordered 1 HBOT session at the indicated protocol as seen below [Patient demonstrated and verbalized proper technique for using air break mask] : Patient demonstrated and verbalized proper technique for using air break mask [Patient educated on the risks of SMOKING prior to HBOT with understanding] : Patient educated on the risks of SMOKING prior to HBOT with understanding [Patient educated on the risks of CONSUMING ALCOHOL prior to HBOT with understanding] : Patient educated on the risks of CONSUMING ALCOHOL prior to HBOT with understanding [100% Cotton] : 100% cotton [Empty all pockets] : empty all pockets [No hair oils, wigs, hairpieces, pins] : no hair oils, wigs, hairpieces, pins  [Pre tx medications] : pre tx medications  [No make-up, creams] : no make-up, creams  [No jewelry] : no jewelry  [No matches, cigarettes, lighters] : no matches, cigarettes, lighters  [Hearing aid removed] : hearing aid removed [Dentures removed] : dentures removed [Ground bracelet on pt's wrist] : ground bracelet on pt's wrist  [Contacts removed] : contacts removed  [Remove nail polish] : remove nail polish  [No reading material] : no reading material  [Bra, undergarments removed] : bra, undergarments removed  [No contraindicated dressings] : no contraindicated dressings [Ground Wire - VISUAL Verification - Intact/Free of Obstruction] : Ground Wire - VISUAL Verification - Intact/Free of Obstruction  [Ground Continuity - Verified < 1 ohm w/ Wrist Strap Meliza] : Ground Continuity - Verified < 1 ohm w/ Wrist Strap Meliza [Clear all fields] : clear all fields [Number: ___] : Number: [unfilled] [Diagnosis: ___] : Diagnosis: [unfilled] [] : No [FreeTextEntry1] : 2.0 [FreeTextEntry3] : 90 Mins [FreeTextEntry5] : 7086 [FreeTextEntry7] : 8596 [FreeTextEntry9] : 8818 [de-identified] : 7280 [de-identified] : 110 Mins/ 4 units

## 2022-04-25 NOTE — PROCEDURE
[Outpatient] : Outpatient [Wheelchair] : wheelchair [THIS CHAMBER HAS BEEN CLEANED / DISINFECTED] : This chamber has been cleaned / disinfected according to local and hospital policy and procedure prior to this treatment. [____] : Post-Dive: Time - [unfilled] [___] : Post-Dive: Value - [unfilled] mg/dL [Patient demonstrated and verbalized proper technique for using air break mask] : Patient demonstrated and verbalized proper technique for using air break mask [Patient educated on the risks of SMOKING prior to HBOT with understanding] : Patient educated on the risks of SMOKING prior to HBOT with understanding [Patient educated on the risks of CONSUMING ALCOHOL prior to HBOT with understanding] : Patient educated on the risks of CONSUMING ALCOHOL prior to HBOT with understanding [100% Cotton] : 100% cotton [Empty all pockets] : empty all pockets [No hair oils, wigs, hairpieces, pins] : no hair oils, wigs, hairpieces, pins  [Pre tx medications] : pre tx medications  [No make-up, creams] : no make-up, creams  [No jewelry] : no jewelry  [No matches, cigarettes, lighters] : no matches, cigarettes, lighters  [Hearing aid removed] : hearing aid removed [Dentures removed] : dentures removed [Ground bracelet on pt's wrist] : ground bracelet on pt's wrist  [Contacts removed] : contacts removed  [Remove nail polish] : remove nail polish  [No reading material] : no reading material  [Bra, undergarments removed] : bra, undergarments removed  [No contraindicated dressings] : no contraindicated dressings [Ground Wire - VISUAL Verification - Intact/Free of Obstruction] : Ground Wire - VISUAL Verification - Intact/Free of Obstruction  [Ground Continuity - Verified < 1 ohm w/ Wrist Strap Meliza] : Ground Continuity - Verified < 1 ohm w/ Wrist Strap Meliza [Clear all fields] : clear all fields [Number: ___] : Number: [unfilled] [Diagnosis: ___] : Diagnosis: [unfilled] [I have ordered 1 HBOT session at the indicated protocol as seen below] : I have ordered 1 HBOT session at the indicated protocol as seen below [] : No [FreeTextEntry1] : 2.0 [FreeTextEntry3] : 90 Mins [FreeTextEntry5] : 2454 [[facility]] : 4985 [de-identified] : 6811 [FreeTextEntry9] : 7906 [de-identified] : 110 Mins/ 4 units

## 2022-05-27 NOTE — OCCUPATIONAL THERAPY INITIAL EVALUATION ADULT - IMPAIRMENTS CONTRIBUTING IMPAIRED BED MOBILITY, REHAB EVAL
Abnormal Lactate decreased ROM/pain/decreased flexibility/impaired motor control/decreased strength/impaired sensory feedback

## 2022-06-02 ENCOUNTER — APPOINTMENT (OUTPATIENT)
Dept: ORTHOPEDIC SURGERY | Facility: CLINIC | Age: 62
End: 2022-06-02
Payer: MEDICAID

## 2022-06-02 VITALS — BODY MASS INDEX: 24.25 KG/M2 | HEART RATE: 64 BPM | HEIGHT: 68 IN | OXYGEN SATURATION: 95 % | WEIGHT: 160 LBS

## 2022-06-02 PROCEDURE — 99214 OFFICE O/P EST MOD 30 MIN: CPT

## 2022-06-14 ENCOUNTER — OUTPATIENT (OUTPATIENT)
Dept: OUTPATIENT SERVICES | Facility: HOSPITAL | Age: 62
LOS: 1 days | End: 2022-06-14
Payer: MEDICAID

## 2022-06-14 ENCOUNTER — APPOINTMENT (OUTPATIENT)
Dept: MRI IMAGING | Facility: CLINIC | Age: 62
End: 2022-06-14
Payer: MEDICAID

## 2022-06-14 DIAGNOSIS — Z00.8 ENCOUNTER FOR OTHER GENERAL EXAMINATION: ICD-10-CM

## 2022-06-14 DIAGNOSIS — Z98.890 OTHER SPECIFIED POSTPROCEDURAL STATES: Chronic | ICD-10-CM

## 2022-06-14 DIAGNOSIS — M25.569 PAIN IN UNSPECIFIED KNEE: ICD-10-CM

## 2022-06-14 PROCEDURE — 73721 MRI JNT OF LWR EXTRE W/O DYE: CPT | Mod: 26,LT

## 2022-06-14 PROCEDURE — 73721 MRI JNT OF LWR EXTRE W/O DYE: CPT

## 2022-06-20 ENCOUNTER — APPOINTMENT (OUTPATIENT)
Dept: ORTHOPEDIC SURGERY | Facility: CLINIC | Age: 62
End: 2022-06-20
Payer: MEDICAID

## 2022-06-20 VITALS
WEIGHT: 160 LBS | OXYGEN SATURATION: 97 % | HEART RATE: 73 BPM | BODY MASS INDEX: 24.25 KG/M2 | DIASTOLIC BLOOD PRESSURE: 81 MMHG | HEIGHT: 68 IN | SYSTOLIC BLOOD PRESSURE: 168 MMHG

## 2022-06-20 DIAGNOSIS — M17.10 UNILATERAL PRIMARY OSTEOARTHRITIS, UNSPECIFIED KNEE: ICD-10-CM

## 2022-06-20 PROCEDURE — 99214 OFFICE O/P EST MOD 30 MIN: CPT

## 2022-06-20 RX ORDER — HYALURONATE SODIUM, STABILIZED 88 MG/4 ML
88 SYRINGE (ML) INTRAARTICULAR
Qty: 1 | Refills: 0 | Status: ACTIVE | COMMUNITY
Start: 2022-06-20 | End: 1900-01-01

## 2022-06-28 ENCOUNTER — FORM ENCOUNTER (OUTPATIENT)
Age: 62
End: 2022-06-28

## 2022-07-06 ENCOUNTER — FORM ENCOUNTER (OUTPATIENT)
Age: 62
End: 2022-07-06

## 2022-07-12 ENCOUNTER — APPOINTMENT (OUTPATIENT)
Dept: ORTHOPEDIC SURGERY | Facility: CLINIC | Age: 62
End: 2022-07-12

## 2022-07-12 VITALS — HEIGHT: 69 IN | BODY MASS INDEX: 23.7 KG/M2 | WEIGHT: 160 LBS

## 2022-07-12 DIAGNOSIS — M22.2X2 PATELLOFEMORAL DISORDERS, LEFT KNEE: ICD-10-CM

## 2022-07-12 PROCEDURE — 99214 OFFICE O/P EST MOD 30 MIN: CPT

## 2022-07-12 NOTE — PHYSICAL EXAM
[de-identified] : Right Lower Extremity\par o Knee :\par ¦ Inspection/Palpation : no tenderness to palpation, no swelling, no deformity\par ¦ Range of Motion : 0 - 120 degrees, no crepitus\par ¦ Stability : no valgus or varus instability present on provocative testing, Lachman’s Test (-)\par ¦ Strength : flexion and extension 5/5, adductor 5/5, glute med 5/5\par o Muscle Bulk : normal muscle bulk present\par o Skin : no erythema, no ecchymosis\par o Sensation : sensation to pin intact\par o Vascular Exam : no edema, no cyanosis, dorsalis pedis artery pulse 2+, posterior tibial artery pulse 2+\par \par Left Lower Extremity\par o Knee :\par ¦ Inspection/Palpation : no tenderness to palpation, no swelling, no deformity\par ¦ Range of Motion : 0 -120 degrees\par ¦ Stability : no valgus or varus instability present on provocative testing, patellar grind (+) with mild crepitus, Lachman’s Test (-)\par ¦ Strength : flexion and extension 3/5, adductor 2-/5, glute med 3/5\par o Muscle Bulk : normal muscle bulk present\par o Skin : no erythema, no ecchymosis\par o Sensation : sensation to pin intact\par o Vascular Exam : no edema, no cyanosis, dorsalis pedis artery pulse 2+, posterior tibial artery pulse 2+  [de-identified] : Patient comes to today's visit with outside imaging already performed.  I reviewed the images in detail with the patient and discussed the findings as highlighted below. \par \par EXAM: MR KNEE LT\par PROCEDURE DATE:  06/14/2022\par IMPRESSION: Horizontal undersurface tear of the posterior horn the medial meniscus without displaced meniscal tissue. Minimal free edge fraying of the body segment of the medial meniscus.\par \par Small free edge radial tear at the junction of the body and anterior horn of the lateral meniscus without displaced meniscal tissue.\par \par Minimal tricompartmental chondrosis.

## 2022-07-12 NOTE — ADDENDUM
[FreeTextEntry1] : I, Amber Tanner , acted solely as a scribe for Dr. Marcus Bhandari on this date 07/12/2022.

## 2022-07-12 NOTE — HISTORY OF PRESENT ILLNESS
[de-identified] :  61 year old M who presents today  for  left knee pain January 2022. Patient is currently ambulating using a walker. No injury reported. He has been under the care of Dr. Torres, cortisone injection provided 7 days of relief. PT has not been provide relief. HA injection were recommend but not approved by insurance. Patient has been referred here for continuation of care. The pain is present with walking and standing. He is ambulating with walker. Taking Motrin for pain. Denies catching, locking, numbness or tingling.

## 2022-07-12 NOTE — DISCUSSION/SUMMARY
[de-identified] : The underlying pathophysiology was reviewed in great detail with the patient as well as the various treatment options, including ice, analgesics, NSAIDs, Physical therapy, steroid injections, hyaluronic acid injections.\par \par Activity modifications and restrictions were discussed. I advised avoiding deep bending, squatting and high intensity activity.\par \par A home exercise sheet was given and discussed with the patient to follow. \par \par I have recommended utilizing a knee sleeve to provide added support and stability. \par \par Continue physical therapy, continue to focus on building strength.\par \par May trial a small lift in the left shoe to see if this improves his gait/comfort given functional leg length discrepancy on exam.\par \par All questions were answered, all alternatives discussed and the patient is in complete agreement with that plan. Follow-up appointment as instructed. Any issues and the patient will call or come in sooner.

## 2022-08-04 ENCOUNTER — APPOINTMENT (OUTPATIENT)
Dept: ORTHOPEDIC SURGERY | Facility: CLINIC | Age: 62
End: 2022-08-04

## 2022-08-10 ENCOUNTER — APPOINTMENT (OUTPATIENT)
Dept: ORTHOPEDIC SURGERY | Facility: CLINIC | Age: 62
End: 2022-08-10

## 2022-10-18 ENCOUNTER — APPOINTMENT (OUTPATIENT)
Dept: VASCULAR SURGERY | Facility: CLINIC | Age: 62
End: 2022-10-18

## 2023-01-05 ENCOUNTER — INPATIENT (INPATIENT)
Facility: HOSPITAL | Age: 63
LOS: 5 days | Discharge: NURSING, REHAB OR LONG-TERM CARE | DRG: 517 | End: 2023-01-11
Attending: GENERAL ACUTE CARE HOSPITAL | Admitting: GENERAL ACUTE CARE HOSPITAL
Payer: MEDICAID

## 2023-01-05 ENCOUNTER — TRANSCRIPTION ENCOUNTER (OUTPATIENT)
Age: 63
End: 2023-01-05

## 2023-01-05 VITALS
DIASTOLIC BLOOD PRESSURE: 110 MMHG | WEIGHT: 169.98 LBS | TEMPERATURE: 98 F | HEIGHT: 68 IN | SYSTOLIC BLOOD PRESSURE: 179 MMHG | RESPIRATION RATE: 18 BRPM | HEART RATE: 88 BPM | OXYGEN SATURATION: 97 %

## 2023-01-05 DIAGNOSIS — G60.0 HEREDITARY MOTOR AND SENSORY NEUROPATHY: ICD-10-CM

## 2023-01-05 DIAGNOSIS — R52 PAIN, UNSPECIFIED: ICD-10-CM

## 2023-01-05 DIAGNOSIS — S82.009A UNSPECIFIED FRACTURE OF UNSPECIFIED PATELLA, INITIAL ENCOUNTER FOR CLOSED FRACTURE: ICD-10-CM

## 2023-01-05 DIAGNOSIS — S82.002A UNSPECIFIED FRACTURE OF LEFT PATELLA, INITIAL ENCOUNTER FOR CLOSED FRACTURE: ICD-10-CM

## 2023-01-05 DIAGNOSIS — Z98.890 OTHER SPECIFIED POSTPROCEDURAL STATES: Chronic | ICD-10-CM

## 2023-01-05 DIAGNOSIS — I25.10 ATHEROSCLEROTIC HEART DISEASE OF NATIVE CORONARY ARTERY WITHOUT ANGINA PECTORIS: ICD-10-CM

## 2023-01-05 LAB
ALBUMIN SERPL ELPH-MCNC: 4.4 G/DL — SIGNIFICANT CHANGE UP (ref 3.3–5)
ALP SERPL-CCNC: 78 U/L — SIGNIFICANT CHANGE UP (ref 40–120)
ALT FLD-CCNC: 8 U/L — LOW (ref 10–45)
ANION GAP SERPL CALC-SCNC: 12 MMOL/L — SIGNIFICANT CHANGE UP (ref 5–17)
APTT BLD: 29.5 SEC — SIGNIFICANT CHANGE UP (ref 27.5–35.5)
AST SERPL-CCNC: 33 U/L — SIGNIFICANT CHANGE UP (ref 10–40)
BASOPHILS # BLD AUTO: 0.05 K/UL — SIGNIFICANT CHANGE UP (ref 0–0.2)
BASOPHILS NFR BLD AUTO: 0.6 % — SIGNIFICANT CHANGE UP (ref 0–2)
BILIRUB SERPL-MCNC: 0.3 MG/DL — SIGNIFICANT CHANGE UP (ref 0.2–1.2)
BLD GP AB SCN SERPL QL: NEGATIVE — SIGNIFICANT CHANGE UP
BUN SERPL-MCNC: 15 MG/DL — SIGNIFICANT CHANGE UP (ref 7–23)
CALCIUM SERPL-MCNC: 9.3 MG/DL — SIGNIFICANT CHANGE UP (ref 8.4–10.5)
CHLORIDE SERPL-SCNC: 106 MMOL/L — SIGNIFICANT CHANGE UP (ref 96–108)
CO2 SERPL-SCNC: 23 MMOL/L — SIGNIFICANT CHANGE UP (ref 22–31)
CREAT SERPL-MCNC: 0.61 MG/DL — SIGNIFICANT CHANGE UP (ref 0.5–1.3)
EGFR: 107 ML/MIN/1.73M2 — SIGNIFICANT CHANGE UP
EOSINOPHIL # BLD AUTO: 0.24 K/UL — SIGNIFICANT CHANGE UP (ref 0–0.5)
EOSINOPHIL NFR BLD AUTO: 2.6 % — SIGNIFICANT CHANGE UP (ref 0–6)
FLUAV AG NPH QL: SIGNIFICANT CHANGE UP
FLUBV AG NPH QL: SIGNIFICANT CHANGE UP
GLUCOSE SERPL-MCNC: 103 MG/DL — HIGH (ref 70–99)
HCT VFR BLD CALC: 37.7 % — LOW (ref 39–50)
HGB BLD-MCNC: 12.3 G/DL — LOW (ref 13–17)
IMM GRANULOCYTES NFR BLD AUTO: 0.2 % — SIGNIFICANT CHANGE UP (ref 0–0.9)
INR BLD: 1.13 RATIO — SIGNIFICANT CHANGE UP (ref 0.88–1.16)
LYMPHOCYTES # BLD AUTO: 1.76 K/UL — SIGNIFICANT CHANGE UP (ref 1–3.3)
LYMPHOCYTES # BLD AUTO: 19.4 % — SIGNIFICANT CHANGE UP (ref 13–44)
MAGNESIUM SERPL-MCNC: 1.9 MG/DL — SIGNIFICANT CHANGE UP (ref 1.6–2.6)
MCHC RBC-ENTMCNC: 28.9 PG — SIGNIFICANT CHANGE UP (ref 27–34)
MCHC RBC-ENTMCNC: 32.6 GM/DL — SIGNIFICANT CHANGE UP (ref 32–36)
MCV RBC AUTO: 88.5 FL — SIGNIFICANT CHANGE UP (ref 80–100)
MONOCYTES # BLD AUTO: 0.43 K/UL — SIGNIFICANT CHANGE UP (ref 0–0.9)
MONOCYTES NFR BLD AUTO: 4.7 % — SIGNIFICANT CHANGE UP (ref 2–14)
NEUTROPHILS # BLD AUTO: 6.59 K/UL — SIGNIFICANT CHANGE UP (ref 1.8–7.4)
NEUTROPHILS NFR BLD AUTO: 72.5 % — SIGNIFICANT CHANGE UP (ref 43–77)
NRBC # BLD: 0 /100 WBCS — SIGNIFICANT CHANGE UP (ref 0–0)
PLATELET # BLD AUTO: 222 K/UL — SIGNIFICANT CHANGE UP (ref 150–400)
POTASSIUM SERPL-MCNC: 4.3 MMOL/L — SIGNIFICANT CHANGE UP (ref 3.5–5.3)
POTASSIUM SERPL-SCNC: 4.3 MMOL/L — SIGNIFICANT CHANGE UP (ref 3.5–5.3)
PROT SERPL-MCNC: 7.2 G/DL — SIGNIFICANT CHANGE UP (ref 6–8.3)
PROTHROM AB SERPL-ACNC: 13.1 SEC — SIGNIFICANT CHANGE UP (ref 10.5–13.4)
RBC # BLD: 4.26 M/UL — SIGNIFICANT CHANGE UP (ref 4.2–5.8)
RBC # FLD: 13.2 % — SIGNIFICANT CHANGE UP (ref 10.3–14.5)
RH IG SCN BLD-IMP: NEGATIVE — SIGNIFICANT CHANGE UP
RSV RNA NPH QL NAA+NON-PROBE: SIGNIFICANT CHANGE UP
SARS-COV-2 RNA SPEC QL NAA+PROBE: SIGNIFICANT CHANGE UP
SODIUM SERPL-SCNC: 141 MMOL/L — SIGNIFICANT CHANGE UP (ref 135–145)
WBC # BLD: 9.09 K/UL — SIGNIFICANT CHANGE UP (ref 3.8–10.5)
WBC # FLD AUTO: 9.09 K/UL — SIGNIFICANT CHANGE UP (ref 3.8–10.5)

## 2023-01-05 PROCEDURE — 73552 X-RAY EXAM OF FEMUR 2/>: CPT | Mod: 26,LT

## 2023-01-05 PROCEDURE — 93010 ELECTROCARDIOGRAM REPORT: CPT

## 2023-01-05 PROCEDURE — 99285 EMERGENCY DEPT VISIT HI MDM: CPT

## 2023-01-05 PROCEDURE — 73590 X-RAY EXAM OF LOWER LEG: CPT | Mod: 26,LT

## 2023-01-05 PROCEDURE — 99222 1ST HOSP IP/OBS MODERATE 55: CPT | Mod: GC,25

## 2023-01-05 PROCEDURE — 73562 X-RAY EXAM OF KNEE 3: CPT | Mod: 26,LT

## 2023-01-05 PROCEDURE — 71045 X-RAY EXAM CHEST 1 VIEW: CPT | Mod: 26

## 2023-01-05 RX ORDER — METOPROLOL TARTRATE 50 MG
50 TABLET ORAL
Refills: 0 | Status: DISCONTINUED | OUTPATIENT
Start: 2023-01-05 | End: 2023-01-11

## 2023-01-05 RX ORDER — PANTOPRAZOLE SODIUM 20 MG/1
40 TABLET, DELAYED RELEASE ORAL
Refills: 0 | Status: DISCONTINUED | OUTPATIENT
Start: 2023-01-05 | End: 2023-01-11

## 2023-01-05 RX ORDER — OXYCODONE HYDROCHLORIDE 5 MG/1
10 TABLET ORAL EVERY 4 HOURS
Refills: 0 | Status: DISCONTINUED | OUTPATIENT
Start: 2023-01-05 | End: 2023-01-11

## 2023-01-05 RX ORDER — ACETAMINOPHEN 500 MG
975 TABLET ORAL ONCE
Refills: 0 | Status: COMPLETED | OUTPATIENT
Start: 2023-01-05 | End: 2023-01-05

## 2023-01-05 RX ORDER — SODIUM CHLORIDE 9 MG/ML
1000 INJECTION, SOLUTION INTRAVENOUS
Refills: 0 | Status: DISCONTINUED | OUTPATIENT
Start: 2023-01-05 | End: 2023-01-06

## 2023-01-05 RX ORDER — ATORVASTATIN CALCIUM 80 MG/1
80 TABLET, FILM COATED ORAL AT BEDTIME
Refills: 0 | Status: DISCONTINUED | OUTPATIENT
Start: 2023-01-05 | End: 2023-01-11

## 2023-01-05 RX ORDER — ASPIRIN/CALCIUM CARB/MAGNESIUM 324 MG
81 TABLET ORAL DAILY
Refills: 0 | Status: DISCONTINUED | OUTPATIENT
Start: 2023-01-05 | End: 2023-01-10

## 2023-01-05 RX ORDER — OXYCODONE HYDROCHLORIDE 5 MG/1
5 TABLET ORAL EVERY 4 HOURS
Refills: 0 | Status: DISCONTINUED | OUTPATIENT
Start: 2023-01-05 | End: 2023-01-11

## 2023-01-05 RX ADMIN — ATORVASTATIN CALCIUM 80 MILLIGRAM(S): 80 TABLET, FILM COATED ORAL at 23:36

## 2023-01-05 RX ADMIN — Medication 81 MILLIGRAM(S): at 23:36

## 2023-01-05 RX ADMIN — Medication 975 MILLIGRAM(S): at 22:01

## 2023-01-05 RX ADMIN — Medication 975 MILLIGRAM(S): at 19:18

## 2023-01-05 NOTE — CONSULT NOTE ADULT - SUBJECTIVE AND OBJECTIVE BOX
62y Male presents with left patella fx. Patient reports recent hx of left lower extremity pain with ambulation has been using cane. Hx of CMT, stents, on eliquis. Reports fall onto left knee today with pain and inability to ambulate after. Denies numbness/tingling in the affected extremity. Denies head strike/LOC/other orthopedic injuries at this time.  Patient ambulates with cane at baseline.    PAST MEDICAL & SURGICAL HISTORY:  CMT (Charcot-Lakia-Tooth disease)      Substance abuse in remission      Palate deformity  partial palate removal      H/O hammer toe correction  left        Home Medications:  acetaminophen 325 mg oral tablet: 2 tab(s) orally every 6 hours, As needed, Mild Pain (1 - 3) (20 Jan 2022 11:05)  aspirin 81 mg oral delayed release tablet: 1 tab(s) orally once a day (20 Jan 2022 11:05)  ertapenem 1 g injection: 1 gram(s) injectable once a day for one month  (20 Jan 2022 11:07)    Allergies    No Known Allergies    Intolerances          Vital Signs Last 24 Hrs  T(C): 37.1 (05 Jan 2023 17:47), Max: 37.1 (05 Jan 2023 17:47)  T(F): 98.8 (05 Jan 2023 17:47), Max: 98.8 (05 Jan 2023 17:47)  HR: 109 (05 Jan 2023 17:47) (88 - 109)  BP: 130/99 (05 Jan 2023 17:47) (130/99 - 179/110)  BP(mean): --  RR: 18 (05 Jan 2023 17:47) (18 - 18)  SpO2: 100% (05 Jan 2023 17:47) (97% - 100%)    Parameters below as of 05 Jan 2023 17:47  Patient On (Oxygen Delivery Method): room air        PHYSICAL EXAM  General: NAD, Awake and Alert    LLE:  skin intact  significant swelling about knee with TTP and palpable defect about patella  NTTP about femur/tibfib/ ankle  +sensation L2-S1  +dorsiflexion/plantarflexion of ankle/hallux  +dorsalis pedis pulse  Soft compartments, - calf tenderness      Secondary Exam: Benign, Skin intact, NTTP along axial spine, SILT throughout, motor grossly intact throughout, no other orthopedic injuries at this time, compartments soft and compressible        IMAGING:  XR : transverse displaced L patella fx  CT :    Assessment/Plan:  62y Male with displaced transverse patella fx left    -Placed in [facility]  -WBAT in KI  -Pain control as needed  -DVT ppx  -Patient will require operative fixation of patella fracture  -Will discuss with attending regarding plan of ORIF patella as inpatient vs - follow up with Dr. Sheppard as outpatient next week for operative planning  -Will discuss with attending, and advise if plan changes

## 2023-01-05 NOTE — CONSULT NOTE ADULT - SUBJECTIVE AND OBJECTIVE BOX
62-year-old male with a history of CMT, CAD s/p stent on Eliquis presents with left knee pain and swelling after a fall at home.  Patient reports that he was walking to the car on the sidewalk and normally has some left leg weakness and tripped on his left foot and fell forward onto his hands and left knee.  When trying to get up he was unable to stand on his left leg and thus presented to the emergency department.  When patient fell patient did not have any LOC, no head strike.  He has no neck pain or pain anywhere else on his body.  Otherwise patient has no headache, chest pain, shortness of breath, nausea/vomiting/abdominal pain, dysuria, fever/chills. Patient was brought to [facility] for further evaluation and treatment. In the ED she was found to have a left patella fracture.  Patient is scheduled for an Open reduction and internal fixation of the left patella. Patient seen now resting comfortably       PAST MEDICAL & SURGICAL HISTORY:  CMT (Charcot-Lakia-Tooth disease)      Substance abuse in remission      Palate deformity  partial palate removal      H/O hammer toe correction  left            MEDICATIONS  (STANDING):  aspirin enteric coated 81 milliGRAM(s) Oral daily  atorvastatin 80 milliGRAM(s) Oral at bedtime  lactated ringers. 1000 milliLiter(s) (100 mL/Hr) IV Continuous <Continuous>  metoprolol tartrate 50 milliGRAM(s) Oral two times a day  pantoprazole    Tablet 40 milliGRAM(s) Oral before breakfast    MEDICATIONS  (PRN):  oxyCODONE    IR 10 milliGRAM(s) Oral every 4 hours PRN Moderate Pain (4 - 6)  oxyCODONE    IR 5 milliGRAM(s) Oral every 4 hours PRN Mild Pain (1 - 3)    Social Hx:  Tobacco: neg  ETOH: neg  Drugs:  neg    Family Hx:  As per my conversation with the patient, non contributory       ROS  CONSTITUTIONAL: No weakness, fevers or chills  EYES/ENT: No visual changes;  No vertigo or throat pain   NECK: No pain or stiffness  RESPIRATORY: No cough, wheezing, hemoptysis; No shortness of breath  CARDIOVASCULAR: No chest pain or palpitations  GASTROINTESTINAL: No abdominal or epigastric pain. No nausea, vomiting, or hematemesis; No diarrhea or constipation. No melena or hematochezia.  GENITOURINARY: No dysuria, frequency or hematuria  NEUROLOGICAL: No numbness or weakness  SKIN: No itching, burning, rashes, or lesions   MUSCULOSKELETAL: left leg pain. B/L had deformities     INTERVAL HPI/OVERNIGHT EVENTS:  T(C): 36.8 (01-05-23 @ 22:03), Max: 37.1 (01-05-23 @ 17:47)  HR: 64 (01-05-23 @ 22:03) (64 - 109)  BP: 137/80 (01-05-23 @ 22:03) (130/99 - 179/110)  RR: 17 (01-05-23 @ 22:03) (17 - 18)  SpO2: 99% (01-05-23 @ 22:03) (97% - 100%)  Wt(kg): --  I&O's Summary      PHYSICAL EXAM:  GENERAL: NAD, well-groomed, well-developed  HEAD:  Atraumatic, Normocephalic  EYES: EOMI, PERRLA, conjunctiva and sclera clear  ENMT: No tonsillar erythema, exudates, or enlargement; Moist mucous membranes, Good dentition, No lesions  NECK: Supple, No JVD, Normal thyroid  NERVOUS SYSTEM:  Alert & Oriented X3, Good concentration; Motor Strength 5/5 B/L upper and lower extremities; DTRs 2+ intact and symmetric  CHEST/LUNG: Clear to percussion bilaterally; No rales, rhonchi, wheezing, or rubs  HEART: Regular rate and rhythm; No murmurs, rubs, or gallops  ABDOMEN: Soft, Nontender, Nondistended; Bowel sounds present  EXTREMITIES:  2+ Peripheral Pulses, No clubbing, cyanosis, or edema  LYMPH: No lymphadenopathy noted  SKIN: No rashes or lesions        LABS:                        12.3   9.09  )-----------( 222      ( 05 Jan 2023 19:21 )             37.7     01-05    141  |  106  |  15  ----------------------------<  103<H>  4.3   |  23  |  0.61    Ca    9.3      05 Jan 2023 19:21  Mg     1.9     01-05    TPro  7.2  /  Alb  4.4  /  TBili  0.3  /  DBili  x   /  AST  33  /  ALT  8<L>  /  AlkPhos  78  01-05    PT/INR - ( 05 Jan 2023 19:22 )   PT: 13.1 sec;   INR: 1.13 ratio         PTT - ( 05 Jan 2023 19:22 )  PTT:29.5 sec    CAPILLARY BLOOD GLUCOSE                Radiology reports: 62-year-old male with a history of CMT, CAD s/p stent on Eliquis presents with left knee pain and swelling after a fall at home.  Patient reports that he was walking to the car on the sidewalk and normally has some left leg weakness and tripped on his left foot and fell forward onto his hands and left knee.  When trying to get up he was unable to stand on his left leg and thus presented to the emergency department.  When patient fell patient did not have any LOC, no head strike.  He has no neck pain or pain anywhere else on his body.  Otherwise patient has no headache, chest pain, shortness of breath, nausea/vomiting/abdominal pain, dysuria, fever/chills. Patient was brought to [facility] for further evaluation and treatment. In the ED she was found to have a left patella fracture.  Patient is scheduled for an Open reduction and internal fixation of the left patella. Patient seen now resting comfortably       PAST MEDICAL & SURGICAL HISTORY:  CMT (Charcot-Lakia-Tooth disease)      Substance abuse in remission      Palate deformity  partial palate removal      H/O hammer toe correction  left            MEDICATIONS  (STANDING):  aspirin enteric coated 81 milliGRAM(s) Oral daily  atorvastatin 80 milliGRAM(s) Oral at bedtime  lactated ringers. 1000 milliLiter(s) (100 mL/Hr) IV Continuous <Continuous>  metoprolol tartrate 50 milliGRAM(s) Oral two times a day  pantoprazole    Tablet 40 milliGRAM(s) Oral before breakfast    MEDICATIONS  (PRN):  oxyCODONE    IR 10 milliGRAM(s) Oral every 4 hours PRN Moderate Pain (4 - 6)  oxyCODONE    IR 5 milliGRAM(s) Oral every 4 hours PRN Mild Pain (1 - 3)    Social Hx:  Tobacco: neg  ETOH: neg  Drugs:  neg    Family Hx:  As per my conversation with the patient, non contributory       ROS  CONSTITUTIONAL: No weakness, fevers or chills  EYES/ENT: No visual changes;  No vertigo or throat pain   NECK: No pain or stiffness  RESPIRATORY: No cough, wheezing, hemoptysis; No shortness of breath  CARDIOVASCULAR: No chest pain or palpitations  GASTROINTESTINAL: No abdominal or epigastric pain. No nausea, vomiting, or hematemesis; No diarrhea or constipation. No melena or hematochezia.  GENITOURINARY: No dysuria, frequency or hematuria  NEUROLOGICAL: No numbness or weakness  SKIN: No itching, burning, rashes, or lesions   MUSCULOSKELETAL: left leg pain. B/L had deformities     INTERVAL HPI/OVERNIGHT EVENTS:  T(C): 36.8 (01-05-23 @ 22:03), Max: 37.1 (01-05-23 @ 17:47)  HR: 64 (01-05-23 @ 22:03) (64 - 109)  BP: 137/80 (01-05-23 @ 22:03) (130/99 - 179/110)  RR: 17 (01-05-23 @ 22:03) (17 - 18)  SpO2: 99% (01-05-23 @ 22:03) (97% - 100%)  Wt(kg): --  I&O's Summary      PHYSICAL EXAM:  GENERAL: NAD, well-groomed, well-developed  HEAD:  Atraumatic, Normocephalic  EYES: EOMI, PERRLA, conjunctiva and sclera clear  ENMT: No tonsillar erythema, exudates, or enlargement; Moist mucous membranes, Good dentition, No lesions  NECK: Supple, No JVD, Normal thyroid  NERVOUS SYSTEM:  Alert & Oriented X3, Good concentration; Motor Strength 5/5 B/L upper and lower extremities; DTRs 2+ intact and symmetric  CHEST/LUNG: Clear to percussion bilaterally; No rales, rhonchi, wheezing, or rubs  HEART: Regular rate and rhythm; No murmurs, rubs, or gallops  ABDOMEN: Soft, Nontender, Nondistended; Bowel sounds present  EXTREMITIES:  2+ Peripheral Pulses, No clubbing, cyanosis, or edema  LYMPH: No lymphadenopathy noted  SKIN: No rashes or lesions        LABS:                        12.3   9.09  )-----------( 222      ( 05 Jan 2023 19:21 )             37.7     01-05    141  |  106  |  15  ----------------------------<  103<H>  4.3   |  23  |  0.61    Ca    9.3      05 Jan 2023 19:21  Mg     1.9     01-05    TPro  7.2  /  Alb  4.4  /  TBili  0.3  /  DBili  x   /  AST  33  /  ALT  8<L>  /  AlkPhos  78  01-05    PT/INR - ( 05 Jan 2023 19:22 )   PT: 13.1 sec;   INR: 1.13 ratio         PTT - ( 05 Jan 2023 19:22 )  PTT:29.5 sec    EKG: pending

## 2023-01-05 NOTE — ED PROVIDER NOTE - OBJECTIVE STATEMENT
62-year-old male with a history of CMT, CAD s/p stent on Eliquis presents with left knee pain and swelling after a fall at home.  Patient reports that he was walking to the car on the sidewalk and normally has some left leg weakness and tripped on his left foot and fell forward onto his hands and left knee.  When trying to get up he was unable to stand on his left leg and thus presented to the emergency department.  When patient fell patient did not have any LOC, no head strike.  He has no neck pain or pain anywhere else on his body.  Otherwise patient has no headache, chest pain, shortness of breath, nausea/vomiting/abdominal pain, dysuria, fever/chills.

## 2023-01-05 NOTE — CONSULT NOTE ADULT - PROBLEM SELECTOR RECOMMENDATION 9
Patient scheduled for an Open reduction and internal fixation of the left patella  No contraindication to scheduled procedure  NPO after MN  DVT and GI prophylaxis

## 2023-01-05 NOTE — ED ADULT NURSE NOTE - OBJECTIVE STATEMENT
63yo M axo4. PMH of HTN. Presents with L knee pain from fall today outdoors. Denies numbness/tingling, chest pain, shortness of breath, lacerations, ecchymosis. Skin intact, safety maintained.

## 2023-01-05 NOTE — CONSULT NOTE ADULT - PROBLEM SELECTOR RECOMMENDATION 2
Patient with a hx of CAD and PTCA with stent placement 1 year ago  would continue asa daily  restart plavix post op  Patient has been asymptomatic

## 2023-01-05 NOTE — H&P ADULT - HISTORY OF PRESENT ILLNESS
62y Male presents with left patella fx. Patient reports recent hx of left lower extremity pain with ambulation has been using cane. Hx of CMT, stents, on eliquis. Reports fall onto left knee today with pain and inability to ambulate after. Denies numbness/tingling in the affected extremity. Denies head strike/LOC/other orthopedic injuries at this time.  Patient ambulates with cane at baseline.    PAST MEDICAL & SURGICAL HISTORY:  CMT (Charcot-Lakia-Tooth disease)      Substance abuse in remission      Palate deformity  partial palate removal      H/O hammer toe correction  left        Home Medications:  acetaminophen 325 mg oral tablet: 2 tab(s) orally every 6 hours, As needed, Mild Pain (1 - 3) (20 Jan 2022 11:05)  aspirin 81 mg oral delayed release tablet: 1 tab(s) orally once a day (20 Jan 2022 11:05)  ertapenem 1 g injection: 1 gram(s) injectable once a day for one month  (20 Jan 2022 11:07)    Allergies    No Known Allergies    Intolerances          Vital Signs Last 24 Hrs  T(C): 37.1 (05 Jan 2023 17:47), Max: 37.1 (05 Jan 2023 17:47)  T(F): 98.8 (05 Jan 2023 17:47), Max: 98.8 (05 Jan 2023 17:47)  HR: 109 (05 Jan 2023 17:47) (88 - 109)  BP: 130/99 (05 Jan 2023 17:47) (130/99 - 179/110)  BP(mean): --  RR: 18 (05 Jan 2023 17:47) (18 - 18)  SpO2: 100% (05 Jan 2023 17:47) (97% - 100%)    Parameters below as of 05 Jan 2023 17:47  Patient On (Oxygen Delivery Method): room air        PHYSICAL EXAM  General: NAD, Awake and Alert    LLE:  skin intact  significant swelling about knee with TTP and palpable defect about patella  NTTP about femur/tibfib/ ankle  +sensation L2-S1  +dorsiflexion/plantarflexion of ankle/hallux  +dorsalis pedis pulse  Soft compartments, - calf tenderness      Secondary Exam: Benign, Skin intact, NTTP along axial spine, SILT throughout, motor grossly intact throughout, no other orthopedic injuries at this time, compartments soft and compressible        IMAGING:  XR : transverse displaced L patella fx  CT :    Assessment/Plan:  62y Male with displaced transverse patella fx left    -Placed in BJKI  -WBAT in KI  -Pain control as needed  -DVT ppx  -Patient will require operative fixation of patella fracture  -Plan for ORIF PAtella tomorrow AM 1/5  -NPO pMN  -LR While NPO  -SCD  -0400 labs please activate CBC/BMP/PT/INR PTT  -Will discuss with attending, and advise if plan changes

## 2023-01-05 NOTE — H&P ADULT - ATTENDING COMMENTS
Associated images, notes, and labs were reviewed. The patient was seen and examined. Risks and benefits of operative versus nonoperative management were discussed with the patient. The patient showed a good understanding of the injury and the treatment options. They would like to move forward with operative management of the patella fracture.

## 2023-01-05 NOTE — ED PROVIDER NOTE - PROGRESS NOTE DETAILS
Notified by radiology that x-ray was positive for patellar fracture.  Preop labs ordered.  Ortho paged. Orthopedic surgery evaluated patient.  Patient consented for surgery tomorrow.  Admit to orthopedic surgery under Dr. Goldstein.

## 2023-01-05 NOTE — CONSULT NOTE ADULT - TIME BILLING
Discussed treatment plan with patient at bedside. I am a non participating [employer] physician seeing Pt in coverage for Dr Brenner Discussed treatment plan with patient at bedside. I am a non participating [employer] physician seeing Pt in coverage for Dr Brenner. The above patient documentation by Dr. Mcnulty was reviewed and I agree with his evaluation, assessment and treatment plan.  Alphonso Brenner M.D.

## 2023-01-05 NOTE — CONSULT NOTE ADULT - ASSESSMENT
63 yo male presents after a fall with a left patella fracture. Patient is scheduled for an ORIF of the left patella

## 2023-01-05 NOTE — ED PROVIDER NOTE - CLINICAL SUMMARY MEDICAL DECISION MAKING FREE TEXT BOX
62-year-old male with a history of Charcot-Lakia-Tooth, CAD s/p stent on Eliquis presents with left knee pain after a fall at home.  Concern for patellar fracture versus dislocation.  Tylenol, XR left lower extremity ordered. 62-year-old male with a history of Charcot-Lakia-Tooth, CAD s/p stent on Eliquis presents with left knee pain after a fall at home.  Concern for patellar fracture versus dislocation.  Tylenol, XR left lower extremity ordered.    LOLA Perez MD: Agree with resident/ACP MDM, assessment and plan as above. NVI on exam

## 2023-01-05 NOTE — ED ADULT NURSE NOTE - NSIMPLEMENTINTERV_GEN_ALL_ED
Implemented All Fall Risk Interventions:  [place] to call system. Call bell, personal items and telephone within reach. Instruct patient to call for assistance. Room bathroom lighting operational. Non-slip footwear when patient is off stretcher. Physically safe environment: no spills, clutter or unnecessary equipment. Stretcher in lowest position, wheels locked, appropriate side rails in place. Provide visual cue, wrist band, yellow gown, etc. Monitor gait and stability. Monitor for mental status changes and reorient to person, place, and time. Review medications for side effects contributing to fall risk. Reinforce activity limits and safety measures with patient and family.

## 2023-01-06 LAB
ANION GAP SERPL CALC-SCNC: 12 MMOL/L — SIGNIFICANT CHANGE UP (ref 5–17)
ANION GAP SERPL CALC-SCNC: 7 MMOL/L — SIGNIFICANT CHANGE UP (ref 5–17)
APTT BLD: 29.2 SEC — SIGNIFICANT CHANGE UP (ref 27.5–35.5)
BUN SERPL-MCNC: 11 MG/DL — SIGNIFICANT CHANGE UP (ref 7–23)
BUN SERPL-MCNC: 14 MG/DL — SIGNIFICANT CHANGE UP (ref 7–23)
CALCIUM SERPL-MCNC: 8.8 MG/DL — SIGNIFICANT CHANGE UP (ref 8.4–10.5)
CALCIUM SERPL-MCNC: 8.8 MG/DL — SIGNIFICANT CHANGE UP (ref 8.4–10.5)
CHLORIDE SERPL-SCNC: 106 MMOL/L — SIGNIFICANT CHANGE UP (ref 96–108)
CHLORIDE SERPL-SCNC: 106 MMOL/L — SIGNIFICANT CHANGE UP (ref 96–108)
CK MB CFR SERPL CALC: 1.6 NG/ML — SIGNIFICANT CHANGE UP (ref 0–6.7)
CK SERPL-CCNC: 58 U/L — SIGNIFICANT CHANGE UP (ref 30–200)
CO2 SERPL-SCNC: 24 MMOL/L — SIGNIFICANT CHANGE UP (ref 22–31)
CO2 SERPL-SCNC: 28 MMOL/L — SIGNIFICANT CHANGE UP (ref 22–31)
CREAT SERPL-MCNC: 0.55 MG/DL — SIGNIFICANT CHANGE UP (ref 0.5–1.3)
CREAT SERPL-MCNC: 0.63 MG/DL — SIGNIFICANT CHANGE UP (ref 0.5–1.3)
EGFR: 106 ML/MIN/1.73M2 — SIGNIFICANT CHANGE UP
EGFR: 111 ML/MIN/1.73M2 — SIGNIFICANT CHANGE UP
GLUCOSE SERPL-MCNC: 111 MG/DL — HIGH (ref 70–99)
GLUCOSE SERPL-MCNC: 96 MG/DL — SIGNIFICANT CHANGE UP (ref 70–99)
HCT VFR BLD CALC: 32.8 % — LOW (ref 39–50)
HCT VFR BLD CALC: 34.7 % — LOW (ref 39–50)
HGB BLD-MCNC: 11 G/DL — LOW (ref 13–17)
HGB BLD-MCNC: 11.6 G/DL — LOW (ref 13–17)
INR BLD: 1.18 RATIO — HIGH (ref 0.88–1.16)
MCHC RBC-ENTMCNC: 28.9 PG — SIGNIFICANT CHANGE UP (ref 27–34)
MCHC RBC-ENTMCNC: 29.4 PG — SIGNIFICANT CHANGE UP (ref 27–34)
MCHC RBC-ENTMCNC: 33.4 GM/DL — SIGNIFICANT CHANGE UP (ref 32–36)
MCHC RBC-ENTMCNC: 33.5 GM/DL — SIGNIFICANT CHANGE UP (ref 32–36)
MCV RBC AUTO: 86.1 FL — SIGNIFICANT CHANGE UP (ref 80–100)
MCV RBC AUTO: 88.1 FL — SIGNIFICANT CHANGE UP (ref 80–100)
NRBC # BLD: 0 /100 WBCS — SIGNIFICANT CHANGE UP (ref 0–0)
NRBC # BLD: 0 /100 WBCS — SIGNIFICANT CHANGE UP (ref 0–0)
PLATELET # BLD AUTO: 184 K/UL — SIGNIFICANT CHANGE UP (ref 150–400)
PLATELET # BLD AUTO: 202 K/UL — SIGNIFICANT CHANGE UP (ref 150–400)
POTASSIUM SERPL-MCNC: 3.4 MMOL/L — LOW (ref 3.5–5.3)
POTASSIUM SERPL-MCNC: 5 MMOL/L — SIGNIFICANT CHANGE UP (ref 3.5–5.3)
POTASSIUM SERPL-SCNC: 3.4 MMOL/L — LOW (ref 3.5–5.3)
POTASSIUM SERPL-SCNC: 5 MMOL/L — SIGNIFICANT CHANGE UP (ref 3.5–5.3)
PROTHROM AB SERPL-ACNC: 13.7 SEC — HIGH (ref 10.5–13.4)
RBC # BLD: 3.81 M/UL — LOW (ref 4.2–5.8)
RBC # BLD: 3.94 M/UL — LOW (ref 4.2–5.8)
RBC # FLD: 13.1 % — SIGNIFICANT CHANGE UP (ref 10.3–14.5)
RBC # FLD: 13.1 % — SIGNIFICANT CHANGE UP (ref 10.3–14.5)
SODIUM SERPL-SCNC: 141 MMOL/L — SIGNIFICANT CHANGE UP (ref 135–145)
SODIUM SERPL-SCNC: 142 MMOL/L — SIGNIFICANT CHANGE UP (ref 135–145)
TROPONIN T, HIGH SENSITIVITY RESULT: 10 NG/L — SIGNIFICANT CHANGE UP (ref 0–51)
WBC # BLD: 7.25 K/UL — SIGNIFICANT CHANGE UP (ref 3.8–10.5)
WBC # BLD: 8.08 K/UL — SIGNIFICANT CHANGE UP (ref 3.8–10.5)
WBC # FLD AUTO: 7.25 K/UL — SIGNIFICANT CHANGE UP (ref 3.8–10.5)
WBC # FLD AUTO: 8.08 K/UL — SIGNIFICANT CHANGE UP (ref 3.8–10.5)

## 2023-01-06 PROCEDURE — 99253 IP/OBS CNSLTJ NEW/EST LOW 45: CPT | Mod: 57

## 2023-01-06 PROCEDURE — 99221 1ST HOSP IP/OBS SF/LOW 40: CPT | Mod: 57

## 2023-01-06 PROCEDURE — 27524 TREAT KNEECAP FRACTURE: CPT | Mod: LT

## 2023-01-06 DEVICE — WASHER TI 4MM: Type: IMPLANTABLE DEVICE | Site: LEFT | Status: FUNCTIONAL

## 2023-01-06 DEVICE — GUIDEWIRE UNTHREADED 1.4X150MM: Type: IMPLANTABLE DEVICE | Site: LEFT | Status: FUNCTIONAL

## 2023-01-06 DEVICE — SCREW CANN 4.0X36MM: Type: IMPLANTABLE DEVICE | Site: LEFT | Status: FUNCTIONAL

## 2023-01-06 RX ORDER — SENNA PLUS 8.6 MG/1
2 TABLET ORAL AT BEDTIME
Refills: 0 | Status: DISCONTINUED | OUTPATIENT
Start: 2023-01-06 | End: 2023-01-11

## 2023-01-06 RX ORDER — APIXABAN 2.5 MG/1
5 TABLET, FILM COATED ORAL EVERY 12 HOURS
Refills: 0 | Status: DISCONTINUED | OUTPATIENT
Start: 2023-01-07 | End: 2023-01-11

## 2023-01-06 RX ORDER — ONDANSETRON 8 MG/1
4 TABLET, FILM COATED ORAL ONCE
Refills: 0 | Status: DISCONTINUED | OUTPATIENT
Start: 2023-01-06 | End: 2023-01-06

## 2023-01-06 RX ORDER — ACETAMINOPHEN 500 MG
975 TABLET ORAL EVERY 8 HOURS
Refills: 0 | Status: DISCONTINUED | OUTPATIENT
Start: 2023-01-06 | End: 2023-01-11

## 2023-01-06 RX ORDER — CEFAZOLIN SODIUM 1 G
2000 VIAL (EA) INJECTION EVERY 8 HOURS
Refills: 0 | Status: COMPLETED | OUTPATIENT
Start: 2023-01-06 | End: 2023-01-07

## 2023-01-06 RX ORDER — SODIUM CHLORIDE 9 MG/ML
1000 INJECTION, SOLUTION INTRAVENOUS
Refills: 0 | Status: DISCONTINUED | OUTPATIENT
Start: 2023-01-06 | End: 2023-01-06

## 2023-01-06 RX ORDER — POTASSIUM CHLORIDE 20 MEQ
40 PACKET (EA) ORAL ONCE
Refills: 0 | Status: COMPLETED | OUTPATIENT
Start: 2023-01-06 | End: 2023-01-06

## 2023-01-06 RX ORDER — HYDROMORPHONE HYDROCHLORIDE 2 MG/ML
0.5 INJECTION INTRAMUSCULAR; INTRAVENOUS; SUBCUTANEOUS
Refills: 0 | Status: DISCONTINUED | OUTPATIENT
Start: 2023-01-06 | End: 2023-01-06

## 2023-01-06 RX ORDER — POLYETHYLENE GLYCOL 3350 17 G/17G
17 POWDER, FOR SOLUTION ORAL DAILY
Refills: 0 | Status: DISCONTINUED | OUTPATIENT
Start: 2023-01-06 | End: 2023-01-11

## 2023-01-06 RX ORDER — SODIUM CHLORIDE 9 MG/ML
1000 INJECTION INTRAMUSCULAR; INTRAVENOUS; SUBCUTANEOUS
Refills: 0 | Status: DISCONTINUED | OUTPATIENT
Start: 2023-01-06 | End: 2023-01-08

## 2023-01-06 RX ADMIN — PANTOPRAZOLE SODIUM 40 MILLIGRAM(S): 20 TABLET, DELAYED RELEASE ORAL at 05:21

## 2023-01-06 RX ADMIN — HYDROMORPHONE HYDROCHLORIDE 0.5 MILLIGRAM(S): 2 INJECTION INTRAMUSCULAR; INTRAVENOUS; SUBCUTANEOUS at 16:05

## 2023-01-06 RX ADMIN — SODIUM CHLORIDE 100 MILLILITER(S): 9 INJECTION, SOLUTION INTRAVENOUS at 05:21

## 2023-01-06 RX ADMIN — Medication 50 MILLIGRAM(S): at 05:21

## 2023-01-06 RX ADMIN — Medication 40 MILLIEQUIVALENT(S): at 07:39

## 2023-01-06 RX ADMIN — ATORVASTATIN CALCIUM 80 MILLIGRAM(S): 80 TABLET, FILM COATED ORAL at 21:09

## 2023-01-06 RX ADMIN — HYDROMORPHONE HYDROCHLORIDE 0.5 MILLIGRAM(S): 2 INJECTION INTRAMUSCULAR; INTRAVENOUS; SUBCUTANEOUS at 15:19

## 2023-01-06 RX ADMIN — Medication 975 MILLIGRAM(S): at 21:02

## 2023-01-06 RX ADMIN — Medication 100 MILLIGRAM(S): at 21:02

## 2023-01-06 RX ADMIN — HYDROMORPHONE HYDROCHLORIDE 0.5 MILLIGRAM(S): 2 INJECTION INTRAMUSCULAR; INTRAVENOUS; SUBCUTANEOUS at 15:49

## 2023-01-06 RX ADMIN — Medication 81 MILLIGRAM(S): at 21:09

## 2023-01-06 RX ADMIN — HYDROMORPHONE HYDROCHLORIDE 0.5 MILLIGRAM(S): 2 INJECTION INTRAMUSCULAR; INTRAVENOUS; SUBCUTANEOUS at 15:48

## 2023-01-06 NOTE — PATIENT PROFILE ADULT - FALL HARM RISK - HARM RISK INTERVENTIONS

## 2023-01-06 NOTE — CHART NOTE - NSCHARTNOTEFT_GEN_A_CORE
Pt seen and evaluated in the PACU. PACU notified the Orthopedic provider of episodes of bradycardia upon arrival to the PACU. Otherwise, resting without complaints. Pt states pain is well tolerated. No Chest Pain, SOB, N/V/D/HA.     T(C): 36.3 (01-06-23 @ 14:18), Max: 37.2 (01-06-23 @ 04:09)  HR: 60 (01-06-23 @ 15:45) (59 - 109)  BP: 107/77 (01-06-23 @ 15:45) (98/62 - 148/85)  RR: 16 (01-06-23 @ 15:45) (16 - 18)  SpO2: 96% (01-06-23 @ 15:45) (93% - 100%)  Wt(kg): --    Exam:  Alert and Oriented, No Acute Distress.   Card: +S1/S2, RRR  Pulm: CTAB  Laterality:  L Knee  Rock brace in place.   Aquacel dressing on L Knee clean, dry and intact.   Sensation intact to light touch   Calves soft, non-tender   (+) 5/5 strong PF/FHL; (+) 4/5 moderate strength EHL/DF (baseline strength as per pt)   unable to assess DP pulse with palpation.   Lower extremities warm and well-perfused, cap refill < 3 sec.     Imaging:   IMPRESSION: s/p ORIF for L patellar fx with retinacular repair   Screws aligned and symmetrical within surrounding cortex. No signs of periprosthetic changes or fractures. Will review official read when available.               11.6   8.08  )-----------( 202      ( 06 Jan 2023 15:14 )             34.7    01-06    141  |  106  |  11  ----------------------------<  111<H>  5.0   |  28  |  0.63    Ca    8.8      06 Jan 2023 15:14  Mg     1.9     01-05    TPro  7.2  /  Alb  4.4  /  TBili  0.3  /  DBili  x   /  AST  33  /  ALT  8<L>  /  AlkPhos  78  01-05      A/P: 62yMale s/p ORIF for L patellar fx with retinacular repair . VSS. NAD.  -PT/OT- WBAT LLE in KI/Bryanna brace @ all times   -IS bedside   -Ancef x 24 hrs   -DVT PPx: Aspirin 81 mg QD, Eliquis 5 mg BID   -GI PPx: Protonix 40 mg   -Pain Control  -Continue Current Tx  -f/u AM labs.   -f/u Cardiology c/s (Josephine, thomas)- possible need for telemetry monitoring d/t episodes of irregular rhythms intra-operatively and bradycardia post-operatively.   -Dispo planning: PACU to Floor, pending PT/OT eval.     Placido Calhoun PA-C  Orthopedic Surgery Team  Team Pager #[phone] Declines

## 2023-01-06 NOTE — PATIENT PROFILE ADULT - NSPROPTRIGHTNOTIFYOBTAINDET_GEN_A_NUR
Patient asking for a call back regarding results for a bone density scan.    Pt ph: [phone]   Cannot recall doctor's name

## 2023-01-06 NOTE — CONSULT NOTE ADULT - SUBJECTIVE AND OBJECTIVE BOX
CHIEF COMPLAINT:Patient is a 62y old  Male who presents with a chief complaint of Lt patella Fx (06 Jan 2023 16:10)      HPI:  62y Male presents with left patella fx. Patient reports recent hx of left lower extremity pain with ambulation has been using cane. Hx of CMT, stents, on eliquis. Reports fall onto left knee today with pain and inability to ambulate after. Denies numbness/tingling in the affected extremity. Denies head strike/LOC/other orthopedic injuries at this time.  Patient ambulates with cane at baseline.    PAST MEDICAL & SURGICAL HISTORY:  CMT (Charcot-Lakia-Tooth disease)  Substance abuse in remission  Palate deformity  partial palate removal  H/O hammer toe correction    Home Medications:  acetaminophen 325 mg oral tablet: 2 tab(s) orally every 6 hours, As needed, Mild Pain (1 - 3) (20 Jan 2022 11:05)  aspirin 81 mg oral delayed release tablet: 1 tab(s) orally once a day (20 Jan 2022 11:05)  ertapenem 1 g injection: 1 gram(s) injectable once a day for one month  (20 Jan 2022 11:07)    Allergies    No Known Allergies    Intolerances          Vital Signs Last 24 Hrs  T(C): 37.1 (05 Jan 2023 17:47), Max: 37.1 (05 Jan 2023 17:47)  T(F): 98.8 (05 Jan 2023 17:47), Max: 98.8 (05 Jan 2023 17:47)  HR: 109 (05 Jan 2023 17:47) (88 - 109)  BP: 130/99 (05 Jan 2023 17:47) (130/99 - 179/110)  BP(mean): --  RR: 18 (05 Jan 2023 17:47) (18 - 18)  SpO2: 100% (05 Jan 2023 17:47) (97% - 100%)    Parameters below as of 05 Jan 2023 17:47  Patient On (Oxygen Delivery Method): room air            Secondary Exam: Benign, Skin intact, NTTP along axial spine, SILT throughout, motor grossly intact throughout, no other orthopedic injuries at this time, compartments soft and compressible        IMAGING:  XR : transverse displaced L patella fx  CT :    Assessment/Plan:  62y Male with displa      MEDICATIONS  (STANDING):  acetaminophen     Tablet .. 975 milliGRAM(s) Oral every 8 hours  aspirin enteric coated 81 milliGRAM(s) Oral daily  atorvastatin 80 milliGRAM(s) Oral at bedtime  ceFAZolin   IVPB 2000 milliGRAM(s) IV Intermittent every 8 hours  metoprolol tartrate 50 milliGRAM(s) Oral two times a day  pantoprazole    Tablet 40 milliGRAM(s) Oral before breakfast  polyethylene glycol 3350 17 Gram(s) Oral daily  senna 2 Tablet(s) Oral at bedtime    MEDICATIONS  (PRN):  bisacodyl Suppository 10 milliGRAM(s) Rectal daily PRN Constipation  HYDROmorphone  Injectable 0.5 milliGRAM(s) IV Push every 10 minutes PRN Moderate Pain (4 - 6)  ondansetron Injectable 4 milliGRAM(s) IV Push once PRN Nausea and/or Vomiting  oxyCODONE    IR 10 milliGRAM(s) Oral every 4 hours PRN Moderate Pain (4 - 6)  oxyCODONE    IR 5 milliGRAM(s) Oral every 4 hours PRN Mild Pain (1 - 3)      FAMILY HISTORY:  Hypertension (Father)    Family history of rheumatoid arthritis        SOCIAL HISTORY:    [ ] Non-smoker  [ x] Smoker  [ ] Alcohol    Allergies    No Known Allergies    Intolerances    	    REVIEW OF SYSTEMS:  CONSTITUTIONAL: No fever, weight loss, or fatigue  EYES: No eye pain, visual disturbances, or discharge  ENT:  No difficulty hearing, tinnitus, vertigo; No sinus or throat pain  NECK: No pain or stiffness  RESPIRATORY: No cough, wheezing, chills or hemoptysis; No Shortness of Breath  CARDIOVASCULAR: No chest pain, palpitations, passing out, dizziness, or leg swelling  GASTROINTESTINAL: No abdominal or epigastric pain. No nausea, vomiting, or hematemesis; No diarrhea or constipation. No melena or hematochezia.  GENITOURINARY: No dysuria, frequency, hematuria, or incontinence  NEUROLOGICAL: No headaches, memory loss, loss of strength, numbness, or tremors  SKIN: No itching, burning, rashes, or lesions   LYMPH Nodes: No enlarged glands  ENDOCRINE: No heat or cold intolerance; No hair loss  MUSCULOSKELETAL: No joint pain or swelling; No muscle, back,  + pattellar  pain  PSYCHIATRIC: No depression, anxiety, mood swings, or difficulty sleeping  HEME/LYMPH: No easy bruising, or bleeding gums  ALLERGY AND IMMUNOLOGIC: No hives or eczema	    [x ] All others negative	  [ ] Unable to obtain    PHYSICAL EXAM:  T(C): 36.3 (01-06-23 @ 19:00), Max: 37.2 (01-06-23 @ 04:09)  HR: 62 (01-06-23 @ 20:00) (51 - 94)  BP: 104/58 (01-06-23 @ 20:00) (89/51 - 148/85)  RR: 17 (01-06-23 @ 20:00) (16 - 18)  SpO2: 98% (01-06-23 @ 20:00) (93% - 100%)  Wt(kg): --  I&O's Summary    05 Jan 2023 07:01  -  06 Jan 2023 07:00  --------------------------------------------------------  IN: 0 mL / OUT: 0 mL / NET: 0 mL    06 Jan 2023 07:01  -  06 Jan 2023 20:48  --------------------------------------------------------  IN: 240 mL / OUT: 1125 mL / NET: -885 mL        Appearance: Normal	  HEENT:   Normal oral mucosa, PERRL, EOMI	  Lymphatic: No lymphadenopathy  Cardiovascular: Normal S1 S2, No JVD, + murmurs, No edema  Respiratory:rhonchi  Psychiatry: A & O x 3, Mood & affect appropriate  Gastrointestinal:  Soft, Non-tender, + BS	  Skin: No rashes, No ecchymoses, No cyanosis	  Extremities: Normal range of motion, bandaged R knee, ?charcot    TELEMETRY: 	    ECG:  	  RADIOLOGY:  OTHER: 	  	  LABS:	 	    CARDIAC MARKERS:                              11.6   8.08  )-----------( 202      ( 06 Jan 2023 15:14 )             34.7     01-06    141  |  106  |  11  ----------------------------<  111<H>  5.0   |  28  |  0.63    Ca    8.8      06 Jan 2023 15:14  Mg     1.9     01-05    TPro  7.2  /  Alb  4.4  /  TBili  0.3  /  DBili  x   /  AST  33  /  ALT  8<L>  /  AlkPhos  78  01-05    proBNP:   Lipid Profile:   HgA1c:   TSH:   PT/INR - ( 06 Jan 2023 05:47 )   PT: 13.7 sec;   INR: 1.18 ratio         PTT - ( 06 Jan 2023 05:47 )  PTT:29.2 sec    PREVIOUS DIAGNOSTIC TESTING:    < from: 12 Lead ECG (01.05.23 @ 23:56) >  Diagnosis Line ATRIAL FIBRILLATION  POSSIBLE ANTERIOR INFARCT (CITED ON OR BEFORE 05-JAN-2023)  ABNORMAL ECG  WHEN COMPARED WITH ECG OF `1/23/22     < from: TTE with Doppler (w/Cont) (01.07.22 @ 13:23) >  Mitral Valve: Mitral annular calcification, otherwise  normal mitral valve.  Aortic Valve/Aorta: Aortic valve not well visualized;  appears calcified.  Aortic Root: 2.9 cm.  Left Atrium: Normal left atrium.  Left Ventricle: Endocardial visualization enhanced with  intravenous injection of Ultrasonic Enhancing Agent  (Definity). Overall preserved left ventricular ejection  fraction. Normal left ventricular internal dimensions and  wall thicknesses. Normal diastolic function  Right Heart: Normal right atrium. The right ventricle is  not well visualized; grossly normal right ventricular  systolic function. Normal tricuspid valve. Minimal  tricuspid regurgitation. Normal pulmonic valve.  Pericardium/Pleura: Normal pericardium with no pericardial  effusion.  Hemodynamic: Estimated right atrial pressure is 8 mm Hg.  Unable to estimate RVSP.  ------------------------------------------------------------------------  Conclusions:  1. Endocardial visualization enhanced with intravenous  injection of Ultrasonic Enhancing Agent (Definity). Overall  preserved left ventricular ejection fraction.  2. The right ventricle is not well visualized; grossly  normal right ventricular systolic function.  < from: 12 Lead ECG (01.23.22 @ 11:54) >  Diagnosis Line NORMAL SINUS RHYTHM  NORMAL ECG  WHEN COMPARED WITH ECG OF `1/22/22   now in NSR, no longer in AF

## 2023-01-06 NOTE — CONSULT NOTE ADULT - ASSESSMENT
62y Male presents with left patella fx. Patient reports recent hx of left lower extremity pain with ambulation has been using cane. Hx of CMT, stents, on eliquis. Reports fall onto left knee today with pain and inability to ambulate after. Denies numbness/tingling in the affected extremity. Denies head strike/LOC/other orthopedic injuries at this time.  Patient ambulates with cane at baseline.  pt is well known to me with hx of htn, ashd, s/p stent, PAF on beta blocker last admission on beta blocker, s/p ortho surgery has developed a.fib, at this time hr is controlled, no chest pain\  check cardiac enzymes  ecg  continue beta blocker  re start on ac as clear by ortho  keep hgb>8, fu cbc, lytes  tele   62y Male presents with left patella fx. Patient reports recent hx of left lower extremity pain with ambulation has been using cane. Hx of CMT, stents, on eliquis. Reports fall onto left knee today with pain and inability to ambulate after. Denies numbness/tingling in the affected extremity. Denies head strike/LOC/other orthopedic injuries at this time.  Patient ambulates with cane at baseline.  pt is well known to me with hx of htn, ashd, s/p stent, PAF on beta blocker last admission on beta blocker, s/p ortho surgery has developed a.fib, at this time hr is controlled, no chest pain\  check cardiac enzymes  ecg  continue beta blocker  re start on ac as clear by ortho  keep hgb>8, fu cbc, lytes  tele  pt in nsr now

## 2023-01-06 NOTE — PATIENT PROFILE ADULT - NSPROEXTENSIONSOFSELF_GEN_A_NUR
eyeglasses Burow's Advancement Flap Text: The defect edges were debeveled with a #15 scalpel blade.  Given the location of the defect and the proximity to free margins a Burow's advancement flap was deemed most appropriate.  Using a sterile surgical marker, the appropriate advancement flap was drawn incorporating the defect and placing the expected incisions within the relaxed skin tension lines where possible.    The area thus outlined was incised deep to adipose tissue with a #15 scalpel blade.  The skin margins were undermined to an appropriate distance in all directions utilizing iris scissors.

## 2023-01-07 ENCOUNTER — TRANSCRIPTION ENCOUNTER (OUTPATIENT)
Age: 63
End: 2023-01-07

## 2023-01-07 LAB
ANION GAP SERPL CALC-SCNC: 12 MMOL/L — SIGNIFICANT CHANGE UP (ref 5–17)
BUN SERPL-MCNC: 13 MG/DL — SIGNIFICANT CHANGE UP (ref 7–23)
CALCIUM SERPL-MCNC: 8.3 MG/DL — LOW (ref 8.4–10.5)
CHLORIDE SERPL-SCNC: 107 MMOL/L — SIGNIFICANT CHANGE UP (ref 96–108)
CO2 SERPL-SCNC: 21 MMOL/L — LOW (ref 22–31)
CREAT SERPL-MCNC: 0.65 MG/DL — SIGNIFICANT CHANGE UP (ref 0.5–1.3)
EGFR: 105 ML/MIN/1.73M2 — SIGNIFICANT CHANGE UP
GLUCOSE SERPL-MCNC: 104 MG/DL — HIGH (ref 70–99)
HCT VFR BLD CALC: 28.1 % — LOW (ref 39–50)
HGB BLD-MCNC: 9.2 G/DL — LOW (ref 13–17)
MCHC RBC-ENTMCNC: 29.3 PG — SIGNIFICANT CHANGE UP (ref 27–34)
MCHC RBC-ENTMCNC: 32.7 GM/DL — SIGNIFICANT CHANGE UP (ref 32–36)
MCV RBC AUTO: 89.5 FL — SIGNIFICANT CHANGE UP (ref 80–100)
NRBC # BLD: 0 /100 WBCS — SIGNIFICANT CHANGE UP (ref 0–0)
PLATELET # BLD AUTO: 163 K/UL — SIGNIFICANT CHANGE UP (ref 150–400)
POTASSIUM SERPL-MCNC: 3.9 MMOL/L — SIGNIFICANT CHANGE UP (ref 3.5–5.3)
POTASSIUM SERPL-SCNC: 3.9 MMOL/L — SIGNIFICANT CHANGE UP (ref 3.5–5.3)
RBC # BLD: 3.14 M/UL — LOW (ref 4.2–5.8)
RBC # FLD: 13.2 % — SIGNIFICANT CHANGE UP (ref 10.3–14.5)
SODIUM SERPL-SCNC: 140 MMOL/L — SIGNIFICANT CHANGE UP (ref 135–145)
WBC # BLD: 7.49 K/UL — SIGNIFICANT CHANGE UP (ref 3.8–10.5)
WBC # FLD AUTO: 7.49 K/UL — SIGNIFICANT CHANGE UP (ref 3.8–10.5)

## 2023-01-07 RX ORDER — OXYCODONE HYDROCHLORIDE 5 MG/1
1 TABLET ORAL
Qty: 10 | Refills: 0
Start: 2023-01-07 | End: 2023-01-11

## 2023-01-07 RX ADMIN — SENNA PLUS 2 TABLET(S): 8.6 TABLET ORAL at 21:13

## 2023-01-07 RX ADMIN — APIXABAN 5 MILLIGRAM(S): 2.5 TABLET, FILM COATED ORAL at 05:15

## 2023-01-07 RX ADMIN — Medication 975 MILLIGRAM(S): at 21:13

## 2023-01-07 RX ADMIN — Medication 81 MILLIGRAM(S): at 21:13

## 2023-01-07 RX ADMIN — Medication 100 MILLIGRAM(S): at 05:14

## 2023-01-07 RX ADMIN — OXYCODONE HYDROCHLORIDE 10 MILLIGRAM(S): 5 TABLET ORAL at 21:16

## 2023-01-07 RX ADMIN — Medication 975 MILLIGRAM(S): at 13:05

## 2023-01-07 RX ADMIN — Medication 975 MILLIGRAM(S): at 12:05

## 2023-01-07 RX ADMIN — Medication 50 MILLIGRAM(S): at 17:42

## 2023-01-07 RX ADMIN — OXYCODONE HYDROCHLORIDE 10 MILLIGRAM(S): 5 TABLET ORAL at 22:16

## 2023-01-07 RX ADMIN — APIXABAN 5 MILLIGRAM(S): 2.5 TABLET, FILM COATED ORAL at 17:42

## 2023-01-07 RX ADMIN — PANTOPRAZOLE SODIUM 40 MILLIGRAM(S): 20 TABLET, DELAYED RELEASE ORAL at 06:11

## 2023-01-07 RX ADMIN — ATORVASTATIN CALCIUM 80 MILLIGRAM(S): 80 TABLET, FILM COATED ORAL at 21:13

## 2023-01-07 RX ADMIN — Medication 50 MILLIGRAM(S): at 05:15

## 2023-01-07 RX ADMIN — Medication 975 MILLIGRAM(S): at 06:15

## 2023-01-07 RX ADMIN — Medication 975 MILLIGRAM(S): at 22:16

## 2023-01-07 RX ADMIN — SODIUM CHLORIDE 75 MILLILITER(S): 9 INJECTION INTRAMUSCULAR; INTRAVENOUS; SUBCUTANEOUS at 12:05

## 2023-01-07 RX ADMIN — Medication 975 MILLIGRAM(S): at 05:15

## 2023-01-07 NOTE — DISCHARGE NOTE PROVIDER - NSDCFUSCHEDAPPT_GEN_ALL_CORE_FT
[facility]  Surg [address]  Scheduled Appointment: 02/14/2023    Kian Dillon  [facility]  Surg [address]  Scheduled Appointment: 02/14/2023

## 2023-01-07 NOTE — DISCHARGE NOTE PROVIDER - NSDCMRMEDTOKEN_GEN_ALL_CORE_FT
apixaban 5 mg oral tablet: 1 tab(s) orally every 12 hours   aspirin 81 mg oral delayed release tablet: 1 tab(s) orally once a day  atorvastatin 80 mg oral tablet: 1 tab(s) orally once a day (at bedtime)  Bryanna Brace: Bryanna brace     clopidogrel 75 mg oral tablet: 1 tab(s) orally once a day  metoprolol tartrate 50 mg oral tablet: 1 tab(s) orally 2 times a day  nicotine 14 mg/24 hr transdermal film, extended release: 1 patch transdermal once a day  pantoprazole 40 mg oral delayed release tablet: 1 tab(s) orally once a day (before a meal)  rolling walker: Apply topically to affected area once a day    apixaban 5 mg oral tablet: 1 tab(s) orally every 12 hours   aspirin 81 mg oral delayed release tablet: 1 tab(s) orally once a day  atorvastatin 80 mg oral tablet: 1 tab(s) orally once a day (at bedtime)  Bryanna Brace: Bryanna brace     clopidogrel 75 mg oral tablet: 1 tab(s) orally once a day  metoprolol tartrate 50 mg oral tablet: 1 tab(s) orally 2 times a day  nicotine 14 mg/24 hr transdermal film, extended release: 1 patch transdermal once a day  oxyCODONE 5 mg oral tablet: 1 tab(s) orally every 4 hours, As Needed MDD:6 tablets  pantoprazole 40 mg oral delayed release tablet: 1 tab(s) orally once a day (before a meal)  rolling walker: Apply topically to affected area once a day

## 2023-01-07 NOTE — DISCHARGE NOTE PROVIDER - HOSPITAL COURSE
Patient is a 62y old  Male who presented to [facility] 1/5/23 with a chief complaint of pattellar Fracture. Patient reports recent hx of left lower extremity pain with ambulation has been using cane. Hx of CMT, stents, on eliquis. Reports fall onto left knee with pain and inability to ambulate after. PAtient was found to have left patella fracture. Patient was optimzied for OR and had an ORIF of patella on 1/6/23. The rest of the patients hospital course was uncomplicated.     PAST MEDICAL & SURGICAL HISTORY:  CMT (Charcot-Lakia-Tooth disease)      Substance abuse in remission      Palate deformity  partial palate removal      H/O hammer toe correction  left        Home Medications:  acetaminophen 325 mg oral tablet: 2 tab(s) orally every 6 hours, As needed, Mild Pain (1 - 3) (20 Jan 2022 11:05)  aspirin 81 mg oral delayed release tablet: 1 tab(s) orally once a day (20 Jan 2022 11:05)  ertapenem 1 g injection: 1 gram(s) injectable once a day for one month  (20 Jan 2022 11:07)    Allergies    No Known Allergies

## 2023-01-07 NOTE — PHYSICAL THERAPY INITIAL EVALUATION ADULT - PERTINENT HX OF CURRENT PROBLEM, REHAB EVAL
history of CMT, CAD s/p stent on Eliquis presents with left knee pain and swelling after a fall at home.  Patient reports that he was walking to the car on the sidewalk and normally has some left leg weakness and tripped on his left foot and fell forward onto his hands and left knee.  When trying to get up he was unable to stand on his left leg and thus presented to the emergency department.  When patient fell patient did not have any LOC, no head strike.  He has no neck pain or pain anywhere else on his body.  Patient was brought to [facility] for further evaluation and treatment. In the ED he was found to have a left patella fracture.  Patient is scheduled for an Open reduction and internal fixation of the left patella. Tests: 1/5/23XR KNEE AP LAT OBL 3 VIEWS LT, XR FEMUR 2 VIEWS LT, XR TIB FIB AP LAT 2 VIEWS LT, Acute patellar fracture at the dominant transverse orientation and with up to 6 cm of distraction of the proximal and distal fracture fragments. Marked overlying anterior soft tissue swelling. No other acute fractures. Advanced left hip osteoarthritis with severe joint space narrowing and flattening/remodeling of the femoral head. No advanced arthritic changes at the left knee.

## 2023-01-07 NOTE — OCCUPATIONAL THERAPY INITIAL EVALUATION ADULT - PERTINENT HX OF CURRENT PROBLEM, REHAB EVAL
62y Male presents with left patella fx. Patient reports recent hx of left lower extremity pain with ambulation has been using cane. Hx of CMT, stents, on eliquis. Reports fall onto left knee today with pain and inability to ambulate after. Denies numbness/tingling in the affected extremity. Denies head strike/LOC/other orthopedic injuries at this time.  Patient ambulates with cane at baseline. now s/p ORIF, patella, left 06-Jan-2023. XRAY Tibia and Fibula L 1/5-Acute patellar fracture at the dominant transverse orientation and with up to 6 cm of distraction of the proximal and distal fracture fragments. Marked overlying anterior soft tissue swelling. No other acute fractures. Advanced left hip osteoarthritis with severe joint space narrowing and flattening/remodeling of the femoral head. No advanced arthritic changes at the left knee. XRAY chest 1/5-No active pulmonary disease.

## 2023-01-07 NOTE — OCCUPATIONAL THERAPY INITIAL EVALUATION ADULT - LIVES WITH, PROFILE
pt lives in a basement apartment with flight of stairs to get to front door. pt has sister living upstairs, and pt was independent in ADLs and functional tasks with use of standard cane/alone

## 2023-01-07 NOTE — PHYSICAL THERAPY INITIAL EVALUATION ADULT - RANGE OF MOTION EXAMINATION, REHAB EVAL
(+)Left LE lino brace locked in zero degrees extension/bilateral upper extremity ROM was WFL (within functional limits)/Right LE ROM was WFL (within functional limits)

## 2023-01-07 NOTE — DISCHARGE NOTE PROVIDER - CARE PROVIDER_API CALL
Wyatt Goldstein)  Orthopedics  [address] [place] [address]  Phone: [phone]  Fax: [phone]  Follow Up Time:

## 2023-01-08 RX ORDER — CLOPIDOGREL BISULFATE 75 MG/1
75 TABLET, FILM COATED ORAL DAILY
Refills: 0 | Status: DISCONTINUED | OUTPATIENT
Start: 2023-01-08 | End: 2023-01-11

## 2023-01-08 RX ORDER — LOSARTAN POTASSIUM 100 MG/1
25 TABLET, FILM COATED ORAL DAILY
Refills: 0 | Status: DISCONTINUED | OUTPATIENT
Start: 2023-01-08 | End: 2023-01-09

## 2023-01-08 RX ADMIN — Medication 50 MILLIGRAM(S): at 05:30

## 2023-01-08 RX ADMIN — OXYCODONE HYDROCHLORIDE 10 MILLIGRAM(S): 5 TABLET ORAL at 22:10

## 2023-01-08 RX ADMIN — Medication 975 MILLIGRAM(S): at 14:01

## 2023-01-08 RX ADMIN — Medication 975 MILLIGRAM(S): at 06:30

## 2023-01-08 RX ADMIN — SENNA PLUS 2 TABLET(S): 8.6 TABLET ORAL at 21:11

## 2023-01-08 RX ADMIN — Medication 975 MILLIGRAM(S): at 22:10

## 2023-01-08 RX ADMIN — Medication 975 MILLIGRAM(S): at 05:30

## 2023-01-08 RX ADMIN — ATORVASTATIN CALCIUM 80 MILLIGRAM(S): 80 TABLET, FILM COATED ORAL at 21:12

## 2023-01-08 RX ADMIN — CLOPIDOGREL BISULFATE 75 MILLIGRAM(S): 75 TABLET, FILM COATED ORAL at 13:01

## 2023-01-08 RX ADMIN — Medication 975 MILLIGRAM(S): at 21:10

## 2023-01-08 RX ADMIN — OXYCODONE HYDROCHLORIDE 10 MILLIGRAM(S): 5 TABLET ORAL at 21:21

## 2023-01-08 RX ADMIN — PANTOPRAZOLE SODIUM 40 MILLIGRAM(S): 20 TABLET, DELAYED RELEASE ORAL at 06:59

## 2023-01-08 RX ADMIN — Medication 50 MILLIGRAM(S): at 17:48

## 2023-01-08 RX ADMIN — APIXABAN 5 MILLIGRAM(S): 2.5 TABLET, FILM COATED ORAL at 05:30

## 2023-01-08 RX ADMIN — Medication 81 MILLIGRAM(S): at 21:12

## 2023-01-08 RX ADMIN — APIXABAN 5 MILLIGRAM(S): 2.5 TABLET, FILM COATED ORAL at 17:48

## 2023-01-08 RX ADMIN — Medication 975 MILLIGRAM(S): at 13:01

## 2023-01-08 NOTE — PROGRESS NOTE ADULT - TIME BILLING
Discussed treatment plan with patient at bedside.  Plan for DC home with home physical therapy    I am a non participating [facility] physician seeing Pt in coverage for Dr Brenner. The above patient documentation by Dr. Mcnulty was reviewed and I agree with his evaluation, assessment and treatment plan.  Alphonso Brenner M.D.

## 2023-01-09 LAB
ANION GAP SERPL CALC-SCNC: 15 MMOL/L — SIGNIFICANT CHANGE UP (ref 5–17)
BLD GP AB SCN SERPL QL: NEGATIVE — SIGNIFICANT CHANGE UP
BUN SERPL-MCNC: 9 MG/DL — SIGNIFICANT CHANGE UP (ref 7–23)
CALCIUM SERPL-MCNC: 9.1 MG/DL — SIGNIFICANT CHANGE UP (ref 8.4–10.5)
CHLORIDE SERPL-SCNC: 109 MMOL/L — HIGH (ref 96–108)
CO2 SERPL-SCNC: 21 MMOL/L — LOW (ref 22–31)
CREAT SERPL-MCNC: 0.62 MG/DL — SIGNIFICANT CHANGE UP (ref 0.5–1.3)
EGFR: 107 ML/MIN/1.73M2 — SIGNIFICANT CHANGE UP
GLUCOSE SERPL-MCNC: 95 MG/DL — SIGNIFICANT CHANGE UP (ref 70–99)
HCT VFR BLD CALC: 26.9 % — LOW (ref 39–50)
HGB BLD-MCNC: 8.9 G/DL — LOW (ref 13–17)
MCHC RBC-ENTMCNC: 29.2 PG — SIGNIFICANT CHANGE UP (ref 27–34)
MCHC RBC-ENTMCNC: 33.1 GM/DL — SIGNIFICANT CHANGE UP (ref 32–36)
MCV RBC AUTO: 88.2 FL — SIGNIFICANT CHANGE UP (ref 80–100)
NRBC # BLD: 0 /100 WBCS — SIGNIFICANT CHANGE UP (ref 0–0)
PLATELET # BLD AUTO: 169 K/UL — SIGNIFICANT CHANGE UP (ref 150–400)
POTASSIUM SERPL-MCNC: 3.3 MMOL/L — LOW (ref 3.5–5.3)
POTASSIUM SERPL-SCNC: 3.3 MMOL/L — LOW (ref 3.5–5.3)
RBC # BLD: 3.05 M/UL — LOW (ref 4.2–5.8)
RBC # FLD: 13.2 % — SIGNIFICANT CHANGE UP (ref 10.3–14.5)
RH IG SCN BLD-IMP: NEGATIVE — SIGNIFICANT CHANGE UP
SARS-COV-2 RNA SPEC QL NAA+PROBE: SIGNIFICANT CHANGE UP
SODIUM SERPL-SCNC: 145 MMOL/L — SIGNIFICANT CHANGE UP (ref 135–145)
WBC # BLD: 7.14 K/UL — SIGNIFICANT CHANGE UP (ref 3.8–10.5)
WBC # FLD AUTO: 7.14 K/UL — SIGNIFICANT CHANGE UP (ref 3.8–10.5)

## 2023-01-09 RX ORDER — POTASSIUM CHLORIDE 20 MEQ
40 PACKET (EA) ORAL ONCE
Refills: 0 | Status: COMPLETED | OUTPATIENT
Start: 2023-01-09 | End: 2023-01-09

## 2023-01-09 RX ORDER — LOSARTAN POTASSIUM 100 MG/1
50 TABLET, FILM COATED ORAL DAILY
Refills: 0 | Status: DISCONTINUED | OUTPATIENT
Start: 2023-01-09 | End: 2023-01-10

## 2023-01-09 RX ADMIN — POLYETHYLENE GLYCOL 3350 17 GRAM(S): 17 POWDER, FOR SOLUTION ORAL at 11:21

## 2023-01-09 RX ADMIN — Medication 975 MILLIGRAM(S): at 06:23

## 2023-01-09 RX ADMIN — APIXABAN 5 MILLIGRAM(S): 2.5 TABLET, FILM COATED ORAL at 18:08

## 2023-01-09 RX ADMIN — SENNA PLUS 2 TABLET(S): 8.6 TABLET ORAL at 21:14

## 2023-01-09 RX ADMIN — CLOPIDOGREL BISULFATE 75 MILLIGRAM(S): 75 TABLET, FILM COATED ORAL at 11:20

## 2023-01-09 RX ADMIN — PANTOPRAZOLE SODIUM 40 MILLIGRAM(S): 20 TABLET, DELAYED RELEASE ORAL at 05:22

## 2023-01-09 RX ADMIN — Medication 50 MILLIGRAM(S): at 18:08

## 2023-01-09 RX ADMIN — OXYCODONE HYDROCHLORIDE 10 MILLIGRAM(S): 5 TABLET ORAL at 11:25

## 2023-01-09 RX ADMIN — Medication 40 MILLIEQUIVALENT(S): at 11:22

## 2023-01-09 RX ADMIN — Medication 81 MILLIGRAM(S): at 21:15

## 2023-01-09 RX ADMIN — OXYCODONE HYDROCHLORIDE 10 MILLIGRAM(S): 5 TABLET ORAL at 12:20

## 2023-01-09 RX ADMIN — Medication 975 MILLIGRAM(S): at 13:40

## 2023-01-09 RX ADMIN — Medication 975 MILLIGRAM(S): at 22:14

## 2023-01-09 RX ADMIN — Medication 975 MILLIGRAM(S): at 05:23

## 2023-01-09 RX ADMIN — ATORVASTATIN CALCIUM 80 MILLIGRAM(S): 80 TABLET, FILM COATED ORAL at 21:15

## 2023-01-09 RX ADMIN — APIXABAN 5 MILLIGRAM(S): 2.5 TABLET, FILM COATED ORAL at 05:22

## 2023-01-09 RX ADMIN — Medication 975 MILLIGRAM(S): at 12:47

## 2023-01-09 RX ADMIN — LOSARTAN POTASSIUM 25 MILLIGRAM(S): 100 TABLET, FILM COATED ORAL at 05:22

## 2023-01-09 RX ADMIN — Medication 975 MILLIGRAM(S): at 21:14

## 2023-01-09 RX ADMIN — Medication 50 MILLIGRAM(S): at 05:23

## 2023-01-09 NOTE — PROVIDER CONTACT NOTE (OTHER) - BACKGROUND
s/p Lt patella ORIF, Hx of A-fib
Pt admitted for L patella fracture. PMH CMT, stents, palate deformity, CAD Pt post op ORIF of L patella. Pt was hypotensive in PACU as per [facility] [place] and started on IV NS @ 75 ml/hr.

## 2023-01-09 NOTE — PROVIDER CONTACT NOTE (OTHER) - ASSESSMENT
Pt resting in bed, No c/o of pain CP or SOB. SR on tele. VSS stable
Pt found sleeping when vitals was checked. Pt asymptomatic. Denies dizziness or lightheadedness.

## 2023-01-09 NOTE — PROGRESS NOTE ADULT - TIME BILLING
Discussed treatment plan with patient at bedside.  Plan for DC home with home physical therapy    I am a non participating [facility] physician seeing Pt in coverage for Dr Brenner. The above patient documentation by Dr. Mcnulty was reviewed and I agree with his evaluation, assessment and treatment plan.  Alphonso Brenner M.D.

## 2023-01-10 RX ORDER — LOSARTAN POTASSIUM 100 MG/1
100 TABLET, FILM COATED ORAL DAILY
Refills: 0 | Status: DISCONTINUED | OUTPATIENT
Start: 2023-01-10 | End: 2023-01-11

## 2023-01-10 RX ADMIN — Medication 50 MILLIGRAM(S): at 05:26

## 2023-01-10 RX ADMIN — Medication 975 MILLIGRAM(S): at 15:20

## 2023-01-10 RX ADMIN — Medication 975 MILLIGRAM(S): at 14:23

## 2023-01-10 RX ADMIN — OXYCODONE HYDROCHLORIDE 10 MILLIGRAM(S): 5 TABLET ORAL at 08:56

## 2023-01-10 RX ADMIN — CLOPIDOGREL BISULFATE 75 MILLIGRAM(S): 75 TABLET, FILM COATED ORAL at 11:49

## 2023-01-10 RX ADMIN — Medication 975 MILLIGRAM(S): at 22:56

## 2023-01-10 RX ADMIN — OXYCODONE HYDROCHLORIDE 10 MILLIGRAM(S): 5 TABLET ORAL at 20:47

## 2023-01-10 RX ADMIN — ATORVASTATIN CALCIUM 80 MILLIGRAM(S): 80 TABLET, FILM COATED ORAL at 21:57

## 2023-01-10 RX ADMIN — Medication 975 MILLIGRAM(S): at 21:56

## 2023-01-10 RX ADMIN — SENNA PLUS 2 TABLET(S): 8.6 TABLET ORAL at 21:56

## 2023-01-10 RX ADMIN — OXYCODONE HYDROCHLORIDE 10 MILLIGRAM(S): 5 TABLET ORAL at 01:50

## 2023-01-10 RX ADMIN — OXYCODONE HYDROCHLORIDE 10 MILLIGRAM(S): 5 TABLET ORAL at 09:50

## 2023-01-10 RX ADMIN — APIXABAN 5 MILLIGRAM(S): 2.5 TABLET, FILM COATED ORAL at 17:34

## 2023-01-10 RX ADMIN — OXYCODONE HYDROCHLORIDE 10 MILLIGRAM(S): 5 TABLET ORAL at 00:50

## 2023-01-10 RX ADMIN — APIXABAN 5 MILLIGRAM(S): 2.5 TABLET, FILM COATED ORAL at 05:26

## 2023-01-10 RX ADMIN — PANTOPRAZOLE SODIUM 40 MILLIGRAM(S): 20 TABLET, DELAYED RELEASE ORAL at 05:26

## 2023-01-10 RX ADMIN — Medication 50 MILLIGRAM(S): at 17:34

## 2023-01-10 RX ADMIN — POLYETHYLENE GLYCOL 3350 17 GRAM(S): 17 POWDER, FOR SOLUTION ORAL at 11:52

## 2023-01-10 RX ADMIN — Medication 975 MILLIGRAM(S): at 06:26

## 2023-01-10 RX ADMIN — LOSARTAN POTASSIUM 50 MILLIGRAM(S): 100 TABLET, FILM COATED ORAL at 05:26

## 2023-01-10 RX ADMIN — Medication 975 MILLIGRAM(S): at 05:26

## 2023-01-10 RX ADMIN — OXYCODONE HYDROCHLORIDE 10 MILLIGRAM(S): 5 TABLET ORAL at 19:47

## 2023-01-10 NOTE — PROGRESS NOTE ADULT - TIME BILLING
Discussed treatment plan with patient at bedside.  Plan for DC to rehab   I am a non participating [facility] physician seeing Pt in coverage for Dr Brenner. The above patient documentation by Dr. Mcnulty was reviewed and I agree with his evaluation, assessment and treatment plan.  Alphonso Brenner M.D.

## 2023-01-11 ENCOUNTER — TRANSCRIPTION ENCOUNTER (OUTPATIENT)
Age: 63
End: 2023-01-11

## 2023-01-11 VITALS
RESPIRATION RATE: 18 BRPM | HEART RATE: 74 BPM | TEMPERATURE: 98 F | OXYGEN SATURATION: 96 % | SYSTOLIC BLOOD PRESSURE: 107 MMHG | DIASTOLIC BLOOD PRESSURE: 75 MMHG

## 2023-01-11 PROCEDURE — 87637 SARSCOV2&INF A&B&RSV AMP PRB: CPT

## 2023-01-11 PROCEDURE — 83735 ASSAY OF MAGNESIUM: CPT

## 2023-01-11 PROCEDURE — 76000 FLUOROSCOPY <1 HR PHYS/QHP: CPT

## 2023-01-11 PROCEDURE — 73590 X-RAY EXAM OF LOWER LEG: CPT

## 2023-01-11 PROCEDURE — 86900 BLOOD TYPING SEROLOGIC ABO: CPT

## 2023-01-11 PROCEDURE — 73552 X-RAY EXAM OF FEMUR 2/>: CPT

## 2023-01-11 PROCEDURE — 82550 ASSAY OF CK (CPK): CPT

## 2023-01-11 PROCEDURE — 82553 CREATINE MB FRACTION: CPT

## 2023-01-11 PROCEDURE — 80048 BASIC METABOLIC PNL TOTAL CA: CPT

## 2023-01-11 PROCEDURE — 93005 ELECTROCARDIOGRAM TRACING: CPT

## 2023-01-11 PROCEDURE — 97161 PT EVAL LOW COMPLEX 20 MIN: CPT

## 2023-01-11 PROCEDURE — U0005: CPT

## 2023-01-11 PROCEDURE — 71045 X-RAY EXAM CHEST 1 VIEW: CPT

## 2023-01-11 PROCEDURE — 86901 BLOOD TYPING SEROLOGIC RH(D): CPT

## 2023-01-11 PROCEDURE — C1713: CPT

## 2023-01-11 PROCEDURE — 85027 COMPLETE CBC AUTOMATED: CPT

## 2023-01-11 PROCEDURE — 80053 COMPREHEN METABOLIC PANEL: CPT

## 2023-01-11 PROCEDURE — 85610 PROTHROMBIN TIME: CPT

## 2023-01-11 PROCEDURE — 85730 THROMBOPLASTIN TIME PARTIAL: CPT

## 2023-01-11 PROCEDURE — 36415 COLL VENOUS BLD VENIPUNCTURE: CPT

## 2023-01-11 PROCEDURE — U0003: CPT

## 2023-01-11 PROCEDURE — C1889: CPT

## 2023-01-11 PROCEDURE — 85025 COMPLETE CBC W/AUTO DIFF WBC: CPT

## 2023-01-11 PROCEDURE — 84484 ASSAY OF TROPONIN QUANT: CPT

## 2023-01-11 PROCEDURE — 73562 X-RAY EXAM OF KNEE 3: CPT

## 2023-01-11 PROCEDURE — 97166 OT EVAL MOD COMPLEX 45 MIN: CPT

## 2023-01-11 PROCEDURE — 86850 RBC ANTIBODY SCREEN: CPT

## 2023-01-11 PROCEDURE — C1769: CPT

## 2023-01-11 PROCEDURE — 97530 THERAPEUTIC ACTIVITIES: CPT

## 2023-01-11 PROCEDURE — 97116 GAIT TRAINING THERAPY: CPT

## 2023-01-11 PROCEDURE — 99285 EMERGENCY DEPT VISIT HI MDM: CPT

## 2023-01-11 RX ORDER — OXYCODONE AND ACETAMINOPHEN 5; 325 MG/1; MG/1
1 TABLET ORAL
Qty: 28 | Refills: 0
Start: 2023-01-11 | End: 2023-01-17

## 2023-01-11 RX ORDER — LOSARTAN POTASSIUM 100 MG/1
1 TABLET, FILM COATED ORAL
Qty: 30 | Refills: 0
Start: 2023-01-11 | End: 2023-02-09

## 2023-01-11 RX ADMIN — OXYCODONE HYDROCHLORIDE 10 MILLIGRAM(S): 5 TABLET ORAL at 10:50

## 2023-01-11 RX ADMIN — OXYCODONE HYDROCHLORIDE 10 MILLIGRAM(S): 5 TABLET ORAL at 10:20

## 2023-01-11 RX ADMIN — PANTOPRAZOLE SODIUM 40 MILLIGRAM(S): 20 TABLET, DELAYED RELEASE ORAL at 05:42

## 2023-01-11 RX ADMIN — Medication 975 MILLIGRAM(S): at 06:42

## 2023-01-11 RX ADMIN — Medication 50 MILLIGRAM(S): at 05:41

## 2023-01-11 RX ADMIN — Medication 975 MILLIGRAM(S): at 05:42

## 2023-01-11 RX ADMIN — LOSARTAN POTASSIUM 100 MILLIGRAM(S): 100 TABLET, FILM COATED ORAL at 10:20

## 2023-01-11 RX ADMIN — CLOPIDOGREL BISULFATE 75 MILLIGRAM(S): 75 TABLET, FILM COATED ORAL at 12:26

## 2023-01-11 RX ADMIN — POLYETHYLENE GLYCOL 3350 17 GRAM(S): 17 POWDER, FOR SOLUTION ORAL at 12:26

## 2023-01-11 RX ADMIN — Medication 975 MILLIGRAM(S): at 12:55

## 2023-01-11 RX ADMIN — Medication 975 MILLIGRAM(S): at 12:25

## 2023-01-11 RX ADMIN — APIXABAN 5 MILLIGRAM(S): 2.5 TABLET, FILM COATED ORAL at 05:41

## 2023-01-11 NOTE — PROGRESS NOTE ADULT - PROBLEM SELECTOR PLAN 4
Pain meds as needed  follow for oversedation  GI regime to prevent constipation.

## 2023-01-11 NOTE — PROGRESS NOTE ADULT - PROBLEM SELECTOR PROBLEM 3
CMT (Charcot-Lakia-Tooth disease)

## 2023-01-11 NOTE — PROGRESS NOTE ADULT - PROBLEM SELECTOR PLAN 1
dispensed knee orthosis, locked in extension
Patient scheduled for an Open reduction and internal fixation of the left patella  No contraindication to scheduled procedure  Patient is NPO  DVT and GI prophylaxis.
tolerated the procedure well  continue physical therapy  Patient needs to work on stairs with physical therapy   DVT and GI prophylaxis.
tolerated the procedure well  start physical therapy as tolerated  DVT and GI prophylaxis.
tolerated the procedure well  continue physical therapy  Patient needs to work on stairs with physical therapy   DVT and GI prophylaxis.

## 2023-01-11 NOTE — PROGRESS NOTE ADULT - PROBLEM SELECTOR PLAN 2
Patient with a hx of CAD and PTCA with stent placement 1 year ago  Patient seen by cardiology  continue Beta blocker  started eliquis 5 daily  continue to monitor rate
Patient with a hx of CAD and PTCA with stent placement 1 year ago  Patient seen by cardiology  continue Beta blocker  started eliquis 5 daily  continue to monitor rate
Patient with a hx of CAD and PTCA with stent placement 1 year ago  would continue asa daily  restart plavix post op  Patient has been asymptomatic.  reviewed EKG appears to be sinus rhythm with frequent APC. currently rate controlled  would repeat EKG post op
Patient with a hx of CAD and PTCA with stent placement 1 year ago  Patient seen by cardiology  continue Beta blocker  started eliquis 5 daily  continue to monitor rate

## 2023-01-11 NOTE — PROGRESS NOTE ADULT - PROBLEM SELECTOR PROBLEM 1
Left patella fracture

## 2023-01-11 NOTE — PROGRESS NOTE ADULT - PROVIDER SPECIALTY LIST ADULT
Cardiology
Cardiology
Orthopedics
Cardiology
Orthopedics
Internal Medicine

## 2023-01-11 NOTE — PROGRESS NOTE ADULT - PROBLEM SELECTOR PLAN 3
Patient with difficulty ambulating  continue physical therapy post op  deformities of hands.

## 2023-01-11 NOTE — DISCHARGE NOTE NURSING/CASE MANAGEMENT/SOCIAL WORK - PATIENT PORTAL LINK FT
You can access the FollowMyHealth Patient Portal offered by [facility] by registering at the following website: [email or web address]. By joining [facility]’s FollowMyHealth portal, you will also be able to view your health information using other applications (apps) compatible with our system.

## 2023-01-11 NOTE — PROGRESS NOTE ADULT - ASSESSMENT
61 yo male presents after a fall with a left patella fracture. Patient is s/p an ORIF of the left patella 
63 yo male presents after a fall with a left patella fracture. Patient is scheduled for an ORIF of the left patella 
Pt requires post op knee orthosis
61 yo male presents after a fall with a left patella fracture. Patient is s/p an ORIF of the left patella 
63 yo male presents after a fall with a left patella fracture. Patient is s/p an ORIF of the left patella 

## 2023-01-11 NOTE — DISCHARGE NOTE NURSING/CASE MANAGEMENT/SOCIAL WORK - NSDCPEFALRISK_GEN_ALL_CORE
For information on Fall & Injury Prevention, visit: [email or web address] OR  [email or web address] OR  https://www.cdc.gov/steadi/patient.html

## 2023-01-11 NOTE — PROGRESS NOTE ADULT - SUBJECTIVE AND OBJECTIVE BOX
CARDIOLOGY     PROGRESS  NOTE   ________________________________________________    CHIEF COMPLAINT:Patient is a 62y old  Male who presents with a chief complaint of Left Patella fracture  (07 Jan 2023 13:27)  no complain  	  REVIEW OF SYSTEMS:  CONSTITUTIONAL: No fever, weight loss, or fatigue  EYES: No eye pain, visual disturbances, or discharge  ENT:  No difficulty hearing, tinnitus, vertigo; No sinus or throat pain  NECK: No pain or stiffness  RESPIRATORY: No cough, wheezing, chills or hemoptysis; No Shortness of Breath  CARDIOVASCULAR: No chest pain, palpitations, passing out, dizziness, or leg swelling  GASTROINTESTINAL: No abdominal or epigastric pain. No nausea, vomiting, or hematemesis; No diarrhea or constipation. No melena or hematochezia.  GENITOURINARY: No dysuria, frequency, hematuria, or incontinence  NEUROLOGICAL: No headaches, memory loss, loss of strength, numbness, or tremors  SKIN: No itching, burning, rashes, or lesions   LYMPH Nodes: No enlarged glands  ENDOCRINE: No heat or cold intolerance; No hair loss  MUSCULOSKELETAL: No joint pain or swelling; No muscle, back, or extremity pain  PSYCHIATRIC: No depression, anxiety, mood swings, or difficulty sleeping  HEME/LYMPH: No easy bruising, or bleeding gums  ALLERGY AND IMMUNOLOGIC: No hives or eczema	    [ ] All others negative	  [ ] Unable to obtain    PHYSICAL EXAM:  T(C): 37.1 (01-11-23 @ 05:14), Max: 37.4 (01-10-23 @ 20:38)  HR: 82 (01-11-23 @ 05:14) (64 - 82)  BP: 131/80 (01-11-23 @ 05:14) (105/57 - 176/80)  RR: 18 (01-11-23 @ 05:14) (18 - 18)  SpO2: 98% (01-11-23 @ 05:14) (96% - 99%)  Wt(kg): --  I&O's Summary    10 Rodriguez 2023 07:01  -  11 Jan 2023 07:00  --------------------------------------------------------  IN: 920 mL / OUT: 2530 mL / NET: -1610 mL        Appearance: Normal	  HEENT:   Normal oral mucosa, PERRL, EOMI	  Lymphatic: No lymphadenopathy  Cardiovascular: Normal S1 S2, No JVD, + murmurs, No edema  Respiratory: Lungs clear to auscultation	  Psychiatry: A & O x 3, Mood & affect appropriate  Gastrointestinal:  Soft, Non-tender, + BS	  Skin: No rashes, No ecchymoses, No cyanosis	  Neurologic: Non-focal  Extremities: Normal range of motion, No clubbing, cyanosis or edema, R knee bandaged  Vascular: Peripheral pulses palpable 2+ bilaterally    MEDICATIONS  (STANDING):  acetaminophen     Tablet .. 975 milliGRAM(s) Oral every 8 hours  apixaban 5 milliGRAM(s) Oral every 12 hours  atorvastatin 80 milliGRAM(s) Oral at bedtime  clopidogrel Tablet 75 milliGRAM(s) Oral daily  losartan 100 milliGRAM(s) Oral daily  metoprolol tartrate 50 milliGRAM(s) Oral two times a day  pantoprazole    Tablet 40 milliGRAM(s) Oral before breakfast  polyethylene glycol 3350 17 Gram(s) Oral daily  senna 2 Tablet(s) Oral at bedtime      TELEMETRY: 	    ECG:  	  RADIOLOGY:  OTHER: 	  	  LABS:	 	    CARDIAC MARKERS:                  proBNP:   Lipid Profile:   HgA1c:   TSH:         Assessment and plan  ---------------------------  -62y Male presents with left patella fx. Patient reports recent hx of left lower extremity pain with ambulation has been using cane. Hx of CMT, stents, on eliquis. Reports fall onto left knee today with pain and inability to ambulate after. Denies numbness/tingling in the affected extremity. Denies head strike/LOC/other orthopedic injuries at this time.  Patient ambulates with cane at baseline.  pt is well known to me with hx of htn, ashd, s/p stent, PAF on beta blocker last admission on beta blocker, s/p ortho surgery has developed a.fib, at this time hr is controlled, no chest pain cardiac enzymes, no chest pain  ecg noted  continue beta blocker  started on ac   trop negative  keep hgb>8, fu cbc, lytes  tele  pt in nsr now  echo noted normal EF  physical therapy  start on losartan 25 mg daily, will increase to 100 mg daily  continue AC  events noted with PAF last night may dc to fu as out pt    	        
           CARDIOLOGY     PROGRESS  NOTE   ________________________________________________    CHIEF COMPLAINT:Patient is a 62y old  Male who presents with a chief complaint of Left Patella fracture  (07 Jan 2023 13:27)  no complain  	  REVIEW OF SYSTEMS:  CONSTITUTIONAL: No fever, weight loss, or fatigue  EYES: No eye pain, visual disturbances, or discharge  ENT:  No difficulty hearing, tinnitus, vertigo; No sinus or throat pain  NECK: No pain or stiffness  RESPIRATORY: No cough, wheezing, chills or hemoptysis; No Shortness of Breath  CARDIOVASCULAR: No chest pain, palpitations, passing out, dizziness, or leg swelling  GASTROINTESTINAL: No abdominal or epigastric pain. No nausea, vomiting, or hematemesis; No diarrhea or constipation. No melena or hematochezia.  GENITOURINARY: No dysuria, frequency, hematuria, or incontinence  NEUROLOGICAL: No headaches, memory loss, loss of strength, numbness, or tremors  SKIN: No itching, burning, rashes, or lesions   LYMPH Nodes: No enlarged glands  ENDOCRINE: No heat or cold intolerance; No hair loss  MUSCULOSKELETAL: No joint pain or swelling; No muscle, back, or extremity pain  PSYCHIATRIC: No depression, anxiety, mood swings, or difficulty sleeping  HEME/LYMPH: No easy bruising, or bleeding gums  ALLERGY AND IMMUNOLOGIC: No hives or eczema	    [x ] All others negative	  [ ] Unable to obtain    PHYSICAL EXAM:  T(C): 36.6 (01-10-23 @ 05:01), Max: 37.3 (01-09-23 @ 21:23)  HR: 65 (01-10-23 @ 05:01) (64 - 81)  BP: 168/90 (01-10-23 @ 05:01) (132/77 - 168/90)  RR: 18 (01-10-23 @ 05:01) (18 - 18)  SpO2: 99% (01-10-23 @ 05:01) (97% - 99%)  Wt(kg): --  I&O's Summary    09 Jan 2023 07:01  -  10 Rodriguez 2023 07:00  --------------------------------------------------------  IN: 980 mL / OUT: 2100 mL / NET: -1120 mL        Appearance: Normal	  HEENT:   Normal oral mucosa, PERRL, EOMI	  Lymphatic: No lymphadenopathy  Cardiovascular: Normal S1 S2, No JVD, + murmurs, No edema  Respiratory:rhoncho  Psychiatry: A & O x 3, Mood & affect appropriate  Gastrointestinal:  Soft, Non-tender, + BS	  Skin: No rashes, No ecchymoses, No cyanosis	  Neurologic: Non-focal  Extremities: Normal range of motion,r knee bandaged  Vascular: Peripheral pulses palpable 2+ bilaterally    MEDICATIONS  (STANDING):  acetaminophen     Tablet .. 975 milliGRAM(s) Oral every 8 hours  apixaban 5 milliGRAM(s) Oral every 12 hours  aspirin enteric coated 81 milliGRAM(s) Oral daily  atorvastatin 80 milliGRAM(s) Oral at bedtime  clopidogrel Tablet 75 milliGRAM(s) Oral daily  losartan 50 milliGRAM(s) Oral daily  metoprolol tartrate 50 milliGRAM(s) Oral two times a day  pantoprazole    Tablet 40 milliGRAM(s) Oral before breakfast  polyethylene glycol 3350 17 Gram(s) Oral daily  senna 2 Tablet(s) Oral at bedtime      TELEMETRY: 	    ECG:  	  RADIOLOGY:  OTHER: 	  	  LABS:	 	    CARDIAC MARKERS:                                8.9    7.14  )-----------( 169      ( 09 Jan 2023 07:23 )             26.9     01-09    145  |  109<H>  |  9   ----------------------------<  95  3.3<L>   |  21<L>  |  0.62    Ca    9.1      09 Jan 2023 07:21      proBNP:   Lipid Profile:   HgA1c:   TSH:         Assessment and plan    -62y Male presents with left patella fx. Patient reports recent hx of left lower extremity pain with ambulation has been using cane. Hx of CMT, stents, on eliquis. Reports fall onto left knee today with pain and inability to ambulate after. Denies numbness/tingling in the affected extremity. Denies head strike/LOC/other orthopedic injuries at this time.  Patient ambulates with cane at baseline.  pt is well known to me with hx of htn, ashd, s/p stent, PAF on beta blocker last admission on beta blocker, s/p ortho surgery has developed a.fib, at this time hr is controlled, no chest paincardiac enzymes, no chest pain  ecg noted  continue beta blocker  started on ac   trop negative  keep hgb>8, fu cbc, lytes  tele  pt in nsr now  echo noted normal EF  physical therapy  start on losartan 25 mg daily, will increase to 100 mg daily  continue AC      	        
           CARDIOLOGY     PROGRESS  NOTE   ________________________________________________    CHIEF COMPLAINT:Patient is a 62y old  Male who presents with a chief complaint of pattellar FX (06 Jan 2023 20:47)  no complain  	  REVIEW OF SYSTEMS:  CONSTITUTIONAL: No fever, weight loss, or fatigue  EYES: No eye pain, visual disturbances, or discharge  ENT:  No difficulty hearing, tinnitus, vertigo; No sinus or throat pain  NECK: No pain or stiffness  RESPIRATORY: No cough, wheezing, chills or hemoptysis; no Shortness of Breath  CARDIOVASCULAR: No chest pain, palpitations, passing out, dizziness, or leg swelling  GASTROINTESTINAL: No abdominal or epigastric pain. No nausea, vomiting, or hematemesis; No diarrhea or constipation. No melena or hematochezia.  GENITOURINARY: No dysuria, frequency, hematuria, or incontinence  NEUROLOGICAL: No headaches, memory loss, loss of strength, numbness, or tremors  SKIN: No itching, burning, rashes, or lesions   LYMPH Nodes: No enlarged glands  ENDOCRINE: No heat or cold intolerance; No hair loss  MUSCULOSKELETAL: No joint pain or swelling; No muscle, back, or extremity pain  PSYCHIATRIC: No depression, anxiety, mood swings, or difficulty sleeping  HEME/LYMPH: No easy bruising, or bleeding gums  ALLERGY AND IMMUNOLOGIC: No hives or eczema	    [ ] All others negative	  [ ] Unable to obtain    PHYSICAL EXAM:  T(C): 36.7 (01-07-23 @ 10:27), Max: 37.2 (01-06-23 @ 13:07)  HR: 62 (01-07-23 @ 10:27) (51 - 94)  BP: 118/70 (01-07-23 @ 10:27) (86/51 - 148/85)  RR: 17 (01-07-23 @ 10:27) (16 - 18)  SpO2: 97% (01-07-23 @ 10:27) (93% - 100%)  Wt(kg): --  I&O's Summary    06 Jan 2023 07:01  -  07 Jan 2023 07:00  --------------------------------------------------------  IN: 1760 mL / OUT: 1775 mL / NET: -15 mL    07 Jan 2023 07:01  -  07 Jan 2023 11:42  --------------------------------------------------------  IN: 540 mL / OUT: 300 mL / NET: 240 mL        Appearance: Normal	  HEENT:   Normal oral mucosa, PERRL, EOMI	  Lymphatic: No lymphadenopathy  Cardiovascular: Normal S1 S2, No JVD, + murmurs, No edema  Respiratory: Lungs clear to auscultation	  Psychiatry: A & O x 3, Mood & affect appropriate  Gastrointestinal:  Soft, Non-tender, + BS	  Skin: No rashes, No ecchymoses, No cyanosis	  Neurologic: Non-focal  Extremities: Normal range of motion, bandaged patellae  Vascular: + pvd    MEDICATIONS  (STANDING):  acetaminophen     Tablet .. 975 milliGRAM(s) Oral every 8 hours  apixaban 5 milliGRAM(s) Oral every 12 hours  aspirin enteric coated 81 milliGRAM(s) Oral daily  atorvastatin 80 milliGRAM(s) Oral at bedtime  metoprolol tartrate 50 milliGRAM(s) Oral two times a day  pantoprazole    Tablet 40 milliGRAM(s) Oral before breakfast  polyethylene glycol 3350 17 Gram(s) Oral daily  senna 2 Tablet(s) Oral at bedtime  sodium chloride 0.9%. 1000 milliLiter(s) (75 mL/Hr) IV Continuous <Continuous>      TELEMETRY: 	    ECG:  	  RADIOLOGY:  OTHER: 	  	  LABS:	 	    CARDIAC MARKERS:  CARDIAC MARKERS ( 06 Jan 2023 21:29 )  x     / x     / 58 U/L / x     / 1.6 ng/mL                                9.2    7.49  )-----------( 163      ( 07 Jan 2023 07:19 )             28.1     01-07    140  |  107  |  13  ----------------------------<  104<H>  3.9   |  21<L>  |  0.65    Ca    8.3<L>      07 Jan 2023 07:18  Mg     1.9     01-05    TPro  7.2  /  Alb  4.4  /  TBili  0.3  /  DBili  x   /  AST  33  /  ALT  8<L>  /  AlkPhos  78  01-05    proBNP:   Lipid Profile:   HgA1c:   TSH:   PT/INR - ( 06 Jan 2023 05:47 )   PT: 13.7 sec;   INR: 1.18 ratio         PTT - ( 06 Jan 2023 05:47 )  PTT:29.2 sec    < from: TTE with Doppler (w/Cont) (01.07.22 @ 13:23) >  Mitral Valve: Mitral annular calcification, otherwise  normal mitral valve.  Aortic Valve/Aorta: Aortic valve not well visualized;  appears calcified.  Aortic Root: 2.9 cm.  Left Atrium: Normal left atrium.  Left Ventricle: Endocardial visualization enhanced with  intravenous injection of Ultrasonic Enhancing Agent  (Definity). Overall preserved left ventricular ejection  fraction. Normal left ventricular internal dimensions and  wall thicknesses. Normal diastolic function  Right Heart: Normal right atrium. The right ventricle is  not well visualized; grossly normal right ventricular  systolic function. Normal tricuspid valve. Minimal  tricuspid regurgitation. Normal pulmonic valve.  Pericardium/Pleura: Normal pericardium with no pericardial  effusion.  Hemodynamic: Estimated right atrial pressure is 8 mm Hg.  Unable to estimate RVSP.    Troponin T, High Sensitivity (01.06.23 @ 21:29)    Troponin T, High Sensitivity Result: 10: Specimen not hemolyzed  *  *  Rapid upward or downward changes in high-sensitivity troponin levels  suggest acute myocardial injury. Renal impairment may cause sustained  troponin elevations.  Normal: <6 - 14 ng/L  Indeterminate: 15-51 ng/L  Elevated: > 51 ng/L  See http://labs/test/TROPTHS on the [facility] intranet for more  information ng/L        Assessment and plan  ---------------------------  62y Male presents with left patella fx. Patient reports recent hx of left lower extremity pain with ambulation has been using cane. Hx of CMT, stents, on eliquis. Reports fall onto left knee today with pain and inability to ambulate after. Denies numbness/tingling in the affected extremity. Denies head strike/LOC/other orthopedic injuries at this time.  Patient ambulates with cane at baseline.  pt is well known to me with hx of htn, ashd, s/p stent, PAF on beta blocker last admission on beta blocker, s/p ortho surgery has developed a.fib, at this time hr is controlled, no chest pain\parcheck cardiac enzymes  ecg noted  continue beta blocker  started on ac   trop negative  keep hgb>8, fu cbc, lytes  tele  pt in nsr now  echo noted normal EF  physical therapy    	        
62-year-old male with a history of CMT, CAD s/p stent on Eliquis presents with left knee pain and swelling after a fall at home.  Patient reports that he was walking to the car on the sidewalk and normally has some left leg weakness and tripped on his left foot and fell forward onto his hands and left knee.  When trying to get up he was unable to stand on his left leg and thus presented to the emergency department.  When patient fell patient did not have any LOC, no head strike.  He has no neck pain or pain anywhere else on his body.  Otherwise patient has no headache, chest pain, shortness of breath, nausea/vomiting/abdominal pain, dysuria, fever/chills. Patient was brought to [facility] for further evaluation and treatment. In the ED she was found to have a left patella fracture.  Patient is s/p  Open reduction and internal fixation of the left patella. Patietnt had issues with afib while under anesthesia. Seen now resting comfortably in NSR     MEDICATIONS  (STANDING):  acetaminophen     Tablet .. 975 milliGRAM(s) Oral every 8 hours  apixaban 5 milliGRAM(s) Oral every 12 hours  aspirin enteric coated 81 milliGRAM(s) Oral daily  atorvastatin 80 milliGRAM(s) Oral at bedtime  clopidogrel Tablet 75 milliGRAM(s) Oral daily  losartan 50 milliGRAM(s) Oral daily  metoprolol tartrate 50 milliGRAM(s) Oral two times a day  pantoprazole    Tablet 40 milliGRAM(s) Oral before breakfast  polyethylene glycol 3350 17 Gram(s) Oral daily  senna 2 Tablet(s) Oral at bedtime    MEDICATIONS  (PRN):  bisacodyl Suppository 10 milliGRAM(s) Rectal daily PRN Constipation  oxyCODONE    IR 10 milliGRAM(s) Oral every 4 hours PRN Moderate Pain (4 - 6)  oxyCODONE    IR 5 milliGRAM(s) Oral every 4 hours PRN Mild Pain (1 - 3)          VITALS:   T(C): 37.1 (01-09-23 @ 13:17), Max: 37.6 (01-08-23 @ 20:37)  HR: 81 (01-09-23 @ 13:17) (57 - 81)  BP: 149/84 (01-09-23 @ 13:17) (137/78 - 162/70)  RR: 18 (01-09-23 @ 13:17) (17 - 18)  SpO2: 97% (01-09-23 @ 13:17) (97% - 99%)  Wt(kg): --      PHYSICAL EXAM:  GENERAL: NAD, well-groomed, well-developed  HEAD:  Atraumatic, Normocephalic  EYES: EOMI, PERRLA, conjunctiva and sclera clear  ENMT: No tonsillar erythema, exudates, or enlargement; Moist mucous membranes, Good dentition, No lesions  NECK: Supple, No JVD, Normal thyroid  NERVOUS SYSTEM:  Alert & Oriented X3, Good concentration; Motor Strength 5/5 B/L upper and lower extremities; DTRs 2+ intact and symmetric  CHEST/LUNG: Clear to percussion bilaterally; No rales, rhonchi, wheezing, or rubs  HEART: Regular rate and rhythm; No murmurs, rubs, or gallops  ABDOMEN: Soft, Nontender, Nondistended; Bowel sounds present  EXTREMITIES:  2+ Peripheral Pulses, No clubbing, cyanosis, or edema  LYMPH: No lymphadenopathy noted  SKIN: No rashes or lesions    LABS:        CBC Full  -  ( 09 Jan 2023 07:23 )  WBC Count : 7.14 K/uL  RBC Count : 3.05 M/uL  Hemoglobin : 8.9 g/dL  Hematocrit : 26.9 %  Platelet Count - Automated : 169 K/uL  Mean Cell Volume : 88.2 fl  Mean Cell Hemoglobin : 29.2 pg  Mean Cell Hemoglobin Concentration : 33.1 gm/dL  Auto Neutrophil # : x  Auto Lymphocyte # : x  Auto Monocyte # : x  Auto Eosinophil # : x  Auto Basophil # : x  Auto Neutrophil % : x  Auto Lymphocyte % : x  Auto Monocyte % : x  Auto Eosinophil % : x  Auto Basophil % : x    01-09    145  |  109<H>  |  9   ----------------------------<  95  3.3<L>   |  21<L>  |  0.62    Ca    9.1      09 Jan 2023 07:21            CAPILLARY BLOOD GLUCOSE          RADIOLOGY & ADDITIONAL TESTS:      
Orthopaedic Surgery Progress Note    Subjective:   Patient seen and examined. No acute events overnight. Pain well controlled on current regimen.     Objective:  T(C): 36.6 (01-06-23 @ 04:11), Max: 37.1 (01-05-23 @ 17:47)  HR: 83 (01-06-23 @ 04:11) (64 - 109)  BP: 127/79 (01-06-23 @ 04:11) (127/79 - 179/110)  RR: 18 (01-06-23 @ 04:11) (16 - 18)  SpO2: 98% (01-06-23 @ 04:11) (93% - 100%)  Wt(kg): --    01-05 @ 07:01  -  01-06 @ 06:34  --------------------------------------------------------  IN: 0 mL / OUT: 0 mL / NET: 0 mL        PE    NAD  LLE:   BJKI in place  motor intact GS/TA/EHL - decreased TA as per baseline  SILT S/S/SP/DP  WWP                          11.0   7.25  )-----------( 184      ( 06 Jan 2023 05:47 )             32.8         62y Male with L patella fx, awaiting operative fixation  - Pain control  - NPO/IVF  - Hold anticoagulation, SCD on RLE okay  - CBC/BMP/Coags/UA/T+S x2  - Plan for OR for ORIF today with Dr. Goldstein  - WBAT LLE in BJKI
Patient seen and examined at bedside this AM.  No acute complaints at this time. Pain well controlled. Denies chest pain, shortness of breath, nausea or vomiting. Able to walk in hallway with PT.    Vital Signs Last 24 Hrs  T(C): 36.9 (10 Rodriguez 2023 01:00), Max: 37.3 (09 Jan 2023 21:23)  T(F): 98.4 (10 Rodriguez 2023 01:00), Max: 99.1 (09 Jan 2023 21:23)  HR: 65 (10 Rodriguez 2023 01:00) (64 - 81)  BP: 132/77 (10 Rodriguez 2023 01:00) (132/77 - 160/73)  BP(mean): --  RR: 18 (10 Rodriguez 2023 01:00) (18 - 18)  SpO2: 97% (10 Rodriguez 2023 01:00) (97% - 98%)    Parameters below as of 10 Rodriguez 2023 01:00  Patient On (Oxygen Delivery Method): room air          PE:  General: NAD, resting comfortably in bed  LLE:   Dressing C/D/I  Dinwiddie brace locked in extension  Compartments soft and compressible  No calf tenderness bilaterally  Patient reports his baseline exam is unchanged, minimally able to dorsiflex at ankle or extent /flex toes much 2/2 CMT disease of the foot  GSC grossly intact  SILT s/s/sp/dp/t  + DP/PT    LABS:                        8.9    7.14  )-----------( 169      ( 09 Jan 2023 07:23 )             26.9     09 Jan 2023 07:21    145    |  109    |  9      ----------------------------<  95     3.3     |  21     |  0.62     Ca    9.1        09 Jan 2023 07:21                     A/P:  62y m s/p ORIF L Patella 1/6    -PT/OT   -WBAT on the LLE in lino brace LIE   -Pain Control prn  -DVT ppx w/ A81, Eliquis, Plavix  -FU AM Labs  -Rest, ice, compress and elevate the extremity as we needed  -Incentive Spirometry  -Medical management appreciated  -Cardiology c/s appreciated, recs appreciated, will c/w home meds and monitor, patient currently in NSR w/ neg cardiac enzymes  -Dispo planning: recommended for [facility], patient now agreeable
Patient seen and examined at bedside this AM.  No acute complaints at this time. Pain well controlled. Denies chest pain, shortness of breath, nausea or vomiting. Able to walk in hallway with PT.  Went into afib overnight with HR 80-140s briefly. Converted to NSR.     Vital Signs Last 24 Hrs  T(C): 37.1 (11 Jan 2023 05:14), Max: 37.4 (10 Rodriguez 2023 20:38)  T(F): 98.7 (11 Jan 2023 05:14), Max: 99.3 (10 Rodriguez 2023 20:38)  HR: 82 (11 Jan 2023 05:14) (64 - 82)  BP: 131/80 (11 Jan 2023 05:14) (105/57 - 176/80)  BP(mean): --  RR: 18 (11 Jan 2023 05:14) (18 - 18)  SpO2: 98% (11 Jan 2023 05:14) (96% - 99%)    Parameters below as of 11 Jan 2023 05:14  Patient On (Oxygen Delivery Method): room air    PE:  General: NAD, resting comfortably in bed  LLE:   Dressing C/D/I  Lino brace locked in extension  Compartments soft and compressible  No calf tenderness bilaterally  Patient reports his baseline exam is unchanged, minimally able to dorsiflex at ankle or extent /flex toes much 2/2 CMT disease of the foot  GSC grossly intact  SILT s/s/sp/dp/t  + DP/PT    LABS:                                        8.9    7.14  )-----------( 169      ( 09 Jan 2023 07:23 )             26.9                A/P:  62y m s/p ORIF L Patella 1/6    -PT/OT   -WBAT on the LLE in lino brace LIE   -Pain Control prn  -DVT ppx w/ A81, Eliquis, Plavix  -Rest, ice, compress and elevate the extremity as we needed  -Incentive Spirometry  -Medical management appreciated  -Cardiology c/s appreciated, recs appreciated, will c/w home meds and monitor  -Dispo planning: recommended for [facility], patient now agreeable
           CARDIOLOGY     PROGRESS  NOTE   ________________________________________________    CHIEF COMPLAINT:Patient is a 62y old  Male who presents with a chief complaint of Left Patella fracture  (07 Jan 2023 13:27)  no complain  	  REVIEW OF SYSTEMS:  CONSTITUTIONAL: No fever, weight loss, or fatigue  EYES: No eye pain, visual disturbances, or discharge  ENT:  No difficulty hearing, tinnitus, vertigo; No sinus or throat pain  NECK: No pain or stiffness  RESPIRATORY: No cough, wheezing, chills or hemoptysis; No Shortness of Breath  CARDIOVASCULAR: No chest pain, palpitations, passing out, dizziness, or leg swelling  GASTROINTESTINAL: No abdominal or epigastric pain. No nausea, vomiting, or hematemesis; No diarrhea or constipation. No melena or hematochezia.  GENITOURINARY: No dysuria, frequency, hematuria, or incontinence  NEUROLOGICAL: No headaches, memory loss, loss of strength, numbness, or tremors  SKIN: No itching, burning, rashes, or lesions   LYMPH Nodes: No enlarged glands  ENDOCRINE: No heat or cold intolerance; No hair loss  MUSCULOSKELETAL: No joint pain or swelling; No muscle, back, or extremity pain  PSYCHIATRIC: No depression, anxiety, mood swings, or difficulty sleeping  HEME/LYMPH: No easy bruising, or bleeding gums  ALLERGY AND IMMUNOLOGIC: No hives or eczema	    [ ] All others negative	  [ ] Unable to obtain    PHYSICAL EXAM:  T(C): 36.9 (01-08-23 @ 05:24), Max: 37.1 (01-07-23 @ 20:52)  HR: 62 (01-08-23 @ 05:24) (60 - 78)  BP: 153/89 (01-08-23 @ 05:24) (121/72 - 153/89)  RR: 18 (01-08-23 @ 05:24) (16 - 18)  SpO2: 95% (01-08-23 @ 05:24) (88% - 99%)  Wt(kg): --  I&O's Summary    07 Jan 2023 07:01  -  08 Jan 2023 07:00  --------------------------------------------------------  IN: 2555 mL / OUT: 2000 mL / NET: 555 mL    08 Jan 2023 07:01  -  08 Jan 2023 11:12  --------------------------------------------------------  IN: 0 mL / OUT: 500 mL / NET: -500 mL        Appearance: Normal	  HEENT:   Normal oral mucosa, PERRL, EOMI	  Lymphatic: No lymphadenopathy  Cardiovascular: Normal S1 S2, No JVD, + murmurs, No edema  Respiratory: Lungs clear to auscultation	  Psychiatry: A & O x 3, Mood & affect appropriate  Gastrointestinal:  Soft, Non-tender, + BS	  Skin: No rashes, No ecchymoses, No cyanosis	  Neurologic: Non-focal  Extremities: Normal range of motion, + patella bandaged  Vascular: + pvd    MEDICATIONS  (STANDING):  acetaminophen     Tablet .. 975 milliGRAM(s) Oral every 8 hours  apixaban 5 milliGRAM(s) Oral every 12 hours  aspirin enteric coated 81 milliGRAM(s) Oral daily  atorvastatin 80 milliGRAM(s) Oral at bedtime  clopidogrel Tablet 75 milliGRAM(s) Oral daily  metoprolol tartrate 50 milliGRAM(s) Oral two times a day  pantoprazole    Tablet 40 milliGRAM(s) Oral before breakfast  polyethylene glycol 3350 17 Gram(s) Oral daily  senna 2 Tablet(s) Oral at bedtime  sodium chloride 0.9%. 1000 milliLiter(s) (75 mL/Hr) IV Continuous <Continuous>      TELEMETRY: 	    ECG:  	  RADIOLOGY:  OTHER: 	  	  LABS:	 	    CARDIAC MARKERS:  CARDIAC MARKERS ( 06 Jan 2023 21:29 )  x     / x     / 58 U/L / x     / 1.6 ng/mL                                9.2    7.49  )-----------( 163      ( 07 Jan 2023 07:19 )             28.1     01-07    140  |  107  |  13  ----------------------------<  104<H>  3.9   |  21<L>  |  0.65    Ca    8.3<L>      07 Jan 2023 07:18      proBNP:   Lipid Profile:   HgA1c:   TSH:         Assessment and plan  ---------------------------  62y Male presents with left patella fx. Patient reports recent hx of left lower extremity pain with ambulation has been using cane. Hx of CMT, stents, on eliquis. Reports fall onto left knee today with pain and inability to ambulate after. Denies numbness/tingling in the affected extremity. Denies head strike/LOC/other orthopedic injuries at this time.  Patient ambulates with cane at baseline.  pt is well known to me with hx of htn, ashd, s/p stent, PAF on beta blocker last admission on beta blocker, s/p ortho surgery has developed a.fib, at this time hr is controlled, no chest paincardiac enzymes, no chest pain  ecg noted  continue beta blocker  started on ac   trop negative  keep hgb>8, fu cbc, lytes  tele  pt in [place] now  echo noted normal EF  physical therapy  start on losartan 25 mg daily    	        
           CARDIOLOGY     PROGRESS  NOTE   ________________________________________________    CHIEF COMPLAINT:Patient is a 62y old  Male who presents with a chief complaint of Left Patella fracture  (07 Jan 2023 13:27)  no complain, doing well  	  REVIEW OF SYSTEMS:  CONSTITUTIONAL: No fever, weight loss, or fatigue  EYES: No eye pain, visual disturbances, or discharge  ENT:  No difficulty hearing, tinnitus, vertigo; No sinus or throat pain  NECK: No pain or stiffness  RESPIRATORY: No cough, wheezing, chills or hemoptysis; No Shortness of Breath  CARDIOVASCULAR: No chest pain, palpitations, passing out, dizziness, or leg swelling  GASTROINTESTINAL: No abdominal or epigastric pain. No nausea, vomiting, or hematemesis; No diarrhea or constipation. No melena or hematochezia.  GENITOURINARY: No dysuria, frequency, hematuria, or incontinence  NEUROLOGICAL: No headaches, memory loss, loss of strength, numbness, or tremors  SKIN: No itching, burning, rashes, or lesions   LYMPH Nodes: No enlarged glands  ENDOCRINE: No heat or cold intolerance; No hair loss  MUSCULOSKELETAL: No joint pain or swelling; No muscle, back, or extremity pain  PSYCHIATRIC: No depression, anxiety, mood swings, or difficulty sleeping  HEME/LYMPH: No easy bruising, or bleeding gums  ALLERGY AND IMMUNOLOGIC: No hives or eczema	    [ ] All others negative	  [ ] Unable to obtain    PHYSICAL EXAM:  T(C): 36.9 (01-09-23 @ 05:06), Max: 37.6 (01-08-23 @ 20:37)  HR: 67 (01-09-23 @ 05:06) (57 - 67)  BP: 157/87 (01-09-23 @ 05:06) (141/75 - 162/70)  RR: 18 (01-09-23 @ 05:06) (17 - 18)  SpO2: 97% (01-09-23 @ 05:06) (97% - 99%)  Wt(kg): --  I&O's Summary    07 Jan 2023 07:01  -  08 Jan 2023 07:00  --------------------------------------------------------  IN: 2555 mL / OUT: 2000 mL / NET: 555 mL    08 Jan 2023 07:01  -  09 Jan 2023 06:58  --------------------------------------------------------  IN: 680 mL / OUT: 2950 mL / NET: -2270 mL        Appearance: Normal	  HEENT:   Normal oral mucosa, PERRL, EOMI	  Lymphatic: No lymphadenopathy  Cardiovascular: Normal S1 S2, No JVD, + murmurs, No edema  Respiratory: Lungs clear to auscultation	  Psychiatry: A & O x 3, Mood & affect appropriate  Gastrointestinal:  Soft, Non-tender, + BS	  Skin: No rashes, No ecchymoses, No cyanosis	  Neurologic: Non-focal  Extremities: Normal range of motion, No clubbing, cyanosis or edema  Vascular: Peripheral pulses palpable 2+ bilaterally    MEDICATIONS  (STANDING):  acetaminophen     Tablet .. 975 milliGRAM(s) Oral every 8 hours  apixaban 5 milliGRAM(s) Oral every 12 hours  aspirin enteric coated 81 milliGRAM(s) Oral daily  atorvastatin 80 milliGRAM(s) Oral at bedtime  clopidogrel Tablet 75 milliGRAM(s) Oral daily  losartan 25 milliGRAM(s) Oral daily  metoprolol tartrate 50 milliGRAM(s) Oral two times a day  pantoprazole    Tablet 40 milliGRAM(s) Oral before breakfast  polyethylene glycol 3350 17 Gram(s) Oral daily  senna 2 Tablet(s) Oral at bedtime      TELEMETRY: 	    ECG:  	  RADIOLOGY:  OTHER: 	  	  LABS:	 	    CARDIAC MARKERS:                                9.2    7.49  )-----------( 163      ( 07 Jan 2023 07:19 )             28.1     01-07    140  |  107  |  13  ----------------------------<  104<H>  3.9   |  21<L>  |  0.65    Ca    8.3<L>      07 Jan 2023 07:18      proBNP:   Lipid Profile:   HgA1c:   TSH:         Assessment and plan  ---------------------------  62y Male presents with left patella fx. Patient reports recent hx of left lower extremity pain with ambulation has been using cane. Hx of CMT, stents, on eliquis. Reports fall onto left knee today with pain and inability to ambulate after. Denies numbness/tingling in the affected extremity. Denies head strike/LOC/other orthopedic injuries at this time.  Patient ambulates with cane at baseline.  pt is well known to me with hx of htn, ashd, s/p stent, PAF on beta blocker last admission on beta blocker, s/p ortho surgery has developed a.fib, at this time hr is controlled, no chest paincardiac enzymes, no chest pain  ecg noted  continue beta blocker  started on ac   trop negative  keep hgb>8, fu cbc, lytes  tele  pt in [place] now  echo noted normal EF  physical therapy  start on losartan 25 mg daily, will increase to 50 mg daily as bp elevated  continue AC    	        
Patient seen and examined at bedside this AM.  No acute complaints at this time. Pain well controlled. Denies chest pain, shortness of breath, nausea or vomiting.     PE:  Vital Signs Last 24 Hrs  T(C): 37.1 (07 Jan 2023 20:52), Max: 37.1 (07 Jan 2023 20:52)  T(F): 98.8 (07 Jan 2023 20:52), Max: 98.8 (07 Jan 2023 20:52)  HR: 61 (07 Jan 2023 20:52) (60 - 78)  BP: 121/72 (07 Jan 2023 20:52) (94/59 - 137/72)  BP(mean): --  RR: 18 (07 Jan 2023 20:52) (16 - 18)  SpO2: 99% (07 Jan 2023 20:52) (95% - 99%)    Parameters below as of 07 Jan 2023 20:52  Patient On (Oxygen Delivery Method): room air        General: NAD, resting comfortably in bed  LLE:   Dressing C/D/I  [place] brace   Compartments soft and compressible  No calf tenderness bilaterally  Patient reports his baseline exam is unchanged, unable to dorsiflex at ankle or extent /flex toes much 2/2 CMT disease of the foot  GSC grossly intact  SILT L3-S1  + DP/PT      LABS:                        9.2    7.49  )-----------( 163      ( 07 Jan 2023 07:19 )             28.1     07 Jan 2023 07:18    140    |  107    |  13     ----------------------------<  104    3.9     |  21     |  0.65     Ca    8.3        07 Jan 2023 07:18      PT/INR - ( 06 Jan 2023 05:47 )   PT: 13.7 sec;   INR: 1.18 ratio         PTT - ( 06 Jan 2023 05:47 )  PTT:29.2 sec        A/P:  62y m s/p ORIF L Patella 1/6    -PT/OT   -WBAT on the LLE in lino brace LIE   -Pain Control prn  -DVT ppx w/ A81 and Frances   -FU AM Labs  -Rest, ice, compress and elevate the extremity as we needed  -Incentive Spirometry  -Medical management appreciated  -Cardiology c/s appreciated, recs appreciated, will c/w home meds and monitor, patient currently in NSR w/ neg cardiac enzymes  -Dispo pending PT eval, likely will d/c home 1/8
Patient seen and examined at bedside this AM.  No acute complaints at this time. Pain well controlled. Denies chest pain, shortness of breath, nausea or vomiting. Able to walk in hallway with PT.    Vital Signs Last 24 Hrs  T(C): 36.8 (09 Jan 2023 00:56), Max: 37.6 (08 Jan 2023 20:37)  T(F): 98.2 (09 Jan 2023 00:56), Max: 99.6 (08 Jan 2023 20:37)  HR: 65 (09 Jan 2023 00:56) (57 - 65)  BP: 146/76 (09 Jan 2023 00:56) (141/75 - 162/70)  BP(mean): --  RR: 18 (09 Jan 2023 00:56) (17 - 18)  SpO2: 97% (09 Jan 2023 00:56) (95% - 99%)    Parameters below as of 09 Jan 2023 00:56  Patient On (Oxygen Delivery Method): room air          PE:  General: NAD, resting comfortably in bed  LLE:   Dressing C/D/I  [place] brace locked in extension  Compartments soft and compressible  No calf tenderness bilaterally  Patient reports his baseline exam is unchanged, minimally able to dorsiflex at ankle or extent /flex toes much 2/2 CMT disease of the foot  GSC grossly intact  SILT s/s/sp/dp/t  + DP/PT      LABS:                              9.2    7.49  )-----------( 163      ( 07 Jan 2023 07:19 )             28.1                A/P:  62y m s/p ORIF L Patella 1/6    -PT/OT   -WBAT on the LLE in lino brace LIE   -Pain Control prn  -DVT ppx w/ A81, Eliquis, Plavix  -FU AM Labs  -Rest, ice, compress and elevate the extremity as we needed  -Incentive Spirometry  -Medical management appreciated  -Cardiology c/s appreciated, recs appreciated, will c/w home meds and monitor, patient currently in NSR w/ neg cardiac enzymes  -Dispo planning: recommended for [facility], patient currently refusing
Patient seen and examined at bedside.  No acute complaints at this time. Pain well controlled. Denies chest pain, shortness of breath, nausea or vomiting.     PE:  Vital Signs Last 24 Hrs  T(C): 36.6 (01-07-23 @ 01:34), Max: 37.2 (01-06-23 @ 04:09)  T(F): 97.8 (01-07-23 @ 01:34), Max: 99 (01-06-23 @ 04:09)  HR: 65 (01-07-23 @ 01:34) (51 - 94)  BP: 94/59 (01-07-23 @ 01:34) (86/51 - 148/85)  BP(mean): 68 (01-06-23 @ 22:00) (67 - 108)  RR: 16 (01-07-23 @ 01:34) (16 - 18)  SpO2: 95% (01-07-23 @ 01:34) (93% - 100%)    General: NAD, resting comfortably in bed  LLE:   Dressing C/D/I  [place] brace   Compartments soft and compressible  No calf tenderness bilaterally  Patient reports his baseline exam is unchanged, unable to dorsiflex at ankle or extent /flex toes much 2/2 CMT disease of the foot  GSC grossly intact  SILT L3-S1  + DP/PT                            11.6   8.08  )-----------( 202      ( 06 Jan 2023 15:14 )             34.7     01-06    141  |  106  |  11  ----------------------------<  111<H>  5.0   |  28  |  0.63    Ca    8.8      06 Jan 2023 15:14  Mg     1.9     01-05    TPro  7.2  /  Alb  4.4  /  TBili  0.3  /  DBili  x   /  AST  33  /  ALT  8<L>  /  AlkPhos  78  01-05    PT/INR - ( 06 Jan 2023 05:47 )   PT: 13.7 sec;   INR: 1.18 ratio         PTT - ( 06 Jan 2023 05:47 )  PTT:29.2 sec    A/P:  62y m s/p ORIF L Patella 1/6    -PT/OT   -WBAT on the LLE in lino brace LIE   -Pain Control prn  -DVT ppx w/ A81 and Frances   -Continue perioperative abx x 24 hours  -FU AM Labs  -Rest, ice, compress and elevate the extremity as we needed  -Incentive Spirometry  -Medical management appreciated  -Cardiology c/s appreciated, recs appreciated, will c/w home meds and monitor, patient currently in NSR w/ neg cardiac enzymes  -Dispo pending PT eval, likely will d/c home 1/7
pt requires post op knee orthosis
62-year-old male with a history of CMT, CAD s/p stent on Eliquis presents with left knee pain and swelling after a fall at home.  Patient reports that he was walking to the car on the sidewalk and normally has some left leg weakness and tripped on his left foot and fell forward onto his hands and left knee.  When trying to get up he was unable to stand on his left leg and thus presented to the emergency department.  When patient fell patient did not have any LOC, no head strike.  He has no neck pain or pain anywhere else on his body.  Otherwise patient has no headache, chest pain, shortness of breath, nausea/vomiting/abdominal pain, dysuria, fever/chills. Patient was brought to [facility] for further evaluation and treatment. In the ED she was found to have a left patella fracture.  Patient is s/p  Open reduction and internal fixation of the left patella. Patietnt had issues with afib while under anesthesia. Seen now resting comfortably in NSR       MEDICATIONS  (STANDING):  acetaminophen     Tablet .. 975 milliGRAM(s) Oral every 8 hours  apixaban 5 milliGRAM(s) Oral every 12 hours  aspirin enteric coated 81 milliGRAM(s) Oral daily  atorvastatin 80 milliGRAM(s) Oral at bedtime  clopidogrel Tablet 75 milliGRAM(s) Oral daily  metoprolol tartrate 50 milliGRAM(s) Oral two times a day  pantoprazole    Tablet 40 milliGRAM(s) Oral before breakfast  polyethylene glycol 3350 17 Gram(s) Oral daily  senna 2 Tablet(s) Oral at bedtime  sodium chloride 0.9%. 1000 milliLiter(s) (75 mL/Hr) IV Continuous <Continuous>    MEDICATIONS  (PRN):  bisacodyl Suppository 10 milliGRAM(s) Rectal daily PRN Constipation  oxyCODONE    IR 10 milliGRAM(s) Oral every 4 hours PRN Moderate Pain (4 - 6)  oxyCODONE    IR 5 milliGRAM(s) Oral every 4 hours PRN Mild Pain (1 - 3)          VITALS:   T(C): 37.2 (01-08-23 @ 14:09), Max: 37.2 (01-08-23 @ 14:09)  HR: 61 (01-08-23 @ 14:09) (60 - 62)  BP: 141/75 (01-08-23 @ 14:09) (121/72 - 153/89)  RR: 17 (01-08-23 @ 14:09) (16 - 18)  SpO2: 99% (01-08-23 @ 14:09) (88% - 99%)  Wt(kg): --    PHYSICAL EXAM:  GENERAL: NAD, well-groomed, well-developed  HEAD:  Atraumatic, Normocephalic  EYES: EOMI, PERRLA, conjunctiva and sclera clear  ENMT: No tonsillar erythema, exudates, or enlargement; Moist mucous membranes, Good dentition, No lesions  NECK: Supple, No JVD, Normal thyroid  NERVOUS SYSTEM:  Alert & Oriented X3, Good concentration; Motor Strength 5/5 B/L upper and lower extremities; DTRs 2+ intact and symmetric  CHEST/LUNG: Clear to percussion bilaterally; No rales, rhonchi, wheezing, or rubs  HEART: Regular rate and rhythm; No murmurs, rubs, or gallops  ABDOMEN: Soft, Nontender, Nondistended; Bowel sounds present  EXTREMITIES:  2+ Peripheral Pulses, No clubbing, cyanosis, or edema  LYMPH: No lymphadenopathy noted  SKIN: No rashes or lesions    LABS:    CARDIAC MARKERS ( 06 Jan 2023 21:29 )  x     / x     / 58 U/L / x     / 1.6 ng/mL      CBC Full  -  ( 07 Jan 2023 07:19 )  WBC Count : 7.49 K/uL  RBC Count : 3.14 M/uL  Hemoglobin : 9.2 g/dL  Hematocrit : 28.1 %  Platelet Count - Automated : 163 K/uL  Mean Cell Volume : 89.5 fl  Mean Cell Hemoglobin : 29.3 pg  Mean Cell Hemoglobin Concentration : 32.7 gm/dL  Auto Neutrophil # : x  Auto Lymphocyte # : x  Auto Monocyte # : x  Auto Eosinophil # : x  Auto Basophil # : x  Auto Neutrophil % : x  Auto Lymphocyte % : x  Auto Monocyte % : x  Auto Eosinophil % : x  Auto Basophil % : x    01-07    140  |  107  |  13  ----------------------------<  104<H>  3.9   |  21<L>  |  0.65    Ca    8.3<L>      07 Jan 2023 07:18            CAPILLARY BLOOD GLUCOSE          RADIOLOGY & ADDITIONAL TESTS:      
62-year-old male with a history of CMT, CAD s/p stent on Eliquis presents with left knee pain and swelling after a fall at home.  Patient reports that he was walking to the car on the sidewalk and normally has some left leg weakness and tripped on his left foot and fell forward onto his hands and left knee.  When trying to get up he was unable to stand on his left leg and thus presented to the emergency department.  When patient fell patient did not have any LOC, no head strike.  He has no neck pain or pain anywhere else on his body.  Otherwise patient has no headache, chest pain, shortness of breath, nausea/vomiting/abdominal pain, dysuria, fever/chills. Patient was brought to [facility] for further evaluation and treatment. In the ED she was found to have a left patella fracture.  Patient is s/p  Open reduction and internal fixation of the left patella. Patietnt had issues with afib while under anesthesia. Seen now resting comfortably in NSR     MEDICATIONS  (STANDING):  acetaminophen     Tablet .. 975 milliGRAM(s) Oral every 8 hours  apixaban 5 milliGRAM(s) Oral every 12 hours  aspirin enteric coated 81 milliGRAM(s) Oral daily  atorvastatin 80 milliGRAM(s) Oral at bedtime  metoprolol tartrate 50 milliGRAM(s) Oral two times a day  pantoprazole    Tablet 40 milliGRAM(s) Oral before breakfast  polyethylene glycol 3350 17 Gram(s) Oral daily  senna 2 Tablet(s) Oral at bedtime  sodium chloride 0.9%. 1000 milliLiter(s) (75 mL/Hr) IV Continuous <Continuous>    MEDICATIONS  (PRN):  bisacodyl Suppository 10 milliGRAM(s) Rectal daily PRN Constipation  oxyCODONE    IR 10 milliGRAM(s) Oral every 4 hours PRN Moderate Pain (4 - 6)  oxyCODONE    IR 5 milliGRAM(s) Oral every 4 hours PRN Mild Pain (1 - 3)          VITALS:   T(C): 37 (01-07-23 @ 13:39), Max: 37.1 (01-06-23 @ 23:30)  HR: 78 (01-07-23 @ 13:39) (51 - 84)  BP: 137/72 (01-07-23 @ 13:39) (86/51 - 137/72)  RR: 16 (01-07-23 @ 13:39) (16 - 18)  SpO2: 98% (01-07-23 @ 13:39) (95% - 100%)  Wt(kg): --    PHYSICAL EXAM:  GENERAL: NAD, well-groomed, well-developed  HEAD:  Atraumatic, Normocephalic  EYES: EOMI, PERRLA, conjunctiva and sclera clear  ENMT: No tonsillar erythema, exudates, or enlargement; Moist mucous membranes, Good dentition, No lesions  NECK: Supple, No JVD, Normal thyroid  NERVOUS SYSTEM:  Alert & Oriented X3, Good concentration; Motor Strength 5/5 B/L upper and lower extremities; DTRs 2+ intact and symmetric  CHEST/LUNG: Clear to percussion bilaterally; No rales, rhonchi, wheezing, or rubs  HEART: Regular rate and rhythm; No murmurs, rubs, or gallops  ABDOMEN: Soft, Nontender, Nondistended; Bowel sounds present  EXTREMITIES:  2+ Peripheral Pulses, No clubbing, cyanosis, or edema  LYMPH: No lymphadenopathy noted  SKIN: No rashes or lesions    LABS:    CARDIAC MARKERS ( 06 Jan 2023 21:29 )  x     / x     / 58 U/L / x     / 1.6 ng/mL      CBC Full  -  ( 07 Jan 2023 07:19 )  WBC Count : 7.49 K/uL  RBC Count : 3.14 M/uL  Hemoglobin : 9.2 g/dL  Hematocrit : 28.1 %  Platelet Count - Automated : 163 K/uL  Mean Cell Volume : 89.5 fl  Mean Cell Hemoglobin : 29.3 pg  Mean Cell Hemoglobin Concentration : 32.7 gm/dL  Auto Neutrophil # : x  Auto Lymphocyte # : x  Auto Monocyte # : x  Auto Eosinophil # : x  Auto Basophil # : x  Auto Neutrophil % : x  Auto Lymphocyte % : x  Auto Monocyte % : x  Auto Eosinophil % : x  Auto Basophil % : x    01-07    140  |  107  |  13  ----------------------------<  104<H>  3.9   |  21<L>  |  0.65    Ca    8.3<L>      07 Jan 2023 07:18  Mg     1.9     01-05    TPro  7.2  /  Alb  4.4  /  TBili  0.3  /  DBili  x   /  AST  33  /  ALT  8<L>  /  AlkPhos  78  01-05    LIVER FUNCTIONS - ( 05 Jan 2023 19:21 )  Alb: 4.4 g/dL / Pro: 7.2 g/dL / ALK PHOS: 78 U/L / ALT: 8 U/L / AST: 33 U/L / GGT: x           PT/INR - ( 06 Jan 2023 05:47 )   PT: 13.7 sec;   INR: 1.18 ratio         PTT - ( 06 Jan 2023 05:47 )  PTT:29.2 sec    CAPILLARY BLOOD GLUCOSE          RADIOLOGY & ADDITIONAL TESTS:      
62-year-old male with a history of CMT, CAD s/p stent on Eliquis presents with left knee pain and swelling after a fall at home.  Patient reports that he was walking to the car on the sidewalk and normally has some left leg weakness and tripped on his left foot and fell forward onto his hands and left knee.  When trying to get up he was unable to stand on his left leg and thus presented to the emergency department.  When patient fell patient did not have any LOC, no head strike.  He has no neck pain or pain anywhere else on his body.  Otherwise patient has no headache, chest pain, shortness of breath, nausea/vomiting/abdominal pain, dysuria, fever/chills. Patient was brought to [facility] for further evaluation and treatment. In the ED she was found to have a left patella fracture.  Patient is s/p  Open reduction and internal fixation of the left patella. Patietnt had issues with afib while under anesthesia. Seen now resting comfortably in NSR. Plan is for Patient to go to rehab      MEDICATIONS  (STANDING):  acetaminophen     Tablet .. 975 milliGRAM(s) Oral every 8 hours  apixaban 5 milliGRAM(s) Oral every 12 hours  atorvastatin 80 milliGRAM(s) Oral at bedtime  clopidogrel Tablet 75 milliGRAM(s) Oral daily  losartan 100 milliGRAM(s) Oral daily  metoprolol tartrate 50 milliGRAM(s) Oral two times a day  pantoprazole    Tablet 40 milliGRAM(s) Oral before breakfast  polyethylene glycol 3350 17 Gram(s) Oral daily  senna 2 Tablet(s) Oral at bedtime    MEDICATIONS  (PRN):  bisacodyl Suppository 10 milliGRAM(s) Rectal daily PRN Constipation  oxyCODONE    IR 10 milliGRAM(s) Oral every 4 hours PRN Moderate Pain (4 - 6)  oxyCODONE    IR 5 milliGRAM(s) Oral every 4 hours PRN Mild Pain (1 - 3)          VITALS:   T(C): 36.9 (01-11-23 @ 13:07), Max: 37.4 (01-10-23 @ 20:38)  HR: 84 (01-11-23 @ 13:07) (64 - 84)  BP: 112/66 (01-11-23 @ 13:07) (105/57 - 176/80)  RR: 18 (01-11-23 @ 13:07) (18 - 18)  SpO2: 97% (01-11-23 @ 13:07) (96% - 99%)  Wt(kg): --    PHYSICAL EXAM:  GENERAL: NAD, well-groomed, well-developed  HEAD:  Atraumatic, Normocephalic  EYES: EOMI, PERRLA, conjunctiva and sclera clear  ENMT: No tonsillar erythema, exudates, or enlargement; Moist mucous membranes, Good dentition, No lesions  NECK: Supple, No JVD, Normal thyroid  NERVOUS SYSTEM:  Alert & Oriented X3, Good concentration; Motor Strength 5/5 B/L upper and lower extremities; DTRs 2+ intact and symmetric  CHEST/LUNG: Clear to percussion bilaterally; No rales, rhonchi, wheezing, or rubs  HEART: Regular rate and rhythm; No murmurs, rubs, or gallops  ABDOMEN: Soft, Nontender, Nondistended; Bowel sounds present  EXTREMITIES:  2+ Peripheral Pulses, No clubbing, cyanosis, or edema  LYMPH: No lymphadenopathy noted  SKIN: No rashes or lesions    LABS:                      CAPILLARY BLOOD GLUCOSE          RADIOLOGY & ADDITIONAL TESTS:      
62-year-old male with a history of CMT, CAD s/p stent on Eliquis presents with left knee pain and swelling after a fall at home.  Patient reports that he was walking to the car on the sidewalk and normally has some left leg weakness and tripped on his left foot and fell forward onto his hands and left knee.  When trying to get up he was unable to stand on his left leg and thus presented to the emergency department.  When patient fell patient did not have any LOC, no head strike.  He has no neck pain or pain anywhere else on his body.  Otherwise patient has no headache, chest pain, shortness of breath, nausea/vomiting/abdominal pain, dysuria, fever/chills. Patient was brought to [facility] for further evaluation and treatment. In the ED she was found to have a left patella fracture.  Patient is scheduled for an Open reduction and internal fixation of the left patella. Patient seen now resting comfortably       MEDICATIONS  (STANDING):  acetaminophen     Tablet .. 975 milliGRAM(s) Oral every 8 hours  aspirin enteric coated 81 milliGRAM(s) Oral daily  atorvastatin 80 milliGRAM(s) Oral at bedtime  lactated ringers. 1000 milliLiter(s) (100 mL/Hr) IV Continuous <Continuous>  metoprolol tartrate 50 milliGRAM(s) Oral two times a day  pantoprazole    Tablet 40 milliGRAM(s) Oral before breakfast  polyethylene glycol 3350 17 Gram(s) Oral daily  senna 2 Tablet(s) Oral at bedtime    MEDICATIONS  (PRN):  oxyCODONE    IR 10 milliGRAM(s) Oral every 4 hours PRN Moderate Pain (4 - 6)  oxyCODONE    IR 5 milliGRAM(s) Oral every 4 hours PRN Mild Pain (1 - 3)          VITALS:   T(C): 37.2 (01-06-23 @ 13:07), Max: 37.2 (01-06-23 @ 04:09)  HR: 94 (01-06-23 @ 13:07) (64 - 109)  BP: 148/85 (01-06-23 @ 13:07) (127/79 - 179/110)  RR: 18 (01-06-23 @ 13:07) (16 - 18)  SpO2: 98% (01-06-23 @ 13:07) (93% - 100%)  Wt(kg): --    PHYSICAL EXAM:  GENERAL: NAD, well-groomed, well-developed  HEAD:  Atraumatic, Normocephalic  EYES: EOMI, PERRLA, conjunctiva and sclera clear  ENMT: No tonsillar erythema, exudates, or enlargement; Moist mucous membranes, Good dentition, No lesions  NECK: Supple, No JVD, Normal thyroid  NERVOUS SYSTEM:  Alert & Oriented X3, Good concentration; Motor Strength 5/5 B/L upper and lower extremities; DTRs 2+ intact and symmetric  CHEST/LUNG: Clear to percussion bilaterally; No rales, rhonchi, wheezing, or rubs  HEART: Regular rate and rhythm; No murmurs, rubs, or gallops  ABDOMEN: Soft, Nontender, Nondistended; Bowel sounds present  EXTREMITIES:  2+ Peripheral Pulses, No clubbing, cyanosis, or edema  LYMPH: No lymphadenopathy noted  SKIN: No rashes or lesions    LABS:        CBC Full  -  ( 06 Jan 2023 05:47 )  WBC Count : 7.25 K/uL  RBC Count : 3.81 M/uL  Hemoglobin : 11.0 g/dL  Hematocrit : 32.8 %  Platelet Count - Automated : 184 K/uL  Mean Cell Volume : 86.1 fl  Mean Cell Hemoglobin : 28.9 pg  Mean Cell Hemoglobin Concentration : 33.5 gm/dL  Auto Neutrophil # : x  Auto Lymphocyte # : x  Auto Monocyte # : x  Auto Eosinophil # : x  Auto Basophil # : x  Auto Neutrophil % : x  Auto Lymphocyte % : x  Auto Monocyte % : x  Auto Eosinophil % : x  Auto Basophil % : x    01-06    142  |  106  |  14  ----------------------------<  96  3.4<L>   |  24  |  0.55    Ca    8.8      06 Jan 2023 05:47  Mg     1.9     01-05    TPro  7.2  /  Alb  4.4  /  TBili  0.3  /  DBili  x   /  AST  33  /  ALT  8<L>  /  AlkPhos  78  01-05    LIVER FUNCTIONS - ( 05 Jan 2023 19:21 )  Alb: 4.4 g/dL / Pro: 7.2 g/dL / ALK PHOS: 78 U/L / ALT: 8 U/L / AST: 33 U/L / GGT: x           PT/INR - ( 06 Jan 2023 05:47 )   PT: 13.7 sec;   INR: 1.18 ratio         PTT - ( 06 Jan 2023 05:47 )  PTT:29.2 sec    CAPILLARY BLOOD GLUCOSE          RADIOLOGY & ADDITIONAL TESTS:      
62-year-old male with a history of CMT, CAD s/p stent on Eliquis presents with left knee pain and swelling after a fall at home.  Patient reports that he was walking to the car on the sidewalk and normally has some left leg weakness and tripped on his left foot and fell forward onto his hands and left knee.  When trying to get up he was unable to stand on his left leg and thus presented to the emergency department.  When patient fell patient did not have any LOC, no head strike.  He has no neck pain or pain anywhere else on his body.  Otherwise patient has no headache, chest pain, shortness of breath, nausea/vomiting/abdominal pain, dysuria, fever/chills. Patient was brought to [facility] for further evaluation and treatment. In the ED she was found to have a left patella fracture.  Patient is s/p  Open reduction and internal fixation of the left patella. Patietnt had issues with afib while under anesthesia. Seen now resting comfortably in NSR. Plan is for Patient to go to rehab    MEDICATIONS  (STANDING):  acetaminophen     Tablet .. 975 milliGRAM(s) Oral every 8 hours  apixaban 5 milliGRAM(s) Oral every 12 hours  atorvastatin 80 milliGRAM(s) Oral at bedtime  clopidogrel Tablet 75 milliGRAM(s) Oral daily  losartan 100 milliGRAM(s) Oral daily  metoprolol tartrate 50 milliGRAM(s) Oral two times a day  pantoprazole    Tablet 40 milliGRAM(s) Oral before breakfast  polyethylene glycol 3350 17 Gram(s) Oral daily  senna 2 Tablet(s) Oral at bedtime    MEDICATIONS  (PRN):  bisacodyl Suppository 10 milliGRAM(s) Rectal daily PRN Constipation  oxyCODONE    IR 10 milliGRAM(s) Oral every 4 hours PRN Moderate Pain (4 - 6)  oxyCODONE    IR 5 milliGRAM(s) Oral every 4 hours PRN Mild Pain (1 - 3)          VITALS:   T(C): 36.7 (01-10-23 @ 09:28), Max: 37.3 (01-09-23 @ 21:23)  HR: 67 (01-10-23 @ 09:28) (65 - 71)  BP: 124/70 (01-10-23 @ 09:28) (124/70 - 168/90)  RR: 18 (01-10-23 @ 09:28) (18 - 18)  SpO2: 97% (01-10-23 @ 09:28) (97% - 99%)  Wt(kg): --    PHYSICAL EXAM:  GENERAL: NAD, well-groomed, well-developed  HEAD:  Atraumatic, Normocephalic  EYES: EOMI, PERRLA, conjunctiva and sclera clear  ENMT: No tonsillar erythema, exudates, or enlargement; Moist mucous membranes, Good dentition, No lesions  NECK: Supple, No JVD, Normal thyroid  NERVOUS SYSTEM:  Alert & Oriented X3, Good concentration; Motor Strength 5/5 B/L upper and lower extremities; DTRs 2+ intact and symmetric  CHEST/LUNG: Clear to percussion bilaterally; No rales, rhonchi, wheezing, or rubs  HEART: Regular rate and rhythm; No murmurs, rubs, or gallops  ABDOMEN: Soft, Nontender, Nondistended; Bowel sounds present  EXTREMITIES:  2+ Peripheral Pulses, No clubbing, cyanosis, or edema  LYMPH: No lymphadenopathy noted  SKIN: No rashes or lesions    LABS:        CBC Full  -  ( 09 Jan 2023 07:23 )  WBC Count : 7.14 K/uL  RBC Count : 3.05 M/uL  Hemoglobin : 8.9 g/dL  Hematocrit : 26.9 %  Platelet Count - Automated : 169 K/uL  Mean Cell Volume : 88.2 fl  Mean Cell Hemoglobin : 29.2 pg  Mean Cell Hemoglobin Concentration : 33.1 gm/dL  Auto Neutrophil # : x  Auto Lymphocyte # : x  Auto Monocyte # : x  Auto Eosinophil # : x  Auto Basophil # : x  Auto Neutrophil % : x  Auto Lymphocyte % : x  Auto Monocyte % : x  Auto Eosinophil % : x  Auto Basophil % : x    01-09    145  |  109<H>  |  9   ----------------------------<  95  3.3<L>   |  21<L>  |  0.62    Ca    9.1      09 Jan 2023 07:21            CAPILLARY BLOOD GLUCOSE          RADIOLOGY & ADDITIONAL TESTS:

## 2023-01-25 ENCOUNTER — NON-APPOINTMENT (OUTPATIENT)
Age: 63
End: 2023-01-25

## 2023-01-30 NOTE — HISTORY OF PRESENT ILLNESS
[Chills] : no chills [Constipation] : no constipation [Diarrhea] : no diarrhea [Dysuria] : no dysuria [Fever] : no fever [Nausea] : no nausea [Vomiting] : no vomiting [de-identified] : s/p ORIF of left patella on DOS: 1/6/23 [de-identified] :  LANAMERNA JOY is a 62 y.o. gentleman who presents to the office for post op s/p ORIF left patella from 1/6/23. Since his surgery he states he is feeling [de-identified] : No Xrays were taken in the office today, 2/1/23. [de-identified] : The patient is sitting comfortably in the exam room. \par LEFT leg.\par -Skin is intact, no swelling, no ecchymosis\par -Range of motion\par -Negative Lachman, negative anterior drawer, negative posterior drawer\par -Negative Julee\par -Sensation is intact L1-S1\par -5/5 EHL, FHL, TA, GS, quadriceps, hamstrings\par -Foot is warm and well-perfused, palpable dorsalis pedis pulse\par  [de-identified] : 62-year-old woman s/p ORIF left patella, approximately 3.5 weeks ago. [de-identified] : - Weightbearing\par -for pain\par -Physical therapy:\par -Home exercises.  These were demonstrated in the office today.\par -Follow-up in with x-rays at that time\par -All the patient's questions and concerns were addressed during this visit\par

## 2023-01-31 ENCOUNTER — NON-APPOINTMENT (OUTPATIENT)
Age: 63
End: 2023-01-31

## 2023-02-01 ENCOUNTER — APPOINTMENT (OUTPATIENT)
Dept: ORTHOPEDIC SURGERY | Facility: CLINIC | Age: 63
End: 2023-02-01

## 2023-02-02 ENCOUNTER — APPOINTMENT (OUTPATIENT)
Dept: INTERNAL MEDICINE | Facility: CLINIC | Age: 63
End: 2023-02-02
Payer: MEDICAID

## 2023-02-02 ENCOUNTER — NON-APPOINTMENT (OUTPATIENT)
Age: 63
End: 2023-02-02

## 2023-02-02 VITALS
SYSTOLIC BLOOD PRESSURE: 126 MMHG | HEART RATE: 83 BPM | BODY MASS INDEX: 27.11 KG/M2 | OXYGEN SATURATION: 98 % | RESPIRATION RATE: 16 BRPM | HEIGHT: 69 IN | WEIGHT: 183 LBS | DIASTOLIC BLOOD PRESSURE: 70 MMHG

## 2023-02-02 DIAGNOSIS — Z13.220 ENCOUNTER FOR SCREENING FOR LIPOID DISORDERS: ICD-10-CM

## 2023-02-02 DIAGNOSIS — Z13.0 ENCOUNTER FOR SCREENING FOR DISEASES OF THE BLOOD AND BLOOD-FORMING ORGANS AND CERTAIN DISORDERS INVOLVING THE IMMUNE MECHANISM: ICD-10-CM

## 2023-02-02 DIAGNOSIS — Z13.1 ENCOUNTER FOR SCREENING FOR DIABETES MELLITUS: ICD-10-CM

## 2023-02-02 DIAGNOSIS — M19.90 UNSPECIFIED OSTEOARTHRITIS, UNSPECIFIED SITE: ICD-10-CM

## 2023-02-02 DIAGNOSIS — Z13.21 ENCOUNTER FOR SCREENING FOR NUTRITIONAL DISORDER: ICD-10-CM

## 2023-02-02 DIAGNOSIS — Z13.29 ENCOUNTER FOR SCREENING FOR OTHER SUSPECTED ENDOCRINE DISORDER: ICD-10-CM

## 2023-02-02 PROCEDURE — 99213 OFFICE O/P EST LOW 20 MIN: CPT

## 2023-02-02 PROCEDURE — 99203 OFFICE O/P NEW LOW 30 MIN: CPT

## 2023-02-02 RX ORDER — CLOPIDOGREL BISULFATE 75 MG/1
75 TABLET, FILM COATED ORAL DAILY
Qty: 30 | Refills: 0 | Status: ACTIVE | COMMUNITY

## 2023-02-02 RX ORDER — AMOXICILLIN AND CLAVULANATE POTASSIUM 875; 125 MG/1; MG/1
875-125 TABLET, COATED ORAL
Qty: 28 | Refills: 0 | Status: DISCONTINUED | COMMUNITY
Start: 2022-02-07 | End: 2023-02-02

## 2023-02-02 RX ORDER — APIXABAN 5 MG/1
5 TABLET, FILM COATED ORAL
Qty: 60 | Refills: 3 | Status: ACTIVE | COMMUNITY

## 2023-02-02 RX ORDER — ATORVASTATIN CALCIUM 80 MG/1
80 TABLET, FILM COATED ORAL DAILY
Qty: 90 | Refills: 3 | Status: ACTIVE | COMMUNITY

## 2023-02-02 RX ORDER — METOPROLOL SUCCINATE 200 MG/1
TABLET, EXTENDED RELEASE ORAL
Refills: 0 | Status: DISCONTINUED | COMMUNITY
End: 2023-02-02

## 2023-02-02 RX ORDER — METOPROLOL TARTRATE 50 MG/1
50 TABLET, FILM COATED ORAL
Qty: 60 | Refills: 0 | Status: ACTIVE | COMMUNITY

## 2023-02-02 RX ORDER — MELOXICAM 7.5 MG/1
7.5 TABLET ORAL DAILY
Qty: 30 | Refills: 0 | Status: DISCONTINUED | COMMUNITY
Start: 2017-11-22 | End: 2023-02-02

## 2023-02-02 RX ORDER — ASPIRIN 81 MG
81 TABLET, DELAYED RELEASE (ENTERIC COATED) ORAL
Refills: 0 | Status: DISCONTINUED | COMMUNITY
End: 2023-02-02

## 2023-02-02 NOTE — HISTORY OF PRESENT ILLNESS
[FreeTextEntry1] : 62-year-old male is seen today for initial evaluation. 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  This is my first time evaluating this patient.\par \par Patient denies any fevers, chills, nausea, vomiting, diarrhea, constipation, chest pain, shortness of breath, weakness, numbness, tingling.\par \par Patient reports that he fell on January 5.  He fractured his left patella.  He had surgery on January 6 at [facility].  On January 10 he was transferred to rehab.  He came out of rehab on January 25.  He has been home since.  He was supposed to follow-up with orthopedics, Dr. Angelita Schneider.  He received a call on Monday stating that he needs a referral to see Dr. Schneider in case the need to take an x-ray.  He is here to get that referral.\par \par smoking 5 cigrettes a day. Smoking since age 13.  He stated that at most 1 pack lasted in 3 to 4 days\par \par No flu vaccine this year\par Did get 2 covid vaccines.

## 2023-02-02 NOTE — PLAN
[FreeTextEntry1] : Follow-up in 4 months\par He may follow-up earlier if needed.\par Pt was told to call with problems or concerns. The patient was told to seek immediate attention by calling 911 or going to the ER if him  condition does not improve or gets acutely worse.\par

## 2023-02-02 NOTE — HEALTH RISK ASSESSMENT
[Good] : ~his/her~  mood as  good [No] : In the past 12 months have you used drugs other than those required for medical reasons? No [0] : 2) Feeling down, depressed, or hopeless: Not at all (0) [Patient declined colonoscopy] : Patient declined colonoscopy [] :  [Fully functional (bathing, dressing, toileting, transferring, walking, feeding)] : Fully functional (bathing, dressing, toileting, transferring, walking, feeding) [Current] : Current [[id number]] : 0 [ColonoscopyComments] : Pt refused [de-identified] : with Sister [de-identified] :  [FreeTextEntry3] :  and then .

## 2023-02-02 NOTE — PHYSICAL EXAM
[Well Nourished] : well nourished [Normal Sclera/Conjunctiva] : normal sclera/conjunctiva [PERRL] : pupils equal round and reactive to light [EOMI] : extraocular movements intact [Normal Outer Ear/Nose] : the outer ears and nose were normal in appearance [Normal Oropharynx] : the oropharynx was normal [No JVD] : no jugular venous distention [No Lymphadenopathy] : no lymphadenopathy [Supple] : supple [Thyroid Normal, No Nodules] : the thyroid was normal and there were no nodules present [No Respiratory Distress] : no respiratory distress  [No Accessory Muscle Use] : no accessory muscle use [Clear to Auscultation] : lungs were clear to auscultation bilaterally [Normal Rate] : normal rate  [Regular Rhythm] : with a regular rhythm [Normal S1, S2] : normal S1 and S2 [No Murmur] : no murmur heard [No Carotid Bruits] : no carotid bruits [No Abdominal Bruit] : a ~M bruit was not heard ~T in the abdomen [No Varicosities] : no varicosities [Pedal Pulses Present] : the pedal pulses are present [No Edema] : there was no peripheral edema [No Palpable Aorta] : no palpable aorta [No Extremity Clubbing/Cyanosis] : no extremity clubbing/cyanosis [Soft] : abdomen soft [Non Tender] : non-tender [Non-distended] : non-distended [No Masses] : no abdominal mass palpated [No HSM] : no HSM [Normal Bowel Sounds] : normal bowel sounds [Normal Anterior Cervical Nodes] : no anterior cervical lymphadenopathy [Coordination Grossly Intact] : coordination grossly intact [No Focal Deficits] : no focal deficits [de-identified] : Mild acute distress due to left lower leg pain. [de-identified] : Left leg enclosed in brace.  Patient stated it hurts to bend.  Has been keeping it straight

## 2023-02-03 LAB
ALBUMIN SERPL ELPH-MCNC: 4.3 G/DL
ALP BLD-CCNC: 96 U/L
ALT SERPL-CCNC: 12 U/L
ANION GAP SERPL CALC-SCNC: 13 MMOL/L
AST SERPL-CCNC: 20 U/L
BASOPHILS # BLD AUTO: 0.05 K/UL
BASOPHILS NFR BLD AUTO: 0.6 %
BILIRUB SERPL-MCNC: 0.3 MG/DL
BUN SERPL-MCNC: 12 MG/DL
CALCIUM SERPL-MCNC: 9.7 MG/DL
CHLORIDE SERPL-SCNC: 104 MMOL/L
CHOLEST SERPL-MCNC: 187 MG/DL
CO2 SERPL-SCNC: 24 MMOL/L
CREAT SERPL-MCNC: 0.66 MG/DL
EGFR: 105 ML/MIN/1.73M2
EOSINOPHIL # BLD AUTO: 0.28 K/UL
EOSINOPHIL NFR BLD AUTO: 3.6 %
ESTIMATED AVERAGE GLUCOSE: 94 MG/DL
FOLATE SERPL-MCNC: 10.9 NG/ML
GLUCOSE SERPL-MCNC: 92 MG/DL
HBA1C MFR BLD HPLC: 4.9 %
HCT VFR BLD CALC: 33.9 %
HDLC SERPL-MCNC: 44 MG/DL
HGB BLD-MCNC: 10.8 G/DL
IMM GRANULOCYTES NFR BLD AUTO: 0.3 %
LDLC SERPL CALC-MCNC: 122 MG/DL
LYMPHOCYTES # BLD AUTO: 1.93 K/UL
LYMPHOCYTES NFR BLD AUTO: 25.1 %
MAN DIFF?: NORMAL
MCHC RBC-ENTMCNC: 29 PG
MCHC RBC-ENTMCNC: 31.9 GM/DL
MCV RBC AUTO: 91.1 FL
MONOCYTES # BLD AUTO: 0.49 K/UL
MONOCYTES NFR BLD AUTO: 6.4 %
NEUTROPHILS # BLD AUTO: 4.93 K/UL
NEUTROPHILS NFR BLD AUTO: 64 %
NONHDLC SERPL-MCNC: 143 MG/DL
PLATELET # BLD AUTO: 350 K/UL
POTASSIUM SERPL-SCNC: 4.7 MMOL/L
PROT SERPL-MCNC: 7.3 G/DL
RBC # BLD: 3.72 M/UL
RBC # FLD: 14.8 %
SODIUM SERPL-SCNC: 141 MMOL/L
TRIGL SERPL-MCNC: 106 MG/DL
TSH SERPL-ACNC: 1.62 UIU/ML
VIT B12 SERPL-MCNC: 434 PG/ML
WBC # FLD AUTO: 7.7 K/UL

## 2023-02-09 ENCOUNTER — APPOINTMENT (OUTPATIENT)
Dept: ORTHOPEDIC SURGERY | Facility: CLINIC | Age: 63
End: 2023-02-09
Payer: MEDICAID

## 2023-02-09 VITALS — WEIGHT: 170 LBS | HEIGHT: 68 IN | BODY MASS INDEX: 25.76 KG/M2

## 2023-02-09 DIAGNOSIS — S82.092A OTHER FRACTURE OF LEFT PATELLA, INITIAL ENCOUNTER FOR CLOSED FRACTURE: ICD-10-CM

## 2023-02-09 PROCEDURE — 73564 X-RAY EXAM KNEE 4 OR MORE: CPT | Mod: LT

## 2023-02-09 PROCEDURE — 99024 POSTOP FOLLOW-UP VISIT: CPT

## 2023-02-09 NOTE — HISTORY OF PRESENT ILLNESS
[Chills] : no chills [Constipation] : no constipation [Diarrhea] : no diarrhea [Dysuria] : no dysuria [Fever] : no fever [Nausea] : no nausea [Vomiting] : no vomiting [de-identified] : s/p ORIF of left patella fracture, DOS: 1/6/23 [de-identified] : JOY Joshua is a 62 y.o. gentleman with Charcot-Lakia-Tooth disorder who presents to the office for post op s/p ORIF left patella from 1/6/23. Since his surgery he states he is feeling well.  Mild pain but improving.  He was discharged from [facility] rehab.  He is using a walker, wbat with leg straight in brace.  Denies numbness/tingling.  Reports he has occasionally bent his knee but not on purpose. [de-identified] : The patient is sitting comfortably in the exam room. \par LEFT leg.\par -Skin is intact, no swelling, no ecchymosis\par -Incision is clean and dry, no erythema, no signs of infection\par -Patient is able to do a straight leg raise\par -Patient is able to move from 30 degrees of flexion and extend against gravity\par -Sensation is intact L1-S1\par -4/5 EHL, FHL, TA, GS, quadriceps, hamstrings\par -Foot is warm and well-perfused, palpable dorsalis pedis pulse\par  [de-identified] : Xrays of the left knee were taken in the office today, 2/9/23.  4 views were taken.  AP, internal, external, and lateral.  X-rays show slight gapping at the fracture site.  The 2 partially-threaded screws are still in good position.  There is a small osseous fragment on the lateral aspect of the patella tendon that is distal to the patella. [de-identified] : 62-year-old woman s/p ORIF left patella, approximately 4.5 weeks out. [de-identified] : -Physical exam and x-ray findings were discussed with the patient.  I explained I am slightly concerned about the osseous fragment distal to the patella as well as the slight gapping at the fracture site.  This could be due to the fact that he has flex his knee a few times inadvertently.\par -We will have the patient return in 2 weeks for x-rays of the left knee\par - Weightbearing as tolerated with the leg in full extension.\par -[place] brace in full extension locked\par -Physical therapy: We will hold off on physical therapy at this time\par -Follow-up in 2 weeks with x-rays at that time\par -All the patient's questions and concerns were addressed during this visit\par

## 2023-02-14 ENCOUNTER — APPOINTMENT (OUTPATIENT)
Dept: VASCULAR SURGERY | Facility: CLINIC | Age: 63
End: 2023-02-14

## 2023-03-01 ENCOUNTER — APPOINTMENT (OUTPATIENT)
Dept: ORTHOPEDIC SURGERY | Facility: CLINIC | Age: 63
End: 2023-03-01
Payer: MEDICAID

## 2023-03-01 PROCEDURE — 99024 POSTOP FOLLOW-UP VISIT: CPT

## 2023-03-01 PROCEDURE — 73562 X-RAY EXAM OF KNEE 3: CPT | Mod: LT

## 2023-03-02 NOTE — HISTORY OF PRESENT ILLNESS
[6] : the patient reports pain that is 6/10 in severity [Chills] : no chills [Constipation] : no constipation [Diarrhea] : no diarrhea [Dysuria] : no dysuria [Fever] : no fever [Nausea] : no nausea [Vomiting] : no vomiting [de-identified] : s/p ORIF of left patella fracture, DOS: 1/6/23 [de-identified] : JOY Joshua is a 62 y.o. gentleman with Charcot-Lakia-Tooth disorder who presents to the office for post op s/p ORIF left patella from 1/6/23. Since his surgery he states he is feeling slightly better. He is ambulating via walker. He is taking Motrin for pain.  The patient used a cane prior to the surgery. [de-identified] : The patient is sitting comfortably in the exam room. \par LEFT leg.\par -Skin is intact, no swelling, no ecchymosis\par -Incision is well-healed, no erythema, no signs of infection\par -Patient is able to do a straight leg raise\par -Patient is able to move from 40 degrees of flexion and extend against gravity\par -Sensation is intact L1-S1\par -4/5 EHL, FHL, TA, GS, quadriceps, hamstrings\par -Foot is warm and well-perfused, palpable dorsalis pedis pulse\par  [de-identified] : Xrays of the left knee were taken in the office today, 3/1/23.  3 views were taken.  AP, oblique, and lateral.  X-rays show slight gapping at the fracture site.  The 2 partially-threaded screws are in good position.  There is a small osseous sleeve fragment on the lateral and distal aspect of the patella that is distal to the articular surface of the patella.  There is callus connecting the sleeve fragment to the articular fragment [de-identified] : 62-year-old woman s/p ORIF left patella, approximately 8 weeks out. [de-identified] : -A long discussion was held with the patient and the patient's sister regarding the x-ray and physical exam findings.  I explained that there is a sleeve fragment of the patella that has displaced from the articular surface.  The patient's extensor mechanism is intact.  The patient is not in any pain.  He is able to ambulate with a walker comfortably.  We discussed the potential treatment options moving forward including further operative management and nonoperative management.  The risk and benefits of both were discussed with the patient.  The patient is able to do all the activities that he would like to do.  The patient is strongly against any further surgical management.\par -Physical Therapy: [place] brace -: Advance to 45 today 3/1/23, then advance to 90 degrees in 2 weeks, 3/15/2023. WBAT on 3/29/23.\par -Follow-up in 2 months with x-rays at that time\par -All the patient's questions and concerns were addressed during this visit\par

## 2023-04-18 ENCOUNTER — APPOINTMENT (OUTPATIENT)
Dept: VASCULAR SURGERY | Facility: CLINIC | Age: 63
End: 2023-04-18

## 2023-05-04 ENCOUNTER — APPOINTMENT (OUTPATIENT)
Dept: ORTHOPEDIC SURGERY | Facility: CLINIC | Age: 63
End: 2023-05-04
Payer: MEDICAID

## 2023-05-04 PROCEDURE — 99213 OFFICE O/P EST LOW 20 MIN: CPT

## 2023-05-04 PROCEDURE — 73562 X-RAY EXAM OF KNEE 3: CPT | Mod: LT

## 2023-05-10 ENCOUNTER — APPOINTMENT (OUTPATIENT)
Dept: INTERNAL MEDICINE | Facility: CLINIC | Age: 63
End: 2023-05-10
Payer: MEDICAID

## 2023-05-10 ENCOUNTER — NON-APPOINTMENT (OUTPATIENT)
Age: 63
End: 2023-05-10

## 2023-05-10 VITALS
HEART RATE: 107 BPM | SYSTOLIC BLOOD PRESSURE: 144 MMHG | OXYGEN SATURATION: 96 % | BODY MASS INDEX: 29.19 KG/M2 | RESPIRATION RATE: 16 BRPM | DIASTOLIC BLOOD PRESSURE: 80 MMHG | WEIGHT: 192 LBS

## 2023-05-10 DIAGNOSIS — I49.9 CARDIAC ARRHYTHMIA, UNSPECIFIED: ICD-10-CM

## 2023-05-10 DIAGNOSIS — I73.9 PERIPHERAL VASCULAR DISEASE, UNSPECIFIED: ICD-10-CM

## 2023-05-10 DIAGNOSIS — G60.0 HEREDITARY MOTOR AND SENSORY NEUROPATHY: ICD-10-CM

## 2023-05-10 DIAGNOSIS — E78.5 HYPERLIPIDEMIA, UNSPECIFIED: ICD-10-CM

## 2023-05-10 DIAGNOSIS — I10 ESSENTIAL (PRIMARY) HYPERTENSION: ICD-10-CM

## 2023-05-10 DIAGNOSIS — G89.29 PAIN IN UNSPECIFIED KNEE: ICD-10-CM

## 2023-05-10 DIAGNOSIS — I48.91 UNSPECIFIED ATRIAL FIBRILLATION: ICD-10-CM

## 2023-05-10 DIAGNOSIS — M25.569 PAIN IN UNSPECIFIED KNEE: ICD-10-CM

## 2023-05-10 DIAGNOSIS — I77.1 STRICTURE OF ARTERY: ICD-10-CM

## 2023-05-10 PROCEDURE — 99214 OFFICE O/P EST MOD 30 MIN: CPT | Mod: 25

## 2023-05-10 PROCEDURE — 93000 ELECTROCARDIOGRAM COMPLETE: CPT

## 2023-05-10 NOTE — HISTORY OF PRESENT ILLNESS
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 Smoking since age 13.  He stated that at most 1 pack lasted in 3 to 4 days. about 13 ppy smoking hx based on online calculators. \par \par Reports that he did not get the flu vaccine this year.\par Reports he has had 2 COVID vaccines in the past.

## 2023-05-10 NOTE — PHYSICAL EXAM
[Well Nourished] : well nourished [Normal Sclera/Conjunctiva] : normal sclera/conjunctiva [PERRL] : pupils equal round and reactive to light [EOMI] : extraocular movements intact [Normal Outer Ear/Nose] : the outer ears and nose were normal in appearance [Normal Oropharynx] : the oropharynx was normal [No JVD] : no jugular venous distention [No Lymphadenopathy] : no lymphadenopathy [Supple] : supple [Thyroid Normal, No Nodules] : the thyroid was normal and there were no nodules present [No Respiratory Distress] : no respiratory distress  [No Accessory Muscle Use] : no accessory muscle use [Clear to Auscultation] : lungs were clear to auscultation bilaterally [Normal Rate] : normal rate  [Regular Rhythm] : with a regular rhythm [Normal S1, S2] : normal S1 and S2 [No Murmur] : no murmur heard [No Varicosities] : no varicosities [Pedal Pulses Present] : the pedal pulses are present [No Edema] : there was no peripheral edema [No Extremity Clubbing/Cyanosis] : no extremity clubbing/cyanosis [Soft] : abdomen soft [Non Tender] : non-tender [Non-distended] : non-distended [No Masses] : no abdominal mass palpated [Normal Bowel Sounds] : normal bowel sounds [de-identified] : Left leg enclosed in brace.  Patient stated it hurts to bend.  Has been keeping it straight

## 2023-06-15 ENCOUNTER — APPOINTMENT (OUTPATIENT)
Dept: ORTHOPEDIC SURGERY | Facility: CLINIC | Age: 63
End: 2023-06-15
Payer: MEDICAID

## 2023-06-15 PROCEDURE — 99213 OFFICE O/P EST LOW 20 MIN: CPT

## 2023-06-15 PROCEDURE — 73562 X-RAY EXAM OF KNEE 3: CPT | Mod: LT

## 2023-06-16 NOTE — PHYSICAL EXAM
[de-identified] : The patient is sitting comfortably in the exam room. \par LEFT leg.\par -Skin is intact, no swelling, no ecchymosis\par -Incision is well-healed, no erythema, no signs of infection\par -Patient is able to do a straight leg raise\par -Patient is able to move from 90 degrees of flexion and extend against gravity\par -Sensation is intact L1-S1\par -4/5 EHL, FHL, TA, GS, quadriceps, hamstrings\par -Foot is warm and well-perfused, palpable dorsalis pedis pulse\par  [de-identified] : Xrays of the left knee were taken in the office today, 6/15/23.  3 views were taken.  AP, oblique, and lateral.  X-rays show slight gapping at the fracture site.  The 2 partially-threaded screws are in good position.  There is a small osseous sleeve fragment on the lateral and distal aspect of the patella that is distal to the articular surface of the patella.  There is callus connecting the sleeve fragment to the articular fragment

## 2023-06-16 NOTE — HISTORY OF PRESENT ILLNESS
[de-identified] : JOY Joshua is a 62 y.o. gentleman with Charcot-Lakia-Tooth disorder who presents to the office for post op s/p ORIF left patella from 1/6/23. Since his last visit he states he is feeling better. He is ambulating with a walker and notes that he sometimes uses a cane. He is participating in PT twice a week. He notes intermittent anterior left knee pain and takes Ibuprofen for the pain with relief.

## 2023-06-16 NOTE — DISCUSSION/SUMMARY
[de-identified] : 62-year-old male s/p ORIF left patella, approximately 4 months out, being treated nonoperatively.\par -X-ray and physical exam findings were discussed with the patient.  I explained that the distal aspect of the patella had started to pull out from the primary part of the patella that was fixed.  The patient's extensor mechanism is still currently intact.  I explained that we will have the patient follow-up in 1 month to make sure that the extensor mechanism remains intact and that there is no further displacement.  The patient and his wife showed a good understanding of the condition and the current treatment plan.\par -Weightbearing as tolerated left LE\par -Follow-up in 1 month with x-rays of the left knee at that time\par -All the patient's questions and concerns were addressed during this visit\par

## 2023-06-16 NOTE — PHYSICAL EXAM
[de-identified] : The patient is sitting comfortably in the exam room. \par LEFT leg.\par -Skin is intact, no swelling, no ecchymosis\par -Incision is well-healed, no erythema, no signs of infection\par -Patient is able to do a straight leg raise\par -Patient is able to move from 40 degrees of flexion and extend against gravity\par -Sensation is intact L1-S1\par -4/5 EHL, FHL, TA, GS, quadriceps, hamstrings\par -Foot is warm and well-perfused, palpable dorsalis pedis pulse\par  [de-identified] : Xrays of the left knee were taken in the office today, 5/4/23.  3 views were taken.  AP, oblique, and lateral.  X-rays show slight gapping at the fracture site.  The 2 partially-threaded screws are in good position.  There is a small osseous sleeve fragment on the lateral and distal aspect of the patella that is distal to the articular surface of the patella.  There is callus connecting the sleeve fragment to the articular fragment

## 2023-06-16 NOTE — DISCUSSION/SUMMARY
[de-identified] : 62-year-old male s/p ORIF left patella, approximately 5.5 months out, being treated nonoperatively.\par -X-ray and physical exam findings were discussed with the patient.  We had a discussion regarding the avulsion of the distal aspects of the patella.  I explained if the patient has issues in the future ambulating we can revise the construct.  The patient has zero interest in surgery at this time.  He and his wife show good understanding of his condition and treatment options.\par -Weightbearing as tolerated left LE\par -Follow-up in 5 months with x-rays of the left knee at that time\par -All the patient's questions and concerns were addressed during this visit\par

## 2023-06-16 NOTE — HISTORY OF PRESENT ILLNESS
[de-identified] : JOY Joshua is a 62 y.o. gentleman with Charcot-Lakia-Tooth disorder who presents to the office for post op s/p ORIF left patella from 1/6/23. Since his last visit he states he is feeling better. He is ambulating with a walker. Prior to this injury he was ambulating with a cane. He presents today with a [place] brace. He notes anterior knee pain, worsened with bearing weight on his left leg. He is taking ibuprofen for the pain with relief. He is participating in PT twice a week.

## 2023-09-21 ENCOUNTER — APPOINTMENT (OUTPATIENT)
Dept: ORTHOPEDIC SURGERY | Facility: CLINIC | Age: 63
End: 2023-09-21
Payer: MEDICAID

## 2023-09-21 VITALS — HEIGHT: 68 IN | WEIGHT: 180 LBS | BODY MASS INDEX: 27.28 KG/M2

## 2023-09-21 PROCEDURE — 99213 OFFICE O/P EST LOW 20 MIN: CPT

## 2023-10-02 ENCOUNTER — APPOINTMENT (OUTPATIENT)
Dept: INTERNAL MEDICINE | Facility: CLINIC | Age: 63
End: 2023-10-02

## 2023-10-04 ENCOUNTER — APPOINTMENT (OUTPATIENT)
Dept: CT IMAGING | Facility: IMAGING CENTER | Age: 63
End: 2023-10-04
Payer: MEDICAID

## 2023-10-04 ENCOUNTER — OUTPATIENT (OUTPATIENT)
Dept: OUTPATIENT SERVICES | Facility: HOSPITAL | Age: 63
LOS: 1 days | End: 2023-10-04
Payer: MEDICAID

## 2023-10-04 ENCOUNTER — RESULT REVIEW (OUTPATIENT)
Age: 63
End: 2023-10-04

## 2023-10-04 DIAGNOSIS — Z98.890 OTHER SPECIFIED POSTPROCEDURAL STATES: Chronic | ICD-10-CM

## 2023-10-04 DIAGNOSIS — S82.092A OTHER FRACTURE OF LEFT PATELLA, INITIAL ENCOUNTER FOR CLOSED FRACTURE: ICD-10-CM

## 2023-10-04 PROCEDURE — 73700 CT LOWER EXTREMITY W/O DYE: CPT

## 2023-10-04 PROCEDURE — 73700 CT LOWER EXTREMITY W/O DYE: CPT | Mod: 26,LT

## 2023-10-04 PROCEDURE — 76377 3D RENDER W/INTRP POSTPROCES: CPT | Mod: 26

## 2023-10-04 PROCEDURE — 76377 3D RENDER W/INTRP POSTPROCES: CPT

## 2023-11-02 ENCOUNTER — APPOINTMENT (OUTPATIENT)
Dept: ORTHOPEDIC SURGERY | Facility: CLINIC | Age: 63
End: 2023-11-02
Payer: MEDICAID

## 2023-11-02 VITALS — WEIGHT: 180 LBS | HEIGHT: 68 IN | BODY MASS INDEX: 27.28 KG/M2

## 2023-11-02 DIAGNOSIS — S82.009A UNSPECIFIED FRACTURE OF UNSPECIFIED PATELLA, INITIAL ENCOUNTER FOR CLOSED FRACTURE: ICD-10-CM

## 2023-11-02 PROCEDURE — 77073 BONE LENGTH STUDIES: CPT

## 2023-11-02 PROCEDURE — 99213 OFFICE O/P EST LOW 20 MIN: CPT

## 2023-12-06 NOTE — PACU DISCHARGE NOTE - AIRWAY PATENCY:
Satisfactory
FAMILY HISTORY:  Father  Still living? Unknown  Family history of diabetes mellitus, Age at diagnosis: Age Unknown
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
